# Patient Record
Sex: FEMALE | Race: BLACK OR AFRICAN AMERICAN | NOT HISPANIC OR LATINO | Employment: FULL TIME | URBAN - METROPOLITAN AREA
[De-identification: names, ages, dates, MRNs, and addresses within clinical notes are randomized per-mention and may not be internally consistent; named-entity substitution may affect disease eponyms.]

---

## 2018-04-19 ENCOUNTER — OFFICE VISIT (OUTPATIENT)
Dept: OBSTETRICS AND GYNECOLOGY | Facility: CLINIC | Age: 48
End: 2018-04-19
Payer: MEDICAID

## 2018-04-19 ENCOUNTER — HOSPITAL ENCOUNTER (OUTPATIENT)
Dept: RADIOLOGY | Facility: HOSPITAL | Age: 48
Discharge: HOME | End: 2018-04-19
Attending: NURSE PRACTITIONER
Payer: MEDICAID

## 2018-04-19 VITALS
DIASTOLIC BLOOD PRESSURE: 72 MMHG | WEIGHT: 177.25 LBS | SYSTOLIC BLOOD PRESSURE: 120 MMHG | HEIGHT: 65 IN | BODY MASS INDEX: 29.53 KG/M2

## 2018-04-19 DIAGNOSIS — Z01.419 WELL WOMAN EXAM WITH ROUTINE GYNECOLOGICAL EXAM: Primary | ICD-10-CM

## 2018-04-19 DIAGNOSIS — R53.83 FATIGUE, UNSPECIFIED TYPE: ICD-10-CM

## 2018-04-19 DIAGNOSIS — N95.1 PERIMENOPAUSE: ICD-10-CM

## 2018-04-19 DIAGNOSIS — R63.5 WEIGHT GAIN, ABNORMAL: ICD-10-CM

## 2018-04-19 DIAGNOSIS — N94.6 DYSMENORRHEA: ICD-10-CM

## 2018-04-19 DIAGNOSIS — Z86.018 HISTORY OF UTERINE FIBROID: ICD-10-CM

## 2018-04-19 DIAGNOSIS — Z12.39 BREAST CANCER SCREENING: ICD-10-CM

## 2018-04-19 DIAGNOSIS — N92.1 METRORRHAGIA: ICD-10-CM

## 2018-04-19 DIAGNOSIS — Z87.42 STATUS POST CERVICAL POLYP REMOVAL: ICD-10-CM

## 2018-04-19 DIAGNOSIS — F32.A DEPRESSION, UNSPECIFIED DEPRESSION TYPE: ICD-10-CM

## 2018-04-19 DIAGNOSIS — R41.3 MEMORY LOSS: ICD-10-CM

## 2018-04-19 DIAGNOSIS — N89.8 VAGINAL DISCHARGE: ICD-10-CM

## 2018-04-19 DIAGNOSIS — K59.00 CONSTIPATION, UNSPECIFIED CONSTIPATION TYPE: ICD-10-CM

## 2018-04-19 DIAGNOSIS — Z11.3 SCREENING FOR STDS (SEXUALLY TRANSMITTED DISEASES): ICD-10-CM

## 2018-04-19 DIAGNOSIS — Z98.890 STATUS POST CERVICAL POLYP REMOVAL: ICD-10-CM

## 2018-04-19 LAB
CANDIDA RRNA VAG QL PROBE: NEGATIVE
G VAGINALIS RRNA GENITAL QL PROBE: NEGATIVE
T VAGINALIS RRNA GENITAL QL PROBE: NEGATIVE

## 2018-04-19 PROCEDURE — 99213 OFFICE O/P EST LOW 20 MIN: CPT | Mod: PBBFAC | Performed by: NURSE PRACTITIONER

## 2018-04-19 PROCEDURE — 77067 SCR MAMMO BI INCL CAD: CPT | Mod: TC

## 2018-04-19 PROCEDURE — 99396 PREV VISIT EST AGE 40-64: CPT | Mod: 25,S$PBB,, | Performed by: NURSE PRACTITIONER

## 2018-04-19 PROCEDURE — 87480 CANDIDA DNA DIR PROBE: CPT

## 2018-04-19 PROCEDURE — 77067 SCR MAMMO BI INCL CAD: CPT | Mod: 26,,, | Performed by: RADIOLOGY

## 2018-04-19 PROCEDURE — 88305 TISSUE EXAM BY PATHOLOGIST: CPT | Performed by: PATHOLOGY

## 2018-04-19 PROCEDURE — 87491 CHLMYD TRACH DNA AMP PROBE: CPT

## 2018-04-19 PROCEDURE — 99999 PR PBB SHADOW E&M-EST. PATIENT-LVL III: CPT | Mod: PBBFAC,,, | Performed by: NURSE PRACTITIONER

## 2018-04-19 PROCEDURE — 88305 TISSUE EXAM BY PATHOLOGIST: CPT | Mod: 26,,, | Performed by: PATHOLOGY

## 2018-04-19 PROCEDURE — 77063 BREAST TOMOSYNTHESIS BI: CPT | Mod: 26,,, | Performed by: RADIOLOGY

## 2018-04-19 PROCEDURE — 87510 GARDNER VAG DNA DIR PROBE: CPT

## 2018-04-19 PROCEDURE — 88175 CYTOPATH C/V AUTO FLUID REDO: CPT

## 2018-04-19 NOTE — PROGRESS NOTES
"HISTORY OF PRESENT ILLNESS:    Jess Mcleod is a 48 y.o. female, , Patient's last menstrual period was 2018 (exact date).,  presents for a routine exam, STD screening ( from  who lives in [place]) and has no new gyn complaints.  -See Problem note for ROS and Exam.    Past Medical History:   Diagnosis Date    Arthritis     Chronic back pain     Chronic neck pain     Fibrocystic breast     Fibroid, uterus     GERD (gastroesophageal reflux disease)     Herpes     Migraine headache     Scoliosis        Past Surgical History:   Procedure Laterality Date     SECTION      Scoliosis surgery      UMBILICAL HERNIA REPAIR         MEDICATIONS AND ALLERGIES:      Current Outpatient Prescriptions:     diazepam (VALIUM) 5 MG tablet, Take 1 tablet (5 mg total) by mouth every 12 (twelve) hours as needed (muscle spasm)., Disp: 6 tablet, Rfl: 0    Review of patient's allergies indicates:   Allergen Reactions    Ibuprofen Other (See Comments)     It makes my "ears hurt" when I take large doses.       Family History   Problem Relation Age of Onset    Breast cancer Paternal Aunt     Colon cancer Neg Hx     Ovarian cancer Neg Hx        Social History     Social History    Marital status: Legally      Spouse name: N/A    Number of children: N/A    Years of education: N/A     Occupational History    Not on file.     Social History Main Topics    Smoking status: Never Smoker    Smokeless tobacco: Never Used    Alcohol use Yes    Drug use: No    Sexual activity: Yes     Partners: Male     Birth control/ protection: None     Other Topics Concern    Not on file     Social History Narrative    No narrative on file       OB HISTORY: Number of vaginal deliveries:1 Number of C/S:1     COMPREHENSIVE GYN HISTORY:  PAP History: Denies abnormal Paps. LAST PAP -16 NORMAL.  Infection History: Denies STDs. Denies PID.  Benign History: Reports uterine fibroids. Denies ovarian " "cysts. Denies endometriosis. Denies other conditions.  Cancer History: Denies cervical cancer. Denies uterine cancer or hyperplasia. Denies ovarian cancer. Denies vulvar cancer or pre-cancer. Denies vaginal cancer or pre-cancer. Denies breast cancer. Denies colon cancer.  Sexual Activity History: Reports currently being sexually active  Menstrual History: Monthly. Flows 7 - 10 days. Heavy flow.   Dysmenorrhea History: Reports severe dysmenorrhea.   Contraception: Condoms.     ROS: See note below.     /72   Ht 5' 4.5" (1.638 m)   Wt 80.4 kg (177 lb 4 oz)   LMP 2018 (Exact Date)   BMI 29.96 kg/m²     PE:  APPEARANCE: Well nourished, well developed, in no acute distress.  AFFECT: WNL, alert and oriented x 3.  SKIN: No acne or hirsutism.  NECK: Neck symmetric, without masses or thyromegaly.  NODES: No inguinal, cervical, axillary or femoral lymph node enlargement.  CHEST: Good respiratory effort.   ABDOMEN: Soft. No tenderness or masses. No hepatosplenomegaly. No hernias.  BREASTS: Symmetrical, no skin changes, visible lesions, palpable masses or nipple discharge bilaterally.  PELVIC: See Below for pelvic exam.  EXTREMITIES: No edema    DIAGNOSIS:   Well Woman Exam with routine gyn exam  Perimenopause  STD screening  History of Uterine Fibroid    PLAN:    LABS AND TESTS ORDERED:  Pap  Genprobe  Affirm  HIV Ab, RPR, Hepatitis panel  Mammogram    COUNSELING:  The patient was counseled today on:  -perimenopause vs menopause and to report severe vasomotor symptoms and/or skipping periods, heavy, prolonged bleeding, spotting in between periods;  -STDs and prevention;  -A.C.S. Pap and pelvic exam guidelines (pap every 3 years), recomendations for yearly mammogram;  -to follow up with her PCP for other health maintenance.    FOLLOW-UP with me annually.           HISTORY OF PRESENT ILLNESS:    Jess Mcleod is a 48 y.o. female, , Patient's last menstrual period was 2018 (exact date).,  presents for " "a routine exam and c/o:  -heavy painful periods (with use of double protection, clots, severe cramping for 7 days) and reports losing two jobs because of either being too fatigued to work or bleeding too heavy and in too much pain to work for 2 weeks out of each month, resulting in mood swings, memory loss and depression.  -no longer taking Ortho Micronor "because it didn't help".  -has gained weight, is constipated and is miserable.    See above note for PH, SH, FH, SH, MEDS, ALLERGIES, OB GYN HX.    ROS:  GENERAL: + WT GAIN. No swelling. + FATIGUE. No fever. + MEMORY LOSS.  CARDIOVASCULAR: No chest pain. No shortness of breath. No leg cramps.   NEUROLOGICAL: + HAS MIGRAINE HEADACHES.  BREASTS: No pain. No lumps. No discharge.  ABDOMEN: + MENSTRUAL CRAMPS. No nausea. No vomiting. No diarrhea. + CONSTIPATION.  REPRODUCTIVE: + HEAVY PERIODS.   VULVA: No pain. No lesions. No itching.  VAGINA: No relaxation. No itching. + ODOR and D/C. No lesions. + DRYNESS with her period.  URINARY: No incontinence. No nocturia. No frequency. No dysuria.    /72   Ht 5' 4.5" (1.638 m)   Wt 80.4 kg (177 lb 4 oz)   LMP 2018 (Exact Date)   BMI 29.96 kg/m²     PE:  APPEARANCE: Well nourished, well developed, in no acute distress.  AFFECT: WNL, alert and oriented x 3.  PELVIC: External female genitalia without lesions.  Female hair distribution. Adequate perineal body, Normal urethral meatus. Vagina moist and well rugated without lesions or discharge.  No significant cystocele or rectocele present. Cervix pink with a BEEFY RED POLYP ON A STALK AT OS WHICH BLEEDS WITH Q TIP TOUCH.  No discharge or tenderness. Uterus is 12-14 week size, regular, mobile and non tender. Adnexa bilaterally without masses or tenderness.    PROCEDURES:  CERVICAL POLYP REMOVAL:    Jess Mcleod is a 48 y.o. [id number] Patient's last menstrual period was 2018 (exact date). presents today for a cervical polyp removal; polyp was discovered " during a routine gyn exam.    PRE CERVICAL POLYP REMOVAL COUNSELING:  The patient was informed of the minimal risk of bleeding and infection and she agrees to proceed.    PROCEDURE:  A time out was performed  The cervical polyp was grasped at the base with a ring forceps and easily twisted off  from its base with minimal bleeding.  The base of the polyp was cauterized with silver nitrite to stop bleeding.  The specimen was placed in formalyn and sent to Pathology for histology evaluation.    The patient tolerated the procedure well.     PROCEDURE:  ENDOMETRIAL BIOPSY:    PRE ENDOMETRIAL BIOPSY COUNSELING:  The patient was informed of the risk of bleeding, infection, uterine perforation and pain and that the test will rule-out endometrial cancer with accuracy greater than 95%. She was counseled on the alternatives to endometrial biopsy and agrees to proceed.    PROCEDURE:  UPT negative  A time out was performed  The cervix was visualized with a speculum.  A single tooth tenaculum was placed on the anterior lip prior to the biopsy.  A sterile sound was used to gently dilate the cervical os and sound the uterus prior to obtaining the biopsy.  A sterile endometrial pipelle was passed without difficulty to a depth of 8 cm.  Adequate endometrial tissue was obtained.  The specimen was placed in formalyn and sent to Pathology for histology evaluation.    The patient tolerated the procedure well.    DIAGNOSIS:  Metrorrhagia / Dysmenorrhea  S/P cervical polyp removal  Fatigue   Mood swings / Depression  Memory Loss   Wt gain  Constipation    PLAN:    LABS AND TESTS ORDERED:  Pelvic US  CBC, CMP, TSH  Endometrial biopsy  Cervical biopsy      COUNSELING:  Options for treatment of AUB including combination hormonal Rx, Mirena IUD, cylcic progestins, NSAIDs and surgical intervention with D&C/Hysteroscope, Endometrial ablation or Hysterectomy and she is INTERESTED IN A HYSTERECTOMY.    POST CERVICAL POLYP REMOVAL  COUNSELING:  Expect spotting or light bleeding for a few days.  Report bleeding heavier than a period, fever > 101.0 F, pain or a foul smelling vaginal  discharge.    POST ENDOMETRIAL BIOPSY COUNSELING:  Manage post biopsy cramping with NSAIDs or Tylenol.  Expect spotting or light bleeding for a few days.  Report bleeding heavier than a period, fever > 101.0 F, worsening pain or a foul smelling vaginal discharge.    FOLLOW-UP: pending pathology, US and lab results and with Dr FREIDA Ricardo for a surgery consult and with Neurologist for memory loss work up and PCP for management of fatigue, mood swings, wt gain, constipation, depression.

## 2018-04-20 LAB
C TRACH DNA SPEC QL NAA+PROBE: NOT DETECTED
N GONORRHOEA DNA SPEC QL NAA+PROBE: NOT DETECTED

## 2018-04-23 ENCOUNTER — TELEPHONE (OUTPATIENT)
Dept: RADIOLOGY | Facility: HOSPITAL | Age: 48
End: 2018-04-23

## 2018-04-23 NOTE — TELEPHONE ENCOUNTER
Spoke with patient and explained mammogram findings.Patient expressed understanding of results. Patient is scheduled for a abnormal mammogram follow up appointment at [facility] on 4/24/18.

## 2018-04-24 ENCOUNTER — HOSPITAL ENCOUNTER (OUTPATIENT)
Dept: RADIOLOGY | Facility: HOSPITAL | Age: 48
Discharge: HOME | End: 2018-04-24
Attending: NURSE PRACTITIONER
Payer: MEDICAID

## 2018-04-24 ENCOUNTER — TELEPHONE (OUTPATIENT)
Dept: OBSTETRICS AND GYNECOLOGY | Facility: CLINIC | Age: 48
End: 2018-04-24

## 2018-04-24 DIAGNOSIS — R92.8 ABNORMAL MAMMOGRAM: ICD-10-CM

## 2018-04-24 PROCEDURE — 77065 DX MAMMO INCL CAD UNI: CPT | Mod: 26,LT,, | Performed by: RADIOLOGY

## 2018-04-24 PROCEDURE — 77061 BREAST TOMOSYNTHESIS UNI: CPT | Mod: TC,PO,LT

## 2018-04-24 PROCEDURE — 77061 BREAST TOMOSYNTHESIS UNI: CPT | Mod: 26,LT,, | Performed by: RADIOLOGY

## 2018-04-24 PROCEDURE — 76642 ULTRASOUND BREAST LIMITED: CPT | Mod: 26,LT,, | Performed by: RADIOLOGY

## 2018-04-24 PROCEDURE — 76642 ULTRASOUND BREAST LIMITED: CPT | Mod: TC,PO,LT

## 2018-04-24 PROCEDURE — 77065 DX MAMMO INCL CAD UNI: CPT | Mod: TC,PO,LT

## 2018-04-24 NOTE — TELEPHONE ENCOUNTER
PHONE CALL: Unable to LM on cell # or home #.    MY CHART MESSAGE:  I could not reach you by phone. Your endometrial biopsy is negative for cancer or precancer. The pathology report of the polyp was benign. Please either e-mail me or call if you have questions. Beckie Mcgarry NP.

## 2018-04-26 ENCOUNTER — TELEPHONE (OUTPATIENT)
Dept: OBSTETRICS AND GYNECOLOGY | Facility: CLINIC | Age: 48
End: 2018-04-26

## 2018-04-26 ENCOUNTER — HOSPITAL ENCOUNTER (OUTPATIENT)
Dept: RADIOLOGY | Facility: HOSPITAL | Age: 48
Discharge: HOME | End: 2018-04-26
Attending: NURSE PRACTITIONER
Payer: MEDICAID

## 2018-04-26 DIAGNOSIS — Z86.018 HISTORY OF UTERINE FIBROID: ICD-10-CM

## 2018-04-26 DIAGNOSIS — N94.6 DYSMENORRHEA: ICD-10-CM

## 2018-04-26 DIAGNOSIS — N83.202 CYST OF LEFT OVARY: Primary | ICD-10-CM

## 2018-04-26 DIAGNOSIS — N92.1 METRORRHAGIA: ICD-10-CM

## 2018-04-26 PROCEDURE — 76830 TRANSVAGINAL US NON-OB: CPT | Mod: 26,,, | Performed by: RADIOLOGY

## 2018-04-26 PROCEDURE — 76830 TRANSVAGINAL US NON-OB: CPT | Mod: TC

## 2018-04-26 PROCEDURE — 76856 US EXAM PELVIC COMPLETE: CPT | Mod: 26,,, | Performed by: RADIOLOGY

## 2018-04-27 ENCOUNTER — PATIENT MESSAGE (OUTPATIENT)
Dept: OBSTETRICS AND GYNECOLOGY | Facility: CLINIC | Age: 48
End: 2018-04-27

## 2018-05-07 ENCOUNTER — OFFICE VISIT (OUTPATIENT)
Dept: OBSTETRICS AND GYNECOLOGY | Facility: CLINIC | Age: 48
End: 2018-05-07
Attending: OBSTETRICS & GYNECOLOGY
Payer: MEDICAID

## 2018-05-07 VITALS
DIASTOLIC BLOOD PRESSURE: 84 MMHG | SYSTOLIC BLOOD PRESSURE: 122 MMHG | BODY MASS INDEX: 29.61 KG/M2 | WEIGHT: 177.69 LBS | HEIGHT: 65 IN

## 2018-05-07 DIAGNOSIS — D25.9 UTERINE LEIOMYOMA, UNSPECIFIED LOCATION: ICD-10-CM

## 2018-05-07 DIAGNOSIS — N92.0 MENORRHAGIA WITH REGULAR CYCLE: ICD-10-CM

## 2018-05-07 DIAGNOSIS — N94.6 DYSMENORRHEA: Primary | ICD-10-CM

## 2018-05-07 PROCEDURE — 99214 OFFICE O/P EST MOD 30 MIN: CPT | Mod: S$PBB,,, | Performed by: OBSTETRICS & GYNECOLOGY

## 2018-05-07 PROCEDURE — 99999 PR PBB SHADOW E&M-EST. PATIENT-LVL III: CPT | Mod: PBBFAC,,, | Performed by: OBSTETRICS & GYNECOLOGY

## 2018-05-07 PROCEDURE — 99213 OFFICE O/P EST LOW 20 MIN: CPT | Mod: PBBFAC | Performed by: OBSTETRICS & GYNECOLOGY

## 2018-05-07 RX ORDER — LEVONORGESTREL AND ETHINYL ESTRADIOL 0.1-0.02MG
1 KIT ORAL DAILY
Qty: 28 TABLET | Refills: 11 | Status: SHIPPED | OUTPATIENT
Start: 2018-05-07 | End: 2019-05-14

## 2018-05-07 NOTE — PROGRESS NOTES
"HISTORY OF PRESENT ILLNESS:    Jess Mcleod is a 48 y.o. female, , Patient's last menstrual period was 2018.,  presents for a consult.  Patient with long history of dysmenorrhea.  For the last 2 years cycles have gotten significantly worse in terms of pain and bleeding.  Cycles are regular every month, lasting 7 days, heavy with clots and flooding.  Patient with history of fibroids since .    U/S:  Uterus:  Size: 11.9 x 7.9 x 9.1 cm.  Redemonstration of 3 previously identified fibroids the largest of which measures 4.5 x 4.5 x 4.8 cm.  Interval development of a new fibroid measuring 1.8 x 2.8 x 2.9 cm.  Endometrium: Normal in this pre menopausal patient, measuring 9 mm.  Right ovary:  Size: 4.0 x 4.0 x 2.5 cm.  Appearance: Normal.  Vascular flow: Normal.  Resistive index is 0.68.  Left ovary:  Size: 2.9 x 2.2 x 2.2 cm.  Appearance: New echogenic foci within the left ovary measuring 1.1 x 0.8 x 1.3 cm which is nonspecific however may represent an involuted follicle.  Vascular Flow: Normal.  Resistive index is 0.74.  Free Fluid: None.    TSH normal.  HCT 37&  EMB benign    Past Medical History:   Diagnosis Date    Arthritis     Chronic back pain     Chronic neck pain     Fibrocystic breast     Fibroid, uterus     GERD (gastroesophageal reflux disease)     Herpes     Migraine headache     Scoliosis        Past Surgical History:   Procedure Laterality Date     SECTION      Scoliosis surgery      UMBILICAL HERNIA REPAIR         MEDICATIONS AND ALLERGIES:      Current Outpatient Prescriptions:     levonorgestrel-ethinyl estradiol (AVIANE,ALESSE,LESSINA) 0.1-20 mg-mcg per tablet, Take 1 tablet by mouth once daily., Disp: 28 tablet, Rfl: 11    Review of patient's allergies indicates:   Allergen Reactions    Ibuprofen Other (See Comments)     It makes my "ears hurt" when I take large doses.       Family History   Problem Relation Age of Onset    Breast cancer Paternal Aunt     Colon " "cancer Neg Hx     Ovarian cancer Neg Hx        Social History     Social History    Marital status: Legally      Spouse name: N/A    Number of children: N/A    Years of education: N/A     Occupational History    Not on file.     Social History Main Topics    Smoking status: Never Smoker    Smokeless tobacco: Never Used    Alcohol use Yes    Drug use: No    Sexual activity: Yes     Partners: Male     Birth control/ protection: None     Other Topics Concern    Not on file     Social History Narrative    No narrative on file       ROS:  GENERAL: No weight changes. No swelling. . No fever.  Reports fatigue  CARDIOVASCULAR: No chest pain. No shortness of breath. No leg cramps.   NEUROLOGICAL: No headaches. No vision changes.  BREASTS: No pain. No lumps. No discharge.  ABDOMEN: No pain. No nausea. No vomiting. No diarrhea. No constipation.  REPRODUCTIVE: see above  VULVA: No pain. No lesions. No itching.  VAGINA: No relaxation. No itching. No odor. No discharge. No lesions.  URINARY: No incontinence. No nocturia. No frequency. No dysuria.    /84   Ht 5' 4.5" (1.638 m)   Wt 80.6 kg (177 lb 11.1 oz)   LMP 05/04/2018   BMI 30.03 kg/m²     PE:  APPEARANCE: Well nourished, well developed, in no acute distress.  ABDOMEN: Soft. No tenderness or masses. No hepatosplenomegaly. No hernias.  BREASTS, FUNDOSCOPIC, RECTAL DEFERRED  PELVIC: External female genitalia without lesions.  Female hair distribution. Adequate perineal body, Normal urethral meatus. Vagina moist and well rugated without lesions or discharge.  No significant cystocele or rectocele present. Cervix pink without lesions, discharge or tenderness. Uterus is 14-16 week size, regular, immobile and nontender. Adnexa without masses or tenderness.  EXTREMITIES: No edema      DIAGNOSIS & PLAN  1. Dysmenorrhea     2. Menorrhagia with regular cycle     3. Uterine leiomyoma, unspecified location         COUNSELING:  Discussed options for " treatment including medical management with OCPs vs. Hysterectomy.  Patient desires trial of medical management.

## 2018-05-19 ENCOUNTER — HOSPITAL ENCOUNTER (OUTPATIENT)
Facility: HOSPITAL | Age: 48
Discharge: HOME | End: 2018-05-20
Attending: EMERGENCY MEDICINE | Admitting: EMERGENCY MEDICINE
Payer: MEDICAID

## 2018-05-19 DIAGNOSIS — K76.0 HEPATIC STEATOSIS: ICD-10-CM

## 2018-05-19 DIAGNOSIS — M54.41 ACUTE RIGHT-SIDED LOW BACK PAIN WITH RIGHT-SIDED SCIATICA: ICD-10-CM

## 2018-05-19 DIAGNOSIS — N13.30 HYDROURETERONEPHROSIS: ICD-10-CM

## 2018-05-19 DIAGNOSIS — N20.0 NEPHROLITHIASIS: ICD-10-CM

## 2018-05-19 DIAGNOSIS — N20.1 LEFT URETERAL STONE: ICD-10-CM

## 2018-05-19 DIAGNOSIS — M54.41 CHRONIC RIGHT-SIDED LOW BACK PAIN WITH RIGHT-SIDED SCIATICA: ICD-10-CM

## 2018-05-19 DIAGNOSIS — D25.9 UTERINE LEIOMYOMA, UNSPECIFIED LOCATION: ICD-10-CM

## 2018-05-19 DIAGNOSIS — K59.04 CHRONIC IDIOPATHIC CONSTIPATION: ICD-10-CM

## 2018-05-19 DIAGNOSIS — N20.0 KIDNEY STONE ON LEFT SIDE: Primary | ICD-10-CM

## 2018-05-19 DIAGNOSIS — R11.2 NAUSEA AND VOMITING, INTRACTABILITY OF VOMITING NOT SPECIFIED, UNSPECIFIED VOMITING TYPE: ICD-10-CM

## 2018-05-19 DIAGNOSIS — R10.9 ABDOMINAL PAIN: ICD-10-CM

## 2018-05-19 DIAGNOSIS — G89.29 CHRONIC RIGHT-SIDED LOW BACK PAIN WITH RIGHT-SIDED SCIATICA: ICD-10-CM

## 2018-05-19 DIAGNOSIS — M54.31 SCIATICA OF RIGHT SIDE: ICD-10-CM

## 2018-05-19 DIAGNOSIS — K21.9 GASTROESOPHAGEAL REFLUX DISEASE, ESOPHAGITIS PRESENCE NOT SPECIFIED: ICD-10-CM

## 2018-05-19 PROBLEM — M54.50 ACUTE RIGHT-SIDED LOW BACK PAIN: Status: ACTIVE | Noted: 2018-05-19

## 2018-05-19 LAB
ALBUMIN SERPL BCP-MCNC: 3.6 G/DL
ALP SERPL-CCNC: 77 U/L
ALT SERPL W/O P-5'-P-CCNC: 16 U/L
ANION GAP SERPL CALC-SCNC: 11 MMOL/L
AST SERPL-CCNC: 21 U/L
B-HCG UR QL: NEGATIVE
BACTERIA #/AREA URNS AUTO: ABNORMAL /HPF
BASOPHILS # BLD AUTO: 0.04 K/UL
BASOPHILS NFR BLD: 0.3 %
BILIRUB SERPL-MCNC: 0.3 MG/DL
BILIRUB UR QL STRIP: NEGATIVE
BUN SERPL-MCNC: 9 MG/DL
CALCIUM SERPL-MCNC: 10.8 MG/DL
CHLORIDE SERPL-SCNC: 105 MMOL/L
CLARITY UR REFRACT.AUTO: CLEAR
CO2 SERPL-SCNC: 24 MMOL/L
COLOR UR AUTO: ABNORMAL
CREAT SERPL-MCNC: 0.8 MG/DL
DIFFERENTIAL METHOD: ABNORMAL
EOSINOPHIL # BLD AUTO: 0 K/UL
EOSINOPHIL NFR BLD: 0.2 %
ERYTHROCYTE [DISTWIDTH] IN BLOOD BY AUTOMATED COUNT: 13.5 %
EST. GFR  (AFRICAN AMERICAN): >60 ML/MIN/1.73 M^2
EST. GFR  (NON AFRICAN AMERICAN): >60 ML/MIN/1.73 M^2
GLUCOSE SERPL-MCNC: 97 MG/DL
GLUCOSE UR QL STRIP: NEGATIVE
HCT VFR BLD AUTO: 39.4 %
HGB BLD-MCNC: 13 G/DL
HGB UR QL STRIP: ABNORMAL
IMM GRANULOCYTES # BLD AUTO: 0.04 K/UL
IMM GRANULOCYTES NFR BLD AUTO: 0.3 %
KETONES UR QL STRIP: ABNORMAL
LEUKOCYTE ESTERASE UR QL STRIP: NEGATIVE
LIPASE SERPL-CCNC: 8 U/L
LYMPHOCYTES # BLD AUTO: 1.8 K/UL
LYMPHOCYTES NFR BLD: 14.8 %
MCH RBC QN AUTO: 30.3 PG
MCHC RBC AUTO-ENTMCNC: 33 G/DL
MCV RBC AUTO: 92 FL
MICROSCOPIC COMMENT: ABNORMAL
MONOCYTES # BLD AUTO: 0.3 K/UL
MONOCYTES NFR BLD: 2.5 %
NEUTROPHILS # BLD AUTO: 10 K/UL
NEUTROPHILS NFR BLD: 81.9 %
NITRITE UR QL STRIP: NEGATIVE
NRBC BLD-RTO: 0 /100 WBC
PH UR STRIP: >8 [PH] (ref 5–8)
PLATELET # BLD AUTO: 459 K/UL
PMV BLD AUTO: 10 FL
POTASSIUM SERPL-SCNC: 4.2 MMOL/L
PROT SERPL-MCNC: 8.3 G/DL
PROT UR QL STRIP: NEGATIVE
RBC # BLD AUTO: 4.29 M/UL
RBC #/AREA URNS AUTO: >100 /HPF (ref 0–4)
SODIUM SERPL-SCNC: 140 MMOL/L
SP GR UR STRIP: 1.01 (ref 1–1.03)
SQUAMOUS #/AREA URNS AUTO: 0 /HPF
URN SPEC COLLECT METH UR: ABNORMAL
UROBILINOGEN UR STRIP-ACNC: NEGATIVE EU/DL
WBC # BLD AUTO: 12.19 K/UL

## 2018-05-19 PROCEDURE — 96361 HYDRATE IV INFUSION ADD-ON: CPT

## 2018-05-19 PROCEDURE — 81025 URINE PREGNANCY TEST: CPT

## 2018-05-19 PROCEDURE — 99285 EMERGENCY DEPT VISIT HI MDM: CPT | Mod: ,,, | Performed by: EMERGENCY MEDICINE

## 2018-05-19 PROCEDURE — 96375 TX/PRO/DX INJ NEW DRUG ADDON: CPT

## 2018-05-19 PROCEDURE — 25500020 PHARM REV CODE 255: Performed by: EMERGENCY MEDICINE

## 2018-05-19 PROCEDURE — 80053 COMPREHEN METABOLIC PANEL: CPT

## 2018-05-19 PROCEDURE — 25000003 PHARM REV CODE 250: Performed by: STUDENT IN AN ORGANIZED HEALTH CARE EDUCATION/TRAINING PROGRAM

## 2018-05-19 PROCEDURE — C9113 INJ PANTOPRAZOLE SODIUM, VIA: HCPCS | Performed by: EMERGENCY MEDICINE

## 2018-05-19 PROCEDURE — 99285 EMERGENCY DEPT VISIT HI MDM: CPT | Mod: 25

## 2018-05-19 PROCEDURE — 25000003 PHARM REV CODE 250: Performed by: NURSE PRACTITIONER

## 2018-05-19 PROCEDURE — 96366 THER/PROPH/DIAG IV INF ADDON: CPT

## 2018-05-19 PROCEDURE — 83690 ASSAY OF LIPASE: CPT

## 2018-05-19 PROCEDURE — 85025 COMPLETE CBC W/AUTO DIFF WBC: CPT

## 2018-05-19 PROCEDURE — 99220 PR INITIAL OBSERVATION CARE,LEVL III: CPT | Mod: ,,, | Performed by: PHYSICIAN ASSISTANT

## 2018-05-19 PROCEDURE — 25000003 PHARM REV CODE 250: Performed by: EMERGENCY MEDICINE

## 2018-05-19 PROCEDURE — 96365 THER/PROPH/DIAG IV INF INIT: CPT

## 2018-05-19 PROCEDURE — 81001 URINALYSIS AUTO W/SCOPE: CPT

## 2018-05-19 PROCEDURE — 96376 TX/PRO/DX INJ SAME DRUG ADON: CPT

## 2018-05-19 PROCEDURE — G0378 HOSPITAL OBSERVATION PER HR: HCPCS

## 2018-05-19 PROCEDURE — 93005 ELECTROCARDIOGRAM TRACING: CPT

## 2018-05-19 PROCEDURE — 93010 ELECTROCARDIOGRAM REPORT: CPT | Mod: ,,, | Performed by: INTERNAL MEDICINE

## 2018-05-19 PROCEDURE — 63600175 PHARM REV CODE 636 W HCPCS: Performed by: EMERGENCY MEDICINE

## 2018-05-19 RX ORDER — DOCUSATE SODIUM 100 MG/1
100 CAPSULE, LIQUID FILLED ORAL 2 TIMES DAILY
Status: DISCONTINUED | OUTPATIENT
Start: 2018-05-20 | End: 2018-05-20 | Stop reason: HOSPADM

## 2018-05-19 RX ORDER — ONDANSETRON 4 MG/1
4 TABLET, ORALLY DISINTEGRATING ORAL EVERY 8 HOURS PRN
Status: DISCONTINUED | OUTPATIENT
Start: 2018-05-20 | End: 2018-05-20 | Stop reason: HOSPADM

## 2018-05-19 RX ORDER — TAMSULOSIN HYDROCHLORIDE 0.4 MG/1
0.4 CAPSULE ORAL NIGHTLY
Status: DISCONTINUED | OUTPATIENT
Start: 2018-05-19 | End: 2018-05-20 | Stop reason: HOSPADM

## 2018-05-19 RX ORDER — ACETAMINOPHEN 500 MG
1000 TABLET ORAL
Status: DISCONTINUED | OUTPATIENT
Start: 2018-05-19 | End: 2018-05-20

## 2018-05-19 RX ORDER — RAMELTEON 8 MG/1
8 TABLET ORAL NIGHTLY PRN
Status: DISCONTINUED | OUTPATIENT
Start: 2018-05-20 | End: 2018-05-20 | Stop reason: HOSPADM

## 2018-05-19 RX ORDER — IBUPROFEN 200 MG
24 TABLET ORAL
Status: DISCONTINUED | OUTPATIENT
Start: 2018-05-20 | End: 2018-05-20 | Stop reason: HOSPADM

## 2018-05-19 RX ORDER — SODIUM CHLORIDE 9 MG/ML
INJECTION, SOLUTION INTRAVENOUS CONTINUOUS
Status: DISCONTINUED | OUTPATIENT
Start: 2018-05-20 | End: 2018-05-19

## 2018-05-19 RX ORDER — ONDANSETRON 4 MG/1
4 TABLET, ORALLY DISINTEGRATING ORAL
Status: DISCONTINUED | OUTPATIENT
Start: 2018-05-19 | End: 2018-05-20

## 2018-05-19 RX ORDER — IPRATROPIUM BROMIDE AND ALBUTEROL SULFATE 2.5; .5 MG/3ML; MG/3ML
3 SOLUTION RESPIRATORY (INHALATION) EVERY 4 HOURS PRN
Status: DISCONTINUED | OUTPATIENT
Start: 2018-05-20 | End: 2018-05-20 | Stop reason: HOSPADM

## 2018-05-19 RX ORDER — PANTOPRAZOLE SODIUM 20 MG/1
20 TABLET, DELAYED RELEASE ORAL DAILY
COMMUNITY
End: 2021-06-22

## 2018-05-19 RX ORDER — IBUPROFEN 200 MG
16 TABLET ORAL
Status: DISCONTINUED | OUTPATIENT
Start: 2018-05-20 | End: 2018-05-20 | Stop reason: HOSPADM

## 2018-05-19 RX ORDER — OXYCODONE HYDROCHLORIDE 5 MG/1
5 TABLET ORAL EVERY 4 HOURS PRN
Status: DISCONTINUED | OUTPATIENT
Start: 2018-05-20 | End: 2018-05-20 | Stop reason: HOSPADM

## 2018-05-19 RX ORDER — LIDOCAINE 50 MG/G
1 PATCH TOPICAL DAILY
Status: DISCONTINUED | OUTPATIENT
Start: 2018-05-20 | End: 2018-05-20 | Stop reason: HOSPADM

## 2018-05-19 RX ORDER — ACETAMINOPHEN 325 MG/1
650 TABLET ORAL EVERY 4 HOURS PRN
Status: DISCONTINUED | OUTPATIENT
Start: 2018-05-20 | End: 2018-05-20 | Stop reason: HOSPADM

## 2018-05-19 RX ORDER — SODIUM CHLORIDE 9 MG/ML
INJECTION, SOLUTION INTRAVENOUS CONTINUOUS
Status: DISCONTINUED | OUTPATIENT
Start: 2018-05-20 | End: 2018-05-20 | Stop reason: HOSPADM

## 2018-05-19 RX ORDER — MORPHINE SULFATE 4 MG/ML
4 INJECTION, SOLUTION INTRAMUSCULAR; INTRAVENOUS
Status: COMPLETED | OUTPATIENT
Start: 2018-05-19 | End: 2018-05-19

## 2018-05-19 RX ORDER — TAMSULOSIN HYDROCHLORIDE 0.4 MG/1
0.8 CAPSULE ORAL
Status: COMPLETED | OUTPATIENT
Start: 2018-05-19 | End: 2018-05-19

## 2018-05-19 RX ORDER — AMOXICILLIN 250 MG
1 CAPSULE ORAL 2 TIMES DAILY PRN
Status: DISCONTINUED | OUTPATIENT
Start: 2018-05-20 | End: 2018-05-20 | Stop reason: HOSPADM

## 2018-05-19 RX ORDER — SODIUM CHLORIDE 0.9 % (FLUSH) 0.9 %
5 SYRINGE (ML) INJECTION
Status: DISCONTINUED | OUTPATIENT
Start: 2018-05-20 | End: 2018-05-20 | Stop reason: HOSPADM

## 2018-05-19 RX ORDER — GLUCAGON 1 MG
1 KIT INJECTION
Status: DISCONTINUED | OUTPATIENT
Start: 2018-05-20 | End: 2018-05-20 | Stop reason: HOSPADM

## 2018-05-19 RX ORDER — METOCLOPRAMIDE HYDROCHLORIDE 5 MG/ML
10 INJECTION INTRAMUSCULAR; INTRAVENOUS
Status: COMPLETED | OUTPATIENT
Start: 2018-05-19 | End: 2018-05-19

## 2018-05-19 RX ORDER — POLYETHYLENE GLYCOL 3350 17 G/17G
17 POWDER, FOR SOLUTION ORAL ONCE
Status: COMPLETED | OUTPATIENT
Start: 2018-05-19 | End: 2018-05-20

## 2018-05-19 RX ORDER — PANTOPRAZOLE SODIUM 20 MG/1
20 TABLET, DELAYED RELEASE ORAL DAILY
Status: DISCONTINUED | OUTPATIENT
Start: 2018-05-20 | End: 2018-05-20 | Stop reason: HOSPADM

## 2018-05-19 RX ADMIN — PANTOPRAZOLE SODIUM 40 MG: 40 INJECTION, POWDER, FOR SOLUTION INTRAVENOUS at 05:05

## 2018-05-19 RX ADMIN — TAMSULOSIN HYDROCHLORIDE 0.4 MG: 0.4 CAPSULE ORAL at 11:05

## 2018-05-19 RX ADMIN — MORPHINE SULFATE 4 MG: 4 INJECTION INTRAVENOUS at 09:05

## 2018-05-19 RX ADMIN — TAMSULOSIN HYDROCHLORIDE 0.8 MG: 0.4 CAPSULE ORAL at 10:05

## 2018-05-19 RX ADMIN — MORPHINE SULFATE 4 MG: 4 INJECTION INTRAVENOUS at 04:05

## 2018-05-19 RX ADMIN — IOHEXOL 100 ML: 350 INJECTION, SOLUTION INTRAVENOUS at 08:05

## 2018-05-19 RX ADMIN — SODIUM CHLORIDE 1000 ML: 0.9 INJECTION, SOLUTION INTRAVENOUS at 02:05

## 2018-05-19 RX ADMIN — METOCLOPRAMIDE 10 MG: 5 INJECTION, SOLUTION INTRAMUSCULAR; INTRAVENOUS at 04:05

## 2018-05-19 NOTE — ED NOTES
"Pt identifiers checked and accurate with Jess Mcleod     Pt reports to ED via EMS with complaints of abdominal pain to LLQ x 3 days. Pt reports N/V since Thursday evening, pt reports chronic constipation. Pt reports vaginal pain described as "burning". Pt reports hx cyst on ovary, fibroids, spotting and recent prescription of birth control started on 5/13. Pt denies headache, diarrhea, trouble urinating.     LOC: The patient is awake, alert and aware of environment with an appropriate affect, the patient is oriented x 3 and speaking appropriately.  APPEARANCE: Patient resting comfortably and in no acute distress, patient is clean and well groomed  SKIN: The skin is warm and dry, color consistent with ethnicity, skin intact, no breakdown or bruising noted.  RESPIRATORY: Airway is open and patent, breath sounds clear throughout all lung fields; respirations are even and unlabored  ABDOMEN: Soft and non tender to palpation, no distention noted. Bowel sounds present x 4  NEUROLOGIC: PERRL, 3 mm bilaterally, eyes open spontaneously, behavior appropriate to situation, follows commands    "

## 2018-05-19 NOTE — ED PROVIDER NOTES
"Encounter Date: 2018       History     Chief Complaint   Patient presents with    Abdominal Pain     Left lower abdominal pain x 3 days.  LBM three days ago.  Patients denies trauma.     HPI   This is a 48 year old female presenting to the emergency department today for evaluation of abdominal pain. She states that her pain began on Thursday after eating chicken salad.  She states that she vomited after eating it which improved her pain. She states that again today she ate the same chicken salad and subsequently experienced the same abdominal pain as well as an episode of vomiting. She states that she continues to have a left lower quadrant abdominal pain. The pain is intermittent and burning.  She denies diarrhea.  States her bowel movements have been normal for her, last BM was 2-3 days ago.  She suffers with chronic constipation.  She denies fever or chills. She does complain of a burning pain to the vaginal area.  She denies any dysuria or frequency.  Denies flank pain. She reports that she suffers from chronic back pain which typically radiates to the lower extremities secondary to scoliosis and flat back syndrome.  Denies changes in her bladder or bowel function.  Denies weakness or numbness of the lower extremities.  Denies saddle anesthesia.  Denies recent trauma.  She denies any other problems or concerns this time.    Review of patient's allergies indicates:   Allergen Reactions    Ibuprofen Other (See Comments)     It makes my "ears hurt" when I take large doses.     Past Medical History:   Diagnosis Date    Arthritis     Chronic back pain     Chronic neck pain     Fibrocystic breast     Fibroid, uterus     GERD (gastroesophageal reflux disease)     Herpes     Migraine headache     Scoliosis      Past Surgical History:   Procedure Laterality Date     SECTION      Scoliosis surgery      UMBILICAL HERNIA REPAIR       Family History   Problem Relation Age of Onset    Breast " cancer Paternal Aunt     Colon cancer Neg Hx     Ovarian cancer Neg Hx      Social History   Substance Use Topics    Smoking status: Never Smoker    Smokeless tobacco: Never Used    Alcohol use Yes     Review of Systems   Constitutional: Negative for activity change, chills and fever.   HENT: Negative for congestion and sore throat.    Eyes: Negative for visual disturbance.   Respiratory: Negative for chest tightness and shortness of breath.    Cardiovascular: Negative for chest pain.   Gastrointestinal: Positive for abdominal pain, nausea and vomiting.   Genitourinary: Positive for vaginal bleeding. Negative for difficulty urinating, dysuria, frequency and vaginal discharge.   Neurological: Negative for dizziness, weakness and light-headedness.   Hematological: Negative.    Psychiatric/Behavioral: Negative.    All other systems reviewed and are negative.     Constitutional: Negative for chills and fever.   HENT: Negative for congestion and sinus pressure.    Eyes: Negative for visual disturbance.   Respiratory: Negative for cough, chest tightness and shortness of breath.    Cardiovascular: Negative for chest pain.   Gastrointestinal:  Positive for abdominal pain. Positive for nausea vomiting.  Negative diarrhea.  Negative for rectal bleeding.  Genitourinary: Negative for flank pain. Negative for dysuria. negative for vaginal discharge. Positive for vaginal pain.  Musculoskeletal: Negative for arthralgias and myalgias.   Skin: Negative for rash and wound.   Neurological: Negative for dizziness. Negative for weakness and numbness.   Psychiatric/Behavioral: Negative for confusion.         Physical Exam     Initial Vitals [05/19/18 1353]   BP Pulse Resp Temp SpO2   (!) 144/93 88 16 97.9 °F (36.6 °C) 98 %      MAP       110         Physical Exam    Nursing note and vitals reviewed.  Constitutional: She appears well-developed and well-nourished.   HENT:   Head: Normocephalic and atraumatic.   Nose: Nose normal.    Eyes: Conjunctivae and EOM are normal. Pupils are equal, round, and reactive to light.   Neck: Normal range of motion. Neck supple.   Cardiovascular: Normal rate, regular rhythm and normal heart sounds.   Pulmonary/Chest: Breath sounds normal. No respiratory distress. She has no wheezes. She has no rhonchi. She exhibits no tenderness.   Abdominal: Soft. Bowel sounds are normal. There is tenderness. There is guarding.   LLQ and epigastric tenderness to palpation.   Genitourinary:   Genitourinary Comments: Deferred.   Musculoskeletal: Normal range of motion.   Neurological: She is alert and oriented to person, place, and time. She has normal strength.   Skin: Skin is warm and dry. Capillary refill takes less than 2 seconds.   Psychiatric: She has a normal mood and affect. Thought content normal.        Nursing note and vitals reviewed.  Constitutional: Patient appears well-developed and well-nourished. Not diaphoretic. No distress.   HENT:   Head: Normocephalic and atraumatic.   Eyes: Conjunctivae are normal. No scleral icterus.   Neck: Normal range of motion. Neck supple.   Cardiovascular: Normal rate, regular rhythm and normal heart sounds.   Pulmonary/Chest: Breath sounds normal. No respiratory distress.  Abdominal:  The abdomen is soft and nondistended. Normal bowel sounds in all 4 quadrants.  Patient has slight tenderness to palpation in the left lower quadrant of the abdomen.  There is no rebound or guarding.  Genitourinary:  Normal external female genitalia.  No swelling, erythema or lesions noted. No CVA tenderness.  Musculoskeletal: Normal range of motion. No edema. No bony spinal tenderness, crepitus, deformity or step-off noted. Normal extremity exam.  Neurological: Alert and oriented to person, place, and time. Normal strength. No sensory deficit.   Skin: Skin is warm and dry. No rash noted. No erythema. No pallor.   Psychiatric: Normal mood and affect. Thought content normal.     ED Course    Procedures  Labs Reviewed   CBC W/ AUTO DIFFERENTIAL - Abnormal; Notable for the following:        Result Value    Platelets 459 (*)     Gran # (ANC) 10.0 (*)     Gran% 81.9 (*)     Lymph% 14.8 (*)     Mono% 2.5 (*)     All other components within normal limits   COMPREHENSIVE METABOLIC PANEL - Abnormal; Notable for the following:     Calcium 10.8 (*)     All other components within normal limits   URINALYSIS, REFLEX TO URINE CULTURE - Abnormal; Notable for the following:     pH, UA >8.0 (*)     Ketones, UA 1+ (*)     Occult Blood UA 3+ (*)     All other components within normal limits    Narrative:     Preferred Collection Type->Urine, Clean Catch   URINALYSIS MICROSCOPIC - Abnormal; Notable for the following:     RBC, UA >100 (*)     All other components within normal limits    Narrative:     Preferred Collection Type->Urine, Clean Catch   LIPASE   PREGNANCY TEST, URINE RAPID   PREGNANCY TEST, URINE RAPID    Narrative:     Preferred Collection Type->Urine, Clean Catch  PREGU ADD ON PER Dr. Shelli MD  05/19/2018  16:51    POCT URINE PREGNANCY         Imaging Results          CT Abdomen Pelvis With Contrast (Final result)  Result time 05/19/18 21:11:11    Final result by Ganesh Basurto MD (05/19/18 21:11:11)                 Impression:      Findings of left obstructive uropathy, likely secondary to a 0.4 cm calculus either within the distal left ureter or at the left UVJ.  There is perinephric stranding and urothelial enhancement, as well as urinary bladder wall thickening, correlation with urinalysis recommended to exclude superimposed infection.  There is additional left nonobstructive nephrolithiasis.    Hepatic steatosis, correlation with LFTs recommended.    Enlargement of the uterus, correlation with phase of menstrual cycle recommended.    Several additional findings above.      Electronically signed by: Ganesh Basurto MD  Date:    05/19/2018  Time:    21:11             Narrative:    EXAMINATION:  CT  ABDOMEN PELVIS WITH CONTRAST    CLINICAL HISTORY:  Abdominal pain, unspecified;    TECHNIQUE:  Low dose axial images, sagittal and coronal reformations were obtained from the lung bases to the pubic symphysis following the IV administration of 100 mL of Omnipaque 350 and the oral administration of 30 mL of Omnipaque 350.    COMPARISON:  None.    FINDINGS:  Images of the lower thorax are remarkable for bilateral dependent atelectasis/scarring.    The liver is hypoattenuating, suggesting steatosis, correlation with LFTs recommended.  The spleen, pancreas, gallbladder and adrenal are grossly unremarkable.  There is retained contrast within the gastric lumen, could reflect delayed gastric emptying or outlet obstruction, correlation recommended there is small hiatal hernia.  No significant abdominal lymphadenopathy.  Portal vein, splenic vein, celiac axis and SMA all are patent.    The kidneys enhance asymmetrically, noting delayed enhancement of the left kidney, and no significant excretion of contrast by the left kidney on delayed imaging.  The right kidney enhances excretes contrast appropriately.  There is no right nephrolithiasis or hydronephrosis, and the right ureter is grossly unremarkable without definite calculi seen.  There is diffuse left perinephric inflammation and moderate left hydroureteronephrosis.  There is nonobstructing left nephrolithiasis.  There is a calculus either within the distal left ureter, or just at the left UVJ, measuring 0.4 cm.  This could reflect 2 adjacent calculi as there is a somewhat beaded appearance.  There is left ureteral urothelial enhancement.  The urinary bladder is decompressed although the wall is mildly thickened, correlation with urinalysis to exclude cystitis.  The uterus and adnexa is grossly unremarkable noting the uterus is prominent, correlation with phase of menstrual cycle recommended.  No significant free fluid in the pelvis.    The large bowel is grossly  unremarkable.  The appendix and terminal ileum are grossly unremarkable.  The small bowel is grossly unremarkable.  Scattered shotty periaortic and paracaval lymph nodes are noted.  No focal organized pelvic fluid collection.    Degenerative changes are noted of the lumbar spine noting stabilization susan and postsurgical changes.    No significant inguinal lymphadenopathy.                               X-Ray Abdomen AP 1 View (KUB) (Final result)  Result time 05/19/18 20:40:22    Final result by Dax Harding MD (05/19/18 20:40:22)                 Impression:      Nonobstructive bowel gas pattern.      Electronically signed by: Dax Harding MD  Date:    05/19/2018  Time:    20:40             Narrative:    EXAMINATION:  XR ABDOMEN AP 1 VIEW    CLINICAL HISTORY:  Abdominal pain.    TECHNIQUE:  Single AP View of the abdomen was performed.    COMPARISON:  None.    FINDINGS:  Cardiac wires overlie the abdomen.  Mild fecal loading in the right hemicolon.  Bowel gas pattern is nonobstructive.  Left convex scoliotic curvature of the lumbar spine post surgical correction with thoracolumbar susan.                               US Pelvis Comp with Transvag NON-OB (xpd (Final result)  Result time 05/19/18 19:13:18    Final result by Deng Jimenez MD (05/19/18 19:13:18)                 Impression:      Stable appearance of multiple uterine fibroid uterus.    Previously described echogenic focus in the left ovary on 04/26/2018 is not seen on today's examination.    2.8 cm follicle or cyst in the right ovary.    Electronically signed by resident: Checo Garcia  Date:    05/19/2018  Time:    18:20    Electronically signed by: Deng Jimenez MD  Date:    05/19/2018  Time:    19:13             Narrative:    EXAMINATION:  US PELVIS COMP WITH TRANSVAG NON-OB (XPD)    CLINICAL HISTORY:  LLQ abdominal Pain; Last menstrual period: 05/04/2018.    TECHNIQUE:  Transabdominal sonography of the pelvis was performed, followed by transvaginal  sonography to better evaluate the uterus and ovaries.    COMPARISON:  Ultrasound pelvis 04/26/2018.    FINDINGS:  Uterus:    Size: Measures 12.3 x 5.8 x 6.7 cm    Masses: There are multiple intramural uterine fibroids, the largest 3 measure 5.9 cm, 4.5 cm and 1.9 cm respectively.    Endometrium: Normal in this pre menopausal patient, measuring 11 mm.    Right ovary:    Size: Measures 4.3 x 3.6 x 2.4 cm    Appearance: There is a small cyst or follicle measuring 2.8 cm    Vascular flow: Normal.    Left ovary:    Size: Measures 3.1 x 2.6 x 2.6 cm    Appearance: Normal    Vascular Flow: Normal.    Free Fluid:    Minimal fluid in the pelvic cul-de-sac, likely physiologic.                                     Medical Decision Making:   Differential Diagnosis:   Abdominal pain.  Nausea vomiting.  Other:   I have discussed this case with another health care provider.       <> Summary of the Discussion: I consulted urology service.  Dr. Gamez who is the urology resident on-call came down to the emergency room and evaluated patient.  After discussing the patient with his attending, the urology service recommended admission to internal medicine for further evaluation and management.  Patient will be admitted under Dr. Boswell who is hospitalist on-call.              Attending Attestation:     Physician Attestation Statement for NP/PA:   I have conducted a face to face encounter with this patient in addition to the NP/PA, due to Medical Complexity                     Clinical Impression:   The primary encounter diagnosis was Nausea and vomiting, intractability of vomiting not specified, unspecified vomiting type. Diagnoses of Abdominal pain, Uterine leiomyoma, unspecified location, and Kidney stone on left side were also pertinent to this visit.    Disposition:   Disposition: Admitted  Condition: Stable                        Francisca Otero MD  05/20/18 0038

## 2018-05-20 VITALS
DIASTOLIC BLOOD PRESSURE: 64 MMHG | WEIGHT: 177.5 LBS | HEIGHT: 66 IN | TEMPERATURE: 99 F | SYSTOLIC BLOOD PRESSURE: 112 MMHG | HEART RATE: 92 BPM | OXYGEN SATURATION: 99 % | RESPIRATION RATE: 16 BRPM | BODY MASS INDEX: 28.53 KG/M2

## 2018-05-20 PROBLEM — M54.31 SCIATICA OF RIGHT SIDE: Status: ACTIVE | Noted: 2018-05-20

## 2018-05-20 PROBLEM — N13.30 HYDROURETERONEPHROSIS: Status: ACTIVE | Noted: 2018-05-20

## 2018-05-20 PROBLEM — N20.0 NEPHROLITHIASIS: Status: ACTIVE | Noted: 2018-05-20

## 2018-05-20 PROBLEM — M54.41 CHRONIC RIGHT-SIDED LOW BACK PAIN WITH RIGHT-SIDED SCIATICA: Status: ACTIVE | Noted: 2018-05-20

## 2018-05-20 PROBLEM — K76.0 HEPATIC STEATOSIS: Status: ACTIVE | Noted: 2018-05-20

## 2018-05-20 PROBLEM — K21.9 GERD (GASTROESOPHAGEAL REFLUX DISEASE): Status: ACTIVE | Noted: 2018-05-20

## 2018-05-20 PROBLEM — G89.29 CHRONIC RIGHT-SIDED LOW BACK PAIN WITH RIGHT-SIDED SCIATICA: Status: ACTIVE | Noted: 2018-05-20

## 2018-05-20 PROBLEM — N39.41 URGE INCONTINENCE: Status: ACTIVE | Noted: 2018-05-20

## 2018-05-20 PROBLEM — K59.04 CHRONIC IDIOPATHIC CONSTIPATION: Status: ACTIVE | Noted: 2018-05-20

## 2018-05-20 PROBLEM — M41.9 SCOLIOSIS: Status: ACTIVE | Noted: 2018-05-20

## 2018-05-20 LAB
ALBUMIN SERPL BCP-MCNC: 3.1 G/DL
ALP SERPL-CCNC: 65 U/L
ALT SERPL W/O P-5'-P-CCNC: 15 U/L
ANION GAP SERPL CALC-SCNC: 9 MMOL/L
AST SERPL-CCNC: 21 U/L
BASOPHILS # BLD AUTO: 0.04 K/UL
BASOPHILS NFR BLD: 0.3 %
BILIRUB DIRECT SERPL-MCNC: 0.2 MG/DL
BILIRUB SERPL-MCNC: 0.3 MG/DL
BUN SERPL-MCNC: 7 MG/DL
CALCIUM SERPL-MCNC: 9.4 MG/DL
CHLORIDE SERPL-SCNC: 104 MMOL/L
CHOLEST SERPL-MCNC: 212 MG/DL
CHOLEST/HDLC SERPL: 4.7 {RATIO}
CO2 SERPL-SCNC: 24 MMOL/L
CREAT SERPL-MCNC: 0.6 MG/DL
DIFFERENTIAL METHOD: ABNORMAL
EOSINOPHIL # BLD AUTO: 0 K/UL
EOSINOPHIL NFR BLD: 0.3 %
ERYTHROCYTE [DISTWIDTH] IN BLOOD BY AUTOMATED COUNT: 13.3 %
EST. GFR  (AFRICAN AMERICAN): >60 ML/MIN/1.73 M^2
EST. GFR  (NON AFRICAN AMERICAN): >60 ML/MIN/1.73 M^2
GLUCOSE SERPL-MCNC: 85 MG/DL
HCT VFR BLD AUTO: 35.2 %
HDLC SERPL-MCNC: 45 MG/DL
HDLC SERPL: 21.2 %
HGB BLD-MCNC: 11.7 G/DL
IMM GRANULOCYTES # BLD AUTO: 0.03 K/UL
IMM GRANULOCYTES NFR BLD AUTO: 0.2 %
LDLC SERPL CALC-MCNC: 131.2 MG/DL
LYMPHOCYTES # BLD AUTO: 2.4 K/UL
LYMPHOCYTES NFR BLD: 19 %
MAGNESIUM SERPL-MCNC: 1.9 MG/DL
MCH RBC QN AUTO: 30.5 PG
MCHC RBC AUTO-ENTMCNC: 33.2 G/DL
MCV RBC AUTO: 92 FL
MONOCYTES # BLD AUTO: 0.7 K/UL
MONOCYTES NFR BLD: 5.3 %
NEUTROPHILS # BLD AUTO: 9.2 K/UL
NEUTROPHILS NFR BLD: 74.9 %
NONHDLC SERPL-MCNC: 167 MG/DL
NRBC BLD-RTO: 0 /100 WBC
PHOSPHATE SERPL-MCNC: 3 MG/DL
PLATELET # BLD AUTO: 437 K/UL
PMV BLD AUTO: 10 FL
POTASSIUM SERPL-SCNC: 3.7 MMOL/L
PROT SERPL-MCNC: 7 G/DL
RBC # BLD AUTO: 3.83 M/UL
SODIUM SERPL-SCNC: 137 MMOL/L
TRIGL SERPL-MCNC: 179 MG/DL
WBC # BLD AUTO: 12.36 K/UL

## 2018-05-20 PROCEDURE — 25000003 PHARM REV CODE 250: Performed by: PHYSICIAN ASSISTANT

## 2018-05-20 PROCEDURE — 36415 COLL VENOUS BLD VENIPUNCTURE: CPT

## 2018-05-20 PROCEDURE — 80061 LIPID PANEL: CPT

## 2018-05-20 PROCEDURE — 80048 BASIC METABOLIC PNL TOTAL CA: CPT

## 2018-05-20 PROCEDURE — G0378 HOSPITAL OBSERVATION PER HR: HCPCS

## 2018-05-20 PROCEDURE — 99225 PR SUBSEQUENT OBSERVATION CARE,LEVEL II: CPT | Mod: ,,, | Performed by: INTERNAL MEDICINE

## 2018-05-20 PROCEDURE — 83735 ASSAY OF MAGNESIUM: CPT

## 2018-05-20 PROCEDURE — 25000003 PHARM REV CODE 250: Performed by: STUDENT IN AN ORGANIZED HEALTH CARE EDUCATION/TRAINING PROGRAM

## 2018-05-20 PROCEDURE — 25000003 PHARM REV CODE 250: Performed by: INTERNAL MEDICINE

## 2018-05-20 PROCEDURE — 80076 HEPATIC FUNCTION PANEL: CPT

## 2018-05-20 PROCEDURE — 84100 ASSAY OF PHOSPHORUS: CPT

## 2018-05-20 PROCEDURE — 85025 COMPLETE CBC W/AUTO DIFF WBC: CPT

## 2018-05-20 RX ORDER — CYCLOBENZAPRINE HCL 5 MG
5 TABLET ORAL 3 TIMES DAILY
Status: DISCONTINUED | OUTPATIENT
Start: 2018-05-20 | End: 2018-05-20 | Stop reason: HOSPADM

## 2018-05-20 RX ORDER — OXYCODONE AND ACETAMINOPHEN 10; 325 MG/1; MG/1
1 TABLET ORAL EVERY 6 HOURS PRN
Qty: 40 TABLET | Refills: 0 | Status: SHIPPED | OUTPATIENT
Start: 2018-05-20 | End: 2021-06-22

## 2018-05-20 RX ORDER — DOCUSATE SODIUM 100 MG/1
100 CAPSULE, LIQUID FILLED ORAL 2 TIMES DAILY
Refills: 0 | COMMUNITY
Start: 2018-05-20 | End: 2019-12-05

## 2018-05-20 RX ORDER — CYCLOBENZAPRINE HCL 5 MG
5 TABLET ORAL 3 TIMES DAILY
Qty: 30 TABLET | Refills: 0 | Status: SHIPPED | OUTPATIENT
Start: 2018-05-20 | End: 2021-06-22

## 2018-05-20 RX ORDER — TAMSULOSIN HYDROCHLORIDE 0.4 MG/1
0.4 CAPSULE ORAL NIGHTLY
Qty: 30 CAPSULE | Refills: 0 | Status: SHIPPED | OUTPATIENT
Start: 2018-05-20 | End: 2019-12-05

## 2018-05-20 RX ORDER — AMOXICILLIN 250 MG
1 CAPSULE ORAL 2 TIMES DAILY PRN
COMMUNITY
Start: 2018-05-20 | End: 2019-12-05

## 2018-05-20 RX ORDER — PROMETHAZINE HYDROCHLORIDE 12.5 MG/1
12.5 SUPPOSITORY RECTAL EVERY 6 HOURS PRN
Status: DISCONTINUED | OUTPATIENT
Start: 2018-05-20 | End: 2018-05-20 | Stop reason: HOSPADM

## 2018-05-20 RX ORDER — METOCLOPRAMIDE HYDROCHLORIDE 5 MG/ML
5 INJECTION INTRAMUSCULAR; INTRAVENOUS EVERY 8 HOURS PRN
Status: DISCONTINUED | OUTPATIENT
Start: 2018-05-20 | End: 2018-05-20 | Stop reason: HOSPADM

## 2018-05-20 RX ORDER — CALCIUM CARBONATE 200(500)MG
500 TABLET,CHEWABLE ORAL DAILY PRN
Status: DISCONTINUED | OUTPATIENT
Start: 2018-05-20 | End: 2018-05-20 | Stop reason: HOSPADM

## 2018-05-20 RX ADMIN — DOCUSATE SODIUM 100 MG: 100 CAPSULE, LIQUID FILLED ORAL at 09:05

## 2018-05-20 RX ADMIN — SODIUM CHLORIDE: 0.9 INJECTION, SOLUTION INTRAVENOUS at 12:05

## 2018-05-20 RX ADMIN — LIDOCAINE 1 PATCH: 50 PATCH TOPICAL at 09:05

## 2018-05-20 RX ADMIN — CYCLOBENZAPRINE HYDROCHLORIDE 5 MG: 5 TABLET, FILM COATED ORAL at 06:05

## 2018-05-20 RX ADMIN — OXYCODONE HYDROCHLORIDE 5 MG: 5 TABLET ORAL at 04:05

## 2018-05-20 RX ADMIN — ACETAMINOPHEN 650 MG: 325 TABLET ORAL at 04:05

## 2018-05-20 RX ADMIN — CYCLOBENZAPRINE HYDROCHLORIDE 5 MG: 5 TABLET, FILM COATED ORAL at 11:05

## 2018-05-20 RX ADMIN — OXYCODONE HYDROCHLORIDE 5 MG: 5 TABLET ORAL at 10:05

## 2018-05-20 RX ADMIN — OXYCODONE HYDROCHLORIDE 5 MG: 5 TABLET ORAL at 02:05

## 2018-05-20 RX ADMIN — POLYETHYLENE GLYCOL 3350 17 G: 17 POWDER, FOR SOLUTION ORAL at 12:05

## 2018-05-20 RX ADMIN — PANTOPRAZOLE SODIUM 20 MG: 20 TABLET, DELAYED RELEASE ORAL at 09:05

## 2018-05-20 NOTE — HPI
"This is a 48 year old female with pmhx of uterine fibroids, scoliosis (Ramos rods in 1988), GERD, chronic constipation presents to the ED for acute onset LLQ abdominal pain.  Abd pain is an intermittent mild-moderate pressure ("Like a balloon"). It is non-radiating and worsened with leaning forward.  She states that her abd pain began on Thursday after eating chicken salad.  She states that she vomited after eating it which improved her pain. She states that again today she ate the same chicken salad and subsequently experienced the same abdominal pain as well as an episode of vomiting. Pt denies fevers, dizziness, SOB, CP, nausea, diarrhea but reports chronic constipation since childhood.  Last BM x3 days ago, which is not unusual for her.     Also: c/o acute on chronic radicular back pain secondary to scoliosis and flat back syndrome.  Pt states that she bent down x2 weeks ago from standing position and experienced pain.  When she stood up, she felt a pop in her R lower back. She localizes the pain to her R lower buttock and is c/o constant shooting, sharp pains down her R leg that radiates through her buttock.  The pain is exacerbated by moving her leg and ttwistting her torso, but alleviated but lying on her L side withoutt any pressure on her R lower back.  This pain is not new and tends to occur yearly, per patient.  Patient has used ibuprofen in the past for her pain but is now "allergic" to it since she developed R ear fullness, sore throat and epigastric pain after taking ibuprofen for several weeks.  Denies changes in her bladder or bowel function, saddle paresthesia, extremities, trauma.      Also: c/o burning pain to the vaginal area.  Pt denies vaginal discharge, dysuria, flank pain or frequency.  Pt also reports chronic urinary urge incontinence which she takes no medication for.      In ED, intake labs unremarkable.  Cr 0.8, Ca 10.8.  UA shows >100 RBC, pH >8, 3+ occult blood and 1+ ketones. " Nitrite negative.  WBC 12.  KUB with non-obstructive bowel gas pattern.  CT And pelvis with contrast shows right kidney with no acute abnormality.  Left kidney with no mass or stone.  There is left hydroureteronephrosis to the level of a 4 mm left UVJ stone. Also, L non-obstructive nephrolithiasis.The left kidney takes up but does not excrete contrast.  Urinary bladder is decompressed.  US pelvis complete with transvag shows multiple stable uterine fibroids and R ovary follicle 2.8cm.

## 2018-05-20 NOTE — SUBJECTIVE & OBJECTIVE
Interval History:     СЕРГЕЙ  Patient only complaining of right leg pain, otherwise feels well   She denies left flank, left lower quadrant pain, nausea and vomiting   No PO intake since admission     Review of Systems  Objective:     Temp:  [97.9 °F (36.6 °C)-98.6 °F (37 °C)] 98 °F (36.7 °C)  Pulse:  [] 97  Resp:  [16-18] 16  SpO2:  [95 %-98 %] 97 %  BP: ()/(49-93) 95/49     Body mass index is 28.64 kg/m².            Drains          No matching active lines, drains, or airways          Physical Exam   Constitutional: She is oriented to person, place, and time. She appears well-developed and well-nourished. No distress.   HENT:   Head: Normocephalic and atraumatic.   Cardiovascular: Normal rate.    Pulmonary/Chest: Effort normal.   Abdominal: Soft. She exhibits no distension. There is no tenderness. There is no CVA tenderness.   Neurological: She is alert and oriented to person, place, and time.   Skin: Skin is warm and dry. She is not diaphoretic.     Psychiatric: She has a normal mood and affect.       Significant Labs:    BMP:    Recent Labs  Lab 05/19/18  1434 05/20/18  0350    137   K 4.2 3.7    104   CO2 24 24   BUN 9 7   CREATININE 0.8 0.6   CALCIUM 10.8* 9.4       CBC:     Recent Labs  Lab 05/19/18  1434 05/20/18  0350   WBC 12.19 12.36   HGB 13.0 11.7*   HCT 39.4 35.2*   * 437*       All pertinent labs results from the past 24 hours have been reviewed.    Significant Imaging:  All pertinent imaging results/findings from the past 24 hours have been reviewed.

## 2018-05-20 NOTE — ASSESSMENT & PLAN NOTE
Last BM x3 days ago  - KUB shows non-obstructive bowel gas pattern  - will give miralax now and start on colace 100 mg PO BID.  - has been taking metamucil at home and drinking more water and recently started using fiber shakes.  - currently asymptomatic

## 2018-05-20 NOTE — CONSULTS
"[facility]  Urology  Consult Note    Patient Name: Jess Mcleod  MRN: [id number]  Admission Date: 2018  Hospital Length of Stay: 0   Code Status: No Order   Attending Provider: Lakeisha Martinez MD  Consulting Provider: Haroldo Gamez MD  Primary Care Physician: Primary Doctor No  Principal Problem:<principal problem not specified>    Inpatient consult to Urology  Consult performed by: HAROLDO GAMEZ  Consult ordered by: DOLORES HOOKS          Subjective:     HPI:  48 YOF presents to ED with left lower quadrant abdominal pain, nausea, and vomiting and found to have 4mm left UVJ stone with a normal contralateral kidney on CT, WBC of 12, normal serum Cr, UA nitrite negative. She is afebrile with intermittent low grade tachycardia and normotensive.      Pt states left lower quadrant pain started one week ago.  She began to experience nausea and emesis 48 hours ago, at first only with chicken salad.  On  the nausea and pain worsened and she came to ED.  She denied F/C, dysuria.  She did note mild gross hematuria.      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  She also complains of burning retrosternal pain.      No history of previous acute stone episodes.  She has never seen a urologist.            Past Medical History:   Diagnosis Date    Arthritis     Chronic back pain     Chronic neck pain     Fibrocystic breast     Fibroid, uterus     GERD (gastroesophageal reflux disease)     Herpes     Migraine headache     Scoliosis        Past Surgical History:   Procedure Laterality Date     SECTION      Scoliosis surgery      UMBILICAL HERNIA REPAIR         Review of patient's allergies indicates:   Allergen Reactions    Ibuprofen Other (See Comments)     It makes my "ears hurt" when I take large doses.       Family History     Problem Relation (Age of Onset)    Breast cancer Paternal Aunt          Social History Main Topics    " Smoking status: Never Smoker    Smokeless tobacco: Never Used    Alcohol use Yes    Drug use: No    Sexual activity: Yes     Partners: Male     Birth control/ protection: None       Review of Systems   Constitutional: Negative for chills and fever.   HENT: Negative for ear pain and hearing loss.    Eyes: Negative for pain and visual disturbance.   Respiratory: Negative for cough and shortness of breath.    Cardiovascular: Positive for chest pain. Negative for palpitations.   Genitourinary: Positive for flank pain and hematuria. Negative for dysuria.   Neurological: Negative for dizziness and facial asymmetry.   Psychiatric/Behavioral: Negative for agitation and behavioral problems.       Objective:     Temp:  [97.9 °F (36.6 °C)-98.6 °F (37 °C)] 98.6 °F (37 °C)  Pulse:  [] 102  Resp:  [16-18] 18  SpO2:  [95 %-98 %] 95 %  BP: (132-161)/(67-93) 161/90     Body mass index is 29.05 kg/m².      Date 05/19/18 0700 - 05/20/18 0659   Shift 5011-1782 7581-2642 9770-4506 24 Hour Total   I  N  T  A  K  E   I.V.  (mL/kg)  100  (1.2)  100  (1.2)    IV Piggyback  1000  1000    Shift Total  (mL/kg)  1100  (13.5)  1100  (13.5)   O  U  T  P  U  T   Shift Total  (mL/kg)       Weight (kg) 81.6 81.6 81.6 81.6          Drains          No matching active lines, drains, or airways          Physical Exam   Constitutional: She is oriented to person, place, and time. She appears well-developed and well-nourished. No distress.   HENT:   Head: Normocephalic and atraumatic.   Cardiovascular: Normal rate.    Pulmonary/Chest: Effort normal.   Abdominal: Soft. She exhibits no distension. There is tenderness in the left lower quadrant. There is CVA tenderness (left).   Neurological: She is alert and oriented to person, place, and time.   Skin: Skin is warm and dry. She is not diaphoretic.     Psychiatric: She has a normal mood and affect.       Significant Labs:    BMP:    Recent Labs  Lab 05/19/18  1434      K 4.2      CO2 24    BUN 9   CREATININE 0.8   CALCIUM 10.8*       CBC:    Recent Labs  Lab 05/19/18  1434   WBC 12.19   HGB 13.0   HCT 39.4   *       All pertinent labs results from the past 24 hours have been reviewed.    Significant Imaging:  CT A/P with contrast with delayed images:  Adrenals unremarkable.  Right kidney normal with no stone, mass, or hydronephrosis, and takes up and excretes contrast normally with good filling of the ureter almost all the way to the bladder.  No right ureteral stones.  Left kidney with no mass or stone.  There is left hydroureteronephrosis to the level of a 4 mm left UVJ stone.  The left kidney takes up but does not excrete contrast.  The urinary bladder is decompressed.     Assessment and Plan:     Left ureteral stone    48 YOF presents to ED with left lower quadrant abdominal pain, nausea, and vomiting and found to have 4mm left UVJ stone with a normal contralateral kidney on CT, WBC of 12, normal serum Cr, UA nitrite negative. She is afebrile with intermittent low grade tachycardia and normotensive.      Recommendations:   - observation on medicine service  - workup and treatment of right back pain    - aggressive IV hydration   - ODT zofran, IV or VA phenergan for nausea  - oxycodone 5-10 mg PO q 4 hours PRN pain, IV tylenol   - flomax 0.4mg QHS until stone passage   - strain all urine   - NPO p MN in case of need for procedure   - will reassess in AM             VTE Risk Mitigation     None          Thank you for your consult. I will follow-up with patient. Please contact us if you have any additional questions.    Haroldo Gamez MD  Urology  [facility]    Patient seen on rounds 5/20/2018.  She states that she is a vegetarian but had eaten chicken 3 x.  Possible that this compounded her nausea.  She is stable for discharge.

## 2018-05-20 NOTE — PROGRESS NOTES
[facility]  Urology  Progress Note    Patient Name: Jess Mcleod  MRN: [id number]  Admission Date: 5/19/2018  Hospital Length of Stay: 0 days  Code Status: Full Code   Attending Provider: Lakeisha Martinez MD   Primary Care Physician: Primary Doctor No    Subjective:     HPI:  48 YOF presents to ED with left lower quadrant abdominal pain, nausea, and vomiting and found to have 4mm left UVJ stone with a normal contralateral kidney on CT, WBC of 12, normal serum Cr, UA nitrite negative. She is afebrile with intermittent low grade tachycardia and normotensive.      Pt states left lower quadrant pain started one week ago.  She began to experience nausea and emesis 48 hours ago, at first only with chicken salad.  On 5/19 the nausea and pain worsened and she came to ED.  She denied F/C, dysuria.  She did note mild gross hematuria.      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  She also complains of burning retrosternal pain.      No history of previous acute stone episodes.  She has never seen a urologist.            Interval History:     СЕРГЕЙ  Patient only complaining of right leg pain, otherwise feels well   She denies left flank, left lower quadrant pain, nausea and vomiting   No PO intake since admission     Review of Systems  Objective:     Temp:  [97.9 °F (36.6 °C)-98.6 °F (37 °C)] 98 °F (36.7 °C)  Pulse:  [] 97  Resp:  [16-18] 16  SpO2:  [95 %-98 %] 97 %  BP: ()/(49-93) 95/49     Body mass index is 28.64 kg/m².            Drains          No matching active lines, drains, or airways          Physical Exam   Constitutional: She is oriented to person, place, and time. She appears well-developed and well-nourished. No distress.   HENT:   Head: Normocephalic and atraumatic.   Cardiovascular: Normal rate.    Pulmonary/Chest: Effort normal.   Abdominal: Soft. She exhibits no distension. There is no tenderness. There is no CVA tenderness.   Neurological:  She is alert and oriented to person, place, and time.   Skin: Skin is warm and dry. She is not diaphoretic.     Psychiatric: She has a normal mood and affect.       Significant Labs:    BMP:    Recent Labs  Lab 05/19/18  1434 05/20/18  0350    137   K 4.2 3.7    104   CO2 24 24   BUN 9 7   CREATININE 0.8 0.6   CALCIUM 10.8* 9.4       CBC:     Recent Labs  Lab 05/19/18  1434 05/20/18  0350   WBC 12.19 12.36   HGB 13.0 11.7*   HCT 39.4 35.2*   * 437*       All pertinent labs results from the past 24 hours have been reviewed.    Significant Imaging:  All pertinent imaging results/findings from the past 24 hours have been reviewed.                  Assessment/Plan:     * Left ureteral stone    48 YOF presents to ED with left lower quadrant abdominal pain, nausea, and vomiting and found to have 4mm left UVJ stone with a normal contralateral kidney on CT, WBC of 12, normal serum Cr, UA nitrite negative. She is afebrile with intermittent low grade tachycardia and normotensive.      Recommendations:   - evaluation of right leg/ side pain   - okay for diet  - if patient tolerates diet, okay for discharge from urology perspective after evaluation of right leg pain   - recommended discharge meds   - zofran 8mg ODT PO q6 hours prn nausea   - phenergan CA 25 mg q6 hours nausea uncontrolled by zofran    - oxycodone 5-10 mg PO q 4 hours PRN pain   - flomax 0.4mg QHS until stone passage   - continue to strain all urine until stone obtained   - will need to follow up with urology within the month, sooner if uncontrolled pain, nausea, or fevers  - please call with questions             VTE Risk Mitigation         Ordered     IP VTE LOW RISK PATIENT  Once      05/19/18 2322     Place sequential compression device  Until discontinued      05/19/18 2321     Place TOSHA hose  Until discontinued      05/19/18 2321          Haroldo Gamez MD  Urology  [facility]

## 2018-05-20 NOTE — HPI
48 YOF presents to ED with left lower quadrant abdominal pain, nausea, and vomiting and found to have 4mm left UVJ stone with a normal contralateral kidney on CT, WBC of 12, normal serum Cr, UA nitrite negative. She is afebrile with intermittent low grade tachycardia and normotensive.      Pt states left lower quadrant pain started one week ago.  She began to experience nausea and emesis 48 hours ago, at first only with chicken salad.  On 5/19 the nausea and pain worsened and she came to ED.  She denied F/C, dysuria.  She did note mild gross hematuria.      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  She also complains of burning retrosternal pain.      No history of previous acute stone episodes.  She has never seen a urologist.

## 2018-05-20 NOTE — DISCHARGE SUMMARY
"Discharge Summary  Hospital Medicine    Patient Name: Jess Mcleod  MRN: [id number]  Attending Provider on Discharge: Pratibha Vu MD  Hospital Medicine Team: [facility]OSP MED D  Date of Admission:  5/19/2018     Date of Discharge:  5/20/2018  7:07 PM  Code status: Full Code    Active Hospital Problems    Diagnosis  POA    *Left ureteral stone [N20.1]  Yes    Chronic right-sided low back pain with right-sided sciatica [M54.41, G89.29]  Yes    Scoliosis [M41.9]  Yes    GERD (gastroesophageal reflux disease) [K21.9]  Yes    Urge incontinence [N39.41]  Yes    Chronic idiopathic constipation [K59.04]  Yes    Nephrolithiasis [N20.0]  Yes    Hepatic steatosis [K76.0]  Yes    Hydroureteronephrosis [N13.30]  Yes    Sciatica of right side [M54.31]  Yes      Resolved Hospital Problems    Diagnosis Date Resolved POA   No resolved problems to display.        HPI: "48 year old female with PMH of uterine fibroids, scoliosis (Ramos rods in 1988), GERD, chronic constipation presents to the ED for acute onset LLQ abdominal pain.  Abd pain is an intermittent mild-moderate pressure ("Like a balloon"). It is non-radiating and worsened with leaning forward.  She reports that her abd pain began on Thursday after eating chicken salad.  She vomited after eating it which improved her pain. She states that again today, she ate the same chicken salad and subsequently experienced the same abdominal pain as well as an episode of vomiting. Pt denies fevers, dizziness, SOB, CP, nausea, diarrhea but reports chronic constipation since childhood.  Last BM x3 days ago, which is not unusual for her.   Other significant complaints included:   1) acute on chronic radicular back pain secondary to scoliosis and flat back syndrome.  Pt reports that she bent down x2 weeks ago from standing position and experienced pain.  When she stood up, she felt a pop in her R lower back. She localizes the pain to her R lower buttock with constant " "shooting, sharp pains down her R leg that radiates through her buttock.  The pain is exacerbated by moving her leg and twistting her torso, but alleviated but lying on her L side without any pressure on her R lower back.  This pain is not new and tends to occur yearly, per patient.  Patient has used ibuprofen in the past for her pain but is now "allergic" to it since she developed R ear fullness, sore throat and epigastric pain after taking ibuprofen for several weeks. Denies changes in her bladder or bowel function, saddle paresthesia, extremities, trauma.    2) burning pain to the vaginal area.   Pt denies vaginal discharge, dysuria, flank pain or frequency.  Pt also reports chronic urinary urge incontinence for which she takes no medication.   In ED, CT Abdomen/pelvis with contrast shows right kidney with no acute abnormality.  Left kidney with no mass or stone.  There is left hydroureteronephrosis to the level of a 4 mm left UVJ stone. Also, L non-obstructive nephrolithiasis.The left kidney takes up but does not excrete contrast.  Urinary bladder is decompressed.  TVUS pelvis shows multiple stable uterine fibroids and R ovary follicle 2.8cm."      Hospital Course:   Patient admitted with left lower quadrant abdominal pain, nausea, and vomiting and found to have 4mm left UVJ stone with a normal contralateral kidney on CT, WBC of 12, normal serum Cr, UA nitrite negative. She is afebrile with intermittent low grade tachycardia and normotensive. Urology was consulted. She received aggressive IV hydration, ODT Zofran for nausea, oxycodone 5-10 mg PO q 4 hours PRN pain, IV Tylenol. Plan for Flomax 0.4mg QHS until stone passage and strain all urine.   Recommended discharge plan per Urology:              - Zofran 8mg ODT PO q6 hours prn nausea              - Phenergan [place] 25 mg q6 hours nausea uncontrolled by zofran               - Oxycodone 5-10 mg PO q 4 hours PRN pain              - Flomax 0.4mg QHS until stone " passage    - Continue to strain all urine until stone obtained    - Follow up with Urology within the month, sooner if uncontrolled pain, nausea, or fever    Workup of right back and leg pain revealed nothing acute. Left sided pain, nausea and vomiting resolved. Diet was advanced and tolerated well. Trial of Flexeril and Percocet was initiated for acute on chronic back pain. Patient should follow up with her PCP, OP PT, and/or Pain Management; OP referrals provided.      Recent Labs  Lab 05/19/18  1434 05/20/18  0350   WBC 12.19 12.36   HGB 13.0 11.7*   HCT 39.4 35.2*   * 437*       Recent Labs  Lab 05/19/18  1434 05/20/18  0350    137   K 4.2 3.7    104   CO2 24 24   BUN 9 7   CREATININE 0.8 0.6   GLU 97 85   CALCIUM 10.8* 9.4   MG  --  1.9   PHOS  --  3.0   LIPASE 8  --        Recent Labs  Lab 05/19/18  1434 05/20/18  0350   ALKPHOS 77 65   ALT 16 15   AST 21 21   ALBUMIN 3.6 3.1*   PROT 8.3 7.0   BILITOT 0.3 0.3      Significant Diagnostic Studies:    Imaging Results          X-Ray Lumbar Spine Ap And Lateral (Final result)  Result time 05/20/18 01:33:26    Final result by Dax Harding MD (05/20/18 01:33:26)                 Impression:      No acute bony abnormality in the lumbar spine.    Levoscoliosis with surgical correction.    Additional findings in the body of the report.      Electronically signed by: Dax Harding MD  Date:    05/20/2018  Time:    01:33             Narrative:    EXAMINATION:  XR LUMBAR SPINE AP AND LATERAL    CLINICAL HISTORY:  low back pain x1 week; hx of scoliosis and recurrent back pain;    TECHNIQUE:  AP, lateral and spot images were performed of the lumbar spine.    COMPARISON:  CT abdomen and pelvis, 05/19/2018..    FINDINGS:  Grossly normal alignment.  Significant left scoliotic curvature of the lumbar spine with thoracolumbar surgical susan in place.  Vertebral body heights are relatively well maintained.  No displaced fracture identified.  Enteric  contrast is noted in the distal small bowel and colon.  Persistent contrast is seen in the left collecting system, which is mildly distended.                               CT Abdomen Pelvis With Contrast (Final result)  Result time 05/19/18 21:11:11    Final result by Ganesh Basurto MD (05/19/18 21:11:11)                 Impression:      Findings of left obstructive uropathy, likely secondary to a 0.4 cm calculus either within the distal left ureter or at the left UVJ.  There is perinephric stranding and urothelial enhancement, as well as urinary bladder wall thickening, correlation with urinalysis recommended to exclude superimposed infection.  There is additional left nonobstructive nephrolithiasis.    Hepatic steatosis, correlation with LFTs recommended.    Enlargement of the uterus, correlation with phase of menstrual cycle recommended.    Several additional findings above.      Electronically signed by: Ganesh Basurto MD  Date:    05/19/2018  Time:    21:11             Narrative:    EXAMINATION:  CT ABDOMEN PELVIS WITH CONTRAST    CLINICAL HISTORY:  Abdominal pain, unspecified;    TECHNIQUE:  Low dose axial images, sagittal and coronal reformations were obtained from the lung bases to the pubic symphysis following the IV administration of 100 mL of Omnipaque 350 and the oral administration of 30 mL of Omnipaque 350.    COMPARISON:  None.    FINDINGS:  Images of the lower thorax are remarkable for bilateral dependent atelectasis/scarring.    The liver is hypoattenuating, suggesting steatosis, correlation with LFTs recommended.  The spleen, pancreas, gallbladder and adrenal are grossly unremarkable.  There is retained contrast within the gastric lumen, could reflect delayed gastric emptying or outlet obstruction, correlation recommended there is small hiatal hernia.  No significant abdominal lymphadenopathy.  Portal vein, splenic vein, celiac axis and SMA all are patent.    The kidneys enhance  asymmetrically, noting delayed enhancement of the left kidney, and no significant excretion of contrast by the left kidney on delayed imaging.  The right kidney enhances excretes contrast appropriately.  There is no right nephrolithiasis or hydronephrosis, and the right ureter is grossly unremarkable without definite calculi seen.  There is diffuse left perinephric inflammation and moderate left hydroureteronephrosis.  There is nonobstructing left nephrolithiasis.  There is a calculus either within the distal left ureter, or just at the left UVJ, measuring 0.4 cm.  This could reflect 2 adjacent calculi as there is a somewhat beaded appearance.  There is left ureteral urothelial enhancement.  The urinary bladder is decompressed although the wall is mildly thickened, correlation with urinalysis to exclude cystitis.  The uterus and adnexa is grossly unremarkable noting the uterus is prominent, correlation with phase of menstrual cycle recommended.  No significant free fluid in the pelvis.    The large bowel is grossly unremarkable.  The appendix and terminal ileum are grossly unremarkable.  The small bowel is grossly unremarkable.  Scattered shotty periaortic and paracaval lymph nodes are noted.  No focal organized pelvic fluid collection.    Degenerative changes are noted of the lumbar spine noting stabilization susan and postsurgical changes.    No significant inguinal lymphadenopathy.                               X-Ray Abdomen AP 1 View (KUB) (Final result)  Result time 05/19/18 20:40:22    Final result by Dax Harding MD (05/19/18 20:40:22)                 Impression:      Nonobstructive bowel gas pattern.      Electronically signed by: Dax Harding MD  Date:    05/19/2018  Time:    20:40             Narrative:    EXAMINATION:  XR ABDOMEN AP 1 VIEW    CLINICAL HISTORY:  Abdominal pain.    TECHNIQUE:  Single AP View of the abdomen was performed.    COMPARISON:  None.    FINDINGS:  Cardiac wires overlie the  abdomen.  Mild fecal loading in the right hemicolon.  Bowel gas pattern is nonobstructive.  Left convex scoliotic curvature of the lumbar spine post surgical correction with thoracolumbar susan.                               US Pelvis Comp with Transvag NON-OB (xpd (Final result)  Result time 05/19/18 19:13:18    Final result by Deng Jimenez MD (05/19/18 19:13:18)                 Impression:      Stable appearance of multiple uterine fibroid uterus.    Previously described echogenic focus in the left ovary on 04/26/2018 is not seen on today's examination.    2.8 cm follicle or cyst in the right ovary.    Electronically signed by resident: Checo Garcia  Date:    05/19/2018  Time:    18:20    Electronically signed by: Deng Jimenez MD  Date:    05/19/2018  Time:    19:13             Narrative:    EXAMINATION:  US PELVIS COMP WITH TRANSVAG NON-OB (XPD)    CLINICAL HISTORY:  LLQ abdominal Pain; Last menstrual period: 05/04/2018.    TECHNIQUE:  Transabdominal sonography of the pelvis was performed, followed by transvaginal sonography to better evaluate the uterus and ovaries.    COMPARISON:  Ultrasound pelvis 04/26/2018.    FINDINGS:  Uterus:    Size: Measures 12.3 x 5.8 x 6.7 cm    Masses: There are multiple intramural uterine fibroids, the largest 3 measure 5.9 cm, 4.5 cm and 1.9 cm respectively.    Endometrium: Normal in this pre menopausal patient, measuring 11 mm.    Right ovary:    Size: Measures 4.3 x 3.6 x 2.4 cm    Appearance: There is a small cyst or follicle measuring 2.8 cm    Vascular flow: Normal.    Left ovary:    Size: Measures 3.1 x 2.6 x 2.6 cm    Appearance: Normal    Vascular Flow: Normal.    Free Fluid:    Minimal fluid in the pelvic cul-de-sac, likely physiologic.                                Procedures: none    Consultants:   Consults         Status Ordering Provider     Inpatient consult to Urology  Once     Provider:  (Not yet assigned)    DOLORES Dixon           Medications:  Reconciled Home Medications:      Medication List      START taking these medications    cyclobenzaprine 5 MG tablet  Commonly known as:  FLEXERIL  Take 1 tablet (5 mg total) by mouth 3 (three) times daily.     docusate sodium 100 MG capsule  Commonly known as:  COLACE  Take 1 capsule (100 mg total) by mouth 2 (two) times daily.     oxyCODONE-acetaminophen  mg per tablet  Commonly known as:  PERCOCET  Take 1 tablet by mouth every 6 (six) hours as needed for Pain.     senna-docusate 8.6-50 mg 8.6-50 mg per tablet  Commonly known as:  PERICOLACE  Take 1 tablet by mouth 2 (two) times daily as needed for Constipation.     tamsulosin 0.4 mg Cp24  Commonly known as:  FLOMAX  Take 1 capsule (0.4 mg total) by mouth every evening.        CONTINUE taking these medications    levonorgestrel-ethinyl estradiol 0.1-20 mg-mcg per tablet  Commonly known as:  AVIANE,ALESSE,LESSINA  Take 1 tablet by mouth once daily.     pantoprazole 20 MG tablet  Commonly known as:  PROTONIX  Take 20 mg by mouth once daily.            Discharge Instructions:    Discharge Procedure Orders  Referral to OutPatient  Physical therapy   Referral Priority: Routine Referral Type: Physical Medicine   Referral Reason: Specialty Services Required    Requested Specialty: Physical Therapy    Number of Visits Requested: 1      Ambulatory referral to Pain Clinic   Referral Priority: Routine Referral Type: Consultation   Referral Reason: Specialty Services Required    Requested Specialty: Pain Medicine    Number of Visits Requested: 1      Diet Adult Regular     Activity as tolerated     Notify your health care provider if you experience any of the following:  temperature >100.4     Notify your health care provider if you experience any of the following:  persistent nausea and vomiting or diarrhea     Notify your health care provider if you experience any of the following:  difficulty breathing or increased cough     Notify your health  care provider if you experience any of the following:  persistent dizziness, light-headedness, or visual disturbances         Discharge Condition: good    Disposition: Home or Self Care    Indwelling Lines/Drains at time of discharge: none    Tests pending at the time of discharge: none      Time spent on the discharge of the patient including review of hospital course with the patient, reviewing discharge medications and arranging follow-up care: 30 minutes.    Discharge examination Patient was seen and examined on 5/20/2018 and determined to be suitable for discharge.    Discharge plan and follow up:  Follow-up Information     [facility]. Schedule an appointment as soon as possible for a visit in 3 weeks.    Why:  Follow up trial of passage for left UVJ stone , For discharge from hospital follow up           Physical Therapy. Schedule an appointment as soon as possible for a visit in 2 days.    Why:  To establish care               Future Appointments  Date Time Provider Department Center   6/7/2018 8:45 AM [facility] MASTER [facility] ULTR  [place]       Provider  Pratibha Vu MD  Department of Hospital Medicine  [facility]

## 2018-05-20 NOTE — PLAN OF CARE
Problem: Patient Care Overview  Goal: Plan of Care Review  Outcome: Ongoing (interventions implemented as appropriate)  Pt with no s/s of infection, afebrile. Pt  with no falls or injuries this shift. Pt report pain level 5/10 post pain med. Cardiac monitoring NSR- ST.

## 2018-05-20 NOTE — ASSESSMENT & PLAN NOTE
48 YOF presents to ED with left lower quadrant abdominal pain, nausea, and vomiting and found to have 4mm left UVJ stone with a normal contralateral kidney on CT, WBC of 12, normal serum Cr, UA nitrite negative. She is afebrile with intermittent low grade tachycardia and normotensive.      Recommendations:   - evaluation of right leg/ side pain   - okay for diet  - if patient tolerates diet, okay for discharge from urology perspective after evaluation of right leg pain   - recommended discharge meds   - zofran 8mg ODT PO q6 hours prn nausea   - phenergan [place] 25 mg q6 hours nausea uncontrolled by zofran    - oxycodone 5-10 mg PO q 4 hours PRN pain   - flomax 0.4mg QHS until stone passage   - continue to strain all urine until stone obtained   - will need to follow up with urology within the month, sooner if uncontrolled pain, nausea, or fevers  - please call with questions

## 2018-05-20 NOTE — SUBJECTIVE & OBJECTIVE
"Past Medical History:   Diagnosis Date    Arthritis     Chronic back pain     Chronic neck pain     Fibrocystic breast     Fibroid, uterus     GERD (gastroesophageal reflux disease)     Herpes     Migraine headache     Scoliosis        Past Surgical History:   Procedure Laterality Date     SECTION      Scoliosis surgery      UMBILICAL HERNIA REPAIR         Review of patient's allergies indicates:   Allergen Reactions    Ibuprofen Other (See Comments)     It makes my "ears hurt" when I take large doses.       Family History     Problem Relation (Age of Onset)    Breast cancer Paternal Aunt          Social History Main Topics    Smoking status: Never Smoker    Smokeless tobacco: Never Used    Alcohol use Yes    Drug use: No    Sexual activity: Yes     Partners: Male     Birth control/ protection: None       Review of Systems   Constitutional: Negative for chills and fever.   HENT: Negative for ear pain and hearing loss.    Eyes: Negative for pain and visual disturbance.   Respiratory: Negative for cough and shortness of breath.    Cardiovascular: Positive for chest pain. Negative for palpitations.   Genitourinary: Positive for flank pain and hematuria. Negative for dysuria.   Neurological: Negative for dizziness and facial asymmetry.   Psychiatric/Behavioral: Negative for agitation and behavioral problems.       Objective:     Temp:  [97.9 °F (36.6 °C)-98.6 °F (37 °C)] 98.6 °F (37 °C)  Pulse:  [] 102  Resp:  [16-18] 18  SpO2:  [95 %-98 %] 95 %  BP: (132-161)/(67-93) 161/90     Body mass index is 29.05 kg/m².      Date 18 0700 - 18 0659   Shift 5231-6779 2765-3744 5100-6139 24 Hour Total   I  N  T  A  K  E   I.V.  (mL/kg)  100  (1.2)  100  (1.2)    IV Piggyback  1000  1000    Shift Total  (mL/kg)  1100  (13.5)  1100  (13.5)   O  U  T  P  U  T   Shift Total  (mL/kg)       Weight (kg) 81.6 81.6 81.6 81.6          Drains          No matching active lines, drains, or airways    "       Physical Exam   Constitutional: She is oriented to person, place, and time. She appears well-developed and well-nourished. No distress.   HENT:   Head: Normocephalic and atraumatic.   Cardiovascular: Normal rate.    Pulmonary/Chest: Effort normal.   Abdominal: Soft. She exhibits no distension. There is tenderness in the left lower quadrant. There is CVA tenderness (left).   Neurological: She is alert and oriented to person, place, and time.   Skin: Skin is warm and dry. She is not diaphoretic.     Psychiatric: She has a normal mood and affect.       Significant Labs:    BMP:    Recent Labs  Lab 05/19/18  1434      K 4.2      CO2 24   BUN 9   CREATININE 0.8   CALCIUM 10.8*       CBC:    Recent Labs  Lab 05/19/18  1434   WBC 12.19   HGB 13.0   HCT 39.4   *       All pertinent labs results from the past 24 hours have been reviewed.    Significant Imaging:  CT A/P with contrast with delayed images:  Adrenals unremarkable.  Right kidney normal with no stone, mass, or hydronephrosis, and takes up and excretes contrast normally with good filling of the ureter almost all the way to the bladder.  No right ureteral stones.  Left kidney with no mass or stone.  There is left hydroureteronephrosis to the level of a 4 mm left UVJ stone.  The left kidney takes up but does not excrete contrast.  The urinary bladder is decompressed.

## 2018-05-20 NOTE — ASSESSMENT & PLAN NOTE
Stable  Not currently on any medications for unclear reason  - UA without signs of infection  - will need close PCP f/u (Dr. Doris Sexton- [facility])
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
## 2018-05-20 NOTE — ASSESSMENT & PLAN NOTE
Hepatic steatosis visualized on CT  Hx of high cholesterol but takes no medications  - check Hepatic function panel  - check lipid panel
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
## 2018-05-20 NOTE — ASSESSMENT & PLAN NOTE
Hydroureteronephrosis  Nephrolithiasis  Jess Mcleod is a 47 y/o F with pmhx of scoliosis, chronic constipation, GERD and uterine fibroids.      - Evaluated by Urology in ED and recs given  - CT A/P shows hydrouretonephrosis 2/2 0.4 cm calculus at L UVJ. Non-obstructive L nephrollithiasis noted.  - Cr function stable; +CVA tenderness  - NPO MN for possible procedure in AM  - aggressive IV hydration--> started on 150 cc/hr mIVF   - PRN anti-emetics  - Pain control: oxycodone 5-10 mg PO q 4 hours PRN pain, IV tylenol   - flomax 0.4mg QHS until stone passage   - strain all urine   - N/V in setting of severe pain.  Currently resolved.   - No hx of kidney stones
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
## 2018-05-20 NOTE — PLAN OF CARE
Problem: Patient Care Overview  Goal: Plan of Care Review  Outcome: Ongoing (interventions implemented as appropriate)  Patient has remained free of falls this shift.  She is AAOx4 and VS's have been stable. She has been NPO since arriving on the unit.  She is waiting for the doctors to come this AM. Her major complaint right now is that her right leg hurts. Nurse administered pain medication at 0200 and then tylenol at 0437.

## 2018-05-20 NOTE — ASSESSMENT & PLAN NOTE
Chronic, stable  - Will start tums PRN for indigestion.  States GERD is worse with protonix?  - d/c with alternative medication for GERD

## 2018-05-20 NOTE — ASSESSMENT & PLAN NOTE
Stable  Not currently on any medications for unclear reason  - UA without signs of infection  - will need close PCP f/u (Dr. Doris SIMMS [facility])

## 2018-05-20 NOTE — SUBJECTIVE & OBJECTIVE
"Past Medical History:   Diagnosis Date    Arthritis     Chronic back pain     Chronic neck pain     Fibrocystic breast     Fibroid, uterus     GERD (gastroesophageal reflux disease)     Herpes     Migraine headache     Scoliosis        Past Surgical History:   Procedure Laterality Date     SECTION      Scoliosis surgery      UMBILICAL HERNIA REPAIR         Review of patient's allergies indicates:   Allergen Reactions    Ibuprofen Other (See Comments)     It makes my "ears hurt" when I take large doses.       No current facility-administered medications on file prior to encounter.      Current Outpatient Prescriptions on File Prior to Encounter   Medication Sig    levonorgestrel-ethinyl estradiol (AVIANE,ALESSE,LESSINA) 0.1-20 mg-mcg per tablet Take 1 tablet by mouth once daily.     Family History     Problem Relation (Age of Onset)    Breast cancer Paternal Aunt        Social History Main Topics    Smoking status: Never Smoker    Smokeless tobacco: Never Used    Alcohol use Yes    Drug use: No    Sexual activity: Yes     Partners: Male     Birth control/ protection: None     Review of Systems   Constitutional: Positive for appetite change and chills (chronic). Negative for activity change, diaphoresis, fatigue, fever and unexpected weight change.   HENT: Negative for congestion and rhinorrhea.    Eyes: Negative for photophobia and visual disturbance (chronic).   Respiratory: Negative for cough, shortness of breath and wheezing.    Cardiovascular: Negative for chest pain and palpitations.   Gastrointestinal: Positive for abdominal pain (LLQ), constipation (chronic), nausea and vomiting (resolved). Negative for diarrhea.   Genitourinary: Positive for dysuria (x2), hematuria and urgency (chronic). Negative for decreased urine volume, difficulty urinating, vaginal bleeding and vaginal discharge.   Musculoskeletal: Positive for back pain. Negative for arthralgias (R lower), gait problem, joint " swelling, myalgias and neck pain.        R leg pain   Skin: Negative for wound.   Neurological: Positive for numbness (to RLE). Negative for dizziness and weakness.   Psychiatric/Behavioral: Negative for agitation and confusion. The patient is not nervous/anxious.      Objective:     Vital Signs (Most Recent):  Temp: 98.6 °F (37 °C) (05/19/18 2017)  Pulse: 107 (05/19/18 2357)  Resp: 18 (05/19/18 2017)  BP: (!) 145/78 (05/19/18 2302)  SpO2: 95 % (05/19/18 2017) Vital Signs (24h Range):  Temp:  [97.9 °F (36.6 °C)-98.6 °F (37 °C)] 98.6 °F (37 °C)  Pulse:  [] 107  Resp:  [16-18] 18  SpO2:  [95 %-98 %] 95 %  BP: (132-161)/(67-93) 145/78     Weight: 81.6 kg (180 lb)  Body mass index is 29.05 kg/m².    Physical Exam   Constitutional: She is oriented to person, place, and time. She appears well-developed and well-nourished.   HENT:   Head: Normocephalic and atraumatic.   Eyes: EOM are normal. Pupils are equal, round, and reactive to light.   Neck: Normal range of motion. Neck supple.   Cardiovascular: Normal rate, regular rhythm, normal heart sounds and intact distal pulses.    No murmur heard.  Pulmonary/Chest: Effort normal and breath sounds normal. No respiratory distress. She has no wheezes.   Diminished lung sounds to BL lungs   Abdominal: Soft. She exhibits no distension and no mass. Bowel sounds are increased. There is tenderness (LLQ). There is no rebound and no guarding. No hernia.   Musculoskeletal: Normal range of motion.   TTP to R upper buttock  No spinal tenderness  5/5 STR to RLE  Straight leg raise negative  Grossly neurovascularly intact to RLE  L CVA tenderness   Neurological: She is alert and oriented to person, place, and time.   Skin: Skin is warm and dry.   midline thoracic-lumbar well-healed scar   Psychiatric: She has a normal mood and affect. Her behavior is normal. Judgment and thought content normal.   Nursing note and vitals reviewed.        CRANIAL NERVES     CN III, IV, VI   Pupils are  equal, round, and reactive to light.  Extraocular motions are normal.        Significant Labs:   CBC:   Recent Labs  Lab 05/19/18  1434   WBC 12.19   HGB 13.0   HCT 39.4   *     CMP:   Recent Labs  Lab 05/19/18  1434      K 4.2      CO2 24   GLU 97   BUN 9   CREATININE 0.8   CALCIUM 10.8*   PROT 8.3   ALBUMIN 3.6   BILITOT 0.3   ALKPHOS 77   AST 21   ALT 16   ANIONGAP 11   EGFRNONAA >60.0     Urine Studies:   Recent Labs  Lab 05/19/18  1526   COLORU Straw   APPEARANCEUA Clear   PHUR >8.0*   SPECGRAV 1.010   PROTEINUA Negative   GLUCUA Negative   KETONESU 1+*   BILIRUBINUA Negative   OCCULTUA 3+*   NITRITE Negative   UROBILINOGEN Negative   LEUKOCYTESUR Negative   RBCUA >100*   BACTERIA Rare   SQUAMEPITHEL 0       Significant Imaging: I have reviewed all pertinent imaging results/findings within the past 24 hours.     X-ray Abdomen Ap 1 View (kub)    Result Date: 5/19/2018  EXAMINATION: XR ABDOMEN AP 1 VIEW CLINICAL HISTORY: Abdominal pain. TECHNIQUE: Single AP View of the abdomen was performed. COMPARISON: None. FINDINGS: Cardiac wires overlie the abdomen.  Mild fecal loading in the right hemicolon.  Bowel gas pattern is nonobstructive.  Left convex scoliotic curvature of the lumbar spine post surgical correction with thoracolumbar susan.     Nonobstructive bowel gas pattern. Electronically signed by: Dax Harding MD Date:    05/19/2018 Time:    20:40    Ct Abdomen Pelvis With Contrast    Result Date: 5/19/2018  EXAMINATION: CT ABDOMEN PELVIS WITH CONTRAST CLINICAL HISTORY: Abdominal pain, unspecified; TECHNIQUE: Low dose axial images, sagittal and coronal reformations were obtained from the lung bases to the pubic symphysis following the IV administration of 100 mL of Omnipaque 350 and the oral administration of 30 mL of Omnipaque 350. COMPARISON: None. FINDINGS: Images of the lower thorax are remarkable for bilateral dependent atelectasis/scarring. The liver is hypoattenuating, suggesting  steatosis, correlation with LFTs recommended.  The spleen, pancreas, gallbladder and adrenal are grossly unremarkable.  There is retained contrast within the gastric lumen, could reflect delayed gastric emptying or outlet obstruction, correlation recommended there is small hiatal hernia.  No significant abdominal lymphadenopathy.  Portal vein, splenic vein, celiac axis and SMA all are patent. The kidneys enhance asymmetrically, noting delayed enhancement of the left kidney, and no significant excretion of contrast by the left kidney on delayed imaging.  The right kidney enhances excretes contrast appropriately.  There is no right nephrolithiasis or hydronephrosis, and the right ureter is grossly unremarkable without definite calculi seen.  There is diffuse left perinephric inflammation and moderate left hydroureteronephrosis.  There is nonobstructing left nephrolithiasis.  There is a calculus either within the distal left ureter, or just at the left UVJ, measuring 0.4 cm.  This could reflect 2 adjacent calculi as there is a somewhat beaded appearance.  There is left ureteral urothelial enhancement.  The urinary bladder is decompressed although the wall is mildly thickened, correlation with urinalysis to exclude cystitis.  The uterus and adnexa is grossly unremarkable noting the uterus is prominent, correlation with phase of menstrual cycle recommended.  No significant free fluid in the pelvis. The large bowel is grossly unremarkable.  The appendix and terminal ileum are grossly unremarkable.  The small bowel is grossly unremarkable.  Scattered shotty periaortic and paracaval lymph nodes are noted.  No focal organized pelvic fluid collection. Degenerative changes are noted of the lumbar spine noting stabilization susan and postsurgical changes. No significant inguinal lymphadenopathy.     Findings of left obstructive uropathy, likely secondary to a 0.4 cm calculus either within the distal left ureter or at the left  UVJ.  There is perinephric stranding and urothelial enhancement, as well as urinary bladder wall thickening, correlation with urinalysis recommended to exclude superimposed infection.  There is additional left nonobstructive nephrolithiasis. Hepatic steatosis, correlation with LFTs recommended. Enlargement of the uterus, correlation with phase of menstrual cycle recommended. Several additional findings above.    Us Pelvis Comp With Transvag Non-ob (xpd    Result Date: 5/19/2018  EXAMINATION: US PELVIS COMP WITH TRANSVAG NON-OB (XPD) CLINICAL HISTORY: LLQ abdominal Pain; Last menstrual period: 05/04/2018. TECHNIQUE: Transabdominal sonography of the pelvis was performed, followed by transvaginal sonography to better evaluate the uterus and ovaries. COMPARISON: Ultrasound pelvis 04/26/2018. FINDINGS: Uterus: Size: Measures 12.3 x 5.8 x 6.7 cm Masses: There are multiple intramural uterine fibroids, the largest 3 measure 5.9 cm, 4.5 cm and 1.9 cm respectively. Endometrium: Normal in this pre menopausal patient, measuring 11 mm. Right ovary: Size: Measures 4.3 x 3.6 x 2.4 cm Appearance: There is a small cyst or follicle measuring 2.8 cm Vascular flow: Normal. Left ovary: Size: Measures 3.1 x 2.6 x 2.6 cm Appearance: Normal Vascular Flow: Normal. Free Fluid: Minimal fluid in the pelvic cul-de-sac, likely physiologic.     Stable appearance of multiple uterine fibroid uterus. Previously described echogenic focus in the left ovary on 04/26/2018 is not seen on today's examination. 2.8 cm follicle or cyst in the right ovary.

## 2018-05-20 NOTE — ASSESSMENT & PLAN NOTE
48 YOF presents to ED with left lower quadrant abdominal pain, nausea, and vomiting and found to have 4mm left UVJ stone with a normal contralateral kidney on CT, WBC of 12, normal serum Cr, UA nitrite negative. She is afebrile with intermittent low grade tachycardia and normotensive.      Recommendations:   - observation on medicine service  - workup and treatment of right back pain   - aggressive IV hydration   - ODT zofran, IV or MA phenergan for nausea  - oxycodone 5-10 mg PO q 4 hours PRN pain, IV tylenol   - flomax 0.4mg QHS until stone passage   - strain all urine   - NPO p MN in case of need for procedure   - will reassess in AM

## 2018-05-21 NOTE — NURSING
Order to d/c home today. Saline lock removed. VSS. Discharge instructions given to pt and daughter. Pt verbalized understanding. Pt discharged from OBS unit via w/c. Pt accompanied by daughter. Prescriptions for flexeril, flomax, and percocet given to pt. PT orders given to pt. Supplies for urine collection and strainer given to pt.

## 2018-05-23 ENCOUNTER — TELEPHONE (OUTPATIENT)
Dept: HEPATOLOGY | Facility: CLINIC | Age: 48
End: 2018-05-23

## 2018-05-23 NOTE — TELEPHONE ENCOUNTER
Patient reached out to Dr. Vu who asked me to contact MsGlennHarsha in regards to her Percocet script.  The pharmacy was unable to fill script.  I have tried to reach the patient several times on her mobile and home numbers.  I have left her messages to return my call with no luck.  I need to find out what pharmacy she took the script to find out what the problem is.  I will wait for patient to reach out to us.

## 2018-06-07 ENCOUNTER — HOSPITAL ENCOUNTER (OUTPATIENT)
Dept: RADIOLOGY | Facility: HOSPITAL | Age: 48
Discharge: HOME | End: 2018-06-07
Attending: NURSE PRACTITIONER
Payer: MEDICAID

## 2018-06-07 DIAGNOSIS — N83.202 CYST OF LEFT OVARY: ICD-10-CM

## 2018-06-07 PROCEDURE — 76856 US EXAM PELVIC COMPLETE: CPT | Mod: 26,,, | Performed by: RADIOLOGY

## 2018-06-07 PROCEDURE — 76830 TRANSVAGINAL US NON-OB: CPT | Mod: TC

## 2018-06-07 PROCEDURE — 76830 TRANSVAGINAL US NON-OB: CPT | Mod: 26,,, | Performed by: RADIOLOGY

## 2018-09-10 ENCOUNTER — PATIENT MESSAGE (OUTPATIENT)
Dept: OBSTETRICS AND GYNECOLOGY | Facility: CLINIC | Age: 48
End: 2018-09-10

## 2018-09-13 ENCOUNTER — PATIENT MESSAGE (OUTPATIENT)
Dept: OBSTETRICS AND GYNECOLOGY | Facility: CLINIC | Age: 48
End: 2018-09-13

## 2018-09-13 DIAGNOSIS — F32.A DEPRESSION, UNSPECIFIED DEPRESSION TYPE: Primary | ICD-10-CM

## 2018-09-13 RX ORDER — FLUOXETINE 10 MG/1
10 TABLET ORAL DAILY
Qty: 90 TABLET | Refills: 3 | Status: SHIPPED | OUTPATIENT
Start: 2018-09-13 | End: 2019-12-05

## 2019-02-05 ENCOUNTER — OFFICE VISIT (OUTPATIENT)
Dept: ENDOCRINOLOGY | Facility: CLINIC | Age: 49
End: 2019-02-05
Payer: MEDICAID

## 2019-02-05 VITALS
TEMPERATURE: 98 F | HEIGHT: 66 IN | SYSTOLIC BLOOD PRESSURE: 118 MMHG | HEART RATE: 104 BPM | DIASTOLIC BLOOD PRESSURE: 68 MMHG | WEIGHT: 181 LBS | BODY MASS INDEX: 29.09 KG/M2

## 2019-02-05 DIAGNOSIS — R53.83 FATIGUE, UNSPECIFIED TYPE: Primary | ICD-10-CM

## 2019-02-05 DIAGNOSIS — L68.0 HIRSUTISM: ICD-10-CM

## 2019-02-05 DIAGNOSIS — N94.3 PMS (PREMENSTRUAL SYNDROME): ICD-10-CM

## 2019-02-05 PROCEDURE — 99213 OFFICE O/P EST LOW 20 MIN: CPT | Mod: PBBFAC,PN | Performed by: INTERNAL MEDICINE

## 2019-02-05 PROCEDURE — 99204 OFFICE O/P NEW MOD 45 MIN: CPT | Mod: S$PBB,,, | Performed by: INTERNAL MEDICINE

## 2019-02-05 PROCEDURE — 99204 PR OFFICE/OUTPT VISIT, NEW, LEVL IV, 45-59 MIN: ICD-10-PCS | Mod: S$PBB,,, | Performed by: INTERNAL MEDICINE

## 2019-02-05 PROCEDURE — 99999 PR PBB SHADOW E&M-EST. PATIENT-LVL III: ICD-10-PCS | Mod: PBBFAC,,, | Performed by: INTERNAL MEDICINE

## 2019-02-05 PROCEDURE — 99999 PR PBB SHADOW E&M-EST. PATIENT-LVL III: CPT | Mod: PBBFAC,,, | Performed by: INTERNAL MEDICINE

## 2019-02-05 NOTE — PROGRESS NOTES
Referring Provider:   No referral found.    PCP:  Doris Sexton MD    CC:  Fatigue    HPI:    Pt has been feeling tired. No thyroid problem.  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     from .  2 children.  Dad was exposed to Agent Orange. No FH of DM.    Past Medical History:   Diagnosis Date    Arthritis     Chronic back pain     Chronic neck pain     Fibrocystic breast     Fibroid, uterus     GERD (gastroesophageal reflux disease)     Herpes     Migraine headache     Scoliosis        Past Surgical History:   Procedure Laterality Date     SECTION      Scoliosis surgery      UMBILICAL HERNIA REPAIR         Social History     Socioeconomic History    Marital status: Legally      Spouse name: Not on file    Number of children: Not on file    Years of education: Not on file    Highest education level: Not on file   Social Needs    Financial resource strain: Not on file    Food insecurity - worry: Not on file    Food insecurity - inability: Not on file    Transportation needs - medical: Not on file    Transportation needs - non-medical: Not on file   Occupational History    Not on file   Tobacco Use    Smoking status: Never Smoker    Smokeless tobacco: Never Used   Substance and Sexual Activity    Alcohol use: Yes    Drug use: No    Sexual activity: Yes     Partners: Male     Birth control/protection: None   Other Topics Concern    Not on file   Social History Narrative    Not on file         ROS:   Fatigue  No thyroid disorder  No Diabetes  + swallowing problem when eating chicken or bread  Bad teeth always  Sinus issues  No chest pain or shortness of breath  No abdominal pain  No kidney problem  No rash  ROS otherwise neg except for what is mentioned in the PMH,  PSH and HPI    PE:  Vitals:    02/05/19 1049   BP: 118/68   Pulse: 104   Temp: 97.6 °F (36.4 °C)     Alert and oriented  No acute distress  No acne  +Acanthosis  No Proptosis or conjunctivitis  No rash on tongue, +teeth, some not adjacent to each other  No goitre by inspection  Thyroid gland is not palpable  No cervical lymphadenopathy  Heart reg, no gallop  Lungs cta, no wheezing  Abd soft, no tnd  No edema in lower legs  No rash  No bruises  Speech normal  Behavior normal  No tremor  Overweight  Body mass index is 29.21 kg/m².      Lab:    Lab Results   Component Value Date    TSH 0.618 04/19/2018       No components found for: AGBA1C  Lab Results   Component Value Date    CHOL 212 (H) 05/20/2018    TRIG 179 (H) 05/20/2018    HDL 45 05/20/2018    CHOLHDL 21.2 05/20/2018    TOTALCHOLEST 4.7 05/20/2018    NONHDLCHOL 167 05/20/2018     BMP  Lab Results   Component Value Date     05/20/2018    K 3.7 05/20/2018     05/20/2018    CO2 24 05/20/2018    BUN 7 05/20/2018    CREATININE 0.6 05/20/2018    CALCIUM 9.4 05/20/2018    ANIONGAP 9 05/20/2018    ESTGFRAFRICA >60.0 05/20/2018    EGFRNONAA >60.0 05/20/2018       A/P:  Fatigue, unspecified type  -     Testosterone; Future; Expected date: 02/06/2019  -     T4, free; Future; Expected date: 02/05/2019  -     TSH; Future; Expected date: 02/05/2019  -     Prolactin; Future; Expected date: 02/05/2019  -     Cortisol, 8AM; Future; Expected date: 02/05/2019    PMS (premenstrual syndrome)  -     Testosterone; Future; Expected date: 02/06/2019  -     T4, free; Future; Expected date: 02/05/2019  -     TSH; Future; Expected date: 02/05/2019  -     Prolactin; Future; Expected date: 02/05/2019  -     Cortisol, 8AM; Future; Expected date: 02/05/2019    Hirsutism  -     Testosterone; Future; Expected date: 02/06/2019  -     T4, free; Future; Expected date: 02/05/2019  -     TSH; Future; Expected date: 02/05/2019  -     Prolactin; Future; Expected date: 02/05/2019  -      Cortisol, 8AM; Future; Expected date: 02/05/2019    Memory deficit    Appt in 3 weeks.    Pt understands the plan and instructions.

## 2019-02-07 ENCOUNTER — LAB VISIT (OUTPATIENT)
Dept: LAB | Facility: HOSPITAL | Age: 49
End: 2019-02-07
Attending: INTERNAL MEDICINE
Payer: MEDICAID

## 2019-02-07 DIAGNOSIS — N94.3 PMS (PREMENSTRUAL SYNDROME): ICD-10-CM

## 2019-02-07 DIAGNOSIS — L68.0 HIRSUTISM: ICD-10-CM

## 2019-02-07 DIAGNOSIS — R53.83 FATIGUE, UNSPECIFIED TYPE: ICD-10-CM

## 2019-02-07 LAB
CORTIS SERPL-MCNC: 12.8 UG/DL
PROLACTIN SERPL IA-MCNC: 17.7 NG/ML
T4 FREE SERPL-MCNC: 0.88 NG/DL
TESTOST SERPL-MCNC: 20 NG/DL
TSH SERPL DL<=0.005 MIU/L-ACNC: 1.06 UIU/ML

## 2019-02-07 PROCEDURE — 84443 ASSAY THYROID STIM HORMONE: CPT

## 2019-02-07 PROCEDURE — 36415 COLL VENOUS BLD VENIPUNCTURE: CPT

## 2019-02-07 PROCEDURE — 82533 TOTAL CORTISOL: CPT

## 2019-02-07 PROCEDURE — 84439 ASSAY OF FREE THYROXINE: CPT

## 2019-02-07 PROCEDURE — 84403 ASSAY OF TOTAL TESTOSTERONE: CPT

## 2019-02-07 PROCEDURE — 84146 ASSAY OF PROLACTIN: CPT

## 2019-02-26 ENCOUNTER — OFFICE VISIT (OUTPATIENT)
Dept: ENDOCRINOLOGY | Facility: CLINIC | Age: 49
End: 2019-02-26
Payer: MEDICAID

## 2019-02-26 VITALS
HEART RATE: 81 BPM | HEIGHT: 64 IN | TEMPERATURE: 98 F | SYSTOLIC BLOOD PRESSURE: 116 MMHG | WEIGHT: 176.38 LBS | DIASTOLIC BLOOD PRESSURE: 70 MMHG | BODY MASS INDEX: 30.11 KG/M2

## 2019-02-26 DIAGNOSIS — R53.83 FATIGUE, UNSPECIFIED TYPE: Primary | ICD-10-CM

## 2019-02-26 DIAGNOSIS — R41.3 MEMORY DEFICIT: ICD-10-CM

## 2019-02-26 PROCEDURE — 99214 OFFICE O/P EST MOD 30 MIN: CPT | Mod: S$PBB,,, | Performed by: INTERNAL MEDICINE

## 2019-02-26 PROCEDURE — 99213 OFFICE O/P EST LOW 20 MIN: CPT | Mod: PBBFAC,PN | Performed by: INTERNAL MEDICINE

## 2019-02-26 PROCEDURE — 99999 PR PBB SHADOW E&M-EST. PATIENT-LVL III: CPT | Mod: PBBFAC,,, | Performed by: INTERNAL MEDICINE

## 2019-02-26 PROCEDURE — 99999 PR PBB SHADOW E&M-EST. PATIENT-LVL III: ICD-10-PCS | Mod: PBBFAC,,, | Performed by: INTERNAL MEDICINE

## 2019-02-26 PROCEDURE — 99214 PR OFFICE/OUTPT VISIT, EST, LEVL IV, 30-39 MIN: ICD-10-PCS | Mod: S$PBB,,, | Performed by: INTERNAL MEDICINE

## 2019-02-26 NOTE — PROGRESS NOTES
"  PCP:  Doris Sexton MD     CC:  Follow-up visit on multiple problems, fatigue and cold sensitivity  Main symptoms are fatigue, hair fall, cold sensitivity,   and hoarseness on and off x 10 y  FEELING "Having symptoms of thyroid problems"  issues since many years ago  Fatigue, constipation, and other symptoms since childhood.  Visited GI and Gyn.  Off BCP SEC TO WORSENING THE CYCLE AFTER TAKING ONE DOSE  Did not take Prozac  Reg heavy menstrual cycle  On B12 inj sometimes  On SL B12  On CPAP machine but it is not working  Just aggravated but not depressed  Still taking iodine. Was advised by me last visit not to take supplement.  Advised again today to stop the iodine.  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  2 children.  Dad was exposed to Agent Orange. No FH of DM.    Past Medical History:   Diagnosis Date    Arthritis     Chronic back pain     Chronic neck pain     Fibrocystic breast     Fibroid, uterus     GERD (gastroesophageal reflux disease)     Herpes     Migraine headache     Scoliosis        Past Surgical History:   Procedure Laterality Date     SECTION      Scoliosis surgery      UMBILICAL HERNIA REPAIR         Social History     Socioeconomic History    Marital status: Legally      Spouse name: Not on file    Number of children: Not on file    Years of education: Not on file    Highest education level: Not on file   Social Needs    Financial resource strain: Not on file    Food insecurity - worry: Not on file    Food insecurity - inability: Not on file    Transportation needs - medical: Not on file    Transportation needs - non-medical: Not on file   Occupational History    Not on file   Tobacco Use    Smoking status: Never Smoker    Smokeless tobacco: Never Used   Substance and Sexual Activity    Alcohol use: Yes    Drug use: No    Sexual activity: Yes     Partners: Male     Birth control/protection: None   Other " Topics Concern    Not on file   Social History Narrative    Not on file         ROS:   Fatigue  No thyroid disorder ( patient is feeling she has thyroid symptoms)  No Diabetes  + swallowing problem to chicken and bread  No chest pain or shortness of breath  No abdominal pain  No kidney problem  No rash  No depression    PE:  Vitals:    02/26/19 1102   BP: 116/70   Pulse: 81   Temp: 97.8 °F (36.6 °C)     Alert and oriented  No acute distress  Some anger feeling   acne  +Acanthosis  No Proptosis or conjunctivitis  No goitre by inspection  Thyroid gland is not palpable  No cervical lymphadenopathy  Heart reg, no gallop  Lungs cta, no wheezing  No edema in lower legs  No rash  No bruises  Speech normal  No tremor  Overweight  Body mass index is 30.27 kg/m².      Lab:     Ref. Range 2/7/2019 08:00   Cortisol -8 AM Latest Ref Range: 4.30 - 22.40 ug/dL 12.80   TSH Latest Ref Range: 0.400 - 4.000 uIU/mL 1.058   Free T4 Latest Ref Range: 0.71 - 1.51 ng/dL 0.88   Prolactin Latest Ref Range: 5.2 - 26.5 ng/mL 17.7   Testosterone, Total Latest Ref Range: 5 - 73 ng/dL 20         Lab Results   Component Value Date    TSH 1.058 02/07/2019    FREET4 0.88 02/07/2019       No components found for: AGBA1C  Lab Results   Component Value Date    CHOL 212 (H) 05/20/2018    TRIG 179 (H) 05/20/2018    HDL 45 05/20/2018    CHOLHDL 21.2 05/20/2018    TOTALCHOLEST 4.7 05/20/2018    NONHDLCHOL 167 05/20/2018     BMP  Lab Results   Component Value Date     05/20/2018    K 3.7 05/20/2018     05/20/2018    CO2 24 05/20/2018    BUN 7 05/20/2018    CREATININE 0.6 05/20/2018    CALCIUM 9.4 05/20/2018    ANIONGAP 9 05/20/2018    ESTGFRAFRICA >60.0 05/20/2018    EGFRNONAA >60.0 05/20/2018       A/P:  Fatigue, unspecified type  PMS (premenstrual syndrome)  Hirsutism  Memory deficit  ?  Need for referral to mental health ( discussed).  Patient said she is depressed  and she is just aggravated she wants to know what is wrong with  her.    PATIENT IS ADVISED TO  discuss with PCP.     Follow-up in 3 months    Pt understands the plan and instructions.

## 2019-03-24 PROBLEM — N94.3 PMS (PREMENSTRUAL SYNDROME): Status: ACTIVE | Noted: 2019-03-24

## 2019-05-07 ENCOUNTER — TELEPHONE (OUTPATIENT)
Dept: OBSTETRICS AND GYNECOLOGY | Facility: CLINIC | Age: 49
End: 2019-05-07

## 2019-05-07 DIAGNOSIS — Z12.39 BREAST CANCER SCREENING: Primary | ICD-10-CM

## 2019-05-07 NOTE — TELEPHONE ENCOUNTER
----- Message from Genet Bryant LPN sent at 5/7/2019  4:17 PM CDT -----  Contact: pt      ----- Message -----  From: Lilia Bosch  Sent: 5/7/2019   3:32 PM  To: [facility] Staff    Pt would like to be called back regarding getting mammo orders    Pt can be reached at [phone]      Done GH

## 2019-05-14 ENCOUNTER — TELEPHONE (OUTPATIENT)
Dept: OBSTETRICS AND GYNECOLOGY | Facility: CLINIC | Age: 49
End: 2019-05-14

## 2019-05-14 ENCOUNTER — OFFICE VISIT (OUTPATIENT)
Dept: OBSTETRICS AND GYNECOLOGY | Facility: CLINIC | Age: 49
End: 2019-05-14
Payer: MEDICAID

## 2019-05-14 ENCOUNTER — HOSPITAL ENCOUNTER (OUTPATIENT)
Dept: RADIOLOGY | Facility: HOSPITAL | Age: 49
Discharge: HOME | End: 2019-05-14
Attending: NURSE PRACTITIONER
Payer: MEDICAID

## 2019-05-14 VITALS
BODY MASS INDEX: 29.88 KG/M2 | SYSTOLIC BLOOD PRESSURE: 123 MMHG | HEIGHT: 64 IN | DIASTOLIC BLOOD PRESSURE: 79 MMHG | WEIGHT: 175 LBS

## 2019-05-14 DIAGNOSIS — D25.9 UTERINE LEIOMYOMA, UNSPECIFIED LOCATION: ICD-10-CM

## 2019-05-14 DIAGNOSIS — N39.41 URGE INCONTINENCE OF URINE: ICD-10-CM

## 2019-05-14 DIAGNOSIS — N92.0 MENORRHAGIA WITH REGULAR CYCLE: ICD-10-CM

## 2019-05-14 DIAGNOSIS — N94.6 DYSMENORRHEA: ICD-10-CM

## 2019-05-14 DIAGNOSIS — Z12.39 BREAST CANCER SCREENING: ICD-10-CM

## 2019-05-14 DIAGNOSIS — Z01.419 WELL WOMAN EXAM WITH ROUTINE GYNECOLOGICAL EXAM: Primary | ICD-10-CM

## 2019-05-14 PROCEDURE — 77067 MAMMO DIGITAL SCREENING BILAT WITH TOMOSYNTHESIS_CAD: ICD-10-PCS | Mod: 26,,, | Performed by: RADIOLOGY

## 2019-05-14 PROCEDURE — 77063 MAMMO DIGITAL SCREENING BILAT WITH TOMOSYNTHESIS_CAD: ICD-10-PCS | Mod: 26,,, | Performed by: RADIOLOGY

## 2019-05-14 PROCEDURE — 77067 SCR MAMMO BI INCL CAD: CPT | Mod: 26,,, | Performed by: RADIOLOGY

## 2019-05-14 PROCEDURE — 99213 OFFICE O/P EST LOW 20 MIN: CPT | Mod: PBBFAC,25 | Performed by: NURSE PRACTITIONER

## 2019-05-14 PROCEDURE — 99999 PR PBB SHADOW E&M-EST. PATIENT-LVL III: ICD-10-PCS | Mod: PBBFAC,,, | Performed by: NURSE PRACTITIONER

## 2019-05-14 PROCEDURE — 99999 PR PBB SHADOW E&M-EST. PATIENT-LVL III: CPT | Mod: PBBFAC,,, | Performed by: NURSE PRACTITIONER

## 2019-05-14 PROCEDURE — G0101 CA SCREEN;PELVIC/BREAST EXAM: HCPCS | Mod: PBBFAC | Performed by: NURSE PRACTITIONER

## 2019-05-14 PROCEDURE — 99396 PREV VISIT EST AGE 40-64: CPT | Mod: S$PBB,,, | Performed by: NURSE PRACTITIONER

## 2019-05-14 PROCEDURE — 77067 SCR MAMMO BI INCL CAD: CPT | Mod: TC

## 2019-05-14 PROCEDURE — 77063 BREAST TOMOSYNTHESIS BI: CPT | Mod: 26,,, | Performed by: RADIOLOGY

## 2019-05-14 PROCEDURE — 99396 PR PREVENTIVE VISIT,EST,40-64: ICD-10-PCS | Mod: S$PBB,,, | Performed by: NURSE PRACTITIONER

## 2019-05-14 RX ORDER — MEDROXYPROGESTERONE ACETATE 150 MG/ML
150 INJECTION, SUSPENSION INTRAMUSCULAR
Qty: 1 ML | Refills: 0 | Status: SHIPPED | OUTPATIENT
Start: 2019-05-14 | End: 2019-07-09

## 2019-05-14 RX ORDER — MEDROXYPROGESTERONE ACETATE 150 MG/ML
150 INJECTION, SUSPENSION INTRAMUSCULAR
Qty: 1 SYRINGE | Refills: 3 | Status: SHIPPED | OUTPATIENT
Start: 2019-05-14 | End: 2019-05-18

## 2019-05-14 NOTE — PROGRESS NOTES
"HISTORY OF PRESENT ILLNESS:    Jess Mcleod is a 49 y.o. female, , Patient's last menstrual period was 2019.,  presents for a routine exam with continued heavy painful periods.   -Saw Dr FREIDA Ricardo 18 for a surgery consult and decided to continue medical management.  -Stopped OCPs after one pack "because it made her periods heavier and more painful".   -Periods are heavy, requiring use of double protection with clots and severe cramping.   -Sometimes has premenstrual vomiting and c/o fatigue, weight gain, mood swings.   -Reports U.I and pad use "from sitting and not emptying often".  -States her PCP thought she was having "PMS", but she thinks she "did it to herself after taking energy drinks and weight loss pills".  -Denies SI, HI, but is depressed about her taxes.   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     DATA REVIEWED:  PELVIC ULTRASOUND 18:  Uterus:  Size: Measures 12.3 x 5.8 x 6.7 cm  Masses: There are multiple intramural uterine fibroids, the largest 3 measure 5.9 cm, 4.5 cm and 1.9 cm respectively.  Endometrium: Normal in this pre menopausal patient, measuring 11 mm.  Right ovary: Size: Measures 4.3 x 3.6 x 2.4 cm Appearance: There is a small cyst or follicle measuring 2.8 cm Vascular flow: Normal.  Left ovary: Size: Measures 3.1 x 2.6 x 2.6 cm Appearance: Normal Vascular Flow: Normal.  Free Fluid: minimal fluid in the pelvic cul-de-sac, likely physiologic.   IMPRESSION:  Stable appearance of multiple uterine fibroid uterus.  Previously described echogenic focus in the left ovary on 2018 is not seen on today's examination.  2.8 cm follicle or cyst in the right ovary.     LABS:   18  H/H 11.7/35.2  19:  TSH 1.058     Past Medical History:   Diagnosis Date    Arthritis     Chronic back pain     Chronic neck pain     Fibrocystic breast     Fibroid, uterus     GERD (gastroesophageal reflux " "disease)     Herpes     Migraine headache     Nephrolithiasis 2018    Sciatica of right side 2018    Scoliosis     Urge incontinence 2018       Past Surgical History:   Procedure Laterality Date     SECTION      Scoliosis surgery      UMBILICAL HERNIA REPAIR         MEDICATIONS AND ALLERGIES:      Current Outpatient Medications:     docusate sodium (COLACE) 100 MG capsule, Take 1 capsule (100 mg total) by mouth 2 (two) times daily., Disp: , Rfl: 0    FLUoxetine 10 MG Tab, Take 1 tablet (10 mg total) by mouth once daily., Disp: 90 tablet, Rfl: 3    pantoprazole (PROTONIX) 20 MG tablet, Take 20 mg by mouth once daily., Disp: , Rfl:     senna-docusate 8.6-50 mg (PERICOLACE) 8.6-50 mg per tablet, Take 1 tablet by mouth 2 (two) times daily as needed for Constipation., Disp: , Rfl:     tamsulosin (FLOMAX) 0.4 mg Cp24, Take 1 capsule (0.4 mg total) by mouth every evening., Disp: 30 capsule, Rfl: 0    medroxyPROGESTERone (DEPO-PROVERA) 150 mg/mL Syrg, Inject 1 mL (150 mg total) into the muscle every 3 (three) months. for 4 days, Disp: 1 Syringe, Rfl: 0    medroxyPROGESTERone (DEPO-PROVERA) 150 mg/mL Syrg, Inject 1 mL (150 mg total) into the muscle every 3 (three) months. for 4 days, Disp: 1 Syringe, Rfl: 3    Review of patient's allergies indicates:   Allergen Reactions    Ibuprofen Other (See Comments)     It makes my "ears hurt" when I take large doses.       Family History   Problem Relation Age of Onset    Breast cancer Paternal Aunt     Colon cancer Neg Hx     Ovarian cancer Neg Hx        Social History     Socioeconomic History    Marital status: Legally      Spouse name: Not on file    Number of children: Not on file    Years of education: Not on file    Highest education level: Not on file   Occupational History    Not on file   Social Needs    Financial resource strain: Not on file    Food insecurity:     Worry: Not on file     Inability: Not on file    " Transportation needs:     Medical: Not on file     Non-medical: Not on file   Tobacco Use    Smoking status: Never Smoker    Smokeless tobacco: Never Used   Substance and Sexual Activity    Alcohol use: Not Currently    Drug use: No    Sexual activity: Yes     Partners: Male     Birth control/protection: None   Lifestyle    Physical activity:     Days per week: Not on file     Minutes per session: Not on file    Stress: Not on file   Relationships    Social connections:     Talks on phone: Not on file     Gets together: Not on file     Attends Jewish service: Not on file     Active member of club or organization: Not on file     Attends meetings of clubs or organizations: Not on file     Relationship status: Not on file   Other Topics Concern    Not on file   Social History Narrative    Not on file       OB HISTORY: Number of vaginal deliveries:1 Number of C/S:1     COMPREHENSIVE GYN HISTORY:  PAP History: Denies abnormal Paps. LAST PAP 4-19-18 NORMAL.  Infection History: Denies STDs. Denies PID.  Benign History: Reports uterine fibroids. Denies ovarian cysts. Denies endometriosis. Denies other conditions.  Cancer History: Denies cervical cancer. Denies uterine cancer or hyperplasia. Denies ovarian cancer. Denies vulvar cancer or pre-cancer. Denies vaginal cancer or pre-cancer. Denies breast cancer. Denies colon cancer.  Sexual Activity History: Reports currently being sexually active  Menstrual History: Monthly. Flows 7 - 10 days. Heavy flow.   Dysmenorrhea History: Reports severe dysmenorrhea.   Contraception: Condoms.       ROS:  GENERAL: No weight changes. No swelling. + FATIGUE. No fever.  CARDIOVASCULAR: No chest pain. No shortness of breath. No leg cramps.   NEUROLOGICAL: + MIGRAINE HEADACHES.  BREASTS: No pain. No lumps. No discharge.  ABDOMEN: + MENSTRUAL CRAMPS.  No nausea. No vomiting. No diarrhea. No constipation.  REPRODUCTIVE: + HEAVY PERIODS.   VULVA: No pain. No lesions. No  "itching.  VAGINA: No relaxation. No itching. No odor. No discharge. No lesions.  URINARY: + CHRONIC U.I. And PAD use. No nocturia. No frequency. No dysuria.    /79   Ht 5' 4" (1.626 m)   Wt 79.4 kg (175 lb)   LMP 05/06/2019   BMI 30.04 kg/m²   4-19-18 Wt 80.4 kg (177 lb 4 oz)     PE:  APPEARANCE: Well nourished, well developed, in no acute distress.  AFFECT: WNL, alert and oriented x 3.  SKIN: No acne or hirsutism.  NECK: Neck symmetric, without masses or thyromegaly.  NODES: No inguinal, cervical, axillary or femoral lymph node enlargement.  CHEST: Good respiratory effort.   ABDOMEN: Soft. No tenderness or masses.   BREASTS: Symmetrical, no skin changes, visible lesions, palpable masses or nipple discharge bilaterally.  PELVIC: External female genitalia without lesions.  Female hair distribution. Adequate perineal body, Normal urethral meatus. Vagina moist and well rugated without lesions or discharge.  No significant cystocele or rectocele present. Cervix pink without lesions, discharge or tenderness. Uterus is 10-12 WEEK SIZE, regular, mobile and nontender. Adnexa without masses or tenderness.  EXTREMITIES: No edema    DIAGNOSIS:  1. Well woman exam with routine gynecological exam    2. Menorrhagia with regular cycle    3. Dysmenorrhea    4. Uterine leiomyoma, unspecified location    5. Urge incontinence of urine        PLAN:    Orders Placed This Encounter    medroxyPROGESTERone (DEPO-PROVERA) 150 mg/mL Syrg    medroxyPROGESTERone (DEPO-PROVERA) 150 mg/mL Syrg   Mammogram    COUNSELING:  The patient was counseled today on:  -all contraceptive options and options for surgical intervention and pt states "cannot take off work for the surgery until next year so would like to try Depo Provera";  -Depo Provera use and potential side effects including altered menstrual bleeding pattern, weight gain, increased fracture risk due to reversible osteoporosis;  -osteoporosis prevention and regular weight bearing " exercise;  -types of incontinence and management options including bladder training, timed voiding, Kegel exercises, and the avoidance of bladder irritants in managing mild symptoms conservatively;  -A.C.S. Pap and pelvic exam guidelines (pap every 3 years), recomendations for yearly mammogram;  -to follow up with her PCP for other health maintenance.    FOLLOW-UP with Dr BHANU Ricardo when she decides she wants surgery and in two years.     ADDENDUM:  Depo Provera 150mg IM given left hip UOQ 12:28 pm  LOT # [id number]  Exp date:   Observed 15 min w/o reaction.   Beckie Mcgarry NP

## 2019-06-05 ENCOUNTER — HOSPITAL ENCOUNTER (EMERGENCY)
Facility: HOSPITAL | Age: 49
Discharge: HOME | End: 2019-06-05
Attending: EMERGENCY MEDICINE
Payer: MEDICAID

## 2019-06-05 ENCOUNTER — PATIENT MESSAGE (OUTPATIENT)
Dept: OBSTETRICS AND GYNECOLOGY | Facility: CLINIC | Age: 49
End: 2019-06-05

## 2019-06-05 VITALS
SYSTOLIC BLOOD PRESSURE: 138 MMHG | BODY MASS INDEX: 28.72 KG/M2 | RESPIRATION RATE: 16 BRPM | HEART RATE: 97 BPM | DIASTOLIC BLOOD PRESSURE: 77 MMHG | TEMPERATURE: 99 F | WEIGHT: 172.38 LBS | HEIGHT: 65 IN | OXYGEN SATURATION: 100 %

## 2019-06-05 DIAGNOSIS — M79.662 PAIN OF LEFT CALF: ICD-10-CM

## 2019-06-05 LAB
ALBUMIN SERPL BCP-MCNC: 3.6 G/DL (ref 3.5–5.2)
ALP SERPL-CCNC: 79 U/L (ref 55–135)
ALT SERPL W/O P-5'-P-CCNC: 18 U/L (ref 10–44)
ANION GAP SERPL CALC-SCNC: 8 MMOL/L (ref 8–16)
AST SERPL-CCNC: 21 U/L (ref 10–40)
BASOPHILS # BLD AUTO: 0.04 K/UL (ref 0–0.2)
BASOPHILS NFR BLD: 0.5 % (ref 0–1.9)
BILIRUB SERPL-MCNC: 0.2 MG/DL (ref 0.1–1)
BUN SERPL-MCNC: 9 MG/DL (ref 6–20)
CALCIUM SERPL-MCNC: 10.4 MG/DL (ref 8.7–10.5)
CHLORIDE SERPL-SCNC: 110 MMOL/L (ref 95–110)
CK SERPL-CCNC: 107 U/L (ref 20–180)
CO2 SERPL-SCNC: 20 MMOL/L (ref 23–29)
CREAT SERPL-MCNC: 0.7 MG/DL (ref 0.5–1.4)
D DIMER PPP IA.FEU-MCNC: 0.67 MG/L FEU
DIFFERENTIAL METHOD: ABNORMAL
EOSINOPHIL # BLD AUTO: 0.1 K/UL (ref 0–0.5)
EOSINOPHIL NFR BLD: 1.8 % (ref 0–8)
ERYTHROCYTE [DISTWIDTH] IN BLOOD BY AUTOMATED COUNT: 13.4 % (ref 11.5–14.5)
EST. GFR  (AFRICAN AMERICAN): >60 ML/MIN/1.73 M^2
EST. GFR  (NON AFRICAN AMERICAN): >60 ML/MIN/1.73 M^2
GLUCOSE SERPL-MCNC: 76 MG/DL (ref 70–110)
HCT VFR BLD AUTO: 40.9 % (ref 37–48.5)
HGB BLD-MCNC: 12.9 G/DL (ref 12–16)
IMM GRANULOCYTES # BLD AUTO: 0.01 K/UL (ref 0–0.04)
IMM GRANULOCYTES NFR BLD AUTO: 0.1 % (ref 0–0.5)
LYMPHOCYTES # BLD AUTO: 2.8 K/UL (ref 1–4.8)
LYMPHOCYTES NFR BLD: 38.1 % (ref 18–48)
MCH RBC QN AUTO: 29.5 PG (ref 27–31)
MCHC RBC AUTO-ENTMCNC: 31.5 G/DL (ref 32–36)
MCV RBC AUTO: 93 FL (ref 82–98)
MONOCYTES # BLD AUTO: 0.5 K/UL (ref 0.3–1)
MONOCYTES NFR BLD: 6.9 % (ref 4–15)
NEUTROPHILS # BLD AUTO: 3.9 K/UL (ref 1.8–7.7)
NEUTROPHILS NFR BLD: 52.6 % (ref 38–73)
NRBC BLD-RTO: 0 /100 WBC
PLATELET # BLD AUTO: 441 K/UL (ref 150–350)
PMV BLD AUTO: 10.3 FL (ref 9.2–12.9)
POTASSIUM SERPL-SCNC: 4.1 MMOL/L (ref 3.5–5.1)
PROT SERPL-MCNC: 8.4 G/DL (ref 6–8.4)
RBC # BLD AUTO: 4.38 M/UL (ref 4–5.4)
SODIUM SERPL-SCNC: 138 MMOL/L (ref 136–145)
WBC # BLD AUTO: 7.42 K/UL (ref 3.9–12.7)

## 2019-06-05 PROCEDURE — 80053 COMPREHEN METABOLIC PANEL: CPT

## 2019-06-05 PROCEDURE — 85025 COMPLETE CBC W/AUTO DIFF WBC: CPT

## 2019-06-05 PROCEDURE — 99284 EMERGENCY DEPT VISIT MOD MDM: CPT | Mod: ,,, | Performed by: EMERGENCY MEDICINE

## 2019-06-05 PROCEDURE — 82550 ASSAY OF CK (CPK): CPT

## 2019-06-05 PROCEDURE — 99284 EMERGENCY DEPT VISIT MOD MDM: CPT | Mod: 25

## 2019-06-05 PROCEDURE — 85379 FIBRIN DEGRADATION QUANT: CPT

## 2019-06-05 PROCEDURE — 99284 PR EMERGENCY DEPT VISIT,LEVEL IV: ICD-10-PCS | Mod: ,,, | Performed by: EMERGENCY MEDICINE

## 2019-06-05 NOTE — ED TRIAGE NOTES
Patient reports to the ED today with reports of L leg pain. Patient states that on Sunday she started to experience pain in her left foot that is now radiating up leg

## 2019-06-05 NOTE — ED NOTES
Patient identifiers verified and correct for Jess Mcleod  LOC: The patient is awake, alert and aware of environment with an appropriate affect, the patient is oriented x 3 and speaking appropriately.   APPEARANCE: Patient appears comfortable and in no acute distress, patient is clean and well groomed.  SKIN: The skin is warm and dry, color consistent with ethnicity, patient has normal skin turgor and moist mucus membranes, skin intact, no breakdown or bruising noted.   MUSCULOSKELETAL: Patient moving all extremities spontaneously, no swelling noted. Reports of L leg pain starting at foot and radiating up  RESPIRATORY: Airway is open and patent, respirations are spontaneous, patient has a normal effort and rate, no accessory muscle use noted, O2 sats noted at 100% on room air.  CARDIAC:Denies chest pain capillary refill < 3 seconds.   GASTRO: Soft and non tender to palpation, no distention noted, normoactive bowel sounds present in all four quadrants. Pt states bowel movements have been regular.  : Pt denies any pain or frequency with urination.  NEURO: Pt opens eyes spontaneously, behavior appropriate to situation, follows commands, facial expression symmetrical, bilateral hand grasp equal and even, purposeful motor response noted, normal sensation in all extremities when touched with a finger.

## 2019-06-05 NOTE — ED PROVIDER NOTES
"Encounter Date: 2019       History     Chief Complaint   Patient presents with    Leg Pain     Started in left foot and traveled up L calf, reports pain to area, able to bear weight but painful      48 y/o female which presents with left calf pain that began on . She states she received a depo-provera shot on  and is concerned that she has a blood clot.  Patient states that this has happened before when she takes contraceptives and she always gets muscular pain after contraceptives.  Patient denies any history of DVT.    The history is provided by the patient.     Review of patient's allergies indicates:   Allergen Reactions    Ibuprofen Other (See Comments)     It makes my "ears hurt" when I take large doses.     Past Medical History:   Diagnosis Date    Arthritis     Chronic back pain     Chronic neck pain     Fibrocystic breast     Fibroid, uterus     GERD (gastroesophageal reflux disease)     Herpes     Migraine headache     Nephrolithiasis 2018    Sciatica of right side 2018    Scoliosis     Urge incontinence 2018     Past Surgical History:   Procedure Laterality Date     SECTION      Scoliosis surgery      UMBILICAL HERNIA REPAIR       Family History   Problem Relation Age of Onset    Breast cancer Paternal Aunt     Colon cancer Neg Hx     Ovarian cancer Neg Hx      Social History     Tobacco Use    Smoking status: Never Smoker    Smokeless tobacco: Never Used   Substance Use Topics    Alcohol use: Not Currently    Drug use: No     Review of Systems   Constitutional: Negative for fever.   HENT: Negative for sore throat.    Respiratory: Negative for shortness of breath.    Cardiovascular: Negative for chest pain.   Gastrointestinal: Negative for nausea.   Genitourinary: Negative for dysuria.   Musculoskeletal: Positive for myalgias (Left calf). Negative for back pain.   Skin: Negative for rash.   Neurological: Negative for weakness.   Hematological: " Does not bruise/bleed easily.   All other systems reviewed and are negative.      Physical Exam     Initial Vitals [06/05/19 1613]   BP Pulse Resp Temp SpO2   138/77 97 16 99 °F (37.2 °C) 100 %      MAP       --         Physical Exam    Nursing note and vitals reviewed.  Constitutional: She appears well-developed and well-nourished.   HENT:   Head: Normocephalic and atraumatic.   Eyes: Conjunctivae and EOM are normal. Pupils are equal, round, and reactive to light.   Cardiovascular: Normal rate, regular rhythm, normal heart sounds and intact distal pulses. Exam reveals no gallop and no friction rub.    No murmur heard.  Pulmonary/Chest: Breath sounds normal. No respiratory distress. She has no wheezes. She has no rhonchi. She has no rales. She exhibits no tenderness.   Abdominal: Soft. Bowel sounds are normal. She exhibits no distension and no mass. There is no tenderness. There is no rebound and no guarding.   Musculoskeletal: Normal range of motion. She exhibits tenderness. She exhibits no edema.   Neurological: She is oriented to person, place, and time. She has normal strength. GCS score is 15. GCS eye subscore is 4. GCS verbal subscore is 5. GCS motor subscore is 6.       ED Course   Procedures  Labs Reviewed   CBC W/ AUTO DIFFERENTIAL - Abnormal; Notable for the following components:       Result Value    Mean Corpuscular Hemoglobin Conc 31.5 (*)     Platelets 441 (*)     All other components within normal limits   COMPREHENSIVE METABOLIC PANEL - Abnormal; Notable for the following components:    CO2 20 (*)     All other components within normal limits   D DIMER, QUANTITATIVE - Abnormal; Notable for the following components:    D-Dimer 0.67 (*)     All other components within normal limits   CK   POCT URINE PREGNANCY          Imaging Results          US Lower Extremity Veins Left (Final result)  Result time 06/05/19 18:10:33    Final result by Mar Pastor MD (06/05/19 18:10:33)                  Impression:      No evidence of deep venous thrombosis in the left lower extremity.    Electronically signed by resident: Sincere Catalan  Date:    06/05/2019  Time:    18:08    Electronically signed by: Mar Pastor MD  Date:    06/05/2019  Time:    18:10             Narrative:    EXAMINATION:  US LOWER EXTREMITY VEINS LEFT    CLINICAL HISTORY:  Pain in left lower leg    TECHNIQUE:  Duplex and color flow Doppler evaluation and graded compression of the left lower extremity veins was performed.    COMPARISON:  No relevant prior imaging for comparison.    FINDINGS:  Left thigh veins: The common femoral, femoral, popliteal, upper greater saphenous, and deep femoral veins are patent and free of thrombus. The veins are normally compressible and have normal phasic flow and augmentation response.    Left calf veins: The visualized calf veins are patent.    Contralateral CFV: The contralateral (right) common femoral vein is patent and free of thrombus.    Miscellaneous: None                                 Medical Decision Making:   Initial Assessment:   50 y/o female which presents with left calf pain. She states she received a depo-provera shot on 5/14 and is concerned that she has a blood clot.  Patient states that this has happened before when she takes contraceptives and she always gets muscular pain after contraceptives.  Patient denies any history of DVT.    Differential Diagnosis:   Myositis, DVT, muscle strain  Clinical Tests:   Lab Tests: Ordered and Reviewed  Radiological Study: Ordered and Reviewed  ED Management:  Patient examined has minimal pain to the left calf.  Ultrasound is negative for DVT.  D-dimer slightly elevated the patient does not have any symptoms of a PE.  Patient denies any chest pain or shortness of breath. Lab work is otherwise unremarkable. Patient advised of these findings and then states that she was concerned that maybe she had gout.  Patient states that she has had episodes of gout  previously but has never been diagnosed with gout or been placed on medication.  Patient denies any history of any other medical problems.  Patient educated on signs and symptoms of gout and advised that she does not have any other symptoms. Patient given strict return precautions and voiced understanding of all discharge instructions.  Patient stable at discharge.              Attending Attestation:     Physician Attestation Statement for NP/PA:   I have conducted a face to face encounter with this patient in addition to the NP/PA, due to Medical Complexity    Other NP/PA Attestation Additions:      Medical Decision Makin-year-old female presents with had left plantar surface foot pain rating up in the calf that started while walking no direct injury to the leg.  Since then she has not been able to put pressure on the area that hurts    Left lower extremity +2 DP PT, sensation intact  No edema erythema , compartment soft  full range of motion of the left ankle  Tenderness to palpation over the plantar surface of the midfoot  No calf tenderness to palpation  Patient walking in RWR with no antalgic gait    Offered to perform x-ray of the left foot to rule out fracture patient declined, she was concerned about a clot in the left leg  Ultrasound rule out DVT negative for DVT.  Discussed the risk of missing calf DVT based on the DVT study today.  Patient instructed to follow up with her PCP   The patient has been carefully educated about symptoms and conditions that should prompt immediate return to the ED for recheck or further evaluation. Told to return immediately for any new or worsening or progressive symptoms.                  ED Course as of    1739 BP: 138/77 [AT]   1739 Temp: 99 °F (37.2 °C) [AT]   1739 Temp src: Oral [AT]   1739 Pulse: 97 [AT]   1739 Resp: 16 [AT]   1739 SpO2: 100 % [AT]   1740 Wells criteria score of 2    [AT]   1753 WBC: 7.42 [AT]   1824 D-Dimer(!): 0.67  [AT]   1851 CPK: 107 [AT]      ED Course User Index  [AT] LUKE Sidhu     Clinical Impression:       ICD-10-CM ICD-9-CM   1. Pain of left calf M79.662 729.5                                LUKE Sidhu  06/05/19 2129

## 2019-06-05 NOTE — DISCHARGE INSTRUCTIONS
Motrin or tylenol as needed for pain  If you continue to have calf pain please have a repeat ultrasound in 1 week

## 2019-06-17 ENCOUNTER — PATIENT MESSAGE (OUTPATIENT)
Dept: OBSTETRICS AND GYNECOLOGY | Facility: CLINIC | Age: 49
End: 2019-06-17

## 2019-07-09 ENCOUNTER — PATIENT MESSAGE (OUTPATIENT)
Dept: OBSTETRICS AND GYNECOLOGY | Facility: CLINIC | Age: 49
End: 2019-07-09

## 2019-07-09 RX ORDER — DESOGESTREL AND ETHINYL ESTRADIOL 0.15-0.03
1 KIT ORAL DAILY
Qty: 84 TABLET | Refills: 3 | Status: SHIPPED | OUTPATIENT
Start: 2019-07-09 | End: 2019-09-17 | Stop reason: SDUPTHER

## 2019-08-16 ENCOUNTER — CLINICAL SUPPORT (OUTPATIENT)
Dept: OBSTETRICS AND GYNECOLOGY | Facility: CLINIC | Age: 49
End: 2019-08-16
Payer: MEDICAID

## 2019-08-16 DIAGNOSIS — Z30.42 DEPO-PROVERA CONTRACEPTIVE STATUS: ICD-10-CM

## 2019-08-16 DIAGNOSIS — N92.0 MENORRHAGIA WITH REGULAR CYCLE: Primary | ICD-10-CM

## 2019-08-16 LAB
B-HCG UR QL: NEGATIVE
CTP QC/QA: YES

## 2019-08-16 PROCEDURE — 99999 PR PBB SHADOW E&M-EST. PATIENT-LVL II: CPT | Mod: PBBFAC,,,

## 2019-08-16 PROCEDURE — 81025 URINE PREGNANCY TEST: CPT | Mod: PBBFAC

## 2019-08-16 PROCEDURE — 96372 THER/PROPH/DIAG INJ SC/IM: CPT | Mod: PBBFAC

## 2019-08-16 PROCEDURE — 99212 OFFICE O/P EST SF 10 MIN: CPT | Mod: PBBFAC,25

## 2019-08-16 PROCEDURE — 99999 PR PBB SHADOW E&M-EST. PATIENT-LVL II: ICD-10-PCS | Mod: PBBFAC,,,

## 2019-08-16 RX ORDER — MEDROXYPROGESTERONE ACETATE 150 MG/ML
150 INJECTION, SUSPENSION INTRAMUSCULAR ONCE
Status: COMPLETED | OUTPATIENT
Start: 2019-08-16 | End: 2019-08-16

## 2019-08-16 RX ADMIN — MEDROXYPROGESTERONE ACETATE 150 MG: 150 INJECTION, SUSPENSION INTRAMUSCULAR at 03:08

## 2019-08-16 NOTE — PROGRESS NOTES
Pregnancy test needed to be performed, arrived out of timeframe. Here for Depo Provera Injection,  UPT obtained with  negative results . Patient with no current complaints of pain prior to or after injection. Advised to wait 5 minutes. Return on 11/5 /2019 for next injection.  PATIENT SUPPLIED MEDICATION.  See medication Card for RX information  Order verified, inspected package,storage verified,expiration verified      APPOINTMENT MADE FOR NEXT INJECTION    Site - RB

## 2019-08-26 ENCOUNTER — TELEPHONE (OUTPATIENT)
Dept: OBSTETRICS AND GYNECOLOGY | Facility: CLINIC | Age: 49
End: 2019-08-26

## 2019-08-26 NOTE — TELEPHONE ENCOUNTER
----- Message from Lyly Pierre sent at 8/26/2019 11:23 AM CDT -----  Contact: Elvia [phone]    Name of Who is Calling: Elvia Shah       What is the request in detail: Elvia is requesting a call from staff in regards to resubmitting a new prescription for FLUoxetine 10 MG Tab the insurance will not pay for the tablets  .....Please contact to further discuss and advise.     Can the clinic reply by [facility]: no     What Number to Call Back if not in [facility]:  [phone]

## 2019-09-11 ENCOUNTER — PATIENT MESSAGE (OUTPATIENT)
Dept: OBSTETRICS AND GYNECOLOGY | Facility: CLINIC | Age: 49
End: 2019-09-11

## 2019-09-16 ENCOUNTER — PATIENT MESSAGE (OUTPATIENT)
Dept: OBSTETRICS AND GYNECOLOGY | Facility: CLINIC | Age: 49
End: 2019-09-16

## 2019-09-17 RX ORDER — DESOGESTREL AND ETHINYL ESTRADIOL 0.15-0.03
1 KIT ORAL DAILY
Qty: 84 TABLET | Refills: 3 | Status: SHIPPED | OUTPATIENT
Start: 2019-09-17 | End: 2019-12-05 | Stop reason: SDUPTHER

## 2019-11-05 ENCOUNTER — CLINICAL SUPPORT (OUTPATIENT)
Dept: OBSTETRICS AND GYNECOLOGY | Facility: CLINIC | Age: 49
End: 2019-11-05
Payer: MEDICAID

## 2019-11-05 DIAGNOSIS — Z30.42 ON DEPO-PROVERA FOR CONTRACEPTION: Primary | ICD-10-CM

## 2019-11-05 PROCEDURE — 99211 OFF/OP EST MAY X REQ PHY/QHP: CPT | Mod: PBBFAC,25

## 2019-11-05 PROCEDURE — 99999 PR PBB SHADOW E&M-EST. PATIENT-LVL I: CPT | Mod: PBBFAC,,,

## 2019-11-05 PROCEDURE — 96372 THER/PROPH/DIAG INJ SC/IM: CPT | Mod: PBBFAC

## 2019-11-05 PROCEDURE — 99999 PR PBB SHADOW E&M-EST. PATIENT-LVL I: ICD-10-PCS | Mod: PBBFAC,,,

## 2019-11-05 RX ORDER — MEDROXYPROGESTERONE ACETATE 150 MG/ML
150 INJECTION, SUSPENSION INTRAMUSCULAR
Status: SHIPPED | OUTPATIENT
Start: 2019-11-05 | End: 2020-10-30

## 2019-11-05 RX ADMIN — MEDROXYPROGESTERONE ACETATE 150 MG: 150 INJECTION, SUSPENSION INTRAMUSCULAR at 10:11

## 2019-11-05 NOTE — PROGRESS NOTES
Here for Depo Provera Injection, date due 11/05/2019 - 11/19/2019. Last injection given 08/16/2019. Patient with no current complaints of pain prior to or after injection. Advised to wait 5 minutes. Return between 01/21/2020 - 02/04/2020 for next injection.    PATIENT SUPPLIED MEDICATION.    See medication Card for RX information    Order verified, inspected package,storage verified,expiration verified      Site -

## 2019-12-05 ENCOUNTER — HOSPITAL ENCOUNTER (OUTPATIENT)
Dept: RADIOLOGY | Facility: HOSPITAL | Age: 49
Discharge: HOME | End: 2019-12-05
Attending: NURSE PRACTITIONER
Payer: MEDICAID

## 2019-12-05 ENCOUNTER — OFFICE VISIT (OUTPATIENT)
Dept: OBSTETRICS AND GYNECOLOGY | Facility: CLINIC | Age: 49
End: 2019-12-05
Payer: MEDICAID

## 2019-12-05 VITALS
WEIGHT: 156.5 LBS | BODY MASS INDEX: 26.08 KG/M2 | DIASTOLIC BLOOD PRESSURE: 85 MMHG | SYSTOLIC BLOOD PRESSURE: 124 MMHG | HEIGHT: 65 IN

## 2019-12-05 DIAGNOSIS — N94.6 DYSMENORRHEA: ICD-10-CM

## 2019-12-05 DIAGNOSIS — N92.4 ABNORMAL PERIMENOPAUSAL BLEEDING: Primary | ICD-10-CM

## 2019-12-05 DIAGNOSIS — D25.9 UTERINE LEIOMYOMA, UNSPECIFIED LOCATION: ICD-10-CM

## 2019-12-05 DIAGNOSIS — N92.4 ABNORMAL PERIMENOPAUSAL BLEEDING: ICD-10-CM

## 2019-12-05 PROCEDURE — 99213 PR OFFICE/OUTPT VISIT, EST, LEVL III, 20-29 MIN: ICD-10-PCS | Mod: S$PBB,,, | Performed by: NURSE PRACTITIONER

## 2019-12-05 PROCEDURE — 76830 TRANSVAGINAL US NON-OB: CPT | Mod: 26,,, | Performed by: RADIOLOGY

## 2019-12-05 PROCEDURE — 76830 TRANSVAGINAL US NON-OB: CPT | Mod: TC

## 2019-12-05 PROCEDURE — 76856 US EXAM PELVIC COMPLETE: CPT | Mod: 26,,, | Performed by: RADIOLOGY

## 2019-12-05 PROCEDURE — 99999 PR PBB SHADOW E&M-EST. PATIENT-LVL III: CPT | Mod: PBBFAC,,, | Performed by: NURSE PRACTITIONER

## 2019-12-05 PROCEDURE — 99213 OFFICE O/P EST LOW 20 MIN: CPT | Mod: S$PBB,,, | Performed by: NURSE PRACTITIONER

## 2019-12-05 PROCEDURE — 76830 US PELVIS COMP WITH TRANSVAG NON-OB (XPD): ICD-10-PCS | Mod: 26,,, | Performed by: RADIOLOGY

## 2019-12-05 PROCEDURE — 76856 US PELVIS COMP WITH TRANSVAG NON-OB (XPD): ICD-10-PCS | Mod: 26,,, | Performed by: RADIOLOGY

## 2019-12-05 PROCEDURE — 99999 PR PBB SHADOW E&M-EST. PATIENT-LVL III: ICD-10-PCS | Mod: PBBFAC,,, | Performed by: NURSE PRACTITIONER

## 2019-12-05 PROCEDURE — 99213 OFFICE O/P EST LOW 20 MIN: CPT | Mod: PBBFAC,25 | Performed by: NURSE PRACTITIONER

## 2019-12-05 RX ORDER — DESOGESTREL AND ETHINYL ESTRADIOL 0.15-0.03
1 KIT ORAL DAILY
Qty: 90 TABLET | Refills: 3 | Status: SHIPPED | OUTPATIENT
Start: 2019-12-05 | End: 2020-04-26

## 2019-12-05 RX ORDER — HYDROCODONE BITARTRATE AND ACETAMINOPHEN 10; 325 MG/1; MG/1
TABLET ORAL
COMMUNITY
End: 2020-04-26

## 2019-12-05 RX ORDER — MEDROXYPROGESTERONE ACETATE 150 MG/ML
INJECTION, SUSPENSION INTRAMUSCULAR
COMMUNITY
End: 2020-05-27 | Stop reason: SDUPTHER

## 2019-12-05 RX ORDER — FLUOXETINE HYDROCHLORIDE 40 MG/1
CAPSULE ORAL
Refills: 2 | Status: ON HOLD | COMMUNITY
Start: 2019-12-02 | End: 2020-02-06

## 2019-12-05 RX ORDER — FLUOXETINE HYDROCHLORIDE 40 MG/1
CAPSULE ORAL
Status: ON HOLD | COMMUNITY
Start: 2019-12-02 | End: 2020-02-06

## 2019-12-05 RX ORDER — IBUPROFEN 800 MG/1
800 TABLET ORAL EVERY 8 HOURS PRN
Qty: 60 TABLET | Refills: 2 | Status: SHIPPED | OUTPATIENT
Start: 2019-12-05 | End: 2020-04-26

## 2019-12-05 RX ORDER — QUETIAPINE FUMARATE 25 MG/1
TABLET, FILM COATED ORAL
Refills: 2 | COMMUNITY
Start: 2019-12-02 | End: 2022-04-28

## 2019-12-05 RX ORDER — MEDROXYPROGESTERONE ACETATE 150 MG/ML
INJECTION, SUSPENSION INTRAMUSCULAR
Refills: 3 | Status: ON HOLD | COMMUNITY
Start: 2019-11-03 | End: 2020-02-06

## 2019-12-05 RX ORDER — PANTOPRAZOLE SODIUM 20 MG/1
20 TABLET, DELAYED RELEASE ORAL
Status: ON HOLD | COMMUNITY
Start: 2017-07-07 | End: 2020-02-06

## 2019-12-05 RX ORDER — FLUTICASONE PROPIONATE 50 MCG
SPRAY, SUSPENSION (ML) NASAL
COMMUNITY
End: 2021-05-26

## 2019-12-05 NOTE — PROGRESS NOTES
HISTORY OF PRESENT ILLNESS:    Jess Mcleod is a 49 y.o. female, , No LMP recorded.,  presents for follow up of pain.  -Taking both OCPs and Depo Provera, but when it's time for the sugar pills she gushes for 7 days, passes clots, has headaches, feels fatigued and dizzy with severe pain on the right side (no syncope).  -Saw Dr Ricardo 18 and declined a hysterectomy.   -Wants more conservative management such as a UAE or myomectomy and is here for a second opinon.  -Bleeding today, so does not want to be examined.  -Ibuprofen 400mg used to help, no longer.    DATA REVIEWED:    ENDOMETRIAL BIOPSY: 18:  1. Proliferative phase endometrium, negative for hyperplasia or malignancy.  2. Uterus, cervical polyp, polypectomy:  -Polypoid fragments of benign endocervical and squamous epithelium compatible with polyp.    PELVIC US 18  Uterus:  Size: Measures 12.3 x 5.8 x 6.7 cm  Masses: There are multiple intramural uterine fibroids, the largest 3 measure 5.9 cm, 4.5 cm and 1.9 cm respectively.  Endometrium: Normal in this pre menopausal patient, measuring 11 mm.  Right ovary:  Size: Measures 4.3 x 3.6 x 2.4 cm  Appearance: There is a small cyst or follicle measuring 2.8 cm  Vascular flow: Normal.  Left ovary:  Size: Measures 3.1 x 2.6 x 2.6 cm  Appearance: Normal  Vascular Flow: Normal.  Free Fluid:  Minimal fluid in the pelvic cul-de-sac, likely physiologic.   Impression:  Stable appearance of multiple uterine fibroid uterus.  Previously described echogenic focus in the left ovary on 2018 is not seen on today's examination.  2.8 cm follicle or cyst in the right ovary.         Past Medical History:   Diagnosis Date    Arthritis     Chronic back pain     Chronic neck pain     Fibrocystic breast     Fibroid, uterus     GERD (gastroesophageal reflux disease)     Herpes     Migraine headache     Nephrolithiasis 2018    Sciatica of right side 2018    Scoliosis     Urge incontinence  "2018       Past Surgical History:   Procedure Laterality Date     SECTION      Scoliosis surgery      UMBILICAL HERNIA REPAIR         MEDICATIONS AND ALLERGIES:      Current Outpatient Medications:     FLUoxetine 40 MG capsule, , Disp: , Rfl:     FLUoxetine 40 MG capsule, TK 1 C PO QHS, Disp: , Rfl: 2    fluticasone propionate (FLONASE) 50 mcg/actuation nasal spray, fluticasone propionate 50 mcg/actuation nasal spray,suspension  SHAKE LIQUID AND USE 1 SPRAY IN EACH NOSTRIL EVERY DAY, Disp: , Rfl:     HYDROcodone-acetaminophen (NORCO)  mg per tablet, hydrocodone 10 mg-acetaminophen 325 mg tablet  Take 1 tablet every 8 hours by oral route., Disp: , Rfl:     medroxyPROGESTERone (DEPO-PROVERA) 150 mg/mL Syrg, , Disp: , Rfl:     medroxyPROGESTERone (DEPO-PROVERA) 150 mg/mL Syrg, , Disp: , Rfl: 3    pantoprazole (PROTONIX) 20 MG tablet, Take 20 mg by mouth once daily., Disp: , Rfl:     pantoprazole (PROTONIX) 20 MG tablet, Take 20 mg by mouth., Disp: , Rfl:     QUEtiapine (SEROQUEL) 25 MG Tab, TK 1 T PO QHS, Disp: , Rfl: 2    desogestrel-ethinyl estradiol (APRI) 0.15-0.03 mg per tablet, Take 1 tablet by mouth once daily. Medically advised to skip the placebo pills and take active tablets back to back, Disp: 90 tablet, Rfl: 3    ibuprofen (ADVIL,MOTRIN) 800 MG tablet, Take 1 tablet (800 mg total) by mouth every 8 (eight) hours as needed for Pain., Disp: 60 tablet, Rfl: 2    Current Facility-Administered Medications:     medroxyPROGESTERone (DEPO-PROVERA) injection 150 mg, 150 mg, Intramuscular, Q90 Days, KORINA Mcgarry NP, 150 mg at 19 1017    Review of patient's allergies indicates:   Allergen Reactions    Ibuprofen Other (See Comments)     It makes my "ears hurt" when I take large doses.       Family History   Problem Relation Age of Onset    Breast cancer Paternal Aunt     Colon cancer Neg Hx     Ovarian cancer Neg Hx        Social History     Socioeconomic History    " Marital status: Legally      Spouse name: Not on file    Number of children: Not on file    Years of education: Not on file    Highest education level: Not on file   Occupational History    Not on file   Social Needs    Financial resource strain: Not on file    Food insecurity:     Worry: Not on file     Inability: Not on file    Transportation needs:     Medical: Not on file     Non-medical: Not on file   Tobacco Use    Smoking status: Never Smoker    Smokeless tobacco: Never Used   Substance and Sexual Activity    Alcohol use: Not Currently    Drug use: No    Sexual activity: Yes     Partners: Male     Birth control/protection: None   Lifestyle    Physical activity:     Days per week: Not on file     Minutes per session: Not on file    Stress: Not on file   Relationships    Social connections:     Talks on phone: Not on file     Gets together: Not on file     Attends [employer] service: Not on file     Active member of club or organization: Not on file     Attends meetings of clubs or organizations: Not on file     Relationship status: Not on file   Other Topics Concern    Not on file   Social History Narrative    Not on file       OB HISTORY: Number of vaginal deliveries:1 Number of C/S:1     COMPREHENSIVE GYN HISTORY:  PAP History: Denies abnormal Paps. LAST PAP 4-19-18 NORMAL.  Infection History: Denies STDs. Denies PID.  Benign History: Reports uterine fibroids. Denies ovarian cysts. Denies endometriosis. Denies other conditions.  Cancer History: Denies cervical cancer. Denies uterine cancer or hyperplasia. Denies ovarian cancer. Denies vulvar cancer or pre-cancer. Denies vaginal cancer or pre-cancer. Denies breast cancer. Denies colon cancer.  Sexual Activity History: Reports currently being sexually active  Menstrual History: Monthly. Flows 7 - 10 days. Heavy flow.   Dysmenorrhea History: Reports severe dysmenorrhea.   Contraception: Depo Provera and OCPs.       ROS:  GENERAL: No  "weight changes. No swelling. No fever. + DIZZY,  FATIGUE,   CARDIOVASCULAR: No chest pain. No shortness of breath. No leg cramps.   NEUROLOGICAL: + HEADACHES. No vision changes.  MSK: CHRONIC RIGHT SIDE PAIN, RIGHT BACK PAIN, SCIATICA.  BREASTS: No pain. No lumps. No discharge.  ABDOMEN: + SEVERE RIGHT SIDE PAIN. No nausea. No vomiting. No diarrhea. No constipation.  REPRODUCTIVE: + HEAVY FLOW.   VULVA: No pain. No lesions. No itching.  VAGINA: No relaxation. No itching. No odor. No discharge. No lesions.  URINARY: No incontinence. No nocturia. No frequency. No dysuria.    /85   Ht 5' 4.5" (1.638 m)   Wt 71 kg (156 lb 8.4 oz)   BMI 26.45 kg/m²   5-14-19 Wt 79.4 kg (175 lb)    PE:  APPEARANCE: Well nourished, well developed, in no acute distress.  AFFECT: WNL, alert and oriented x 3.     DIAGNOSIS:  1. Abnormal perimenopausal bleeding    2. Severe right side dysmenorrhea    3. Uterine leiomyoma, unspecified location        PLAN:    Orders Placed This Encounter    CBC auto differential    ibuprofen (ADVIL,MOTRIN) 800 MG tablet   Pelvic US    COUNSELING:  The patient was counseled today on:  -NSAIDs use and potential side effects;  -will arrange for a 2nd opinion with a GYN MD.    FOLLOW-UP with Dr Thomas after the pelvic US for a surgery consult .     "

## 2019-12-06 ENCOUNTER — TELEPHONE (OUTPATIENT)
Dept: OBSTETRICS AND GYNECOLOGY | Facility: CLINIC | Age: 49
End: 2019-12-06

## 2019-12-06 NOTE — TELEPHONE ENCOUNTER
I spoke to the pharmacy they will fax over a form to get a auth for a 3 month supply on the pt's birth control.

## 2019-12-09 ENCOUNTER — TELEPHONE (OUTPATIENT)
Dept: OBSTETRICS AND GYNECOLOGY | Facility: CLINIC | Age: 49
End: 2019-12-09

## 2019-12-09 ENCOUNTER — PATIENT MESSAGE (OUTPATIENT)
Dept: OBSTETRICS AND GYNECOLOGY | Facility: CLINIC | Age: 49
End: 2019-12-09

## 2019-12-09 NOTE — TELEPHONE ENCOUNTER
----- Message from Hernandez Martinez sent at 12/9/2019  2:19 PM CST -----  Contact: RADHA WILKINS [[id number]]  Type:  Patient Returning Call    Who Called:     Who Left Message for Patient: kassie     Does the patient know what this is regarding?: missed call     Best Call Back Number:  [phone]    Additional Information:  n/a

## 2019-12-09 NOTE — TELEPHONE ENCOUNTER
I spoke to the pt and informed her that she has to be see to be referred to interventional radiology for an  appt.

## 2019-12-09 NOTE — TELEPHONE ENCOUNTER
----- Message from Manju Rivera sent at 12/9/2019 10:40 AM CST -----  Contact: RADHA WILKINS [[id number]]  Name of Who is Calling : RADHA WILKINS [[id number]]    Patient is requesting a call from staff in regards to getting sooner appointment patient states would like a call back from dr. ventura   .....Please contact to further discuss and advise.    Can the clinic reply by [facility] : No    What Number to Call Back :  [phone]

## 2020-01-09 ENCOUNTER — SURGICAL CONSULT (OUTPATIENT)
Dept: OBSTETRICS AND GYNECOLOGY | Facility: CLINIC | Age: 50
End: 2020-01-09
Payer: MEDICAID

## 2020-01-09 ENCOUNTER — TELEPHONE (OUTPATIENT)
Dept: GASTROENTEROLOGY | Facility: CLINIC | Age: 50
End: 2020-01-09

## 2020-01-09 VITALS
DIASTOLIC BLOOD PRESSURE: 70 MMHG | BODY MASS INDEX: 27.31 KG/M2 | SYSTOLIC BLOOD PRESSURE: 110 MMHG | WEIGHT: 160 LBS | HEIGHT: 64 IN

## 2020-01-09 DIAGNOSIS — K21.9 GASTROESOPHAGEAL REFLUX DISEASE, ESOPHAGITIS PRESENCE NOT SPECIFIED: Primary | ICD-10-CM

## 2020-01-09 DIAGNOSIS — D25.9 UTERINE LEIOMYOMA, UNSPECIFIED LOCATION: ICD-10-CM

## 2020-01-09 DIAGNOSIS — N92.0 MENORRHAGIA WITH REGULAR CYCLE: ICD-10-CM

## 2020-01-09 DIAGNOSIS — K59.00 CONSTIPATION, UNSPECIFIED CONSTIPATION TYPE: ICD-10-CM

## 2020-01-09 DIAGNOSIS — R10.9 ABDOMINAL PAIN, UNSPECIFIED ABDOMINAL LOCATION: ICD-10-CM

## 2020-01-09 DIAGNOSIS — R10.2 PELVIC PAIN: ICD-10-CM

## 2020-01-09 PROCEDURE — 99212 OFFICE O/P EST SF 10 MIN: CPT | Mod: S$PBB,,, | Performed by: OBSTETRICS & GYNECOLOGY

## 2020-01-09 PROCEDURE — 99212 PR OFFICE/OUTPT VISIT, EST, LEVL II, 10-19 MIN: ICD-10-PCS | Mod: S$PBB,,, | Performed by: OBSTETRICS & GYNECOLOGY

## 2020-01-09 RX ORDER — FLUOXETINE HYDROCHLORIDE 20 MG/1
CAPSULE ORAL
COMMUNITY
Start: 2020-01-06 | End: 2021-05-28

## 2020-01-09 RX ORDER — DEXTROAMPHETAMINE SACCHARATE, AMPHETAMINE ASPARTATE, DEXTROAMPHETAMINE SULFATE AND AMPHETAMINE SULFATE 2.5; 2.5; 2.5; 2.5 MG/1; MG/1; MG/1; MG/1
TABLET ORAL
COMMUNITY
Start: 2020-01-06 | End: 2021-05-28

## 2020-01-09 NOTE — H&P
HISTORY OF PRESENT ILLNESS:    Jess Mcleod is a 50 y.o. female  No LMP recorded. presents today for surgery consult.       2019 - Mcgarry note   Taking both OCPs and Depo Provera, but when it's time for the sugar pills she gushes for 7 days, passes clots, has headaches, feels fatigued and dizzy with severe pain on the right side (no syncope).  -Saw Dr Ricardo 18 and declined a hysterectomy.   -Wants more conservative management such as a UAE or myomectomy and is here for a second opinon.  -Bleeding today, so does not want to be examined.  -Ibuprofen 400mg used to help, no longer.    DATA REVIEWED:    ENDOMETRIAL BIOPSY: 18:  1. Proliferative phase endometrium, negative for hyperplasia or malignancy.  2. Uterus, cervical polyp, polypectomy:  -Polypoid fragments of benign endocervical and squamous epithelium compatible with polyp.    PELVIC US 18  Uterus:  Size: Measures 12.3 x 5.8 x 6.7 cm  Masses: There are multiple intramural uterine fibroids, the largest 3 measure 5.9 cm, 4.5 cm and 1.9 cm respectively.  Endometrium: Normal in this pre menopausal patient, measuring 11 mm.  Right ovary:  Size: Measures 4.3 x 3.6 x 2.4 cm  Appearance: There is a small cyst or follicle measuring 2.8 cm  Vascular flow: Normal.  Left ovary:  Size: Measures 3.1 x 2.6 x 2.6 cm  Appearance: Normal  Vascular Flow: Normal.  Free Fluid:  Minimal fluid in the pelvic cul-de-sac, likely physiologic.   Impression:  Stable appearance of multiple uterine fibroid uterus.  Previously described echogenic focus in the left ovary on 2018 is not seen on today's examination.  2.8 cm follicle or cyst in the right ovary.     Menorrhagia began in 2016. Cycles last 6-7 days using 10-12 pads per day with clotting.   DepoProvera since 2019 & continuous OCPs since 2019.   She also reports upper abdominal pain with menses or if she eats something that aggravates the fibroids.   Needs GI referral     Past Medical  History:   Diagnosis Date    Arthritis     Chronic back pain     Chronic neck pain     Fibrocystic breast     Fibroid, uterus     GERD (gastroesophageal reflux disease)     Herpes     Migraine headache     Nephrolithiasis 2018    Sciatica of right side 2018    Scoliosis     Urge incontinence 2018       Past Surgical History:   Procedure Laterality Date     SECTION      Scoliosis surgery      UMBILICAL HERNIA REPAIR         MEDICATIONS AND ALLERGIES:      Current Outpatient Medications:     desogestrel-ethinyl estradiol (APRI) 0.15-0.03 mg per tablet, Take 1 tablet by mouth once daily. Medically advised to skip the placebo pills and take active tablets back to back, Disp: 90 tablet, Rfl: 3    dextroamphetamine-amphetamine 10 mg Tab, TK 1 T PO BID, Disp: , Rfl:     FLUoxetine 20 MG capsule, TK ONE C PO D, Disp: , Rfl:     fluticasone propionate (FLONASE) 50 mcg/actuation nasal spray, fluticasone propionate 50 mcg/actuation nasal spray,suspension  SHAKE LIQUID AND USE 1 SPRAY IN EACH NOSTRIL EVERY DAY, Disp: , Rfl:     ibuprofen (ADVIL,MOTRIN) 800 MG tablet, Take 1 tablet (800 mg total) by mouth every 8 (eight) hours as needed for Pain., Disp: 60 tablet, Rfl: 2    medroxyPROGESTERone (DEPO-PROVERA) 150 mg/mL Syrg, , Disp: , Rfl:     pantoprazole (PROTONIX) 20 MG tablet, Take 20 mg by mouth., Disp: , Rfl:     QUEtiapine (SEROQUEL) 25 MG Tab, TK 1 T PO QHS, Disp: , Rfl: 2    FLUoxetine 40 MG capsule, , Disp: , Rfl:     FLUoxetine 40 MG capsule, TK 1 C PO QHS, Disp: , Rfl: 2    HYDROcodone-acetaminophen (NORCO)  mg per tablet, hydrocodone 10 mg-acetaminophen 325 mg tablet  Take 1 tablet every 8 hours by oral route., Disp: , Rfl:     medroxyPROGESTERone (DEPO-PROVERA) 150 mg/mL Syrg, , Disp: , Rfl: 3    pantoprazole (PROTONIX) 20 MG tablet, Take 20 mg by mouth once daily., Disp: , Rfl:     Current Facility-Administered Medications:     medroxyPROGESTERone  "(DEPO-PROVERA) injection 150 mg, 150 mg, Intramuscular, Q90 Days, KORINA Whyte Zeferino, SANDRO, 150 mg at 11/05/19 1017    Review of patient's allergies indicates:   Allergen Reactions    Ibuprofen Other (See Comments)     It makes my "ears hurt" when I take large doses.       COMPREHENSIVE GYN HISTORY:  PAP History: Denies abnormal Paps.  Infection History: Denies STDs. Denies PID.  Benign History: Denies uterine fibroids. Denies ovarian cysts. Denies endometriosis. Denies other conditions.  Cancer History: Denies cervical cancer. Denies uterine cancer or hyperplasia. Denies ovarian cancer. Denies vulvar cancer or pre-cancer. Denies vaginal cancer or pre-cancer. Denies breast cancer. Denies colon cancer.  Sexual Activity History: Reports currently being sexually active  Menstrual History: Every 28 days, flows for 4 days. Light flow.  Dysmenorrhea History: Denies dysmenorrhea.    ROS:  GENERAL: No fever or chills.  BREASTS: No pain. No lumps. No discharge.  ABDOMEN: No pain. No nausea. No vomiting. No diarrhea. No constipation.  REPRODUCTIVE: No abnormal bleeding.   VULVA: No pain. No lesions. No itching.  VAGINA: No relaxation. No itching. No odor. No discharge. No lesions.  URINARY: No incontinence. No nocturia. No frequency. No dysuria.    PE:  APPEARANCE: Well nourished, well developed, in no acute distress.  AFFECT: WNL, alert and oriented x 3.  Deferred    Patient counseled in detail on options available for menorrhagia.   Patient with history of anemia, normal pap and normal EMBX.   Medical therapy and surgical options discussed in detail. Patient desires to proceed with minimally invasive surgery with UAE. She does not desire surgery at this time.       1. Gastroesophageal reflux disease, esophagitis presence not specified    2. Menorrhagia with regular cycle    3. Uterine leiomyoma, unspecified location    4. Abdominal pain, unspecified abdominal location    5. Pelvic pain    6. Constipation, unspecified " constipation type        Orders Placed This Encounter    MRI Pelvis W WO Contrast    Ambulatory consult to Interventional Radiology    Ambulatory consult to Gastroenterology       FOLLOW-UP with me in 3 months

## 2020-01-09 NOTE — TELEPHONE ENCOUNTER
----- Message from Yamileth Mitchell sent at 1/9/2020  1:12 PM CST -----  Contact: Pt  Pt need to schedule a appt she has a referral from [facility]    Please advise pt at [phone]

## 2020-01-09 NOTE — TELEPHONE ENCOUNTER
Spoke with patient.  Aware the physicians in our department do not except Medicaid.  Number given to her to call [facility].

## 2020-01-16 ENCOUNTER — HOSPITAL ENCOUNTER (OUTPATIENT)
Dept: RADIOLOGY | Facility: OTHER | Age: 50
Discharge: HOME | End: 2020-01-16
Attending: OBSTETRICS & GYNECOLOGY
Payer: MEDICAID

## 2020-01-16 DIAGNOSIS — D25.1 INTRAMURAL, SUBMUCOUS, AND SUBSEROUS LEIOMYOMA OF UTERUS: Primary | ICD-10-CM

## 2020-01-16 DIAGNOSIS — D25.0 INTRAMURAL, SUBMUCOUS, AND SUBSEROUS LEIOMYOMA OF UTERUS: Primary | ICD-10-CM

## 2020-01-16 DIAGNOSIS — D25.2 INTRAMURAL, SUBMUCOUS, AND SUBSEROUS LEIOMYOMA OF UTERUS: Primary | ICD-10-CM

## 2020-01-16 DIAGNOSIS — D25.9 UTERINE LEIOMYOMA, UNSPECIFIED LOCATION: ICD-10-CM

## 2020-01-16 PROCEDURE — 25500020 PHARM REV CODE 255: Performed by: OBSTETRICS & GYNECOLOGY

## 2020-01-16 PROCEDURE — 72197 MRI FEMALE PELVIS W W/O CONTRAST: ICD-10-PCS | Mod: 26,,, | Performed by: RADIOLOGY

## 2020-01-16 PROCEDURE — A9585 GADOBUTROL INJECTION: HCPCS | Performed by: OBSTETRICS & GYNECOLOGY

## 2020-01-16 PROCEDURE — 72197 MRI PELVIS W/O & W/DYE: CPT | Mod: 26,,, | Performed by: RADIOLOGY

## 2020-01-16 PROCEDURE — 72197 MRI PELVIS W/O & W/DYE: CPT | Mod: TC

## 2020-01-16 RX ORDER — GADOBUTROL 604.72 MG/ML
7 INJECTION INTRAVENOUS
Status: COMPLETED | OUTPATIENT
Start: 2020-01-16 | End: 2020-01-16

## 2020-01-16 RX ADMIN — GADOBUTROL 7 ML: 604.72 INJECTION INTRAVENOUS at 09:01

## 2020-01-24 ENCOUNTER — CLINICAL SUPPORT (OUTPATIENT)
Dept: OBSTETRICS AND GYNECOLOGY | Facility: CLINIC | Age: 50
End: 2020-01-24
Payer: MEDICAID

## 2020-01-24 ENCOUNTER — OFFICE VISIT (OUTPATIENT)
Dept: INTERVENTIONAL RADIOLOGY/VASCULAR | Facility: CLINIC | Age: 50
End: 2020-01-24
Payer: MEDICAID

## 2020-01-24 VITALS
HEIGHT: 64 IN | HEART RATE: 118 BPM | WEIGHT: 160.06 LBS | SYSTOLIC BLOOD PRESSURE: 139 MMHG | DIASTOLIC BLOOD PRESSURE: 84 MMHG | TEMPERATURE: 98 F | BODY MASS INDEX: 27.33 KG/M2

## 2020-01-24 DIAGNOSIS — Z30.42 ON DEPO-PROVERA FOR CONTRACEPTION: Primary | ICD-10-CM

## 2020-01-24 DIAGNOSIS — D21.9 FIBROIDS: ICD-10-CM

## 2020-01-24 PROCEDURE — 96372 THER/PROPH/DIAG INJ SC/IM: CPT | Mod: PBBFAC

## 2020-01-24 PROCEDURE — 99203 OFFICE O/P NEW LOW 30 MIN: CPT | Mod: S$PBB,,, | Performed by: RADIOLOGY

## 2020-01-24 PROCEDURE — 99203 PR OFFICE/OUTPT VISIT, NEW, LEVL III, 30-44 MIN: ICD-10-PCS | Mod: S$PBB,,, | Performed by: RADIOLOGY

## 2020-01-24 PROCEDURE — 99999 PR PBB SHADOW E&M-EST. PATIENT-LVL I: ICD-10-PCS | Mod: PBBFAC,,,

## 2020-01-24 PROCEDURE — 99999 PR PBB SHADOW E&M-EST. PATIENT-LVL I: CPT | Mod: PBBFAC,,,

## 2020-01-24 PROCEDURE — 99213 OFFICE O/P EST LOW 20 MIN: CPT | Mod: PBBFAC,25

## 2020-01-24 PROCEDURE — 99999 PR PBB SHADOW E&M-EST. PATIENT-LVL III: ICD-10-PCS | Mod: PBBFAC,,,

## 2020-01-24 PROCEDURE — 99999 PR PBB SHADOW E&M-EST. PATIENT-LVL III: CPT | Mod: PBBFAC,,,

## 2020-01-24 PROCEDURE — 99211 OFF/OP EST MAY X REQ PHY/QHP: CPT | Mod: PBBFAC,27,25

## 2020-01-24 RX ADMIN — MEDROXYPROGESTERONE ACETATE 150 MG: 150 INJECTION, SUSPENSION INTRAMUSCULAR at 09:01

## 2020-01-24 NOTE — H&P (VIEW-ONLY)
Consult/H&P Note  Interventional Radiology    Consult Requested By: William    Reason for Consult: symptomatic fibroids    SUBJECTIVE:     Chief Complaint: symptomatic fibroids    History of Present Illness: 51 yo F with symptomatic fibroids.   She is on OCP and Depo to attempt to control cycles, but has heavy cycles for roughly 7 days.  She also describes significant pain with cycles which can be debilitating.  She is a [employer] and refuses blood products.    Past Medical History:   Diagnosis Date    Arthritis     Chronic back pain     Chronic neck pain     Fibrocystic breast     Fibroid, uterus     GERD (gastroesophageal reflux disease)     Herpes     Migraine headache     Nephrolithiasis 2018    Sciatica of right side 2018    Scoliosis     Urge incontinence 2018     Past Surgical History:   Procedure Laterality Date     SECTION      Scoliosis surgery      UMBILICAL HERNIA REPAIR       Family History   Problem Relation Age of Onset    Breast cancer Paternal Aunt     Colon cancer Neg Hx     Ovarian cancer Neg Hx      Social History     Tobacco Use    Smoking status: Never Smoker    Smokeless tobacco: Never Used   Substance Use Topics    Alcohol use: Not Currently    Drug use: No       Review of Systems:  Constitutional/General:No fever, chills, change in appetite or weight loss.  Hematological/Immuno: no known coagulopathies  Respiratory: no shortness of breath  Cardiovascular: no chest pain  Gastrointestinal: no abdominal pain  Genito-Urinary: positive for - change in menstrual cycle, dysmenorrhea, irregular/heavy menses and pelvic pain  Musculoskeletal: negative  Skin: Negative for rash, itching, pigmentation changes, nail or hair changes.  Neurological: no TIA or stroke symptoms  Psychiatric: normal mood/affect, good insight/judgement      OBJECTIVE:     Vital Signs Range (Last 24H):  [unfilled]    Physical Exam:  General- Patient alert and oriented x3  "in NAD  ENT- PERRLA,  Neck- No masses  CV- Regular rate and rhythm  Resp-  No increased WOB  GI- Non tender/non-distended  Extrem- No cyanosis, clubbing, edema.   Derm- No rashes, masses, or lesions noted  Neuro-  No focal deficits noted.     Physical Exam  Body mass index is 27.47 kg/m².    Scheduled Meds:    medroxyPROGESTERone  150 mg Intramuscular Q90 Days     Continuous Infusions:   PRN Meds:    Allergies:   Review of patient's allergies indicates:   Allergen Reactions    Ibuprofen Other (See Comments)     It makes my "ears hurt" when I take large doses.       Labs:  @LABRCNTIP(INR, PT, PTT)@  @LABRCNTIP(WBC,HGB,HCT,MCV,PLT)@ @LABRCNTIP(GLU,NA,K,Cl,CO2,BUN,Creatinine,Calcium,MG,ALT,AST,ALBUMIN,bilitot,bilidir)@    Vitals (Most Recent):  Temp: 98.2 °F (36.8 °C) (01/24/20 0914)  Pulse: (!) 118 (01/24/20 0914)  BP: 139/84 (01/24/20 0914)    ASA: 2  Mallampati: 2    Consent obtained    ASSESSMENT/PLAN:     Uterine artery embolization under moderate sedation on 2/6.  Overnight observation for pain control.    Approximately 35 minutes spent in face to face consultation.    There are no hospital problems to display for this patient.          Dean Wheeler MD  "

## 2020-01-24 NOTE — H&P
Consult/H&P Note  Interventional Radiology    Consult Requested By: William    Reason for Consult: symptomatic fibroids    SUBJECTIVE:     Chief Complaint: symptomatic fibroids    History of Present Illness: 51 yo F with symptomatic fibroids.   She is on OCP and Depo to attempt to control cycles, but has heavy cycles for roughly 7 days.  She also describes significant pain with cycles which can be debilitating.  She is a [employer] and refuses blood products.    Past Medical History:   Diagnosis Date    Arthritis     Chronic back pain     Chronic neck pain     Fibrocystic breast     Fibroid, uterus     GERD (gastroesophageal reflux disease)     Herpes     Migraine headache     Nephrolithiasis 2018    Sciatica of right side 2018    Scoliosis     Urge incontinence 2018     Past Surgical History:   Procedure Laterality Date     SECTION      Scoliosis surgery      UMBILICAL HERNIA REPAIR       Family History   Problem Relation Age of Onset    Breast cancer Paternal Aunt     Colon cancer Neg Hx     Ovarian cancer Neg Hx      Social History     Tobacco Use    Smoking status: Never Smoker    Smokeless tobacco: Never Used   Substance Use Topics    Alcohol use: Not Currently    Drug use: No       Review of Systems:  Constitutional/General:No fever, chills, change in appetite or weight loss.  Hematological/Immuno: no known coagulopathies  Respiratory: no shortness of breath  Cardiovascular: no chest pain  Gastrointestinal: no abdominal pain  Genito-Urinary: positive for - change in menstrual cycle, dysmenorrhea, irregular/heavy menses and pelvic pain  Musculoskeletal: negative  Skin: Negative for rash, itching, pigmentation changes, nail or hair changes.  Neurological: no TIA or stroke symptoms  Psychiatric: normal mood/affect, good insight/judgement      OBJECTIVE:     Vital Signs Range (Last 24H):  [unfilled]    Physical Exam:  General- Patient alert and oriented x3  "in NAD  ENT- PERRLA,  Neck- No masses  CV- Regular rate and rhythm  Resp-  No increased WOB  GI- Non tender/non-distended  Extrem- No cyanosis, clubbing, edema.   Derm- No rashes, masses, or lesions noted  Neuro-  No focal deficits noted.     Physical Exam  Body mass index is 27.47 kg/m².    Scheduled Meds:    medroxyPROGESTERone  150 mg Intramuscular Q90 Days     Continuous Infusions:   PRN Meds:    Allergies:   Review of patient's allergies indicates:   Allergen Reactions    Ibuprofen Other (See Comments)     It makes my "ears hurt" when I take large doses.       Labs:  @LABRCNTIP(INR, PT, PTT)@  @LABRCNTIP(WBC,HGB,HCT,MCV,PLT)@ @LABRCNTIP(GLU,NA,K,Cl,CO2,BUN,Creatinine,Calcium,MG,ALT,AST,ALBUMIN,bilitot,bilidir)@    Vitals (Most Recent):  Temp: 98.2 °F (36.8 °C) (01/24/20 0914)  Pulse: (!) 118 (01/24/20 0914)  BP: 139/84 (01/24/20 0914)    ASA: 2  Mallampati: 2    Consent obtained    ASSESSMENT/PLAN:     Uterine artery embolization under moderate sedation on 2/6.  Overnight observation for pain control.    Approximately 35 minutes spent in face to face consultation.    There are no hospital problems to display for this patient.          Dean Wheeler MD  "

## 2020-01-24 NOTE — PROGRESS NOTES
Here for Depo Provera Injection, date due  01/21/2020 - 02/04/2020. Last injection given 11/05/2019. Patient with no current complaints of pain prior to or after injection. Advised to wait 5 minutes. Return between 04/11/2020 - 04/25/2020 for next injection.    PATIENT SUPPLIED MEDICATION.    See medication Card for RX information    Order verified, inspected package,storage verified,expiration verified      Site -   RB

## 2020-02-06 ENCOUNTER — HOSPITAL ENCOUNTER (OUTPATIENT)
Facility: OTHER | Age: 50
Discharge: HOME | End: 2020-02-07
Attending: RADIOLOGY | Admitting: RADIOLOGY
Payer: MEDICAID

## 2020-02-06 DIAGNOSIS — D25.2 INTRAMURAL, SUBMUCOUS, AND SUBSEROUS LEIOMYOMA OF UTERUS: ICD-10-CM

## 2020-02-06 DIAGNOSIS — D25.1 INTRAMURAL AND SUBMUCOUS LEIOMYOMA OF UTERUS: ICD-10-CM

## 2020-02-06 DIAGNOSIS — D21.9 FIBROIDS: Primary | ICD-10-CM

## 2020-02-06 DIAGNOSIS — D25.1 INTRAMURAL, SUBMUCOUS, AND SUBSEROUS LEIOMYOMA OF UTERUS: ICD-10-CM

## 2020-02-06 DIAGNOSIS — D25.0 INTRAMURAL AND SUBMUCOUS LEIOMYOMA OF UTERUS: ICD-10-CM

## 2020-02-06 DIAGNOSIS — D25.0 INTRAMURAL, SUBMUCOUS, AND SUBSEROUS LEIOMYOMA OF UTERUS: ICD-10-CM

## 2020-02-06 LAB
B-HCG UR QL: NEGATIVE
CTP QC/QA: YES

## 2020-02-06 PROCEDURE — 81025 URINE PREGNANCY TEST: CPT | Performed by: RADIOLOGY

## 2020-02-06 PROCEDURE — 63600175 PHARM REV CODE 636 W HCPCS: Performed by: RADIOLOGY

## 2020-02-06 PROCEDURE — 25000003 PHARM REV CODE 250: Performed by: STUDENT IN AN ORGANIZED HEALTH CARE EDUCATION/TRAINING PROGRAM

## 2020-02-06 PROCEDURE — 27201423 OPTIME MED/SURG SUP & DEVICES STERILE SUPPLY: Performed by: RADIOLOGY

## 2020-02-06 PROCEDURE — 25000003 PHARM REV CODE 250: Performed by: RADIOLOGY

## 2020-02-06 PROCEDURE — C1887 CATHETER, GUIDING: HCPCS | Performed by: RADIOLOGY

## 2020-02-06 PROCEDURE — C1769 GUIDE WIRE: HCPCS | Performed by: RADIOLOGY

## 2020-02-06 PROCEDURE — 99152 MOD SED SAME PHYS/QHP 5/>YRS: CPT | Performed by: RADIOLOGY

## 2020-02-06 PROCEDURE — C1894 INTRO/SHEATH, NON-LASER: HCPCS | Performed by: RADIOLOGY

## 2020-02-06 PROCEDURE — 25500020 PHARM REV CODE 255: Performed by: RADIOLOGY

## 2020-02-06 PROCEDURE — 99153 MOD SED SAME PHYS/QHP EA: CPT | Performed by: RADIOLOGY

## 2020-02-06 RX ORDER — SODIUM CHLORIDE 0.9 G/100ML
IRRIGANT IRRIGATION
Status: DISCONTINUED | OUTPATIENT
Start: 2020-02-06 | End: 2020-02-06 | Stop reason: HOSPADM

## 2020-02-06 RX ORDER — NITROGLYCERIN 5 MG/ML
INJECTION, SOLUTION INTRAVENOUS
Status: DISCONTINUED | OUTPATIENT
Start: 2020-02-06 | End: 2020-02-06 | Stop reason: HOSPADM

## 2020-02-06 RX ORDER — HYDROMORPHONE HYDROCHLORIDE 2 MG/ML
1 INJECTION, SOLUTION INTRAMUSCULAR; INTRAVENOUS; SUBCUTANEOUS
Status: DISCONTINUED | OUTPATIENT
Start: 2020-02-06 | End: 2020-02-06 | Stop reason: SDUPTHER

## 2020-02-06 RX ORDER — HEPARIN SOD,PORCINE/0.9 % NACL 1000/500ML
INTRAVENOUS SOLUTION INTRAVENOUS
Status: DISCONTINUED | OUTPATIENT
Start: 2020-02-06 | End: 2020-02-06 | Stop reason: HOSPADM

## 2020-02-06 RX ORDER — OXYCODONE AND ACETAMINOPHEN 5; 325 MG/1; MG/1
2 TABLET ORAL EVERY 6 HOURS
Status: DISCONTINUED | OUTPATIENT
Start: 2020-02-06 | End: 2020-02-07 | Stop reason: HOSPADM

## 2020-02-06 RX ORDER — DIPHENHYDRAMINE HYDROCHLORIDE 50 MG/ML
25 INJECTION INTRAMUSCULAR; INTRAVENOUS EVERY 6 HOURS PRN
Status: DISCONTINUED | OUTPATIENT
Start: 2020-02-06 | End: 2020-02-07 | Stop reason: HOSPADM

## 2020-02-06 RX ORDER — ONDANSETRON 2 MG/ML
4 INJECTION INTRAMUSCULAR; INTRAVENOUS EVERY 6 HOURS PRN
Status: DISCONTINUED | OUTPATIENT
Start: 2020-02-06 | End: 2020-02-07 | Stop reason: HOSPADM

## 2020-02-06 RX ORDER — OXYCODONE AND ACETAMINOPHEN 5; 325 MG/1; MG/1
1 TABLET ORAL EVERY 6 HOURS
Status: DISCONTINUED | OUTPATIENT
Start: 2020-02-06 | End: 2020-02-06

## 2020-02-06 RX ORDER — HYDROMORPHONE HYDROCHLORIDE 1 MG/ML
1 INJECTION, SOLUTION INTRAMUSCULAR; INTRAVENOUS; SUBCUTANEOUS
Status: DISCONTINUED | OUTPATIENT
Start: 2020-02-06 | End: 2020-02-07 | Stop reason: HOSPADM

## 2020-02-06 RX ORDER — ASPIRIN 81 MG/1
81 TABLET ORAL DAILY
COMMUNITY
End: 2020-05-27

## 2020-02-06 RX ORDER — MIDAZOLAM HYDROCHLORIDE 1 MG/ML
INJECTION, SOLUTION INTRAMUSCULAR; INTRAVENOUS
Status: DISCONTINUED | OUTPATIENT
Start: 2020-02-06 | End: 2020-02-06 | Stop reason: HOSPADM

## 2020-02-06 RX ORDER — KETOROLAC TROMETHAMINE 30 MG/ML
30 INJECTION, SOLUTION INTRAMUSCULAR; INTRAVENOUS EVERY 6 HOURS
Status: DISCONTINUED | OUTPATIENT
Start: 2020-02-06 | End: 2020-02-07 | Stop reason: HOSPADM

## 2020-02-06 RX ORDER — HEPARIN SODIUM 1000 [USP'U]/ML
INJECTION, SOLUTION INTRAVENOUS; SUBCUTANEOUS
Status: DISCONTINUED | OUTPATIENT
Start: 2020-02-06 | End: 2020-02-06 | Stop reason: HOSPADM

## 2020-02-06 RX ORDER — FAMOTIDINE 20 MG/1
20 TABLET, FILM COATED ORAL ONCE
Status: COMPLETED | OUTPATIENT
Start: 2020-02-06 | End: 2020-02-06

## 2020-02-06 RX ORDER — DOCUSATE SODIUM 100 MG/1
100 CAPSULE, LIQUID FILLED ORAL 2 TIMES DAILY
Status: DISCONTINUED | OUTPATIENT
Start: 2020-02-06 | End: 2020-02-07 | Stop reason: HOSPADM

## 2020-02-06 RX ORDER — KETOROLAC TROMETHAMINE 30 MG/ML
INJECTION, SOLUTION INTRAMUSCULAR; INTRAVENOUS
Status: DISCONTINUED | OUTPATIENT
Start: 2020-02-06 | End: 2020-02-06 | Stop reason: HOSPADM

## 2020-02-06 RX ORDER — FENTANYL CITRATE 50 UG/ML
INJECTION, SOLUTION INTRAMUSCULAR; INTRAVENOUS
Status: DISCONTINUED | OUTPATIENT
Start: 2020-02-06 | End: 2020-02-06 | Stop reason: HOSPADM

## 2020-02-06 RX ORDER — CIPROFLOXACIN 2 MG/ML
INJECTION, SOLUTION INTRAVENOUS
Status: DISCONTINUED | OUTPATIENT
Start: 2020-02-06 | End: 2020-02-07 | Stop reason: HOSPADM

## 2020-02-06 RX ORDER — LIDOCAINE HYDROCHLORIDE 10 MG/ML
INJECTION, SOLUTION EPIDURAL; INFILTRATION; INTRACAUDAL; PERINEURAL
Status: DISCONTINUED | OUTPATIENT
Start: 2020-02-06 | End: 2020-02-06 | Stop reason: HOSPADM

## 2020-02-06 RX ORDER — ONDANSETRON 2 MG/ML
INJECTION INTRAMUSCULAR; INTRAVENOUS
Status: DISCONTINUED | OUTPATIENT
Start: 2020-02-06 | End: 2020-02-06 | Stop reason: HOSPADM

## 2020-02-06 RX ADMIN — DOCUSATE SODIUM 100 MG: 100 CAPSULE, LIQUID FILLED ORAL at 10:02

## 2020-02-06 RX ADMIN — HYDROMORPHONE HYDROCHLORIDE 1 MG: 2 INJECTION, SOLUTION INTRAMUSCULAR; INTRAVENOUS; SUBCUTANEOUS at 10:02

## 2020-02-06 RX ADMIN — HYDROMORPHONE HYDROCHLORIDE 1 MG: 2 INJECTION, SOLUTION INTRAMUSCULAR; INTRAVENOUS; SUBCUTANEOUS at 02:02

## 2020-02-06 RX ADMIN — KETOROLAC TROMETHAMINE 30 MG: 30 INJECTION, SOLUTION INTRAMUSCULAR; INTRAVENOUS at 06:02

## 2020-02-06 RX ADMIN — HYDROMORPHONE HYDROCHLORIDE 1 MG: 2 INJECTION, SOLUTION INTRAMUSCULAR; INTRAVENOUS; SUBCUTANEOUS at 04:02

## 2020-02-06 RX ADMIN — FAMOTIDINE 20 MG: 20 TABLET ORAL at 09:02

## 2020-02-06 RX ADMIN — OXYCODONE HYDROCHLORIDE AND ACETAMINOPHEN 2 TABLET: 5; 325 TABLET ORAL at 06:02

## 2020-02-06 RX ADMIN — SODIUM CHLORIDE 1000 ML: 0.9 INJECTION, SOLUTION INTRAVENOUS at 04:02

## 2020-02-06 NOTE — PROGRESS NOTES
Patient arrived on unit via stretcher. VSS. Bedside handoff done with SUSAN Verma. Patient alert and oriented. Dressing to left wrist dry and intact. Patient is ordering food. Will continue to monitor.

## 2020-02-06 NOTE — PROCEDURES
Radiology Post-Procedure Note    Pre Op Diagnosis: uterine fibroids  Post Op Diagnosis: Same    Procedure: uterine artery embolization    Procedure performed by: Dean Wheeler MD    Written Informed Consent Obtained: Yes  Specimen Removed: NO  Estimated Blood Loss: Minimal    Findings:   Successful B UAE.    Patient tolerated procedure well.    @SIG@

## 2020-02-07 VITALS
RESPIRATION RATE: 18 BRPM | TEMPERATURE: 98 F | HEIGHT: 64 IN | BODY MASS INDEX: 26.63 KG/M2 | SYSTOLIC BLOOD PRESSURE: 108 MMHG | WEIGHT: 156 LBS | DIASTOLIC BLOOD PRESSURE: 66 MMHG | HEART RATE: 70 BPM | OXYGEN SATURATION: 99 %

## 2020-02-07 PROCEDURE — 63600175 PHARM REV CODE 636 W HCPCS: Performed by: RADIOLOGY

## 2020-02-07 PROCEDURE — 25000003 PHARM REV CODE 250: Performed by: RADIOLOGY

## 2020-02-07 RX ORDER — OXYCODONE AND ACETAMINOPHEN 5; 325 MG/1; MG/1
1 TABLET ORAL EVERY 6 HOURS PRN
Qty: 12 TABLET | Refills: 0 | Status: SHIPPED | OUTPATIENT
Start: 2020-02-07 | End: 2020-05-27

## 2020-02-07 RX ORDER — ONDANSETRON 4 MG/1
4 TABLET, ORALLY DISINTEGRATING ORAL EVERY 6 HOURS PRN
Qty: 20 TABLET | Refills: 0 | Status: SHIPPED | OUTPATIENT
Start: 2020-02-07 | End: 2020-05-27

## 2020-02-07 RX ORDER — CIPROFLOXACIN 500 MG/1
500 TABLET ORAL EVERY 12 HOURS
Qty: 14 TABLET | Refills: 0 | Status: SHIPPED | OUTPATIENT
Start: 2020-02-07 | End: 2021-06-22

## 2020-02-07 RX ORDER — KETOROLAC TROMETHAMINE 10 MG/1
10 TABLET, FILM COATED ORAL EVERY 6 HOURS
Qty: 20 TABLET | Refills: 0 | Status: SHIPPED | OUTPATIENT
Start: 2020-02-07 | End: 2020-05-27

## 2020-02-07 RX ORDER — HYDROCODONE BITARTRATE AND ACETAMINOPHEN 5; 325 MG/1; MG/1
1 TABLET ORAL EVERY 4 HOURS PRN
Status: DISCONTINUED | OUTPATIENT
Start: 2020-02-07 | End: 2020-02-07 | Stop reason: HOSPADM

## 2020-02-07 RX ADMIN — OXYCODONE HYDROCHLORIDE AND ACETAMINOPHEN 2 TABLET: 5; 325 TABLET ORAL at 05:02

## 2020-02-07 RX ADMIN — HYDROCODONE BITARTRATE AND ACETAMINOPHEN 1 TABLET: 5; 325 TABLET ORAL at 10:02

## 2020-02-07 RX ADMIN — KETOROLAC TROMETHAMINE 30 MG: 30 INJECTION, SOLUTION INTRAMUSCULAR; INTRAVENOUS at 05:02

## 2020-02-07 RX ADMIN — KETOROLAC TROMETHAMINE 30 MG: 30 INJECTION, SOLUTION INTRAMUSCULAR; INTRAVENOUS at 12:02

## 2020-02-07 RX ADMIN — OXYCODONE HYDROCHLORIDE AND ACETAMINOPHEN 2 TABLET: 5; 325 TABLET ORAL at 12:02

## 2020-02-07 RX ADMIN — DOCUSATE SODIUM 100 MG: 100 CAPSULE, LIQUID FILLED ORAL at 10:02

## 2020-02-07 NOTE — PLAN OF CARE
8:34 AM    In agreement and eager for DC.  VU of DC instructions, paperwork and prescriptions called - to be delivered to bedside prior to leaving.   IV removed with cath tip intact, WNL.  To be DC home with Daughter, en route from [facility] at this time.    Will be escorted downstairs via  transport team once arrives for transportation home.   Free from falls or skin breakdown this hospital admission.

## 2020-02-07 NOTE — PLAN OF CARE
Pt AAOx4. VSS throughout shift. Poc reviewed with pt and questions answered. Pt repositions self independently. Pt has a good appetite and tolerating diet well. Pt is voiding accurately up to toilet. Drainage is clear and yellow. Dressing to L. Wrist, CDI. Pt c/o stomach burn this. Pepcid is given with full relief. Pain is managed with prn IV med and scheduled pain meds. Pt remains free from falls, injury, and skin breakdown. All needs and concerns met. Purposeful rounding done. Bed is locked and in lowest position, side rails up x2, call light in reach. Will continue to monitor.

## 2020-02-07 NOTE — DISCHARGE INSTRUCTIONS
Discharge Instructions for Uterine Fibroid Embolization  Your doctor performed a uterine fibroid embolization. This is also called a uterine artery embolization. Uterine fibroids are tumors that are not cancerous. This means they are benign. Uterine fibroid embolization stops the blood supply to the tumor without surgery. To do this, a doctor injects small bits of plastic into the blood vessel that brings blood to the fibroid tumor. These pieces of plastic build up in the artery and block the blood supply. During this procedure, your doctor made a cut, or incision, at your groin. A thin tube called a catheter was put through a blood vessel in your leg to your uterus. Here's what to do at home following this procedure.  Activity  · Limit your activity for 2 days after the procedure.  · Ask a friend or family member to stay with you as you rest in bed or on the couch.  · Slowly increase your activities during the week after the procedure.  · Dont drive for 24 hours.  · Slowly climb stairs for 2 days after the procedure.  · Dont lift anything heavier than 10 pounds for 1 week after the procedure.  · Dont bend at the waist for 2 days.  · Ask your doctor when you can go back to work.  Other home care  · Dont be alarmed by vaginal discharge that is grayish or brown in color. This is from the breakdown of the fibroid tumor. It is normal.  · Expect your next 2 or 3 periods to be heavier than normal.  · Take your medicines as directed. Dont skip doses.  · Unless otherwise directed, drink 6 to 8 glasses of water every day. This helps to prevent dehydration. It also helps flush your body of the dye that was used during the procedure.  · Take your temperature and check your incision site every day for a week. Look for signs of infection such as redness, swelling, or warmth.  · Ask your doctor when it is safe to swim or take a bath.  Follow-up care  Make a follow-up appointment as directed by our staff.  When to seek  medical care  Call your health care provider right away if you have any of the following:  · Constant or increasing pain or numbness in your leg  · Fever above 100.5°F (38.0°C) or other signs of infection. This includes redness, swelling, or warmth at the incision site.  · Shortness of breath  · A leg that feels cold or looks blue  · Blood in your urine  · Black or tarry stools   Date Last Reviewed: 6/14/2015 © 2000-2017 [employer]. [address] [place] [address]. All rights reserved. This information is not intended as a substitute for professional medical care. Always follow your healthcare professional's instructions.

## 2020-02-07 NOTE — DISCHARGE SUMMARY
[facility]  Discharge Summary      Admit Date: 2/6/2020    Discharge Date and Time:  02/07/2020 8:16 AM    Attending Physician: Dean Wheeler MD     Reason for Admission: post procedural analgesia    Procedures Performed: Procedure(s) (LRB):  EMBOLIZATION, BLOOD VESSEL (N/A)    Hospital Course (synopsis of major diagnoses, care, treatment, and services provided during the course of the hospital stay): pain adequately controlled currently     Consults: none    Significant Diagnostic Studies: none    Final Diagnoses:    Principal Problem: <principal problem not specified>   Secondary Diagnoses:   Active Hospital Problems    Diagnosis  POA    Intramural and submucous leiomyoma of uterus [D25.1, D25.0]  Yes    Fibroids [D21.9]  Yes      Resolved Hospital Problems   No resolved problems to display.       Discharged Condition: good    Disposition: Home or Self Care    Follow Up/Patient Instructions:     Medications:  Reconciled Home Medications:      Medication List      ASK your doctor about these medications    aspirin 81 MG EC tablet  Commonly known as:  ECOTRIN  Take 81 mg by mouth once daily.     Depo-Provera 150 mg/mL Syrg  Generic drug:  medroxyPROGESTERone  Ask about: Which instructions should I use?     desogestrel-ethinyl estradiol 0.15-0.03 mg per tablet  Commonly known as:  APRI  Take 1 tablet by mouth once daily. Medically advised to skip the placebo pills and take active tablets back to back     dextroamphetamine-amphetamine 10 mg Tab  TK 1 T PO BID     FLUoxetine 20 MG capsule  TK ONE C PO D  Ask about: Which instructions should I use?     fluticasone propionate 50 mcg/actuation nasal spray  Commonly known as:  FLONASE  fluticasone propionate 50 mcg/actuation nasal spray,suspension   SHAKE LIQUID AND USE 1 SPRAY IN EACH NOSTRIL EVERY DAY     HYDROcodone-acetaminophen  mg per tablet  Commonly known as:  NORCO  hydrocodone 10 mg-acetaminophen 325 mg tablet   Take 1 tablet  every 8 hours by oral route.     ibuprofen 800 MG tablet  Commonly known as:  ADVIL,MOTRIN  Take 1 tablet (800 mg total) by mouth every 8 (eight) hours as needed for Pain.     pantoprazole 20 MG tablet  Commonly known as:  PROTONIX  Take 20 mg by mouth once daily.  Ask about: Which instructions should I use?     QUEtiapine 25 MG Tab  Commonly known as:  SEROQUEL  TK 1 T PO QHS          No discharge procedures on file.

## 2020-02-07 NOTE — PROGRESS NOTES
Patient tolerating regular diet. Pain controlled with use of IV and PO pain medication. Wrist incision site clean and dry with no evidence of bleeding.

## 2020-02-28 ENCOUNTER — PATIENT MESSAGE (OUTPATIENT)
Dept: OBSTETRICS AND GYNECOLOGY | Facility: CLINIC | Age: 50
End: 2020-02-28

## 2020-02-28 ENCOUNTER — OFFICE VISIT (OUTPATIENT)
Dept: INTERVENTIONAL RADIOLOGY/VASCULAR | Facility: CLINIC | Age: 50
End: 2020-02-28
Payer: MEDICAID

## 2020-02-28 VITALS
BODY MASS INDEX: 26.75 KG/M2 | DIASTOLIC BLOOD PRESSURE: 69 MMHG | WEIGHT: 155.88 LBS | SYSTOLIC BLOOD PRESSURE: 127 MMHG | TEMPERATURE: 98 F

## 2020-02-28 DIAGNOSIS — D25.0 INTRAMURAL AND SUBMUCOUS LEIOMYOMA OF UTERUS: Primary | ICD-10-CM

## 2020-02-28 DIAGNOSIS — D25.1 INTRAMURAL AND SUBMUCOUS LEIOMYOMA OF UTERUS: Primary | ICD-10-CM

## 2020-02-28 PROCEDURE — 99213 PR OFFICE/OUTPT VISIT, EST, LEVL III, 20-29 MIN: ICD-10-PCS | Mod: S$PBB,,, | Performed by: RADIOLOGY

## 2020-02-28 PROCEDURE — 99212 OFFICE O/P EST SF 10 MIN: CPT | Mod: PBBFAC

## 2020-02-28 PROCEDURE — 99213 OFFICE O/P EST LOW 20 MIN: CPT | Mod: S$PBB,,, | Performed by: RADIOLOGY

## 2020-02-28 PROCEDURE — 99999 PR PBB SHADOW E&M-EST. PATIENT-LVL II: CPT | Mod: PBBFAC,,,

## 2020-02-28 PROCEDURE — 99999 PR PBB SHADOW E&M-EST. PATIENT-LVL II: ICD-10-PCS | Mod: PBBFAC,,,

## 2020-02-28 NOTE — PROGRESS NOTES
Ms. Mcleod is 3 weeks following UAE for symptomatic fibroids.    She has done well following the procedure.  Her pain has resolved and she is happy to this point.  She does report some light vaginal bleeding, initially relatively constant but becoming somewhat intermittent recently.  Her L ulnar puncture site looks great.    Plan:  We will see her for a 3 month follow up on 4/24.    She was questioning her OCP regimen, which I have encouraged her to direct to her gynecologist.

## 2020-03-25 ENCOUNTER — PATIENT MESSAGE (OUTPATIENT)
Dept: OBSTETRICS AND GYNECOLOGY | Facility: CLINIC | Age: 50
End: 2020-03-25

## 2020-04-15 ENCOUNTER — TELEPHONE (OUTPATIENT)
Dept: INTERVENTIONAL RADIOLOGY/VASCULAR | Facility: OTHER | Age: 50
End: 2020-04-15

## 2020-04-15 NOTE — PROGRESS NOTES
Spoke with MsGlenn Harsha by phone due to COVID precautions.  She states that she did pass some clots several weeks ago, but has had relatively persistent vaginal bleeding since then.  She describes has constant flows and requires pads.  She also has had 3 instances of what sounds to be urge incontinence.    She does not have any pain which is improved for her.    We will call her in 2 weeks to see how things are going.  This does seem a bit far out for her to still have persistent bleeding, although she did have a large submucosal fibroid that was treated.  I have also encouraged her to re-establish contact with her OB, as we are approximately 3 months out from procedure.

## 2020-04-17 ENCOUNTER — CLINICAL SUPPORT (OUTPATIENT)
Dept: OBSTETRICS AND GYNECOLOGY | Facility: CLINIC | Age: 50
End: 2020-04-17
Payer: MEDICAID

## 2020-04-17 PROCEDURE — 99999 PR PBB SHADOW E&M-EST. PATIENT-LVL I: ICD-10-PCS | Mod: PBBFAC,,,

## 2020-04-17 PROCEDURE — 99999 PR PBB SHADOW E&M-EST. PATIENT-LVL I: CPT | Mod: PBBFAC,,,

## 2020-04-17 NOTE — PROGRESS NOTES
Here for Depo Provera Injection, date due 4/11- 4/25 . Last injection given 1/24/2020 . Patient with no current complaints of pain prior to or after injection. Advised to wait 5 minutes. Return between   July 3-17 /2020 for next injection.      PATIENT SUPPLIED MEDICATION.    See medication Card for RX information    Order verified, inspected package,storage verified,expiration verified      Site - [facility]

## 2020-04-24 ENCOUNTER — OFFICE VISIT (OUTPATIENT)
Dept: OBSTETRICS AND GYNECOLOGY | Facility: CLINIC | Age: 50
End: 2020-04-24
Payer: MEDICAID

## 2020-04-24 ENCOUNTER — LAB VISIT (OUTPATIENT)
Dept: LAB | Facility: OTHER | Age: 50
End: 2020-04-24
Attending: OBSTETRICS & GYNECOLOGY
Payer: MEDICAID

## 2020-04-24 VITALS
SYSTOLIC BLOOD PRESSURE: 138 MMHG | BODY MASS INDEX: 25.69 KG/M2 | WEIGHT: 149.69 LBS | DIASTOLIC BLOOD PRESSURE: 92 MMHG

## 2020-04-24 DIAGNOSIS — N92.6 IRREGULAR MENSTRUAL BLEEDING: Primary | ICD-10-CM

## 2020-04-24 DIAGNOSIS — N92.6 IRREGULAR MENSTRUAL BLEEDING: ICD-10-CM

## 2020-04-24 LAB
BASOPHILS # BLD AUTO: 0.03 K/UL (ref 0–0.2)
BASOPHILS NFR BLD: 0.4 % (ref 0–1.9)
DIFFERENTIAL METHOD: ABNORMAL
EOSINOPHIL # BLD AUTO: 0.2 K/UL (ref 0–0.5)
EOSINOPHIL NFR BLD: 2.3 % (ref 0–8)
ERYTHROCYTE [DISTWIDTH] IN BLOOD BY AUTOMATED COUNT: 14.1 % (ref 11.5–14.5)
HCT VFR BLD AUTO: 41.9 % (ref 37–48.5)
HGB BLD-MCNC: 13.4 G/DL (ref 12–16)
IMM GRANULOCYTES # BLD AUTO: 0.02 K/UL (ref 0–0.04)
IMM GRANULOCYTES NFR BLD AUTO: 0.3 % (ref 0–0.5)
LYMPHOCYTES # BLD AUTO: 2.4 K/UL (ref 1–4.8)
LYMPHOCYTES NFR BLD: 30.6 % (ref 18–48)
MCH RBC QN AUTO: 29.3 PG (ref 27–31)
MCHC RBC AUTO-ENTMCNC: 32 G/DL (ref 32–36)
MCV RBC AUTO: 92 FL (ref 82–98)
MONOCYTES # BLD AUTO: 0.4 K/UL (ref 0.3–1)
MONOCYTES NFR BLD: 5.5 % (ref 4–15)
NEUTROPHILS # BLD AUTO: 4.8 K/UL (ref 1.8–7.7)
NEUTROPHILS NFR BLD: 60.9 % (ref 38–73)
NRBC BLD-RTO: 0 /100 WBC
PLATELET # BLD AUTO: 556 K/UL (ref 150–350)
PMV BLD AUTO: 9.7 FL (ref 9.2–12.9)
RBC # BLD AUTO: 4.58 M/UL (ref 4–5.4)
WBC # BLD AUTO: 7.8 K/UL (ref 3.9–12.7)

## 2020-04-24 PROCEDURE — 99212 OFFICE O/P EST SF 10 MIN: CPT | Mod: S$PBB,,, | Performed by: OBSTETRICS & GYNECOLOGY

## 2020-04-24 PROCEDURE — 99999 PR PBB SHADOW E&M-EST. PATIENT-LVL II: CPT | Mod: PBBFAC,,, | Performed by: OBSTETRICS & GYNECOLOGY

## 2020-04-24 PROCEDURE — 36415 COLL VENOUS BLD VENIPUNCTURE: CPT

## 2020-04-24 PROCEDURE — 99212 PR OFFICE/OUTPT VISIT, EST, LEVL II, 10-19 MIN: ICD-10-PCS | Mod: S$PBB,,, | Performed by: OBSTETRICS & GYNECOLOGY

## 2020-04-24 PROCEDURE — 85025 COMPLETE CBC W/AUTO DIFF WBC: CPT

## 2020-04-24 PROCEDURE — 99212 OFFICE O/P EST SF 10 MIN: CPT | Mod: PBBFAC | Performed by: OBSTETRICS & GYNECOLOGY

## 2020-04-24 PROCEDURE — 99999 PR PBB SHADOW E&M-EST. PATIENT-LVL II: ICD-10-PCS | Mod: PBBFAC,,, | Performed by: OBSTETRICS & GYNECOLOGY

## 2020-04-25 ENCOUNTER — PATIENT MESSAGE (OUTPATIENT)
Dept: OBSTETRICS AND GYNECOLOGY | Facility: CLINIC | Age: 50
End: 2020-04-25

## 2020-04-26 NOTE — PROGRESS NOTES
HISTORY OF PRESENT ILLNESS:    Jess Mcleod is a 50 y.o. female  No LMP recorded. presents today complaining of continued vaginal bleeding despite uterine artery embolization earlier this year.  Patient describes bleeding as light to moderate and followed up with interventional radiology who suggested for her to follow up with GYN.   Patient recently received Depo-Provera therapy.    Past Medical History:   Diagnosis Date    Arthritis     Chronic back pain     Chronic neck pain     Fibrocystic breast     Fibroid, uterus     GERD (gastroesophageal reflux disease)     Herpes     Migraine headache     Nephrolithiasis 2018    Sciatica of right side 2018    Scoliosis     Urge incontinence 2018       Past Surgical History:   Procedure Laterality Date     SECTION      EMBOLIZATION N/A 2020    Procedure: EMBOLIZATION, BLOOD VESSEL;  Surgeon: Dean Wheeler MD;  Location: [facility] CATH LAB;  Service: Radiology;  Laterality: N/A;    Scoliosis surgery      UMBILICAL HERNIA REPAIR         MEDICATIONS AND ALLERGIES:      Current Outpatient Medications:     aspirin (ECOTRIN) 81 MG EC tablet, Take 81 mg by mouth once daily., Disp: , Rfl:     dextroamphetamine-amphetamine 10 mg Tab, TK 1 T PO BID, Disp: , Rfl:     FLUoxetine 20 MG capsule, TK ONE C PO D, Disp: , Rfl:     fluticasone propionate (FLONASE) 50 mcg/actuation nasal spray, fluticasone propionate 50 mcg/actuation nasal spray,suspension  SHAKE LIQUID AND USE 1 SPRAY IN EACH NOSTRIL EVERY DAY, Disp: , Rfl:     medroxyPROGESTERone (DEPO-PROVERA) 150 mg/mL Syrg, , Disp: , Rfl:     QUEtiapine (SEROQUEL) 25 MG Tab, TK 1 T PO QHS, Disp: , Rfl: 2    desogestrel-ethinyl estradiol (APRI) 0.15-0.03 mg per tablet, Take 1 tablet by mouth once daily. Medically advised to skip the placebo pills and take active tablets back to back (Patient not taking: Reported on 2020), Disp: 90 tablet, Rfl: 3     "HYDROcodone-acetaminophen (NORCO)  mg per tablet, hydrocodone 10 mg-acetaminophen 325 mg tablet  Take 1 tablet every 8 hours by oral route., Disp: , Rfl:     ibuprofen (ADVIL,MOTRIN) 800 MG tablet, Take 1 tablet (800 mg total) by mouth every 8 (eight) hours as needed for Pain., Disp: 60 tablet, Rfl: 2    ketorolac (TORADOL) 10 mg tablet, Take 1 tablet (10 mg total) by mouth every 6 (six) hours., Disp: 20 tablet, Rfl: 0    ondansetron (ZOFRAN-ODT) 4 MG TbDL, Take 1 tablet (4 mg total) by mouth every 6 (six) hours as needed (nausea)., Disp: 20 tablet, Rfl: 0    oxyCODONE-acetaminophen (PERCOCET) 5-325 mg per tablet, Take 1 tablet by mouth every 6 (six) hours as needed for Pain., Disp: 12 tablet, Rfl: 0    pantoprazole (PROTONIX) 20 MG tablet, Take 20 mg by mouth once daily., Disp: , Rfl:     Current Facility-Administered Medications:     medroxyPROGESTERone (DEPO-PROVERA) injection 150 mg, 150 mg, Intramuscular, Q90 Days, KORINA Mcgarry NP, 150 mg at 01/24/20 0902    Review of patient's allergies indicates:   Allergen Reactions    Ibuprofen Other (See Comments)     It makes my "ears hurt" when I take large doses.       COMPREHENSIVE GYN HISTORY:  PAP History: Denies abnormal Paps.  Infection History: Denies STDs. Denies PID.  Benign History: Denies uterine fibroids. Denies ovarian cysts. Denies endometriosis. Denies other conditions.  Cancer History: Denies cervical cancer. Denies uterine cancer or hyperplasia. Denies ovarian cancer. Denies vulvar cancer or pre-cancer. Denies vaginal cancer or pre-cancer. Denies breast cancer. Denies colon cancer.  Sexual Activity History: Reports currently being sexually active  Menstrual History: Every 28 days, flows for 4 days. Light flow.  Dysmenorrhea History: Denies dysmenorrhea.    ROS:  GENERAL: No fever or chills.  BREASTS: No pain. No lumps. No discharge.  ABDOMEN: No pain. No nausea. No vomiting. No diarrhea. No constipation.  REPRODUCTIVE: No abnormal " bleeding.   VULVA: No pain. No lesions. No itching.  VAGINA: No relaxation. No itching. No odor. No discharge. No lesions.  URINARY: No incontinence. No nocturia. No frequency. No dysuria.    PE:  APPEARANCE: Well nourished, well developed, in no acute distress.  AFFECT: WNL, alert and oriented x 3.  Deferred    1. Irregular menstrual bleeding      PLAN:    Orders Placed This Encounter    CBC auto differential     Plan daily low-dose estrogen therapy to stabilize uterine lining.    FOLLOW-UP with me in 4 weeks  Answers for HPI/ROS submitted by the patient on 2020   Vaginal bleeding  Chronicity: recurrent  Onset: more than 1 month ago  Frequency: constantly  Progression since onset: waxing and waning  Pain severity: no pain  Affected side: both  Pregnant now?: No  abdominal pain: No  anorexia: No  back pain: No  chills: Yes  constipation: Yes  diarrhea: No  discolored urine: No  dysuria: No  fever: No  flank pain: No  frequency: Yes  headaches: No  hematuria: No  nausea: No  painful intercourse: No  rash: No  urgency: Yes  vomiting: No  Vaginal bleeding: typical of menses  Passing clots?: No  Passing tissue?: No  Aggravated by: nothing  treatments tried: nothing  Improvement on treatment: no relief  Sexual activity: not sexually active  Partner with STD symptoms: unknown  Birth control: condoms  Menstrual history: irregular  STD: Yes  abdominal surgery: No   section: Yes  Ectopic pregnancy: No  Endometriosis: No  herpes simplex: Yes  gynecological surgery: Yes  menorrhagia: Yes  metrorrhagia: Yes  miscarriage: Yes  ovarian cysts: Yes  perineal abscess: No  PID: No  terminated pregnancy: Yes  vaginosis: No

## 2020-05-01 ENCOUNTER — TELEPHONE (OUTPATIENT)
Dept: OBSTETRICS AND GYNECOLOGY | Facility: CLINIC | Age: 50
End: 2020-05-01

## 2020-05-01 NOTE — TELEPHONE ENCOUNTER
----- Message from Jackie Villar sent at 5/1/2020  2:27 PM CDT -----  Contact: RADHA WILKINS [[id number]]  Name of Who is Calling: RADHA WILKINS [[id number]]    What is the request in detail: Would like to speak with staff in regards to test results and estrogen prescription. Patient states her pharmacy never received the prescription. Please contact to further discuss and advise      Can the clinic reply by [facility]: no    [facility]: [phone]

## 2020-05-05 ENCOUNTER — TELEPHONE (OUTPATIENT)
Dept: OBSTETRICS AND GYNECOLOGY | Facility: CLINIC | Age: 50
End: 2020-05-05

## 2020-05-05 NOTE — TELEPHONE ENCOUNTER
Pt states that  She saw you last week and she was supposed to be placed on a low dose of estrogen and she never got the rx

## 2020-05-05 NOTE — TELEPHONE ENCOUNTER
----- Message from Eder Mckenzie sent at 5/5/2020 10:31 AM CDT -----  PT NEEDS A REFILL ON HER ESTROGEN PHARMACY # [phone]

## 2020-05-06 RX ORDER — ESTRADIOL 1 MG/1
1 TABLET ORAL DAILY
Qty: 30 TABLET | Refills: 11 | OUTPATIENT
Start: 2020-05-06 | End: 2021-05-06

## 2020-05-07 ENCOUNTER — TELEPHONE (OUTPATIENT)
Dept: OBSTETRICS AND GYNECOLOGY | Facility: CLINIC | Age: 50
End: 2020-05-07

## 2020-05-07 NOTE — TELEPHONE ENCOUNTER
----- Message from Manju Rivera sent at 5/6/2020  1:41 PM CDT -----  Contact: RADHA WILKINS [[id number]]  Name of Who is Calling : RADHA WILKINS [[id number]]    Patient is requesting a call from staff in regards to medication states she was waiting to hear back from staff about low dose estrogen medication  .....Please contact to further discuss and advise.    Can the clinic reply by [facility] : No     What Number to Call Back : [phone]        Physical Therapy Daily Treatment       Visit Count: 4   Plan of Care: 10/11/2018 Through: 1/3/2019  Insurance Information: pending  Referred by: Maynor Maynard MD; Next provider visit (if known/scheduled): 11/20/2018  Medical Diagnosis (from order):  M25.511, G89.29 Chronic right shoulder pain   Diagnosis Precautions: none  Chart reviewed at time of initial evaluation (relevant co-morbidities, allergies, tests and medications listed):   1996- Foot/toes surgery       SUBJECTIVE   Patient reports he has been feeling pretty good the last two days.     Pain:  Intensity (0-10 scale):   Current: 3-4/10 (was 1-2 last session).  Pain Range (best-worst): 0-4 (was 8 last session).  Location: right shoulder    OBJECTIVE   Right shoulder passive range of motion and active range of motion within Normal limits.    Strength (out of 5)    Left Right   Date Initial Initial   Shoulder Flexors 5 5   Shoulder Extensors  5 5   Shoulder Abductors 5 5   Shoulder Internal Rotators 5 5   Shoulder External Rotators  5 5 (slight irritation reported)           Treatment                       HEP: Reviewed with patient demonstrating all in therapy.   Patient was instructed/performed right upper extremity strengthening with level #5 non latex band resistance for flexion, extension, internal rotation, external rotation. Patient was also educated on performing activities as tolerated.   Horizontal adduction with level 3 theraband 2-3 sets of 10 repetitions.  Right shoulder adduction with level 3 theraband 2-3 sets of 10 repetitions.   Doorway pectoral muscle stretch 90 degree and 120 degrees bilateral shoulder flexion.     Skilled input: Patient reinforced on appropriate amount of excursion of movement for performance of exercise tasks.     Writer verbally educated the patient and received verbal consent from the patient on hand placement, positioning of patient, and exercises performed to strengthen Right shoulder including proper placement of  arms for shoulder stretch in doorway.    ASSESSMENT   44 year old male patient has signs and symptoms consistent with right shoulder pain that has reported functional limitations listed above. Today: Discussion occurred with patient this date in regards to shoulder irritation. Patient demonstrates passive and active range of motion that is within normal limits, along with right shoulder strength. He has had mostly good days with shoulder pain, but occasional times after sleeping on his shoulder where he experiences irritation. Patient is to continue with home exercise program, and discussed with him avoiding laying on his right side, along with avoiding aggravating activities. He demonstrates and reports independence with this. Based on discussion and demonstration of right upper extremity mobility, he is to be discharged from skilled physical therapy at this time.     PLAN   Suggestions for next session as indicated:  Progression of HEP shoulder and scapular strengthening include postural awareness.       THERAPY DAILY BILLING   Insurance: 1. [employer] 2. [employer]    Evaluation Procedures:  Re-evaluation of Physical Therapy    Timed Procedures:  No timed codes were used on this date of service    Untimed Procedures:  No untimed codes were used on this date of service    Total Treatment Time: 10  Minutes

## 2020-05-07 NOTE — TELEPHONE ENCOUNTER
----- Message from Manju Rivera sent at 5/6/2020  1:41 PM CDT -----  Contact: RADHA WILKINS [[id number]]  Name of Who is Calling : RADHA WILKINS [[id number]]    Patient is requesting a call from staff in regards to medication states she was waiting to hear back from staff about low dose estrogen medication  .....Please contact to further discuss and advise.    Can the clinic reply by [facility] : No     What Number to Call Back : [phone]

## 2020-05-07 NOTE — TELEPHONE ENCOUNTER
Pt states that she was suppose to be placed on a low dosage of estrogen and never got the rx sent to the pharmacy

## 2020-05-27 ENCOUNTER — OFFICE VISIT (OUTPATIENT)
Dept: OBSTETRICS AND GYNECOLOGY | Facility: CLINIC | Age: 50
End: 2020-05-27
Payer: MEDICAID

## 2020-05-27 VITALS — BODY MASS INDEX: 25.6 KG/M2 | WEIGHT: 149.94 LBS | HEIGHT: 64 IN

## 2020-05-27 DIAGNOSIS — N94.6 DYSMENORRHEA: ICD-10-CM

## 2020-05-27 DIAGNOSIS — D25.9 UTERINE LEIOMYOMA, UNSPECIFIED LOCATION: Primary | ICD-10-CM

## 2020-05-27 DIAGNOSIS — N92.0 MENORRHAGIA WITH REGULAR CYCLE: ICD-10-CM

## 2020-05-27 DIAGNOSIS — Z12.31 VISIT FOR SCREENING MAMMOGRAM: ICD-10-CM

## 2020-05-27 PROCEDURE — 99396 PR PREVENTIVE VISIT,EST,40-64: ICD-10-PCS | Mod: S$PBB,,, | Performed by: OBSTETRICS & GYNECOLOGY

## 2020-05-27 PROCEDURE — 99999 PR PBB SHADOW E&M-EST. PATIENT-LVL III: ICD-10-PCS | Mod: PBBFAC,,, | Performed by: OBSTETRICS & GYNECOLOGY

## 2020-05-27 PROCEDURE — 99213 OFFICE O/P EST LOW 20 MIN: CPT | Mod: PBBFAC | Performed by: OBSTETRICS & GYNECOLOGY

## 2020-05-27 PROCEDURE — 99999 PR PBB SHADOW E&M-EST. PATIENT-LVL III: CPT | Mod: PBBFAC,,, | Performed by: OBSTETRICS & GYNECOLOGY

## 2020-05-27 PROCEDURE — 99396 PREV VISIT EST AGE 40-64: CPT | Mod: S$PBB,,, | Performed by: OBSTETRICS & GYNECOLOGY

## 2020-05-27 RX ORDER — MEDROXYPROGESTERONE ACETATE 150 MG/ML
150 INJECTION, SUSPENSION INTRAMUSCULAR
Qty: 1 ML | Refills: 3 | Status: SHIPPED | OUTPATIENT
Start: 2020-05-27 | End: 2021-05-26

## 2020-05-27 NOTE — PROGRESS NOTES
"HISTORY OF PRESENT ILLNESS:    Jess Mcleod is a 50 y.o. female, , No LMP recorded.,  presents for follow up.   Patient with spotting after UAE. Bleeding has improved.     Past Medical History:   Diagnosis Date    Arthritis     Chronic back pain     Chronic neck pain     Fibrocystic breast     Fibroid, uterus     GERD (gastroesophageal reflux disease)     Herpes     Migraine headache     Nephrolithiasis 2018    Sciatica of right side 2018    Scoliosis     Urge incontinence 2018       Past Surgical History:   Procedure Laterality Date     SECTION      EMBOLIZATION N/A 2020    Procedure: EMBOLIZATION, BLOOD VESSEL;  Surgeon: Dean Wheeler MD;  Location: [facility] CATH LAB;  Service: Radiology;  Laterality: N/A;    Scoliosis surgery      UMBILICAL HERNIA REPAIR         MEDICATIONS AND ALLERGIES:      Current Outpatient Medications:     fluticasone propionate (FLONASE) 50 mcg/actuation nasal spray, fluticasone propionate 50 mcg/actuation nasal spray,suspension  SHAKE LIQUID AND USE 1 SPRAY IN EACH NOSTRIL EVERY DAY, Disp: , Rfl:     medroxyPROGESTERone (DEPO-PROVERA) 150 mg/mL Syrg, Inject 1 mL (150 mg total) into the muscle every 3 (three) months., Disp: 1 mL, Rfl: 3    QUEtiapine (SEROQUEL) 25 MG Tab, TK 1 T PO QHS, Disp: , Rfl: 2    dextroamphetamine-amphetamine 10 mg Tab, TK 1 T PO BID, Disp: , Rfl:     FLUoxetine 20 MG capsule, TK ONE C PO D, Disp: , Rfl:     pantoprazole (PROTONIX) 20 MG tablet, Take 20 mg by mouth once daily., Disp: , Rfl:     Current Facility-Administered Medications:     medroxyPROGESTERone (DEPO-PROVERA) injection 150 mg, 150 mg, Intramuscular, Q90 Days, KORINA Mcgarry NP, 150 mg at 20 0902    Review of patient's allergies indicates:   Allergen Reactions    Ibuprofen Other (See Comments)     It makes my "ears hurt" when I take large doses.       Family History   Problem Relation Age of Onset    Breast cancer Paternal " Aunt     Colon cancer Neg Hx     Ovarian cancer Neg Hx        Social History     Socioeconomic History    Marital status: Legally      Spouse name: Not on file    Number of children: Not on file    Years of education: Not on file    Highest education level: Not on file   Occupational History    Not on file   Social Needs    Financial resource strain: Not on file    Food insecurity:     Worry: Not on file     Inability: Not on file    Transportation needs:     Medical: Not on file     Non-medical: Not on file   Tobacco Use    Smoking status: Never Smoker    Smokeless tobacco: Never Used   Substance and Sexual Activity    Alcohol use: Not Currently    Drug use: No    Sexual activity: Yes     Partners: Male     Birth control/protection: None   Lifestyle    Physical activity:     Days per week: Not on file     Minutes per session: Not on file    Stress: Not on file   Relationships    Social connections:     Talks on phone: Not on file     Gets together: Not on file     Attends [employer]ism service: Not on file     Active member of club or organization: Not on file     Attends meetings of clubs or organizations: Not on file     Relationship status: Not on file   Other Topics Concern    Not on file   Social History Narrative    Not on file       COMPREHENSIVE GYN HISTORY:  PAP History: Denies abnormal Paps.  Infection History: Denies STDs. Denies PID.  Benign History: Denies uterine fibroids. Denies ovarian cysts. Denies endometriosis. Denies other conditions.  Cancer History: Denies cervical cancer. Denies uterine cancer or hyperplasia. Denies ovarian cancer. Denies vulvar cancer or pre-cancer. Denies vaginal cancer or pre-cancer. Denies breast cancer. Denies colon cancer.  Sexual Activity History: Reports currently being sexually active  Menstrual History: Monthly. Mod then light flow.   Dysmenorrhea History: Reports mild dysmenorrhea.       ROS:  GENERAL: No weight changes. No swelling. No  "fatigue. No fever.  CARDIOVASCULAR: No chest pain. No shortness of breath. No leg cramps.   NEUROLOGICAL: No headaches. No vision changes.  BREASTS: No pain. No lumps. No discharge.  ABDOMEN: No pain. No nausea. No vomiting. No diarrhea. No constipation.  REPRODUCTIVE: No abnormal bleeding.   VULVA: No pain. No lesions. No itching.  VAGINA: No relaxation. No itching. No odor. No discharge. No lesions.  URINARY: No incontinence. No nocturia. No frequency. No dysuria.    Ht 5' 4" (1.626 m)   Wt 68 kg (149 lb 14.6 oz)   BMI 25.73 kg/m²     PE:  APPEARANCE: Well nourished, well developed, in no acute distress.  AFFECT: WNL, alert and oriented x 3.  Deferred     DIAGNOSIS:  1. Uterine leiomyoma, unspecified location    2. Menorrhagia with regular cycle    3. Dysmenorrhea    4. Visit for screening mammogram        PLAN:    Orders Placed This Encounter    Mammo Digital Screening Bilat w/ Erasto    medroxyPROGESTERone (DEPO-PROVERA) 150 mg/mL Syrg       COUNSELING:  The patient was counseled today on:  -A.C.S. Pap and pelvic exam guidelines (pap every 3 years), recomendations for yearly mammogram;  -to follow up with her PCP for other health maintenance.    FOLLOW-UP with me in 3 months     Answers for HPI/ROS submitted by the patient on 5/27/2020   Gynecologic exam  genital itching: No  genital lesions: No  genital odor: No  genital rash: No  missed menses: No  pelvic pain: No  vaginal bleeding: No  vaginal discharge: No  Chronicity: chronic  Onset: more than 1 month ago  Frequency: daily  Progression since onset: gradually improving  Pain severity: no pain  Affected side: both  Pregnant now?: No  abdominal pain: No  anorexia: No  back pain: Yes  chills: Yes  constipation: Yes  diarrhea: No  discolored urine: Yes  dysuria: No  fever: No  flank pain: Yes  frequency: Yes  headaches: No  hematuria: No  nausea: No  painful intercourse: No  rash: No  urgency: Yes  vomiting: No  Vaginal bleeding: no bleeding  Passing clots?: " No  Passing tissue?: No  Aggravated by: nothing  treatments tried: nothing  Improvement on treatment: moderate  Sexual activity: not sexually active  Partner with STD symptoms: possible  Menstrual history: irregular  STD: Yes  abdominal surgery: No   section: Yes  Ectopic pregnancy: No  Endometriosis: No  herpes simplex: Yes  gynecological surgery: Yes  menorrhagia: Yes  metrorrhagia: Yes  miscarriage: Yes  ovarian cysts: Yes  perineal abscess: No  PID: No  terminated pregnancy: Yes  vaginosis: No

## 2020-05-27 NOTE — PROGRESS NOTES
HISTORY OF PRESENT ILLNESS:    Jess Mcleod is a 50 y.o. female  No LMP recorded. presents today for follow up.   Continued vaginal bleeding despite uterine artery embolization earlier 2020.   Patient describes bleeding as light to moderate and followed up with interventional radiology who suggested for her to follow up with GYN.   Patient recently received Depo-Provera therapy 2020, 2020, On Depo secondary to menorrhagia & dysmenorrhea.   Bleeding stopped 2 weeks ago, started in November.     Past Medical History:   Diagnosis Date    Arthritis     Chronic back pain     Chronic neck pain     Fibrocystic breast     Fibroid, uterus     GERD (gastroesophageal reflux disease)     Herpes     Migraine headache     Nephrolithiasis 2018    Sciatica of right side 2018    Scoliosis     Urge incontinence 2018   Answers for HPI/ROS submitted by the patient on 2020   Gynecologic exam  genital itching: No  genital lesions: No  genital odor: No  genital rash: No  missed menses: No  pelvic pain: No  vaginal bleeding: No  vaginal discharge: No  Chronicity: chronic  Onset: more than 1 month ago  Frequency: daily  Progression since onset: gradually improving  Pain severity: no pain  Affected side: both  Pregnant now?: No  abdominal pain: No  anorexia: No  back pain: Yes  chills: Yes  constipation: Yes  diarrhea: No  discolored urine: Yes  dysuria: No  fever: No  flank pain: Yes  frequency: Yes  headaches: No  hematuria: No  nausea: No  painful intercourse: No  rash: No  urgency: Yes  vomiting: No  Vaginal bleeding: no bleeding  Passing clots?: No  Passing tissue?: No  Aggravated by: nothing  treatments tried: nothing  Improvement on treatment: moderate  Sexual activity: not sexually active  Partner with STD symptoms: possible  Menstrual history: irregular  STD: Yes  abdominal surgery: No   section: Yes  Ectopic pregnancy: No  Endometriosis: No  herpes simplex:  "Yes  gynecological surgery: Yes  menorrhagia: Yes  metrorrhagia: Yes  miscarriage: Yes  ovarian cysts: Yes  perineal abscess: No  PID: No  terminated pregnancy: Yes  vaginosis: No      Past Surgical History:   Procedure Laterality Date     SECTION      EMBOLIZATION N/A 2020    Procedure: EMBOLIZATION, BLOOD VESSEL;  Surgeon: Dean Wheeler MD;  Location: [facility] CATH LAB;  Service: Radiology;  Laterality: N/A;    Scoliosis surgery      UMBILICAL HERNIA REPAIR         MEDICATIONS AND ALLERGIES:      Current Outpatient Medications:     fluticasone propionate (FLONASE) 50 mcg/actuation nasal spray, fluticasone propionate 50 mcg/actuation nasal spray,suspension  SHAKE LIQUID AND USE 1 SPRAY IN EACH NOSTRIL EVERY DAY, Disp: , Rfl:     medroxyPROGESTERone (DEPO-PROVERA) 150 mg/mL Syrg, , Disp: , Rfl:     QUEtiapine (SEROQUEL) 25 MG Tab, TK 1 T PO QHS, Disp: , Rfl: 2    dextroamphetamine-amphetamine 10 mg Tab, TK 1 T PO BID, Disp: , Rfl:     FLUoxetine 20 MG capsule, TK ONE C PO D, Disp: , Rfl:     pantoprazole (PROTONIX) 20 MG tablet, Take 20 mg by mouth once daily., Disp: , Rfl:     Current Facility-Administered Medications:     medroxyPROGESTERone (DEPO-PROVERA) injection 150 mg, 150 mg, Intramuscular, Q90 Days, KORINA Mcgarry NP, 150 mg at 20 0902    Review of patient's allergies indicates:   Allergen Reactions    Ibuprofen Other (See Comments)     It makes my "ears hurt" when I take large doses.       COMPREHENSIVE GYN HISTORY:  PAP History: Denies abnormal Paps.  Infection History: Denies STDs. Denies PID.  Benign History: Denies uterine fibroids. Denies ovarian cysts. Denies endometriosis. Denies other conditions.  Cancer History: Denies cervical cancer. Denies uterine cancer or hyperplasia. Denies ovarian cancer. Denies vulvar cancer or pre-cancer. Denies vaginal cancer or pre-cancer. Denies breast cancer. Denies colon cancer.  Sexual Activity History: Reports currently being " sexually active  Menstrual History: Every 28 days, flows for 4 days. Light flow.  Dysmenorrhea History: Denies dysmenorrhea.  Contraception History:      ROS:  GENERAL: No fever or chills.  BREASTS: No pain. No lumps. No discharge.  ABDOMEN: No pain. No nausea. No vomiting. No diarrhea. No constipation.  REPRODUCTIVE: No abnormal bleeding.   VULVA: No pain. No lesions. No itching.  VAGINA: No relaxation. No itching. No odor. No discharge. No lesions.  URINARY: No incontinence. No nocturia. No frequency. No dysuria.    PE:  APPEARANCE: Well nourished, well developed, in no acute distress.  AFFECT: WNL, alert and oriented x 3.  ABDOMEN: Soft. No tenderness or masses. No hepatosplenomegaly. No hernias.  BREASTS: Symmetrical, no skin changes or visible lesions. No palpable masses, nipple discharge bilaterally.  PELVIC: Normal external female genitalia without lesions. Normal hair distribution. Adequate perineal body, normal urethral meatus. Vagina pink and well rugated without lesions or discharge. Cervix pink without lesions, discharge or tenderness. No significant cystocele or rectocele. Bimanual exam shows uterus to be 4-6 weeks size, regular, mobile and nontender. Adnexa without masses or tenderness.    PROCEDURES:    1. Uterine leiomyoma, unspecified location    2. Menorrhagia with regular cycle    3. Dysmenorrhea    4. Visit for screening mammogram        PLAN:    Orders Placed This Encounter    Mammo Digital Screening Bilat w/ Erasto       COUNSELING:  The patient was counseled today on:    FOLLOW-UP with me pending test results.

## 2020-07-07 ENCOUNTER — OFFICE VISIT (OUTPATIENT)
Dept: OBSTETRICS AND GYNECOLOGY | Facility: CLINIC | Age: 50
End: 2020-07-07
Payer: MEDICAID

## 2020-07-07 ENCOUNTER — HOSPITAL ENCOUNTER (OUTPATIENT)
Dept: RADIOLOGY | Facility: OTHER | Age: 50
Discharge: HOME | End: 2020-07-07
Attending: OBSTETRICS & GYNECOLOGY
Payer: MEDICAID

## 2020-07-07 VITALS
BODY MASS INDEX: 26.27 KG/M2 | HEIGHT: 64 IN | DIASTOLIC BLOOD PRESSURE: 68 MMHG | SYSTOLIC BLOOD PRESSURE: 106 MMHG | WEIGHT: 153.88 LBS

## 2020-07-07 DIAGNOSIS — R41.3 MEMORY DEFICIT: ICD-10-CM

## 2020-07-07 DIAGNOSIS — D21.9 FIBROIDS: ICD-10-CM

## 2020-07-07 DIAGNOSIS — Z12.31 VISIT FOR SCREENING MAMMOGRAM: ICD-10-CM

## 2020-07-07 DIAGNOSIS — N94.3 PMS (PREMENSTRUAL SYNDROME): ICD-10-CM

## 2020-07-07 DIAGNOSIS — N39.41 URGE INCONTINENCE: ICD-10-CM

## 2020-07-07 DIAGNOSIS — K21.9 GASTROESOPHAGEAL REFLUX DISEASE, ESOPHAGITIS PRESENCE NOT SPECIFIED: ICD-10-CM

## 2020-07-07 DIAGNOSIS — Z01.419 ENCOUNTER FOR GYNECOLOGICAL EXAMINATION WITHOUT ABNORMAL FINDING: Primary | ICD-10-CM

## 2020-07-07 PROCEDURE — 77067 MAMMO DIGITAL SCREENING BILAT WITH TOMOSYNTHESIS_CAD: ICD-10-PCS | Mod: 26,,, | Performed by: RADIOLOGY

## 2020-07-07 PROCEDURE — 99999 PR PBB SHADOW E&M-EST. PATIENT-LVL III: CPT | Mod: PBBFAC,,, | Performed by: OBSTETRICS & GYNECOLOGY

## 2020-07-07 PROCEDURE — 99396 PREV VISIT EST AGE 40-64: CPT | Mod: S$PBB,,, | Performed by: OBSTETRICS & GYNECOLOGY

## 2020-07-07 PROCEDURE — 77063 MAMMO DIGITAL SCREENING BILAT WITH TOMOSYNTHESIS_CAD: ICD-10-PCS | Mod: 26,,, | Performed by: RADIOLOGY

## 2020-07-07 PROCEDURE — 77067 SCR MAMMO BI INCL CAD: CPT | Mod: 26,,, | Performed by: RADIOLOGY

## 2020-07-07 PROCEDURE — 99396 PR PREVENTIVE VISIT,EST,40-64: ICD-10-PCS | Mod: S$PBB,,, | Performed by: OBSTETRICS & GYNECOLOGY

## 2020-07-07 PROCEDURE — 99213 OFFICE O/P EST LOW 20 MIN: CPT | Mod: PBBFAC | Performed by: OBSTETRICS & GYNECOLOGY

## 2020-07-07 PROCEDURE — 77067 SCR MAMMO BI INCL CAD: CPT | Mod: TC

## 2020-07-07 PROCEDURE — 99999 PR PBB SHADOW E&M-EST. PATIENT-LVL III: ICD-10-PCS | Mod: PBBFAC,,, | Performed by: OBSTETRICS & GYNECOLOGY

## 2020-07-07 PROCEDURE — 77063 BREAST TOMOSYNTHESIS BI: CPT | Mod: 26,,, | Performed by: RADIOLOGY

## 2020-07-07 NOTE — PROGRESS NOTES
HISTORY OF PRESENT ILLNESS:    Jess Mcleod is a 50 y.o. female, , Patient's last menstrual period was 2020.,  presents for a routine exam.     Patient with history of menorrhagia since 2016. Over that year her periods became heavy. She used double protection or a super sizes pad and changes every 1-2 hours with clots and severe cramping for a full seven days. She tried OCPs but had to discontinue them due to leg pain (did not have a DVT). Also is concerned about needing a more permanent form of birth control.     In 2018 she took OCPs and Depo Provera, but when it's time for the sugar pills she gushes for 7 days, passes clots, has headaches, feels fatigued and dizzy with severe pain on the right side (no syncope).  Saw Dr Ricardo 18 and declined a hysterectomy wanted more conservative management such as a UAE or myomectomy.     Had UAE in 2020 and after patient describes bleeding as light to moderate. Patient continued Depo Provera with last doses 2020, 2020.    On Depo secondary to menorrhagia & dysmenorrhea.   Bleeding has been intermittent. Last cycle started on 2020 with daily bleeding since then using 2-5 pads per day with occasional clots.     2020   EXAMINATION:  MRI FEMALE PELVIS W W/O CONTRAST     CLINICAL HISTORY:  Leiomyoma of uterus, unspecifiedUterine fibroid, suspected;     TECHNIQUE:  T2-weighted images with fat saturation were obtained of the pelvis in sagittal and coronal planes. T2-weighted axial images were also obtained as well as coronal T1-weighted images and Haste multi-slice images. No intravenous contrast was administered.     COMPARISON:  Ultrasound 2019, CT 2018     FINDINGS:  Uterus is anteverted and anteflexed. It measures 11.2 x 9.5 x 8.5 cm.  Endometrium has uniform signal and has a maximal thickness of 6 mm which is within normal limits for a female with menstrual cycles.  Junctional zone is normal.     Multiple fibroids  are present.     There is a 5.1 cm anterior fibroid along the submucosa.   There is a 2.5 cm fibroid at the left inferior uterus which may also have submucosal extension.   There are 2 posterior intramural fibroids measuring 2.3 cm and 1.5 cm.   There is a subserosal fibroid at the left lateral uterus measuring 3.4 cm.  There is an intramural fibroid at the right lateral uterus measuring 2.0 cm.   There is a 2.6 cm intramural fibroid at the left inferior posterior uterus.   The bilateral ovaries are unremarkable..  No adnexal or pelvic masses.   Bladder is relatively decompressed and has normal appearance. No enlarged lymph nodes. There is   free fluid within the pelvis, likely physiologic in a female patient in this age.   Osseous structures have normal marrow signal characteristics.  Prior lumbar fusion with associated artifact.     Impression:  Fibroid uterus as detailed above.  Of note, there is a dominant 5.1 cm subserosal fibroid at the uterine fundus.       Past Medical History:   Diagnosis Date    Arthritis     Chronic back pain     Chronic neck pain     Fibrocystic breast     Fibroid, uterus     GERD (gastroesophageal reflux disease)     Herpes     Migraine headache     Nephrolithiasis 2018    Sciatica of right side 2018    Scoliosis     Urge incontinence 2018       Past Surgical History:   Procedure Laterality Date     SECTION      EMBOLIZATION N/A 2020    Procedure: EMBOLIZATION, BLOOD VESSEL;  Surgeon: Dean Wheeler MD;  Location: [facility] CATH LAB;  Service: Radiology;  Laterality: N/A;    Scoliosis surgery      UMBILICAL HERNIA REPAIR         MEDICATIONS AND ALLERGIES:      Current Outpatient Medications:     dextroamphetamine-amphetamine 10 mg Tab, TK 1 T PO BID, Disp: , Rfl:     FLUoxetine 20 MG capsule, TK ONE C PO D, Disp: , Rfl:     medroxyPROGESTERone (DEPO-PROVERA) 150 mg/mL Syrg, Inject 1 mL (150 mg total) into the muscle every 3 (three) months.,  "Disp: 1 mL, Rfl: 3    pantoprazole (PROTONIX) 20 MG tablet, Take 20 mg by mouth once daily., Disp: , Rfl:     QUEtiapine (SEROQUEL) 25 MG Tab, TK 1 T PO QHS, Disp: , Rfl: 2    fluticasone propionate (FLONASE) 50 mcg/actuation nasal spray, fluticasone propionate 50 mcg/actuation nasal spray,suspension  SHAKE LIQUID AND USE 1 SPRAY IN EACH NOSTRIL EVERY DAY, Disp: , Rfl:     Current Facility-Administered Medications:     medroxyPROGESTERone (DEPO-PROVERA) injection 150 mg, 150 mg, Intramuscular, Q90 Days, KORINA Mcgarry NP, 150 mg at 01/24/20 0902    Review of patient's allergies indicates:   Allergen Reactions    Ibuprofen Other (See Comments)     It makes my "ears hurt" when I take large doses.       Family History   Problem Relation Age of Onset    Breast cancer Paternal Aunt     Colon cancer Neg Hx     Ovarian cancer Neg Hx        Social History     Socioeconomic History    Marital status: Legally      Spouse name: Not on file    Number of children: Not on file    Years of education: Not on file    Highest education level: Not on file   Occupational History    Not on file   Social Needs    Financial resource strain: Not on file    Food insecurity     Worry: Not on file     Inability: Not on file    Transportation needs     Medical: Not on file     Non-medical: Not on file   Tobacco Use    Smoking status: Never Smoker    Smokeless tobacco: Never Used   Substance and Sexual Activity    Alcohol use: Not Currently    Drug use: No    Sexual activity: Yes     Partners: Male     Birth control/protection: None   Lifestyle    Physical activity     Days per week: Not on file     Minutes per session: Not on file    Stress: Not on file   Relationships    Social connections     Talks on phone: Not on file     Gets together: Not on file     Attends Faith service: Not on file     Active member of club or organization: Not on file     Attends meetings of clubs or organizations: Not on file " "    Relationship status: Not on file   Other Topics Concern    Not on file   Social History Narrative    Not on file       COMPREHENSIVE GYN HISTORY:  PAP History: Denies abnormal Paps.  Infection History: Denies STDs. Denies PID.  Benign History: Denies uterine fibroids. Denies ovarian cysts. Denies endometriosis. Denies other conditions.  Cancer History: Denies cervical cancer. Denies uterine cancer or hyperplasia. Denies ovarian cancer. Denies vulvar cancer or pre-cancer. Denies vaginal cancer or pre-cancer. Denies breast cancer. Denies colon cancer.  Sexual Activity History: Reports currently being sexually active  Menstrual History: Monthly. Mod then light flow.   Dysmenorrhea History: Reports mild dysmenorrhea.       ROS:  GENERAL: No weight changes. No swelling. No fatigue. No fever.  CARDIOVASCULAR: No chest pain. No shortness of breath. No leg cramps.   NEUROLOGICAL: No headaches. No vision changes.  BREASTS: No pain. No lumps. No discharge.  ABDOMEN: No pain. No nausea. No vomiting. No diarrhea. No constipation.  REPRODUCTIVE: No abnormal bleeding.   VULVA: No pain. No lesions. No itching.  VAGINA: No relaxation. No itching. No odor. No discharge. No lesions.  URINARY: No incontinence. No nocturia. No frequency. No dysuria.    /68 (BP Location: Right arm, Patient Position: Sitting, BP Method: Medium (Manual))   Ht 5' 4" (1.626 m)   Wt 69.8 kg (153 lb 14.1 oz)   LMP 05/01/2020   BMI 26.41 kg/m²     PE:  APPEARANCE: Well nourished, well developed, in no acute distress.  AFFECT: WNL, alert and oriented x 3.  SKIN: No acne or hirsutism.  NECK: Neck symmetric, without masses or thyromegaly.  NODES: No inguinal, cervical, axillary or femoral lymph node enlargement.  CHEST: Good respiratory effort.   ABDOMEN: Soft. No tenderness or masses. No hepatosplenomegaly. No hernias.  BREASTS: Symmetrical, no skin changes, visible lesions, palpable masses or nipple discharge bilaterally.  PELVIC: External " female genitalia without lesions.  Female hair distribution. Adequate perineal body, Normal urethral meatus. Vagina moist and well rugated without lesions or discharge.  No significant cystocele or rectocele present. Cervix pink without lesions, discharge or tenderness. Uterus is 4-6 week size, regular, mobile and nontender. Adnexa without masses or tenderness.  EXTREMITIES: No edema    DIAGNOSIS:  1. Encounter for gynecological examination without abnormal finding    2. Visit for screening mammogram    3. Memory deficit    4. Urge incontinence    5. PMS (premenstrual syndrome)    6. Hydroureteronephrosis    7. Fibroids    8. Gastroesophageal reflux disease, esophagitis presence not specified      PLAN:    COUNSELING:  The patient was counseled today on:  -A.C.S. Pap and pelvic exam guidelines (pap every 3 years), recomendations for yearly mammogram;  -to follow up with her PCP for other health maintenance.    FOLLOW-UP with me IN 3 MONTHS

## 2020-12-09 ENCOUNTER — PATIENT MESSAGE (OUTPATIENT)
Dept: OBSTETRICS AND GYNECOLOGY | Facility: CLINIC | Age: 50
End: 2020-12-09

## 2020-12-10 ENCOUNTER — TELEPHONE (OUTPATIENT)
Dept: OBSTETRICS AND GYNECOLOGY | Facility: CLINIC | Age: 50
End: 2020-12-10

## 2021-04-12 ENCOUNTER — PATIENT MESSAGE (OUTPATIENT)
Dept: RESEARCH | Facility: HOSPITAL | Age: 51
End: 2021-04-12

## 2021-04-19 ENCOUNTER — OFFICE VISIT (OUTPATIENT)
Dept: SLEEP MEDICINE | Facility: CLINIC | Age: 51
End: 2021-04-19
Payer: COMMERCIAL

## 2021-04-19 VITALS
HEART RATE: 97 BPM | SYSTOLIC BLOOD PRESSURE: 110 MMHG | HEIGHT: 64 IN | WEIGHT: 162.69 LBS | BODY MASS INDEX: 27.77 KG/M2 | DIASTOLIC BLOOD PRESSURE: 74 MMHG

## 2021-04-19 DIAGNOSIS — G47.52 DREAM ENACTMENT BEHAVIOR: ICD-10-CM

## 2021-04-19 DIAGNOSIS — G47.10 HYPERSOMNIA: ICD-10-CM

## 2021-04-19 DIAGNOSIS — G47.33 OSA (OBSTRUCTIVE SLEEP APNEA): Primary | ICD-10-CM

## 2021-04-19 PROCEDURE — 3008F PR BODY MASS INDEX (BMI) DOCUMENTED: ICD-10-PCS | Mod: CPTII,S$GLB,, | Performed by: INTERNAL MEDICINE

## 2021-04-19 PROCEDURE — 99203 PR OFFICE/OUTPT VISIT, NEW, LEVL III, 30-44 MIN: ICD-10-PCS | Mod: S$GLB,,, | Performed by: INTERNAL MEDICINE

## 2021-04-19 PROCEDURE — 3008F BODY MASS INDEX DOCD: CPT | Mod: CPTII,S$GLB,, | Performed by: INTERNAL MEDICINE

## 2021-04-19 PROCEDURE — 1126F AMNT PAIN NOTED NONE PRSNT: CPT | Mod: S$GLB,,, | Performed by: INTERNAL MEDICINE

## 2021-04-19 PROCEDURE — 99999 PR PBB SHADOW E&M-EST. PATIENT-LVL III: CPT | Mod: PBBFAC,,, | Performed by: INTERNAL MEDICINE

## 2021-04-19 PROCEDURE — 1126F PR PAIN SEVERITY QUANTIFIED, NO PAIN PRESENT: ICD-10-PCS | Mod: S$GLB,,, | Performed by: INTERNAL MEDICINE

## 2021-04-19 PROCEDURE — 99203 OFFICE O/P NEW LOW 30 MIN: CPT | Mod: S$GLB,,, | Performed by: INTERNAL MEDICINE

## 2021-04-19 PROCEDURE — 99999 PR PBB SHADOW E&M-EST. PATIENT-LVL III: ICD-10-PCS | Mod: PBBFAC,,, | Performed by: INTERNAL MEDICINE

## 2021-05-25 ENCOUNTER — HOSPITAL ENCOUNTER (OUTPATIENT)
Dept: SLEEP MEDICINE | Facility: OTHER | Age: 51
Discharge: HOME | End: 2021-05-25
Attending: INTERNAL MEDICINE
Payer: COMMERCIAL

## 2021-05-25 DIAGNOSIS — G47.52 DREAM ENACTMENT BEHAVIOR: ICD-10-CM

## 2021-05-25 DIAGNOSIS — G47.10 HYPERSOMNIA: ICD-10-CM

## 2021-05-25 DIAGNOSIS — G47.33 OSA (OBSTRUCTIVE SLEEP APNEA): ICD-10-CM

## 2021-05-25 PROCEDURE — 95810 POLYSOM 6/> YRS 4/> PARAM: CPT | Mod: 26,,, | Performed by: INTERNAL MEDICINE

## 2021-05-25 PROCEDURE — 95810 POLYSOM 6/> YRS 4/> PARAM: CPT

## 2021-05-25 PROCEDURE — 95810 PR POLYSOMNOGRAPHY, 4 OR MORE: ICD-10-PCS | Mod: 26,,, | Performed by: INTERNAL MEDICINE

## 2021-05-26 LAB
AMPHET+METHAMPHET UR QL: NEGATIVE
BARBITURATES UR QL SCN>200 NG/ML: NEGATIVE
BENZODIAZ UR QL SCN>200 NG/ML: NEGATIVE
BZE UR QL SCN: NEGATIVE
CANNABINOIDS UR QL SCN: NEGATIVE
CREAT UR-MCNC: 45.3 MG/DL (ref 15–325)
ETHANOL UR-MCNC: <10 MG/DL
METHADONE UR QL SCN>300 NG/ML: NEGATIVE
OPIATES UR QL SCN: NEGATIVE
PCP UR QL SCN>25 NG/ML: NEGATIVE
TOXICOLOGY INFORMATION: NORMAL

## 2021-05-26 PROCEDURE — 80307 DRUG TEST PRSMV CHEM ANLYZR: CPT | Performed by: INTERNAL MEDICINE

## 2021-05-26 PROCEDURE — 95805 MULTIPLE SLEEP LATENCY TEST: CPT | Mod: 26,,, | Performed by: INTERNAL MEDICINE

## 2021-05-26 PROCEDURE — 95805 PR MULTIPLE SLEEP LATENCY TEST: ICD-10-PCS | Mod: 26,,, | Performed by: INTERNAL MEDICINE

## 2021-05-26 PROCEDURE — 95805 MULTIPLE SLEEP LATENCY TEST: CPT

## 2021-05-28 ENCOUNTER — OFFICE VISIT (OUTPATIENT)
Dept: SLEEP MEDICINE | Facility: CLINIC | Age: 51
End: 2021-05-28
Payer: COMMERCIAL

## 2021-05-28 DIAGNOSIS — G47.10 HYPERSOMNIA: ICD-10-CM

## 2021-05-28 DIAGNOSIS — G47.33 OSA (OBSTRUCTIVE SLEEP APNEA): Primary | ICD-10-CM

## 2021-05-28 PROCEDURE — 99213 PR OFFICE/OUTPT VISIT, EST, LEVL III, 20-29 MIN: ICD-10-PCS | Mod: 95,,, | Performed by: INTERNAL MEDICINE

## 2021-05-28 PROCEDURE — 99213 OFFICE O/P EST LOW 20 MIN: CPT | Mod: 95,,, | Performed by: INTERNAL MEDICINE

## 2021-05-28 RX ORDER — MODAFINIL 200 MG/1
200 TABLET ORAL DAILY
Qty: 30 TABLET | Refills: 0 | Status: SHIPPED | OUTPATIENT
Start: 2021-05-28 | End: 2021-08-04

## 2021-06-22 ENCOUNTER — OFFICE VISIT (OUTPATIENT)
Dept: OBSTETRICS AND GYNECOLOGY | Facility: CLINIC | Age: 51
End: 2021-06-22
Payer: COMMERCIAL

## 2021-06-22 VITALS
DIASTOLIC BLOOD PRESSURE: 60 MMHG | BODY MASS INDEX: 27.89 KG/M2 | HEIGHT: 64 IN | SYSTOLIC BLOOD PRESSURE: 112 MMHG | WEIGHT: 163.38 LBS

## 2021-06-22 DIAGNOSIS — N92.0 MENORRHAGIA WITH REGULAR CYCLE: ICD-10-CM

## 2021-06-22 DIAGNOSIS — R10.2 PELVIC PAIN IN FEMALE: ICD-10-CM

## 2021-06-22 DIAGNOSIS — D25.9 UTERINE LEIOMYOMA, UNSPECIFIED LOCATION: Primary | ICD-10-CM

## 2021-06-22 DIAGNOSIS — N39.41 URGE INCONTINENCE: ICD-10-CM

## 2021-06-22 PROCEDURE — 3008F PR BODY MASS INDEX (BMI) DOCUMENTED: ICD-10-PCS | Mod: CPTII,S$GLB,, | Performed by: OBSTETRICS & GYNECOLOGY

## 2021-06-22 PROCEDURE — 99214 PR OFFICE/OUTPT VISIT, EST, LEVL IV, 30-39 MIN: ICD-10-PCS | Mod: S$GLB,,, | Performed by: OBSTETRICS & GYNECOLOGY

## 2021-06-22 PROCEDURE — 99999 PR PBB SHADOW E&M-EST. PATIENT-LVL III: CPT | Mod: PBBFAC,,, | Performed by: OBSTETRICS & GYNECOLOGY

## 2021-06-22 PROCEDURE — 1126F PR PAIN SEVERITY QUANTIFIED, NO PAIN PRESENT: ICD-10-PCS | Mod: S$GLB,,, | Performed by: OBSTETRICS & GYNECOLOGY

## 2021-06-22 PROCEDURE — 99214 OFFICE O/P EST MOD 30 MIN: CPT | Mod: S$GLB,,, | Performed by: OBSTETRICS & GYNECOLOGY

## 2021-06-22 PROCEDURE — 99999 PR PBB SHADOW E&M-EST. PATIENT-LVL III: ICD-10-PCS | Mod: PBBFAC,,, | Performed by: OBSTETRICS & GYNECOLOGY

## 2021-06-22 PROCEDURE — 3008F BODY MASS INDEX DOCD: CPT | Mod: CPTII,S$GLB,, | Performed by: OBSTETRICS & GYNECOLOGY

## 2021-06-22 PROCEDURE — 1126F AMNT PAIN NOTED NONE PRSNT: CPT | Mod: S$GLB,,, | Performed by: OBSTETRICS & GYNECOLOGY

## 2021-06-22 RX ORDER — DEXTROAMPHETAMINE SACCHARATE, AMPHETAMINE ASPARTATE, DEXTROAMPHETAMINE SULFATE AND AMPHETAMINE SULFATE 3.75; 3.75; 3.75; 3.75 MG/1; MG/1; MG/1; MG/1
15 TABLET ORAL 2 TIMES DAILY
COMMUNITY
Start: 2021-04-25 | End: 2022-11-21

## 2021-06-22 RX ORDER — MODAFINIL 200 MG/1
TABLET ORAL
COMMUNITY
End: 2021-06-22

## 2021-06-22 RX ORDER — TRAMADOL HYDROCHLORIDE 50 MG/1
50 TABLET ORAL EVERY 6 HOURS PRN
Qty: 15 TABLET | Refills: 0 | Status: SHIPPED | OUTPATIENT
Start: 2021-06-22 | End: 2022-04-20 | Stop reason: ALTCHOICE

## 2021-06-30 ENCOUNTER — TELEPHONE (OUTPATIENT)
Dept: OBSTETRICS AND GYNECOLOGY | Facility: CLINIC | Age: 51
End: 2021-06-30

## 2021-06-30 DIAGNOSIS — Z12.31 VISIT FOR SCREENING MAMMOGRAM: Primary | ICD-10-CM

## 2022-02-25 ENCOUNTER — HOSPITAL ENCOUNTER (EMERGENCY)
Facility: HOSPITAL | Age: 52
Discharge: HOME | End: 2022-02-25
Attending: EMERGENCY MEDICINE
Payer: COMMERCIAL

## 2022-02-25 VITALS
HEART RATE: 106 BPM | TEMPERATURE: 98 F | DIASTOLIC BLOOD PRESSURE: 76 MMHG | WEIGHT: 175 LBS | BODY MASS INDEX: 30.04 KG/M2 | RESPIRATION RATE: 16 BRPM | SYSTOLIC BLOOD PRESSURE: 126 MMHG | OXYGEN SATURATION: 100 %

## 2022-02-25 DIAGNOSIS — S83.412A SPRAIN OF MEDIAL COLLATERAL LIGAMENT OF LEFT KNEE, INITIAL ENCOUNTER: Primary | ICD-10-CM

## 2022-02-25 PROCEDURE — 99284 PR EMERGENCY DEPT VISIT,LEVEL IV: ICD-10-PCS | Mod: ,,, | Performed by: EMERGENCY MEDICINE

## 2022-02-25 PROCEDURE — 99284 EMERGENCY DEPT VISIT MOD MDM: CPT | Mod: ,,, | Performed by: EMERGENCY MEDICINE

## 2022-02-25 PROCEDURE — 99282 EMERGENCY DEPT VISIT SF MDM: CPT

## 2022-02-26 NOTE — ED NOTES
Adult Physical Assessment  LOC: Jess Mcleod, 52 y.o. female verified via two identifiers.  The patient is awake, alert, oriented and speaking appropriately at this time.  APPEARANCE: Patient resting comfortably and appears to be in no acute distress at this time. Patient is clean and well groomed, patient's clothing is properly fastened.  SKIN:The skin is warm and dry, color consistent with ethnicity, patient has normal skin turgor and moist mucus membranes, skin intact, no breakdown or brusing noted.  MUSCULOSKELETAL: No obvious swelling or deformities noted. Patient has mild difficulty bearing weight on the left leg r/t knee pain; otherwise, patient is moving all extremities well.   RESPIRATORY: Airway is open and patent, respirations are spontaneous, patient has a normal effort and rate, no accessory muscle use noted.  CARDIAC: Patient has a normal rate and rhythm, no periphreal edema noted in any extremity, capillary refill < 3 seconds in all extremities  ABDOMEN: Soft and non tender to palpation, no abdominal distention noted. Bowel sounds present in all four quadrants.  NEUROLOGIC: Eyes open spontaneously, behavior appropriate to situation, follows commands, facial expression symmetrical, bilateral hand grasp equal and even, purposeful motor response noted, normal sensation in all extremities when touched with a finger.

## 2022-02-26 NOTE — DISCHARGE INSTRUCTIONS
Take acetaminophen (Tylenol), ibuprofen (Motrin, Advil) or naproxen (Naprosyn, Aleve) over-the-counter for pain as needed.  Please strictly follow all instructions on the packaging and do not take more medication per dose and per day than the instructions recommend.

## 2022-02-26 NOTE — ED PROVIDER NOTES
"Source of History   Patient    Chief Complaint   Knee Injury (Pt c/o of L knee pain x2 weeks. Pt states she "rolled her knee" while walking up steps. Pt is ambulatory in triage. )      History Of Present Illness   Jess Mcleod is a 52 y.o. female presenting with left knee pain that has been present for 2 weeks and over the last few days has worsened significantly.  She is limping and more than it she has ever been.  She states she rolled her knee severely 2 weeks ago.  No direct trauma, it was a twisting motion.  She has been wearing any brace, which is not helping over the last couple of days.    Review Of Systems   As per HPI and below:  General: No fever.  No chills.  Extremities: Notes left lower extremity pain.  Integument: No rashes or bruising.  Neurologic: No weakness or numbness.          Review of patient's allergies indicates:   Allergen Reactions    Ibuprofen Other (See Comments)     It makes my "ears hurt" when I take large doses.       No current facility-administered medications on file prior to encounter.     Current Outpatient Medications on File Prior to Encounter   Medication Sig Dispense Refill    dextroamphetamine-amphetamine (ADDERALL) 15 mg tablet Take 15 mg by mouth 2 (two) times daily.      modafiniL (PROVIGIL) 200 MG Tab TAKE 1 TABLET BY MOUTH ONCE DAILY 30 tablet 3    QUEtiapine (SEROQUEL) 25 MG Tab TK 1 T PO QHS  2    traMADoL (ULTRAM) 50 mg tablet Take 1 tablet (50 mg total) by mouth every 6 (six) hours as needed for Pain. 15 tablet 0       Past History   As per HPI and below:  Past Medical History:   Diagnosis Date    Arthritis     Chronic back pain     Chronic neck pain     Fibrocystic breast     Fibroid, uterus     GERD (gastroesophageal reflux disease)     Herpes     Migraine headache     Narcolepsy     Nephrolithiasis 5/20/2018    Sciatica of right side 5/20/2018    Scoliosis     Urge incontinence 5/20/2018     Past Surgical History:   Procedure Laterality " Date     SECTION      EMBOLIZATION N/A 2020    Procedure: EMBOLIZATION, BLOOD VESSEL;  Surgeon: Dean Wheeler MD;  Location: [facility] CATH LAB;  Service: Radiology;  Laterality: N/A;    Scoliosis surgery      UMBILICAL HERNIA REPAIR         Social History     Socioeconomic History    Marital status: Legally    Tobacco Use    Smoking status: Never Smoker    Smokeless tobacco: Never Used   Substance and Sexual Activity    Alcohol use: Not Currently    Drug use: No    Sexual activity: Yes     Partners: Male     Birth control/protection: None       Family History   Problem Relation Age of Onset    Breast cancer Paternal Aunt     Colon cancer Neg Hx     Ovarian cancer Neg Hx        Physical Exam     Vitals:    22   BP: 126/76   Pulse: 106   Resp: 16   Temp: 97.9 °F (36.6 °C)   TempSrc: Oral   SpO2: 100%   Weight: 79.4 kg (175 lb)     Appearance: No acute distress.  Head: Atraumatic.  Integument: No ecchymoses or other signs of trauma.  Musculoskeletal:  No bony tenderness in the extremities.  No deformities.  Soft tissue tenderness over the MCL region of the left knee.  Negative anterior and posterior drawer.  No meniscal tenderness over the medial or lateral meniscus.  No significant effusion.  Neurovascular exam distal to the injury intact.  Neurologic: Motor intact.  Sensation intact.   Mental status: Alert and oriented x 3.  GCS 15.          I decided to obtain the patient's medical records.    Medications - No data to display    Results and Course   Labs Reviewed - No data to display    Imaging Results    None                  MDM, Impression and Plan   52 y.o. female with likely MCL sprain x 2 weeks, worsening over the past few days.  Unclear if she is resting it properly in the brace she is wearing.  Will provide knee immobilizer and crutches for a few days.  Referred her to Sports Medicine.  OTC NSAID of choice.  Good distal pulse and intact sensation; no indication  for emergent imaging.       Final diagnoses:  [T24.412X] Sprain of medial collateral ligament of left knee, initial encounter (Primary)          ED Disposition Condition    Discharge Stable        ED Prescriptions     None        Follow-up Information     Follow up With Specialties Details Why Contact Info Additional Information    Mark Barbosa B - Sports Med [facility] Schedule an appointment as soon as possible for a visit   [address]  [facility] [address]  [phone] Please park in surface lot or garage and check in on the first floor, [address]           Stephan Duran MD  02/25/22 3051

## 2022-02-26 NOTE — ED TRIAGE NOTES
"Pt reports to ED with c/o of pain in her left knee. Pt states that she "rolled" her knee on February 12, 2022, and the pain has not improved. Pt denies any numbness or tingling.   "

## 2022-03-07 ENCOUNTER — HOSPITAL ENCOUNTER (OUTPATIENT)
Dept: RADIOLOGY | Facility: HOSPITAL | Age: 52
Discharge: HOME | End: 2022-03-07
Attending: FAMILY MEDICINE
Payer: COMMERCIAL

## 2022-03-07 ENCOUNTER — OFFICE VISIT (OUTPATIENT)
Dept: SPORTS MEDICINE | Facility: CLINIC | Age: 52
End: 2022-03-07
Payer: COMMERCIAL

## 2022-03-07 VITALS
BODY MASS INDEX: 28.51 KG/M2 | HEART RATE: 98 BPM | DIASTOLIC BLOOD PRESSURE: 79 MMHG | SYSTOLIC BLOOD PRESSURE: 127 MMHG | WEIGHT: 167 LBS | HEIGHT: 64 IN

## 2022-03-07 DIAGNOSIS — Z99.89 CRUTCHES AS AMBULATION AID: ICD-10-CM

## 2022-03-07 DIAGNOSIS — S83.412A SPRAIN OF MEDIAL COLLATERAL LIGAMENT OF LEFT KNEE, INITIAL ENCOUNTER: ICD-10-CM

## 2022-03-07 DIAGNOSIS — M25.562 MECHANICAL KNEE PAIN, LEFT: Primary | ICD-10-CM

## 2022-03-07 DIAGNOSIS — R26.89 ANTALGIC GAIT: ICD-10-CM

## 2022-03-07 PROCEDURE — 1160F RVW MEDS BY RX/DR IN RCRD: CPT | Mod: CPTII,S$GLB,, | Performed by: FAMILY MEDICINE

## 2022-03-07 PROCEDURE — 73564 XR KNEE ORTHO BILAT WITH FLEXION: ICD-10-PCS | Mod: 26,,, | Performed by: RADIOLOGY

## 2022-03-07 PROCEDURE — 3078F DIAST BP <80 MM HG: CPT | Mod: CPTII,S$GLB,, | Performed by: FAMILY MEDICINE

## 2022-03-07 PROCEDURE — 3008F PR BODY MASS INDEX (BMI) DOCUMENTED: ICD-10-PCS | Mod: CPTII,S$GLB,, | Performed by: FAMILY MEDICINE

## 2022-03-07 PROCEDURE — 99999 PR PBB SHADOW E&M-EST. PATIENT-LVL IV: CPT | Mod: PBBFAC,,, | Performed by: FAMILY MEDICINE

## 2022-03-07 PROCEDURE — 3008F BODY MASS INDEX DOCD: CPT | Mod: CPTII,S$GLB,, | Performed by: FAMILY MEDICINE

## 2022-03-07 PROCEDURE — 73564 X-RAY EXAM KNEE 4 OR MORE: CPT | Mod: 26,,, | Performed by: RADIOLOGY

## 2022-03-07 PROCEDURE — 3074F PR MOST RECENT SYSTOLIC BLOOD PRESSURE < 130 MM HG: ICD-10-PCS | Mod: CPTII,S$GLB,, | Performed by: FAMILY MEDICINE

## 2022-03-07 PROCEDURE — 3078F PR MOST RECENT DIASTOLIC BLOOD PRESSURE < 80 MM HG: ICD-10-PCS | Mod: CPTII,S$GLB,, | Performed by: FAMILY MEDICINE

## 2022-03-07 PROCEDURE — 1159F PR MEDICATION LIST DOCUMENTED IN MEDICAL RECORD: ICD-10-PCS | Mod: CPTII,S$GLB,, | Performed by: FAMILY MEDICINE

## 2022-03-07 PROCEDURE — 1159F MED LIST DOCD IN RCRD: CPT | Mod: CPTII,S$GLB,, | Performed by: FAMILY MEDICINE

## 2022-03-07 PROCEDURE — 1160F PR REVIEW ALL MEDS BY PRESCRIBER/CLIN PHARMACIST DOCUMENTED: ICD-10-PCS | Mod: CPTII,S$GLB,, | Performed by: FAMILY MEDICINE

## 2022-03-07 PROCEDURE — 3074F SYST BP LT 130 MM HG: CPT | Mod: CPTII,S$GLB,, | Performed by: FAMILY MEDICINE

## 2022-03-07 PROCEDURE — 73564 X-RAY EXAM KNEE 4 OR MORE: CPT | Mod: TC,50

## 2022-03-07 PROCEDURE — 99204 OFFICE O/P NEW MOD 45 MIN: CPT | Mod: S$GLB,,, | Performed by: FAMILY MEDICINE

## 2022-03-07 PROCEDURE — 99204 PR OFFICE/OUTPT VISIT, NEW, LEVL IV, 45-59 MIN: ICD-10-PCS | Mod: S$GLB,,, | Performed by: FAMILY MEDICINE

## 2022-03-07 PROCEDURE — 99999 PR PBB SHADOW E&M-EST. PATIENT-LVL IV: ICD-10-PCS | Mod: PBBFAC,,, | Performed by: FAMILY MEDICINE

## 2022-03-07 NOTE — PROGRESS NOTES
Jess Mcleod, a 52 y.o. female, presents today for evaluation of her left knee.      History of Present Illness (HPI)  Location: anterior knee   Onset: sudden  Palliative:    relative rest   oral analgesics, OTC   Crutches   Knee brace   Ice and heat first week     Provocative: ambulation  Prior: none  Progression: plateau discomfort  Quality: sharp  Radiation: none  Severity: per nursing documentation  Timing: constant, exacerbated w/ use  Trauma:     Review of Systems (ROS)  A 10+ review of systems was performed with pertinent positives and negatives noted above in the history of present illness. Other systems were negative unless otherwise specified.    Physical Examination (PE)  General:  The patient is alert and oriented x 3. Mood is pleasant. Observation of ears, eyes and nose reveal no gross abnormalities. HEENT: NCAT, sclera anicteric.   Lungs: Respirations are equal and unlabored.  Gait is coordinated. Patient can toe walk and heel walk without difficulty.    KNEE EXAMINATION    Observation/Inspection  Gait:   ANTALGIC  Alignment:  Neutral   Scars:   None   Muscle atrophy: Mild  Effusion:  None   Warmth:  None   Discoloration:   none     Tenderness / Crepitus (T / C):         T / C      T / C  Patella   - / -   Lateral joint line   - / -     Peripatellar medial  -  Medial joint line    + / -  Peripatellar lateral -  Medial plica   - / -  Patellar tendon -   Popliteal fossa   - / -  Quad tendon   -   Gastrocnemius   -  Prepatellar Bursa - / -   Quadricep   -  Tibial tubercle  -  Thigh/hamstring  -  Pes anserine/HS -  Fibula    -  ITB   - / -  Tibia     -  Tib/fib joint  - / -  LCL    -    MFC   - / -   MCL: Proximal  -    LFC   - / -   Distal    -          ROM: (* = pain)  PASSIVE   ACTIVE    Left :   5 / 0 / 145   5 / 0 / 145     Right :    5 / 0 / 145   5 / 0 / 145    Patellofemoral examination:  See above noted areas of tenderness.   Patella position    Subluxation / dislocation: Centered         Sup. / Inf;   Normal   Crepitus (PF):    Absent   Patellar Mobility:       Medial-lateral:   Normal    Superior-inferior:  Normal    Inferior tilt   Normal    Patellar tendon:  Normal   Lateral tilt:    Normal   J-sign:     None   Patellofemoral grind:   No pain     Meniscal Signs:     Pain on terminal extension:  +  Pain on terminal flexion:  +  Krystens maneuver:  +*  Squat     NT  Thesaly    NT    Ligament Examination:  ACL / Lachman:  WNL  PCL-Post.  drawer: normal 0 to 2mm  MCL- Valgus:  normal 0 to 2mm  LCL- Varus:    normal 0 to 2mm  Pivot shift:  guarding   Dial Test:   difference c/w other side   At 30° flexion: normal (< 5°)    At 90° flexion: normal (< 5°)   Reverse Pivot Shift:   normal (Equal)     Strength: (* = with pain) Painful Side  Quadriceps   5/5  Hamstrin/5    Extremity Neuro-vascular Examination:   Sensation:  Grossly intact to light touch all dermatomal regions.   Motor Function:  Fully intact motor function at hip, knee, foot and ankle    DTRs;  quadriceps and  achilles 2+.  No clonus and downgoing Babinski.    Vascular status:  DP and PT pulses 2+, brisk capillary refill, symmetric.     Other Findings:    ASSESSMENT & PLAN  Assessment  #1 mechanical knee pain, s/p trauma, left   W/ med knee pain  #2 chondrocalcinosis seen on plane films may be indicative of CPPD, knee, bilat    No evidence of neurologic pathology  No evidence of vascular pathology    Imaging studies reviewed:   X-ray knee, bilateral 22.03    Plan  Given extreme dysfunction and discomfort, coupled with physical examination suggesting meniscal pathology and non-diagnostic x-ray imaging, we will obtain MRI imaging for further evaluation of the soft tissue structures of the knee.     We discussed options including    Watchful waiting / relative rest x   Physical therapy    Injection therapy    Consultation    The patient chooses As above   x = prescribed  CSI = corticosteroid injection  VSI = viscosupplement  injection  PRPI = platelet rich plasma injection  ia = intra articular  R = right  L = left  B = bilateral   nfSx = surgical consultation was recommended, but patient is not interested in consultation at this time    Physical Therapy        Formal (fPT), @ [facility] facility    Formal (fPT), @ [facility] facility        Homegoing (hgPT), per concurrent fPT recommendations    Homegoing (hgPT), per prior fPT recommendations    Homegoing (hgPT), handout provided        w/  (atPT)    [blank] = not prescribed  x = prescribed  b = prescribed, and begin as indicated  t = continue as indicated  r = prescribed, and restart as indicated  p = completed prior as indicated  hs = prescribed, and with high school   col = prescribed, and with college or university   nfPT = physical therapy was recommended, but patient is not interested in PT at this time    Activity (e.g. sports, work) restrictions    [blank] = as tolerated  pt = per physical therapist  at = per   NWB = non weight bearing on affected lower extremity, with crutches assistance for ambulation    Bracing Only tolerating a single crutch currently   [blank] = not prescribed  r = recommended, but not fit with at todays visit  f = prescribed and fit with at todays visit  t = continue as indicated  d = d/c  p = as needed  rare = use on rare, as-needed basis; advised against chronic use    Pain management acet  melox   [blank] = No prescription necessary. A handout detailing dosing of appropriate   over-the-counter musculoskeletal analgesics was made available to the patient.   m = meloxicam x 14 days  mp = 14 day course of meloxicam prescribed prior    Follow up mri   [blank] = as needed  [number] = in [number] weeks  CSI = for corticosteroid injection  VSI = for viscosupplement injection or injection series  PRP = for platelet rich plasma injection or injection series  MRI = after MRI imaging  ns = should surgical  options be deferred (no surgery)  o = appointment offered, deferred by patient    Should symptoms worsen or fail to resolve, consider    Revisiting the above options and / or      Vocation:

## 2022-03-08 ENCOUNTER — HOSPITAL ENCOUNTER (OUTPATIENT)
Dept: RADIOLOGY | Facility: HOSPITAL | Age: 52
Discharge: HOME | End: 2022-03-08
Attending: FAMILY MEDICINE
Payer: COMMERCIAL

## 2022-03-08 DIAGNOSIS — M25.562 MECHANICAL KNEE PAIN, LEFT: ICD-10-CM

## 2022-03-08 DIAGNOSIS — S83.412A SPRAIN OF MEDIAL COLLATERAL LIGAMENT OF LEFT KNEE, INITIAL ENCOUNTER: ICD-10-CM

## 2022-03-08 PROCEDURE — 73721 MRI JNT OF LWR EXTRE W/O DYE: CPT | Mod: TC,LT

## 2022-03-08 PROCEDURE — 73721 MRI KNEE WITHOUT CONTRAST LEFT: ICD-10-PCS | Mod: 26,LT,, | Performed by: RADIOLOGY

## 2022-03-08 PROCEDURE — 73721 MRI JNT OF LWR EXTRE W/O DYE: CPT | Mod: 26,LT,, | Performed by: RADIOLOGY

## 2022-03-09 ENCOUNTER — PATIENT MESSAGE (OUTPATIENT)
Dept: SPORTS MEDICINE | Facility: CLINIC | Age: 52
End: 2022-03-09

## 2022-03-09 ENCOUNTER — OFFICE VISIT (OUTPATIENT)
Dept: SPORTS MEDICINE | Facility: CLINIC | Age: 52
End: 2022-03-09
Payer: COMMERCIAL

## 2022-03-09 VITALS — BODY MASS INDEX: 28.51 KG/M2 | WEIGHT: 167 LBS | TEMPERATURE: 98 F | HEIGHT: 64 IN

## 2022-03-09 DIAGNOSIS — S83.242S ACUTE MEDIAL MENISCAL TEAR, LEFT, SEQUELA: Primary | ICD-10-CM

## 2022-03-09 DIAGNOSIS — R26.89 ANTALGIC GAIT: ICD-10-CM

## 2022-03-09 DIAGNOSIS — Z99.89 CRUTCHES AS AMBULATION AID: ICD-10-CM

## 2022-03-09 DIAGNOSIS — M25.562 ACUTE PAIN OF LEFT KNEE: ICD-10-CM

## 2022-03-09 PROCEDURE — 1160F RVW MEDS BY RX/DR IN RCRD: CPT | Mod: CPTII,S$GLB,, | Performed by: FAMILY MEDICINE

## 2022-03-09 PROCEDURE — 1159F MED LIST DOCD IN RCRD: CPT | Mod: CPTII,S$GLB,, | Performed by: FAMILY MEDICINE

## 2022-03-09 PROCEDURE — 99999 PR PBB SHADOW E&M-EST. PATIENT-LVL III: ICD-10-PCS | Mod: PBBFAC,,, | Performed by: FAMILY MEDICINE

## 2022-03-09 PROCEDURE — 1159F PR MEDICATION LIST DOCUMENTED IN MEDICAL RECORD: ICD-10-PCS | Mod: CPTII,S$GLB,, | Performed by: FAMILY MEDICINE

## 2022-03-09 PROCEDURE — 3008F PR BODY MASS INDEX (BMI) DOCUMENTED: ICD-10-PCS | Mod: CPTII,S$GLB,, | Performed by: FAMILY MEDICINE

## 2022-03-09 PROCEDURE — 3008F BODY MASS INDEX DOCD: CPT | Mod: CPTII,S$GLB,, | Performed by: FAMILY MEDICINE

## 2022-03-09 PROCEDURE — 99214 OFFICE O/P EST MOD 30 MIN: CPT | Mod: S$GLB,,, | Performed by: FAMILY MEDICINE

## 2022-03-09 PROCEDURE — 1160F PR REVIEW ALL MEDS BY PRESCRIBER/CLIN PHARMACIST DOCUMENTED: ICD-10-PCS | Mod: CPTII,S$GLB,, | Performed by: FAMILY MEDICINE

## 2022-03-09 PROCEDURE — 99214 PR OFFICE/OUTPT VISIT, EST, LEVL IV, 30-39 MIN: ICD-10-PCS | Mod: S$GLB,,, | Performed by: FAMILY MEDICINE

## 2022-03-09 PROCEDURE — 99999 PR PBB SHADOW E&M-EST. PATIENT-LVL III: CPT | Mod: PBBFAC,,, | Performed by: FAMILY MEDICINE

## 2022-03-09 NOTE — PROGRESS NOTES
Jess Mcleod, a 52 y.o. female, presents today for evaluation of her left knee.      History of Present Illness (HPI)  Location: anterior knee   Onset: sudden  Palliative:    relative rest   oral analgesics, OTC   Crutches   Knee brace   Ice and heat first week     Provocative: ambulation  Prior: none  Progression: plateau discomfort  Quality: sharp  Radiation: none  Severity: per nursing documentation  Timing: constant, exacerbated w/ use  Trauma:     Review of Systems (ROS)  A 10+ review of systems was performed with pertinent positives and negatives noted above in the history of present illness. Other systems were negative unless otherwise specified.    Physical Examination (PE)  General:  The patient is alert and oriented x 3. Mood is pleasant. Observation of ears, eyes and nose reveal no gross abnormalities. HEENT: NCAT, sclera anicteric.   Lungs: Respirations are equal and unlabored.  Gait is coordinated. Patient can toe walk and heel walk without difficulty.    KNEE EXAMINATION    Observation/Inspection  Gait:   ANTALGIC  Alignment:  Neutral   Scars:   None   Muscle atrophy: Mild  Effusion:  None   Warmth:  None   Discoloration:   none     Tenderness / Crepitus (T / C):         T / C      T / C  Patella   - / -   Lateral joint line   - / -     Peripatellar medial  -  Medial joint line    + / -  Peripatellar lateral -  Medial plica   - / -  Patellar tendon -   Popliteal fossa   - / -  Quad tendon   -   Gastrocnemius   -  Prepatellar Bursa - / -   Quadricep   -  Tibial tubercle  -  Thigh/hamstring  -  Pes anserine/HS -  Fibula    -  ITB   - / -  Tibia     -  Tib/fib joint  - / -  LCL    -    MFC   - / -   MCL: Proximal  -    LFC   - / -   Distal    -          ROM: (* = pain)  PASSIVE   ACTIVE    Left :   5 / 0 / 145   5 / 0 / 145     Right :    5 / 0 / 145   5 / 0 / 145    Patellofemoral examination:  See above noted areas of tenderness.   Patella position    Subluxation / dislocation: Centered         Sup. / Inf;   Normal   Crepitus (PF):    Absent   Patellar Mobility:       Medial-lateral:   Normal    Superior-inferior:  Normal    Inferior tilt   Normal    Patellar tendon:  Normal   Lateral tilt:    Normal   J-sign:     None   Patellofemoral grind:   No pain     Meniscal Signs:     Pain on terminal extension:  +  Pain on terminal flexion:  +  Krystens maneuver:  +*  Squat     NT  Thesaly    NT    Ligament Examination:  ACL / Lachman:  WNL  PCL-Post.  drawer: normal 0 to 2mm  MCL- Valgus:  normal 0 to 2mm  LCL- Varus:    normal 0 to 2mm  Pivot shift:  guarding   Dial Test:   difference c/w other side   At 30° flexion: normal (< 5°)    At 90° flexion: normal (< 5°)   Reverse Pivot Shift:   normal (Equal)     Strength: (* = with pain) Painful Side  Quadriceps   5/5  Hamstrin/5    Extremity Neuro-vascular Examination:   Sensation:  Grossly intact to light touch all dermatomal regions.   Motor Function:  Fully intact motor function at hip, knee, foot and ankle    DTRs;  quadriceps and  achilles 2+.  No clonus and downgoing Babinski.    Vascular status:  DP and PT pulses 2+, brisk capillary refill, symmetric.     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, sarah med > lat compartments, left  #3 chondrocalcinosis seen on plane films may be indicative of CPPD, knee, bilateral    No evidence of neurologic pathology  No evidence of vascular pathology    Imaging studies reviewed:   X-ray knee, bilateral 22.03  MRI knee left 22.03    Plan    We discussed options including    Watchful waiting / relative rest x   Physical therapy    Injection therapy    Consultation W/ Team [employer] re: arthroscopy   The patient chooses As above   x = prescribed  CSI = corticosteroid injection  VSI = viscosupplement injection  PRPI = platelet rich plasma injection  ia = intra articular  R = right  L = left  B = bilateral   nfSx  = surgical consultation was recommended, but patient is not interested in consultation at this time    Physical Therapy        Formal (fPT), @ [facility] facility    Formal (fPT), @ [facility] facility        Homegoing (hgPT), per concurrent fPT recommendations    Homegoing (hgPT), per prior fPT recommendations    Homegoing (hgPT), handout provided        w/  (atPT)    [blank] = not prescribed  x = prescribed  b = prescribed, and begin as indicated  t = continue as indicated  r = prescribed, and restart as indicated  p = completed prior as indicated  hs = prescribed, and with high school   col = prescribed, and with college or university   nfPT = physical therapy was recommended, but patient is not interested in PT at this time    Activity (e.g. sports, work) restrictions    [blank] = as tolerated  pt = per physical therapist  at = per   NWB = non weight bearing on affected lower extremity, with crutches assistance for ambulation    Bracing Only tolerating a single crutch currently   [blank] = not prescribed  r = recommended, but not fit with at todays visit  f = prescribed and fit with at todays visit  t = continue as indicated  d = d/c  p = as needed  rare = use on rare, as-needed basis; advised against chronic use    Pain management acet    Melox: clifford allergy noted in chart; pt says she tolerates melox just fine, script given for 14 tablets   [blank] = No prescription necessary. A handout detailing dosing of appropriate   over-the-counter musculoskeletal analgesics was made available to the patient.   m = meloxicam x 14 days  mp = 14 day course of meloxicam prescribed prior    Follow up ns   [blank] = as needed  [number] = in [number] weeks  CSI = for corticosteroid injection  VSI = for viscosupplement injection or injection series  PRP = for platelet rich plasma injection or injection series  MRI = after MRI imaging  ns = should surgical options be deferred  (no surgery)  o = appointment offered, deferred by patient    Should symptoms worsen or fail to resolve, consider    Revisiting the above options and / or      Vocation:

## 2022-03-10 ENCOUNTER — TELEPHONE (OUTPATIENT)
Dept: SPORTS MEDICINE | Facility: CLINIC | Age: 52
End: 2022-03-10
Payer: COMMERCIAL

## 2022-03-10 RX ORDER — MELOXICAM 7.5 MG/1
7.5 TABLET ORAL DAILY
Qty: 15 TABLET | Refills: 0 | Status: ON HOLD | OUTPATIENT
Start: 2022-03-10 | End: 2022-04-28 | Stop reason: HOSPADM

## 2022-03-10 NOTE — PROGRESS NOTES
"NEW PATIENT    HISTORY OF PRESENT ILLNESS   52 y.o. Female with a history of left knee pain who is referred today by Dr. Danny Cherry. She is a  who fell in a rubber runner at work. She states she initially felt a "pop" and has felt "popping" ever since. She reports immediate pain and swelling. She was able to walk with discomfort. She wore a brace, but states later that week she had to walk on her tiptoes. She states she presented to the ED with pain and felt something loose. She is wearing a sleeve on her right knee, she believes she is in pain because of favoring the knee.      + mechanical symptoms, - instability    Is affecting ADLs.  Pain is 5/10 at it's worst.        PAST MEDICAL HISTORY    Past Medical History:   Diagnosis Date    Arthritis     Chronic back pain     Chronic neck pain     Fibrocystic breast     Fibroid, uterus     GERD (gastroesophageal reflux disease)     Herpes     Migraine headache     Narcolepsy     Nephrolithiasis 2018    Sciatica of right side 2018    Scoliosis     Urge incontinence 2018       PAST SURGICAL HISTORY     Past Surgical History:   Procedure Laterality Date     SECTION      EMBOLIZATION N/A 2020    Procedure: EMBOLIZATION, BLOOD VESSEL;  Surgeon: Dean Wheeler MD;  Location: [facility] CATH LAB;  Service: Radiology;  Laterality: N/A;    Scoliosis surgery      UMBILICAL HERNIA REPAIR         FAMILY HISTORY    Family History   Problem Relation Age of Onset    Breast cancer Paternal Aunt     Colon cancer Neg Hx     Ovarian cancer Neg Hx        SOCIAL HISTORY    Social History     Socioeconomic History    Marital status: Legally    Tobacco Use    Smoking status: Never Smoker    Smokeless tobacco: Never Used   Substance and Sexual Activity    Alcohol use: Not Currently    Drug use: No    Sexual activity: Yes     Partners: Male     Birth control/protection: None       MEDICATIONS      Current Outpatient " "Medications:     dextroamphetamine-amphetamine (ADDERALL) 15 mg tablet, Take 15 mg by mouth 2 (two) times daily., Disp: , Rfl:     meloxicam (MOBIC) 7.5 MG tablet, Take 1 tablet (7.5 mg total) by mouth once daily., Disp: 15 tablet, Rfl: 0    modafiniL (PROVIGIL) 200 MG Tab, TAKE 1 TABLET BY MOUTH ONCE DAILY, Disp: 30 tablet, Rfl: 3    QUEtiapine (SEROQUEL) 25 MG Tab, TK 1 T PO QHS, Disp: , Rfl: 2    traMADoL (ULTRAM) 50 mg tablet, Take 1 tablet (50 mg total) by mouth every 6 (six) hours as needed for Pain., Disp: 15 tablet, Rfl: 0    ALLERGIES     Review of patient's allergies indicates:   Allergen Reactions    Ibuprofen Other (See Comments)     It makes my "ears hurt" when I take large doses.         REVIEW OF SYSTEMS   Constitution: Negative. Negative for chills, fever and night sweats.   HENT: Negative for congestion and headaches.    Eyes: Negative for blurred vision, left vision loss and right vision loss.   Cardiovascular: Negative for chest pain and syncope.   Respiratory: Negative for cough and shortness of breath.    Endocrine: Negative for polydipsia, polyphagia and polyuria.   Hematologic/Lymphatic: Negative for bleeding problem. Does not bruise/bleed easily.   Skin: Negative for dry skin, itching and rash.   Musculoskeletal: Negative for falls. Positive for left knee pain  Gastrointestinal: Negative for abdominal pain and bowel incontinence.   Genitourinary: Negative for bladder incontinence and nocturia.   Neurological: Negative for disturbances in coordination, loss of balance and seizures.   Psychiatric/Behavioral: Negative for depression. The patient does not have insomnia.    Allergic/Immunologic: Negative for hives and persistent infections.     PHYSICAL EXAMINATION    Vitals: BP (!) 153/97   Pulse 96   Ht 5' 4" (1.626 m)   Wt 74.8 kg (165 lb)   BMI 28.32 kg/m²     General: The patient appears active and healthy with no apparent physical problems.  No disturbance of mood or affect is " demonstrated. Alert and Oriented.    HEENT: Eyes normal, pupils equally round, nose normal.    Resp: Equal and symmetrical chest rises. No wheezing    CV: Regular rate    Neck: Supple; nonpainful range of motion.    Extremities: no cyanosis, clubbing, edema, or diffuse swelling.  Palpable pulses, good capillary refill of the digits.  No coolness, discoloration, edema or obvious varicosities.    Skin: no lesions noted.    Lymphatic: no detected adenopathy in the upper or lower extremities.    Neurologic: normal mental status, normal reflexes, normal gait and balance.  Patient is alert and oriented to person, place and time.  No flaccidity or spasticity is noted.  No motor or sensory deficits are noted.  Light touch is intact    Orthopaedic:     KNEE EXAM - LEFT    Inspection:   Normal skin color and appearance with no scars, no ecchymosis and no effusion.      ROM:   0° - 145°.      Palpation:   There is no tenderness along medial joint line, medial plica, medial collateral ligament, pes bursa, lateral joint line, iliotibial band, lateral collateral ligament, popliteal fossa, patellar tendon, or quadriceps tendon. Tender posterior medial joint line    Stability: - anterior drawer, - Lachman, - pivot shift and - posterior drawer.      No instability with varus or valgus stress at 0° or 30°. Negative dial  test at 30° and 90°.    Tests:   + Krystens test. + Thessaly's  - patellar compression - grind test, - crepitus.  There is no patellar apprehension.     - J Sign. - Henry's. - patellar tilt. - Andreia. Lateral patella translation  2 quadrants. Medial patella translation 2 quadrants    Motor:   Quadriceps strength is 5/5 and hamstrings strength is 5/5. Hamstrings show no tightness.      Neuro:   Distal neurovascular status is normal    Vascular: Negative Homans and no palpable popliteal cords. 2+ pedal pulse with brisk cap refill    Gait Antalgic with contralateral crutch      IMAGING    MRI Knee Without Contrast  Left  Narrative: EXAMINATION:  MRI KNEE WITHOUT CONTRAST LEFT    CLINICAL HISTORY:  mechanical pain;Sprain of medial collateral ligament of left knee, initial encounter    TECHNIQUE:  Multiplanar, multisequence MRI of the left knee performed per routine protocol without contrast.    COMPARISON:  Left knee radiograph 03/07/2022    FINDINGS:  Menisci: There is a complex tear of the body segment and posterior horn medial meniscus, noting a prominent vertical longitudinal component at the posterior horn.  Lateral meniscus is intact.    Ligaments:  ACL, PCL, MCL, and LCL complex are intact.    Tendons:  Extensor mechanism is maintained.    Cartilage:    Patellofemoral: Articular cartilage is maintained.    Medial tibiofemoral: Full-thickness fissuring of the weight bearing medial femoral condyle associated with mild subchondral marrow edema.  Medial tibial plateau cartilage is maintained.    Lateral tibiofemoral: Full-thickness fissuring of the weight-bearing lateral femoral condyle.  Lateral tibial plateau cartilage is maintained.    Bone: No fracture or diffuse marrow replacing process.    Miscellaneous: Small joint effusion.  Edema within the superolateral aspect Hoffa's fat pad.  Impression: 1. Complex tear of the medial meniscus.  2. Medial and lateral tibiofemoral chondral fissuring.  3. Edema within the superolateral aspect of Hoffa's fat pad, findings which can be seen with patellar tendon lateral femoral condyle friction syndrome.    Electronically signed by resident: Bhatri Pratt  Date:    03/08/2022  Time:    15:59    Electronically signed by: Yohannes Escalona MD  Date:    03/08/2022  Time:    17:09        IMPRESSION       ICD-10-CM ICD-9-CM   1. Acute medial meniscal tear, left, sequela  S83.242S 905.7   2. 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 None    RECOMMENDATIONS     1. Surgical vs non-surgical options discussed  today  2. Patient would like to continue with conservative treatment at this time  3. RTC in 1 month  4. We discussed a possible left knee arthroscopic medial meniscectomy and chondroplasty if needed.  We also discussed possible corticosteroid injection for her left knee if she remains symptomatic and wishes to not have surgery      All questions were answered, pt will contact us for questions or concerns in the interim.

## 2022-03-10 NOTE — TELEPHONE ENCOUNTER
Spoke with pt. Pt requested meloxicam. I informed the pt is has been called in but she has an ibuprofen allergy in her chart. Pt stated she can take ibuprofen in small doses and that she would be ok that she was only going to take as needed.     María Ramirez  Clinical Assistant to Dr. Danny Cherry

## 2022-03-11 ENCOUNTER — OFFICE VISIT (OUTPATIENT)
Dept: SPORTS MEDICINE | Facility: CLINIC | Age: 52
End: 2022-03-11
Payer: COMMERCIAL

## 2022-03-11 VITALS
BODY MASS INDEX: 28.17 KG/M2 | HEART RATE: 96 BPM | DIASTOLIC BLOOD PRESSURE: 97 MMHG | WEIGHT: 165 LBS | SYSTOLIC BLOOD PRESSURE: 153 MMHG | HEIGHT: 64 IN

## 2022-03-11 DIAGNOSIS — M11.262 CHONDROCALCINOSIS DUE TO PYROPHOSPHATE CRYSTALS, OF KNEE, LEFT: ICD-10-CM

## 2022-03-11 DIAGNOSIS — S83.242S ACUTE MEDIAL MENISCAL TEAR, LEFT, SEQUELA: Primary | ICD-10-CM

## 2022-03-11 PROCEDURE — 1159F PR MEDICATION LIST DOCUMENTED IN MEDICAL RECORD: ICD-10-PCS | Mod: CPTII,S$GLB,, | Performed by: ORTHOPAEDIC SURGERY

## 2022-03-11 PROCEDURE — 99204 PR OFFICE/OUTPT VISIT, NEW, LEVL IV, 45-59 MIN: ICD-10-PCS | Mod: S$GLB,,, | Performed by: ORTHOPAEDIC SURGERY

## 2022-03-11 PROCEDURE — 99204 OFFICE O/P NEW MOD 45 MIN: CPT | Mod: S$GLB,,, | Performed by: ORTHOPAEDIC SURGERY

## 2022-03-11 PROCEDURE — 3008F PR BODY MASS INDEX (BMI) DOCUMENTED: ICD-10-PCS | Mod: CPTII,S$GLB,, | Performed by: ORTHOPAEDIC SURGERY

## 2022-03-11 PROCEDURE — 3080F DIAST BP >= 90 MM HG: CPT | Mod: CPTII,S$GLB,, | Performed by: ORTHOPAEDIC SURGERY

## 2022-03-11 PROCEDURE — 99999 PR PBB SHADOW E&M-EST. PATIENT-LVL III: CPT | Mod: PBBFAC,,, | Performed by: ORTHOPAEDIC SURGERY

## 2022-03-11 PROCEDURE — 3080F PR MOST RECENT DIASTOLIC BLOOD PRESSURE >= 90 MM HG: ICD-10-PCS | Mod: CPTII,S$GLB,, | Performed by: ORTHOPAEDIC SURGERY

## 2022-03-11 PROCEDURE — 3077F SYST BP >= 140 MM HG: CPT | Mod: CPTII,S$GLB,, | Performed by: ORTHOPAEDIC SURGERY

## 2022-03-11 PROCEDURE — 1159F MED LIST DOCD IN RCRD: CPT | Mod: CPTII,S$GLB,, | Performed by: ORTHOPAEDIC SURGERY

## 2022-03-11 PROCEDURE — 99999 PR PBB SHADOW E&M-EST. PATIENT-LVL III: ICD-10-PCS | Mod: PBBFAC,,, | Performed by: ORTHOPAEDIC SURGERY

## 2022-03-11 PROCEDURE — 3008F BODY MASS INDEX DOCD: CPT | Mod: CPTII,S$GLB,, | Performed by: ORTHOPAEDIC SURGERY

## 2022-03-11 PROCEDURE — 3077F PR MOST RECENT SYSTOLIC BLOOD PRESSURE >= 140 MM HG: ICD-10-PCS | Mod: CPTII,S$GLB,, | Performed by: ORTHOPAEDIC SURGERY

## 2022-04-01 ENCOUNTER — PATIENT MESSAGE (OUTPATIENT)
Dept: SPORTS MEDICINE | Facility: CLINIC | Age: 52
End: 2022-04-01
Payer: COMMERCIAL

## 2022-04-01 NOTE — PROGRESS NOTES
"ESTABLISHED PATIENT    History 4/4/2022:  Jess returns to clinic today for left knee medial meniscus tear. She presents to clinic with one crutch. She states she is still having trouble moving around.  She states her left knee is catching more often.    History 3/11/2022:   52 y.o. Female with a history of left knee pain who is referred today by Dr. Danny Cherry. She is a  who fell in a rubber runner at work. She states she initially felt a "pop" and has felt "popping" ever since. She reports immediate pain and swelling. She was able to walk with discomfort. She wore a brace, but states later that week she had to walk on her tiptoes. She states she presented to the ED with pain and felt something loose. She is wearing a sleeve on her right knee, she believes she is in pain because of favoring the knee.      + mechanical symptoms, - instability    Is affecting ADLs.  Pain is 4/10 at it's worst.      REVIEW OF SYSTEMS   Constitution: Negative. Negative for chills, fever and night sweats.   HENT: Negative for congestion and headaches.    Eyes: Negative for blurred vision, left vision loss and right vision loss.   Cardiovascular: Negative for chest pain and syncope.   Respiratory: Negative for cough and shortness of breath.    Endocrine: Negative for polydipsia, polyphagia and polyuria.   Hematologic/Lymphatic: Negative for bleeding problem. Does not bruise/bleed easily.   Skin: Negative for dry skin, itching and rash.   Musculoskeletal: Negative for falls. Positive for left knee pain  Gastrointestinal: Negative for abdominal pain and bowel incontinence.   Genitourinary: Negative for bladder incontinence and nocturia.   Neurological: Negative for disturbances in coordination, loss of balance and seizures.   Psychiatric/Behavioral: Negative for depression. The patient does not have insomnia.    Allergic/Immunologic: Negative for hives and persistent infections.     PHYSICAL EXAMINATION    Vitals: /80 " "  Pulse 103   Ht 5' 4" (1.626 m)   Wt 74.8 kg (165 lb)   BMI 28.32 kg/m²     General: The patient appears active and healthy with no apparent physical problems.  No disturbance of mood or affect is demonstrated. Alert and Oriented.    HEENT: Eyes normal, pupils equally round, nose normal.    Resp: Equal and symmetrical chest rises. No wheezing    CV: Regular rate    Neck: Supple; nonpainful range of motion.    Extremities: no cyanosis, clubbing, edema, or diffuse swelling.  Palpable pulses, good capillary refill of the digits.  No coolness, discoloration, edema or obvious varicosities.    Skin: no lesions noted.    Lymphatic: no detected adenopathy in the upper or lower extremities.    Neurologic: normal mental status, normal reflexes, normal gait and balance.  Patient is alert and oriented to person, place and time.  No flaccidity or spasticity is noted.  No motor or sensory deficits are noted.  Light touch is intact    Orthopaedic:     KNEE EXAM - LEFT    Inspection:   Normal skin color and appearance with no scars, no ecchymosis and no effusion.      ROM:   0° - 145°.      Palpation:   There is no tenderness along medial joint line, medial plica, medial collateral ligament, pes bursa, lateral joint line, iliotibial band, lateral collateral ligament, popliteal fossa, patellar tendon, or quadriceps tendon. Tender posterior medial joint line    Stability: - anterior drawer, - Lachman, - pivot shift and - posterior drawer.      No instability with varus or valgus stress at 0° or 30°. Negative dial  test at 30° and 90°.    Tests:   + Krystens test. + Thessaly's  - patellar compression - grind test, - crepitus.  There is no patellar apprehension.     - J Sign. - Henry's. - patellar tilt. - Andreia. Lateral patella translation  2 quadrants. Medial patella translation 2 quadrants    Motor:   Quadriceps strength is 5/5 and hamstrings strength is 5/5. Hamstrings show no tightness.      Neuro:   Distal neurovascular " status is normal    Vascular: Negative Homans and no palpable popliteal cords. 2+ pedal pulse with brisk cap refill    Gait Antalgic with contralateral crutch      IMAGING    MRI Knee Without Contrast Left  Narrative: EXAMINATION:  MRI KNEE WITHOUT CONTRAST LEFT    CLINICAL HISTORY:  mechanical pain;Sprain of medial collateral ligament of left knee, initial encounter    TECHNIQUE:  Multiplanar, multisequence MRI of the left knee performed per routine protocol without contrast.    COMPARISON:  Left knee radiograph 03/07/2022    FINDINGS:  Menisci: There is a complex tear of the body segment and posterior horn medial meniscus, noting a prominent vertical longitudinal component at the posterior horn.  Lateral meniscus is intact.    Ligaments:  ACL, PCL, MCL, and LCL complex are intact.    Tendons:  Extensor mechanism is maintained.    Cartilage:    Patellofemoral: Articular cartilage is maintained.    Medial tibiofemoral: Full-thickness fissuring of the weight bearing medial femoral condyle associated with mild subchondral marrow edema.  Medial tibial plateau cartilage is maintained.    Lateral tibiofemoral: Full-thickness fissuring of the weight-bearing lateral femoral condyle.  Lateral tibial plateau cartilage is maintained.    Bone: No fracture or diffuse marrow replacing process.    Miscellaneous: Small joint effusion.  Edema within the superolateral aspect Hoffa's fat pad.  Impression: 1. Complex tear of the medial meniscus.  2. Medial and lateral tibiofemoral chondral fissuring.  3. Edema within the superolateral aspect of Hoffa's fat pad, findings which can be seen with patellar tendon lateral femoral condyle friction syndrome.    Electronically signed by resident: Bharti Pratt  Date:    03/08/2022  Time:    15:59    Electronically signed by: Yohannes Escalona MD  Date:    03/08/2022  Time:    17:09        IMPRESSION       ICD-10-CM ICD-9-CM   1. Acute medial meniscal tear, left, sequela  S83.242S 905.7   2.  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 None    RECOMMENDATIONS     1. Surgical vs non-surgical options discussed today; left knee arthroscopy with medial meniscus repair versus menisectomy, synovectomy and chondroplasty  2. RTC for pre-op      All questions were answered, pt will contact us for questions or concerns in the interim.

## 2022-04-04 ENCOUNTER — OFFICE VISIT (OUTPATIENT)
Dept: SPORTS MEDICINE | Facility: CLINIC | Age: 52
End: 2022-04-04
Payer: COMMERCIAL

## 2022-04-04 VITALS
HEART RATE: 103 BPM | DIASTOLIC BLOOD PRESSURE: 80 MMHG | HEIGHT: 64 IN | WEIGHT: 165 LBS | SYSTOLIC BLOOD PRESSURE: 128 MMHG | BODY MASS INDEX: 28.17 KG/M2

## 2022-04-04 DIAGNOSIS — M11.262 CHONDROCALCINOSIS DUE TO PYROPHOSPHATE CRYSTALS, OF KNEE, LEFT: ICD-10-CM

## 2022-04-04 DIAGNOSIS — S83.242S ACUTE MEDIAL MENISCAL TEAR, LEFT, SEQUELA: Primary | ICD-10-CM

## 2022-04-04 PROCEDURE — 99214 OFFICE O/P EST MOD 30 MIN: CPT | Mod: S$GLB,,, | Performed by: ORTHOPAEDIC SURGERY

## 2022-04-04 PROCEDURE — 3074F SYST BP LT 130 MM HG: CPT | Mod: CPTII,S$GLB,, | Performed by: ORTHOPAEDIC SURGERY

## 2022-04-04 PROCEDURE — 99999 PR PBB SHADOW E&M-EST. PATIENT-LVL III: CPT | Mod: PBBFAC,,, | Performed by: ORTHOPAEDIC SURGERY

## 2022-04-04 PROCEDURE — 3008F BODY MASS INDEX DOCD: CPT | Mod: CPTII,S$GLB,, | Performed by: ORTHOPAEDIC SURGERY

## 2022-04-04 PROCEDURE — 99999 PR PBB SHADOW E&M-EST. PATIENT-LVL III: ICD-10-PCS | Mod: PBBFAC,,, | Performed by: ORTHOPAEDIC SURGERY

## 2022-04-04 PROCEDURE — 99214 PR OFFICE/OUTPT VISIT, EST, LEVL IV, 30-39 MIN: ICD-10-PCS | Mod: S$GLB,,, | Performed by: ORTHOPAEDIC SURGERY

## 2022-04-04 PROCEDURE — 3079F DIAST BP 80-89 MM HG: CPT | Mod: CPTII,S$GLB,, | Performed by: ORTHOPAEDIC SURGERY

## 2022-04-04 PROCEDURE — 3079F PR MOST RECENT DIASTOLIC BLOOD PRESSURE 80-89 MM HG: ICD-10-PCS | Mod: CPTII,S$GLB,, | Performed by: ORTHOPAEDIC SURGERY

## 2022-04-04 PROCEDURE — 3074F PR MOST RECENT SYSTOLIC BLOOD PRESSURE < 130 MM HG: ICD-10-PCS | Mod: CPTII,S$GLB,, | Performed by: ORTHOPAEDIC SURGERY

## 2022-04-04 PROCEDURE — 3008F PR BODY MASS INDEX (BMI) DOCUMENTED: ICD-10-PCS | Mod: CPTII,S$GLB,, | Performed by: ORTHOPAEDIC SURGERY

## 2022-04-06 ENCOUNTER — PATIENT MESSAGE (OUTPATIENT)
Dept: PREADMISSION TESTING | Facility: HOSPITAL | Age: 52
End: 2022-04-06
Payer: COMMERCIAL

## 2022-04-06 NOTE — ANESTHESIA PAT ROS NOTE
04/06/2022  Jess Mcleod is a 52 y.o., female.    All information is gathered per Chart review via Epic system only.  Pre-op Assessment          Review of Systems  Anesthesia Hx:  No problems with previous Anesthesia    Social:  Non-Smoker, No Alcohol Use    Pulmonary:   Sleep Apnea    Renal/:   Chronic Renal Disease nephrolithiasis    Hepatic/GI:   GERD Liver Disease, Hepatic steatosis   OB/GYN/PEDS:  uterine artery embolization 2020    Neurological:   Headaches       Planned Procedure: Procedure(s) (LRB):  ARTHROSCOPY, KNEE, WITH MENISCECTOMY (Left)  ARTHROSCOPY, KNEE, WITH CHONDROPLASTY (Left)  Requested Anesthesia Type:General  Surgeon: Janette Odonnell MD  Service: Orthopedics  Known or anticipated Date of Surgery:4/28/2022    Previous anesthesia records:MAC and No problems     Subspecialty notes: OB/GYN    5'4  165#  No clearance requested

## 2022-04-18 ENCOUNTER — ANESTHESIA EVENT (OUTPATIENT)
Dept: SURGERY | Facility: HOSPITAL | Age: 52
End: 2022-04-18
Payer: COMMERCIAL

## 2022-04-18 NOTE — ANESTHESIA PREPROCEDURE EVALUATION
Chart review complete. Patient's medical history reviewed.  OK to proceed at [facility].    Dean Reno MD   4/18/2022 04/18/2022  Jess Mcleod is a 52 y.o., female.      Pre-op Assessment    I have reviewed the Patient Summary Reports.     I have reviewed the Nursing Notes. I have reviewed the NPO Status.   I have reviewed the Medications.     Review of Systems  Anesthesia Hx:  No problems with previous Anesthesia  Denies Family Hx of Anesthesia complications.   Denies Personal Hx of Anesthesia complications.   Social:  Non-Smoker, No Alcohol Use    Cardiovascular:   Denies MI.  Denies CAD.     Functional Capacity good / => 4 METS    Pulmonary:   Sleep Apnea    Renal/:   Chronic Renal Disease nephrolithiasis    Hepatic/GI:   GERD Liver Disease, Hepatic steatosis   OB/GYN/PEDS:  uterine artery embolization 2020    Neurological:   Headaches    Endocrine:  Denies Diabetes  Denies Thyroid Disease        Physical Exam  General: Well nourished and Cooperative    Airway:  Mallampati: II   Mouth Opening: Normal  Tongue: Normal  Neck ROM: Normal ROM    Chest/Lungs:  Clear to auscultation, Normal Respiratory Rate    Heart:  Rate: Normal  Rhythm: Regular Rhythm    Abdomen:  Normal, Soft        Anesthesia Plan  Type of Anesthesia, risks & benefits discussed:    Anesthesia Type: Gen Supraglottic Airway  Intra-op Monitoring Plan: Standard ASA Monitors  Post Op Pain Control Plan: multimodal analgesia and IV/PO Opioids PRN  Induction:  IV  Informed Consent: Informed consent signed with the Patient and all parties understand the risks and agree with anesthesia plan.  All questions answered.   ASA Score: 2    Ready For Surgery From Anesthesia Perspective.     .

## 2022-04-19 NOTE — H&P (VIEW-ONLY)
"ESTABLISHED PATIENT    History 2022:  Jess returns to clinic today to complete perioperative paperwork. She does not report any changes to her condition since last visit.  She obtained her medical clearance.    History 2022:  Jess returns to clinic today for left knee medial meniscus tear. She presents to clinic with one crutch. She states she is still having trouble moving around.  She states her left knee is catching more often.    History 3/11/2022:   52 y.o. Female with a history of left knee pain who is referred today by Dr. Danny Cherry. She is a  who fell in a rubber runner at work. She states she initially felt a "pop" and has felt "popping" ever since. She reports immediate pain and swelling. She was able to walk with discomfort. She wore a brace, but states later that week she had to walk on her tiptoes. She states she presented to the ED with pain and felt something loose. She is wearing a sleeve on her right knee, she believes she is in pain because of favoring the knee.      + mechanical symptoms, - instability    Is affecting ADLs.  Pain is 2/10 at it's worst.        PAST MEDICAL HISTORY    Past Medical History:   Diagnosis Date    Arthritis     Chronic back pain     Chronic neck pain     Fibrocystic breast     Fibroid, uterus     GERD (gastroesophageal reflux disease)     Herpes     Migraine headache     Narcolepsy     Nephrolithiasis 2018    Sciatica of right side 2018    Scoliosis     Urge incontinence 2018       PAST SURGICAL HISTORY     Past Surgical History:   Procedure Laterality Date     SECTION      EMBOLIZATION N/A 2020    Procedure: EMBOLIZATION, BLOOD VESSEL;  Surgeon: Dean Wheeler MD;  Location: [facility] CATH LAB;  Service: Radiology;  Laterality: N/A;    Scoliosis surgery      UMBILICAL HERNIA REPAIR         FAMILY HISTORY    Family History   Problem Relation Age of Onset    Breast cancer Paternal Aunt     Colon " "cancer Neg Hx     Ovarian cancer Neg Hx        SOCIAL HISTORY    Social History     Socioeconomic History    Marital status: Legally    Tobacco Use    Smoking status: Never Smoker    Smokeless tobacco: Never Used   Substance and Sexual Activity    Alcohol use: Not Currently    Drug use: No    Sexual activity: Yes     Partners: Male     Birth control/protection: None       MEDICATIONS      Current Outpatient Medications:     dextroamphetamine-amphetamine (ADDERALL) 15 mg tablet, Take 15 mg by mouth 2 (two) times daily., Disp: , Rfl:     meloxicam (MOBIC) 7.5 MG tablet, Take 1 tablet (7.5 mg total) by mouth once daily., Disp: 15 tablet, Rfl: 0    modafiniL (PROVIGIL) 200 MG Tab, TAKE 1 TABLET BY MOUTH ONCE DAILY, Disp: 30 tablet, Rfl: 3    QUEtiapine (SEROQUEL) 25 MG Tab, TK 1 T PO QHS, Disp: , Rfl: 2    traMADoL (ULTRAM) 50 mg tablet, Take 1 tablet (50 mg total) by mouth every 6 (six) hours as needed for Pain., Disp: 15 tablet, Rfl: 0    ALLERGIES     Review of patient's allergies indicates:   Allergen Reactions    Ibuprofen Other (See Comments)     It makes my "ears hurt" when I take large doses.         REVIEW OF SYSTEMS   Constitution: Negative. Negative for chills, fever and night sweats.   HENT: Negative for congestion and headaches.    Eyes: Negative for blurred vision, left vision loss and right vision loss.   Cardiovascular: Negative for chest pain and syncope.   Respiratory: Negative for cough and shortness of breath.    Endocrine: Negative for polydipsia, polyphagia and polyuria.   Hematologic/Lymphatic: Negative for bleeding problem. Does not bruise/bleed easily.   Skin: Negative for dry skin, itching and rash.   Musculoskeletal: Negative for falls. Positive for left knee pain  Gastrointestinal: Negative for abdominal pain and bowel incontinence.   Genitourinary: Negative for bladder incontinence and nocturia.   Neurological: Negative for disturbances in coordination, loss of balance " "and seizures.   Psychiatric/Behavioral: Negative for depression. The patient does not have insomnia.    Allergic/Immunologic: Negative for hives and persistent infections.     PHYSICAL EXAMINATION    Vitals: /77   Pulse 96   Ht 5' 4" (1.626 m)   Wt 74.8 kg (165 lb)   BMI 28.32 kg/m²     General: The patient appears active and healthy with no apparent physical problems.  No disturbance of mood or affect is demonstrated. Alert and Oriented.    HEENT: Eyes normal, pupils equally round, nose normal.    Resp: Equal and symmetrical chest rises. No wheezing    CV: Regular rate    Neck: Supple; nonpainful range of motion.    Extremities: no cyanosis, clubbing, edema, or diffuse swelling.  Palpable pulses, good capillary refill of the digits.  No coolness, discoloration, edema or obvious varicosities.    Skin: no lesions noted.    Lymphatic: no detected adenopathy in the upper or lower extremities.    Neurologic: normal mental status, normal reflexes, normal gait and balance.  Patient is alert and oriented to person, place and time.  No flaccidity or spasticity is noted.  No motor or sensory deficits are noted.  Light touch is intact    Orthopaedic:     KNEE EXAM - LEFT    Inspection:   Normal skin color and appearance with no scars, no ecchymosis and no effusion.      ROM:   0° - 145°.      Palpation:   There is no tenderness along medial joint line, medial plica, medial collateral ligament, pes bursa, lateral joint line, iliotibial band, lateral collateral ligament, popliteal fossa, patellar tendon, or quadriceps tendon. Tender posterior medial joint line    Stability: - anterior drawer, - Lachman, - pivot shift and - posterior drawer.      No instability with varus or valgus stress at 0° or 30°. Negative dial  test at 30° and 90°.    Tests:   + Krystens test. + Thessaly's  - patellar compression - grind test, - crepitus.  There is no patellar apprehension.     - J Sign. - Henry's. - patellar tilt. - Andreia. " Lateral patella translation  2 quadrants. Medial patella translation 2 quadrants    Motor:   Quadriceps strength is 5/5 and hamstrings strength is 5/5. Hamstrings show no tightness.      Neuro:   Distal neurovascular status is normal    Vascular: Negative Homans and no palpable popliteal cords. 2+ pedal pulse with brisk cap refill    Gait Antalgic      IMAGING    MRI Knee Without Contrast Left  Narrative: EXAMINATION:  MRI KNEE WITHOUT CONTRAST LEFT    CLINICAL HISTORY:  mechanical pain;Sprain of medial collateral ligament of left knee, initial encounter    TECHNIQUE:  Multiplanar, multisequence MRI of the left knee performed per routine protocol without contrast.    COMPARISON:  Left knee radiograph 03/07/2022    FINDINGS:  Menisci: There is a complex tear of the body segment and posterior horn medial meniscus, noting a prominent vertical longitudinal component at the posterior horn.  Lateral meniscus is intact.    Ligaments:  ACL, PCL, MCL, and LCL complex are intact.    Tendons:  Extensor mechanism is maintained.    Cartilage:    Patellofemoral: Articular cartilage is maintained.    Medial tibiofemoral: Full-thickness fissuring of the weight bearing medial femoral condyle associated with mild subchondral marrow edema.  Medial tibial plateau cartilage is maintained.    Lateral tibiofemoral: Full-thickness fissuring of the weight-bearing lateral femoral condyle.  Lateral tibial plateau cartilage is maintained.    Bone: No fracture or diffuse marrow replacing process.    Miscellaneous: Small joint effusion.  Edema within the superolateral aspect Hoffa's fat pad.  Impression: 1. Complex tear of the medial meniscus.  2. Medial and lateral tibiofemoral chondral fissuring.  3. Edema within the superolateral aspect of Hoffa's fat pad, findings which can be seen with patellar tendon lateral femoral condyle friction syndrome.    Electronically signed by resident: Bharti  Santa  Date:    03/08/2022  Time:    15:59    Electronically signed by: Yohannes Escalona MD  Date:    03/08/2022  Time:    17:09        IMPRESSION       ICD-10-CM ICD-9-CM   1. Acute medial meniscal tear, left, sequela  S83.242S 905.7       MEDICATIONS PRESCRIBED      1. None    RECOMMENDATIONS     1. The patient elected to proceed with operative intervention after all risks, benefits, and alternatives were discussed with the patient.  The risks of surgery include bleeding, scarring, injuries to nerves, arteries and veins, need for additional surgeries, blood clots, infections, and the risk of anesthesia.  I personally obtained informed consent from the patient and documented full history and physical, which has been placed on the chart.  2. Left knee arthroscopy with medial menisectomy versus repair and chondroplasty  3. Referral to physical therapy placed today      All questions were answered, pt will contact us for questions or concerns in the interim.

## 2022-04-19 NOTE — PROGRESS NOTES
"ESTABLISHED PATIENT    History 2022:  Jess returns to clinic today to complete perioperative paperwork. She does not report any changes to her condition since last visit.  She obtained her medical clearance.    History 2022:  Jess returns to clinic today for left knee medial meniscus tear. She presents to clinic with one crutch. She states she is still having trouble moving around.  She states her left knee is catching more often.    History 3/11/2022:   52 y.o. Female with a history of left knee pain who is referred today by Dr. Danny Cherry. She is a  who fell in a rubber runner at work. She states she initially felt a "pop" and has felt "popping" ever since. She reports immediate pain and swelling. She was able to walk with discomfort. She wore a brace, but states later that week she had to walk on her tiptoes. She states she presented to the ED with pain and felt something loose. She is wearing a sleeve on her right knee, she believes she is in pain because of favoring the knee.      + mechanical symptoms, - instability    Is affecting ADLs.  Pain is 2/10 at it's worst.        PAST MEDICAL HISTORY    Past Medical History:   Diagnosis Date    Arthritis     Chronic back pain     Chronic neck pain     Fibrocystic breast     Fibroid, uterus     GERD (gastroesophageal reflux disease)     Herpes     Migraine headache     Narcolepsy     Nephrolithiasis 2018    Sciatica of right side 2018    Scoliosis     Urge incontinence 2018       PAST SURGICAL HISTORY     Past Surgical History:   Procedure Laterality Date     SECTION      EMBOLIZATION N/A 2020    Procedure: EMBOLIZATION, BLOOD VESSEL;  Surgeon: Dean Wheeler MD;  Location: [facility] CATH LAB;  Service: Radiology;  Laterality: N/A;    Scoliosis surgery      UMBILICAL HERNIA REPAIR         FAMILY HISTORY    Family History   Problem Relation Age of Onset    Breast cancer Paternal Aunt     Colon " "cancer Neg Hx     Ovarian cancer Neg Hx        SOCIAL HISTORY    Social History     Socioeconomic History    Marital status: Legally    Tobacco Use    Smoking status: Never Smoker    Smokeless tobacco: Never Used   Substance and Sexual Activity    Alcohol use: Not Currently    Drug use: No    Sexual activity: Yes     Partners: Male     Birth control/protection: None       MEDICATIONS      Current Outpatient Medications:     dextroamphetamine-amphetamine (ADDERALL) 15 mg tablet, Take 15 mg by mouth 2 (two) times daily., Disp: , Rfl:     meloxicam (MOBIC) 7.5 MG tablet, Take 1 tablet (7.5 mg total) by mouth once daily., Disp: 15 tablet, Rfl: 0    modafiniL (PROVIGIL) 200 MG Tab, TAKE 1 TABLET BY MOUTH ONCE DAILY, Disp: 30 tablet, Rfl: 3    QUEtiapine (SEROQUEL) 25 MG Tab, TK 1 T PO QHS, Disp: , Rfl: 2    traMADoL (ULTRAM) 50 mg tablet, Take 1 tablet (50 mg total) by mouth every 6 (six) hours as needed for Pain., Disp: 15 tablet, Rfl: 0    ALLERGIES     Review of patient's allergies indicates:   Allergen Reactions    Ibuprofen Other (See Comments)     It makes my "ears hurt" when I take large doses.         REVIEW OF SYSTEMS   Constitution: Negative. Negative for chills, fever and night sweats.   HENT: Negative for congestion and headaches.    Eyes: Negative for blurred vision, left vision loss and right vision loss.   Cardiovascular: Negative for chest pain and syncope.   Respiratory: Negative for cough and shortness of breath.    Endocrine: Negative for polydipsia, polyphagia and polyuria.   Hematologic/Lymphatic: Negative for bleeding problem. Does not bruise/bleed easily.   Skin: Negative for dry skin, itching and rash.   Musculoskeletal: Negative for falls. Positive for left knee pain  Gastrointestinal: Negative for abdominal pain and bowel incontinence.   Genitourinary: Negative for bladder incontinence and nocturia.   Neurological: Negative for disturbances in coordination, loss of balance " "and seizures.   Psychiatric/Behavioral: Negative for depression. The patient does not have insomnia.    Allergic/Immunologic: Negative for hives and persistent infections.     PHYSICAL EXAMINATION    Vitals: /77   Pulse 96   Ht 5' 4" (1.626 m)   Wt 74.8 kg (165 lb)   BMI 28.32 kg/m²     General: The patient appears active and healthy with no apparent physical problems.  No disturbance of mood or affect is demonstrated. Alert and Oriented.    HEENT: Eyes normal, pupils equally round, nose normal.    Resp: Equal and symmetrical chest rises. No wheezing    CV: Regular rate    Neck: Supple; nonpainful range of motion.    Extremities: no cyanosis, clubbing, edema, or diffuse swelling.  Palpable pulses, good capillary refill of the digits.  No coolness, discoloration, edema or obvious varicosities.    Skin: no lesions noted.    Lymphatic: no detected adenopathy in the upper or lower extremities.    Neurologic: normal mental status, normal reflexes, normal gait and balance.  Patient is alert and oriented to person, place and time.  No flaccidity or spasticity is noted.  No motor or sensory deficits are noted.  Light touch is intact    Orthopaedic:     KNEE EXAM - LEFT    Inspection:   Normal skin color and appearance with no scars, no ecchymosis and no effusion.      ROM:   0° - 145°.      Palpation:   There is no tenderness along medial joint line, medial plica, medial collateral ligament, pes bursa, lateral joint line, iliotibial band, lateral collateral ligament, popliteal fossa, patellar tendon, or quadriceps tendon. Tender posterior medial joint line    Stability: - anterior drawer, - Lachman, - pivot shift and - posterior drawer.      No instability with varus or valgus stress at 0° or 30°. Negative dial  test at 30° and 90°.    Tests:   + Krystens test. + Thessaly's  - patellar compression - grind test, - crepitus.  There is no patellar apprehension.     - J Sign. - Henry's. - patellar tilt. - Andreia. " Lateral patella translation  2 quadrants. Medial patella translation 2 quadrants    Motor:   Quadriceps strength is 5/5 and hamstrings strength is 5/5. Hamstrings show no tightness.      Neuro:   Distal neurovascular status is normal    Vascular: Negative Homans and no palpable popliteal cords. 2+ pedal pulse with brisk cap refill    Gait Antalgic      IMAGING    MRI Knee Without Contrast Left  Narrative: EXAMINATION:  MRI KNEE WITHOUT CONTRAST LEFT    CLINICAL HISTORY:  mechanical pain;Sprain of medial collateral ligament of left knee, initial encounter    TECHNIQUE:  Multiplanar, multisequence MRI of the left knee performed per routine protocol without contrast.    COMPARISON:  Left knee radiograph 03/07/2022    FINDINGS:  Menisci: There is a complex tear of the body segment and posterior horn medial meniscus, noting a prominent vertical longitudinal component at the posterior horn.  Lateral meniscus is intact.    Ligaments:  ACL, PCL, MCL, and LCL complex are intact.    Tendons:  Extensor mechanism is maintained.    Cartilage:    Patellofemoral: Articular cartilage is maintained.    Medial tibiofemoral: Full-thickness fissuring of the weight bearing medial femoral condyle associated with mild subchondral marrow edema.  Medial tibial plateau cartilage is maintained.    Lateral tibiofemoral: Full-thickness fissuring of the weight-bearing lateral femoral condyle.  Lateral tibial plateau cartilage is maintained.    Bone: No fracture or diffuse marrow replacing process.    Miscellaneous: Small joint effusion.  Edema within the superolateral aspect Hoffa's fat pad.  Impression: 1. Complex tear of the medial meniscus.  2. Medial and lateral tibiofemoral chondral fissuring.  3. Edema within the superolateral aspect of Hoffa's fat pad, findings which can be seen with patellar tendon lateral femoral condyle friction syndrome.    Electronically signed by resident: Bharti  Santa  Date:    03/08/2022  Time:    15:59    Electronically signed by: Yohannes Escalona MD  Date:    03/08/2022  Time:    17:09        IMPRESSION       ICD-10-CM ICD-9-CM   1. Acute medial meniscal tear, left, sequela  S83.242S 905.7       MEDICATIONS PRESCRIBED      1. None    RECOMMENDATIONS     1. The patient elected to proceed with operative intervention after all risks, benefits, and alternatives were discussed with the patient.  The risks of surgery include bleeding, scarring, injuries to nerves, arteries and veins, need for additional surgeries, blood clots, infections, and the risk of anesthesia.  I personally obtained informed consent from the patient and documented full history and physical, which has been placed on the chart.  2. Left knee arthroscopy with medial menisectomy versus repair and chondroplasty  3. Referral to physical therapy placed today      All questions were answered, pt will contact us for questions or concerns in the interim.

## 2022-04-20 ENCOUNTER — OFFICE VISIT (OUTPATIENT)
Dept: SPORTS MEDICINE | Facility: CLINIC | Age: 52
End: 2022-04-20
Payer: COMMERCIAL

## 2022-04-20 VITALS
SYSTOLIC BLOOD PRESSURE: 114 MMHG | BODY MASS INDEX: 28.17 KG/M2 | HEART RATE: 96 BPM | WEIGHT: 165 LBS | HEIGHT: 64 IN | DIASTOLIC BLOOD PRESSURE: 77 MMHG

## 2022-04-20 DIAGNOSIS — S83.242S ACUTE MEDIAL MENISCAL TEAR, LEFT, SEQUELA: Primary | ICD-10-CM

## 2022-04-20 PROCEDURE — 3074F SYST BP LT 130 MM HG: CPT | Mod: CPTII,S$GLB,, | Performed by: ORTHOPAEDIC SURGERY

## 2022-04-20 PROCEDURE — 99215 PR OFFICE/OUTPT VISIT, EST, LEVL V, 40-54 MIN: ICD-10-PCS | Mod: S$GLB,,, | Performed by: ORTHOPAEDIC SURGERY

## 2022-04-20 PROCEDURE — 99999 PR PBB SHADOW E&M-EST. PATIENT-LVL III: CPT | Mod: PBBFAC,,, | Performed by: ORTHOPAEDIC SURGERY

## 2022-04-20 PROCEDURE — 99999 PR PBB SHADOW E&M-EST. PATIENT-LVL III: ICD-10-PCS | Mod: PBBFAC,,, | Performed by: ORTHOPAEDIC SURGERY

## 2022-04-20 PROCEDURE — 3078F PR MOST RECENT DIASTOLIC BLOOD PRESSURE < 80 MM HG: ICD-10-PCS | Mod: CPTII,S$GLB,, | Performed by: ORTHOPAEDIC SURGERY

## 2022-04-20 PROCEDURE — 3078F DIAST BP <80 MM HG: CPT | Mod: CPTII,S$GLB,, | Performed by: ORTHOPAEDIC SURGERY

## 2022-04-20 PROCEDURE — 3008F PR BODY MASS INDEX (BMI) DOCUMENTED: ICD-10-PCS | Mod: CPTII,S$GLB,, | Performed by: ORTHOPAEDIC SURGERY

## 2022-04-20 PROCEDURE — 3008F BODY MASS INDEX DOCD: CPT | Mod: CPTII,S$GLB,, | Performed by: ORTHOPAEDIC SURGERY

## 2022-04-20 PROCEDURE — 99215 OFFICE O/P EST HI 40 MIN: CPT | Mod: S$GLB,,, | Performed by: ORTHOPAEDIC SURGERY

## 2022-04-20 PROCEDURE — 3074F PR MOST RECENT SYSTOLIC BLOOD PRESSURE < 130 MM HG: ICD-10-PCS | Mod: CPTII,S$GLB,, | Performed by: ORTHOPAEDIC SURGERY

## 2022-04-20 RX ORDER — ASPIRIN 81 MG/1
81 TABLET ORAL DAILY
Qty: 28 TABLET | Refills: 0 | Status: SHIPPED | OUTPATIENT
Start: 2022-04-20 | End: 2022-11-21

## 2022-04-20 RX ORDER — HYDROCODONE BITARTRATE AND ACETAMINOPHEN 7.5; 325 MG/1; MG/1
1 TABLET ORAL EVERY 4 HOURS PRN
Qty: 20 TABLET | Refills: 0 | Status: SHIPPED | OUTPATIENT
Start: 2022-04-20 | End: 2022-11-21

## 2022-04-20 RX ORDER — MUPIROCIN 20 MG/G
OINTMENT TOPICAL
Status: CANCELLED | OUTPATIENT
Start: 2022-04-20

## 2022-04-20 NOTE — H&P
"History 2022:  Jess returns to clinic today to complete perioperative paperwork. She does not report any changes to her condition since last visit.  She obtained her medical clearance.    History 2022:  Jess returns to clinic today for left knee medial meniscus tear. She presents to clinic with one crutch. She states she is still having trouble moving around.  She states her left knee is catching more often.    History 3/11/2022:   52 y.o. Female with a history of left knee pain who is referred today by Dr. Danny Cherry. She is a  who fell in a rubber runner at work. She states she initially felt a "pop" and has felt "popping" ever since. She reports immediate pain and swelling. She was able to walk with discomfort. She wore a brace, but states later that week she had to walk on her tiptoes. She states she presented to the ED with pain and felt something loose. She is wearing a sleeve on her right knee, she believes she is in pain because of favoring the knee.      + mechanical symptoms, - instability    Is affecting ADLs.  Pain is 2/10 at it's worst.        PAST MEDICAL HISTORY    Past Medical History:   Diagnosis Date    Arthritis     Chronic back pain     Chronic neck pain     Fibrocystic breast     Fibroid, uterus     GERD (gastroesophageal reflux disease)     Herpes     Migraine headache     Narcolepsy     Nephrolithiasis 2018    Sciatica of right side 2018    Scoliosis     Urge incontinence 2018       PAST SURGICAL HISTORY     Past Surgical History:   Procedure Laterality Date     SECTION      EMBOLIZATION N/A 2020    Procedure: EMBOLIZATION, BLOOD VESSEL;  Surgeon: Dean Wheeler MD;  Location: [facility], [place] CATH LAB;  Service: Radiology;  Laterality: N/A;    Scoliosis surgery      UMBILICAL HERNIA REPAIR         FAMILY HISTORY    Family History   Problem Relation Age of Onset    Breast cancer Paternal Aunt     Colon cancer Neg Hx     Ovarian " "cancer Neg Hx        SOCIAL HISTORY    Social History     Socioeconomic History    Marital status: Legally    Tobacco Use    Smoking status: Never Smoker    Smokeless tobacco: Never Used   Substance and Sexual Activity    Alcohol use: Not Currently    Drug use: No    Sexual activity: Yes     Partners: Male     Birth control/protection: None       MEDICATIONS      Current Outpatient Medications:     dextroamphetamine-amphetamine (ADDERALL) 15 mg tablet, Take 15 mg by mouth 2 (two) times daily., Disp: , Rfl:     meloxicam (MOBIC) 7.5 MG tablet, Take 1 tablet (7.5 mg total) by mouth once daily., Disp: 15 tablet, Rfl: 0    modafiniL (PROVIGIL) 200 MG Tab, TAKE 1 TABLET BY MOUTH ONCE DAILY, Disp: 30 tablet, Rfl: 3    QUEtiapine (SEROQUEL) 25 MG Tab, TK 1 T PO QHS, Disp: , Rfl: 2    traMADoL (ULTRAM) 50 mg tablet, Take 1 tablet (50 mg total) by mouth every 6 (six) hours as needed for Pain., Disp: 15 tablet, Rfl: 0    ALLERGIES     Review of patient's allergies indicates:   Allergen Reactions    Ibuprofen Other (See Comments)     It makes my "ears hurt" when I take large doses.         REVIEW OF SYSTEMS   Constitution: Negative. Negative for chills, fever and night sweats.   HENT: Negative for congestion and headaches.    Eyes: Negative for blurred vision, left vision loss and right vision loss.   Cardiovascular: Negative for chest pain and syncope.   Respiratory: Negative for cough and shortness of breath.    Endocrine: Negative for polydipsia, polyphagia and polyuria.   Hematologic/Lymphatic: Negative for bleeding problem. Does not bruise/bleed easily.   Skin: Negative for dry skin, itching and rash.   Musculoskeletal: Negative for falls. Positive for left knee pain  Gastrointestinal: Negative for abdominal pain and bowel incontinence.   Genitourinary: Negative for bladder incontinence and nocturia.   Neurological: Negative for disturbances in coordination, loss of balance and seizures. " "  Psychiatric/Behavioral: Negative for depression. The patient does not have insomnia.    Allergic/Immunologic: Negative for hives and persistent infections.     PHYSICAL EXAMINATION    Vitals: /77   Pulse 96   Ht 5' 4" (1.626 m)   Wt 74.8 kg (165 lb)   BMI 28.32 kg/m²     General: The patient appears active and healthy with no apparent physical problems.  No disturbance of mood or affect is demonstrated. Alert and Oriented.    HEENT: Eyes normal, pupils equally round, nose normal.    Resp: Equal and symmetrical chest rises. No wheezing    CV: Regular rate    Neck: Supple; nonpainful range of motion.    Extremities: no cyanosis, clubbing, edema, or diffuse swelling.  Palpable pulses, good capillary refill of the digits.  No coolness, discoloration, edema or obvious varicosities.    Skin: no lesions noted.    Lymphatic: no detected adenopathy in the upper or lower extremities.    Neurologic: normal mental status, normal reflexes, normal gait and balance.  Patient is alert and oriented to person, place and time.  No flaccidity or spasticity is noted.  No motor or sensory deficits are noted.  Light touch is intact    Orthopaedic:     KNEE EXAM - LEFT    Inspection:   Normal skin color and appearance with no scars, no ecchymosis and no effusion.      ROM:   0° - 145°.      Palpation:   There is no tenderness along medial joint line, medial plica, medial collateral ligament, pes bursa, lateral joint line, iliotibial band, lateral collateral ligament, popliteal fossa, patellar tendon, or quadriceps tendon. Tender posterior medial joint line    Stability: - anterior drawer, - Lachman, - pivot shift and - posterior drawer.      No instability with varus or valgus stress at 0° or 30°. Negative dial  test at 30° and 90°.    Tests:   + Krystens test. + Thessaly's  - patellar compression - grind test, - crepitus.  There is no patellar apprehension.     - J Sign. - Henry's. - patellar tilt. - Andreia. Lateral patella " translation  2 quadrants. Medial patella translation 2 quadrants    Motor:   Quadriceps strength is 5/5 and hamstrings strength is 5/5. Hamstrings show no tightness.      Neuro:   Distal neurovascular status is normal    Vascular: Negative Homans and no palpable popliteal cords. 2+ pedal pulse with brisk cap refill    Gait Antalgic      IMAGING    MRI Knee Without Contrast Left  Narrative: EXAMINATION:  MRI KNEE WITHOUT CONTRAST LEFT    CLINICAL HISTORY:  mechanical pain;Sprain of medial collateral ligament of left knee, initial encounter    TECHNIQUE:  Multiplanar, multisequence MRI of the left knee performed per routine protocol without contrast.    COMPARISON:  Left knee radiograph 03/07/2022    FINDINGS:  Menisci: There is a complex tear of the body segment and posterior horn medial meniscus, noting a prominent vertical longitudinal component at the posterior horn.  Lateral meniscus is intact.    Ligaments:  ACL, PCL, MCL, and LCL complex are intact.    Tendons:  Extensor mechanism is maintained.    Cartilage:    Patellofemoral: Articular cartilage is maintained.    Medial tibiofemoral: Full-thickness fissuring of the weight bearing medial femoral condyle associated with mild subchondral marrow edema.  Medial tibial plateau cartilage is maintained.    Lateral tibiofemoral: Full-thickness fissuring of the weight-bearing lateral femoral condyle.  Lateral tibial plateau cartilage is maintained.    Bone: No fracture or diffuse marrow replacing process.    Miscellaneous: Small joint effusion.  Edema within the superolateral aspect Hoffa's fat pad.  Impression: 1. Complex tear of the medial meniscus.  2. Medial and lateral tibiofemoral chondral fissuring.  3. Edema within the superolateral aspect of Hoffa's fat pad, findings which can be seen with patellar tendon lateral femoral condyle friction syndrome.    Electronically signed by resident: Bharti Pratt  Date:    03/08/2022  Time:    15:59    Electronically  signed by: Yohannes Escalona MD  Date:    03/08/2022  Time:    17:09        IMPRESSION       ICD-10-CM ICD-9-CM   1. Acute medial meniscal tear, left, sequela  S83.242S 905.7       MEDICATIONS PRESCRIBED      1. None    RECOMMENDATIONS     1. The patient elected to proceed with operative intervention after all risks, benefits, and alternatives were discussed with the patient.  The risks of surgery include bleeding, scarring, injuries to nerves, arteries and veins, need for additional surgeries, blood clots, infections, and the risk of anesthesia.  I personally obtained informed consent from the patient and documented full history and physical, which has been placed on the chart.  2. Left knee arthroscopy with medial menisectomy versus repair and chondroplasty  3. Referral to physical therapy placed today      All questions were answered, pt will contact us for questions or concerns in the interim.

## 2022-04-27 ENCOUNTER — TELEPHONE (OUTPATIENT)
Dept: SPORTS MEDICINE | Facility: CLINIC | Age: 52
End: 2022-04-27
Payer: COMMERCIAL

## 2022-04-27 NOTE — TELEPHONE ENCOUNTER
Spoke with the patient. Notified of 5 AM arrival time to [facility]. Informed of current visitor policy.  Reminded of NPO. Patient verbalized understanding to all.    Montserrat Cobos MS, OTC  Clinical/OR Assistant to Janette Odonnell MD  [facility]    ----- Message from Giuseppe Oreilly sent at 4/27/2022  3:12 PM CDT -----  Regarding: Arrival Time needed for tomorrow    The Pt states that she hasn't heard from you about procedure instructions and arrival time     # [phone]

## 2022-04-28 ENCOUNTER — HOSPITAL ENCOUNTER (OUTPATIENT)
Facility: HOSPITAL | Age: 52
Discharge: HOME | End: 2022-04-28
Attending: ORTHOPAEDIC SURGERY | Admitting: ORTHOPAEDIC SURGERY
Payer: COMMERCIAL

## 2022-04-28 ENCOUNTER — ANESTHESIA (OUTPATIENT)
Dept: SURGERY | Facility: HOSPITAL | Age: 52
End: 2022-04-28
Payer: COMMERCIAL

## 2022-04-28 VITALS
BODY MASS INDEX: 27.49 KG/M2 | TEMPERATURE: 98 F | OXYGEN SATURATION: 100 % | SYSTOLIC BLOOD PRESSURE: 147 MMHG | HEART RATE: 81 BPM | RESPIRATION RATE: 17 BRPM | WEIGHT: 165 LBS | HEIGHT: 65 IN | DIASTOLIC BLOOD PRESSURE: 88 MMHG

## 2022-04-28 DIAGNOSIS — S83.242S ACUTE MEDIAL MENISCAL TEAR, LEFT, SEQUELA: ICD-10-CM

## 2022-04-28 LAB
B-HCG UR QL: NEGATIVE
CTP QC/QA: YES
CTP QC/QA: YES
SARS-COV-2 AG RESP QL IA.RAPID: NEGATIVE

## 2022-04-28 PROCEDURE — 29877 PR KNEE SCOPE,SHAVE ARTICULAR CART: ICD-10-PCS | Mod: LT,,, | Performed by: ORTHOPAEDIC SURGERY

## 2022-04-28 PROCEDURE — D9220A PRA ANESTHESIA: ICD-10-PCS | Mod: ANES,,, | Performed by: ANESTHESIOLOGY

## 2022-04-28 PROCEDURE — D9220A PRA ANESTHESIA: ICD-10-PCS | Mod: CRNA,,, | Performed by: NURSE ANESTHETIST, CERTIFIED REGISTERED

## 2022-04-28 PROCEDURE — D9220A PRA ANESTHESIA: Mod: CRNA,,, | Performed by: NURSE ANESTHETIST, CERTIFIED REGISTERED

## 2022-04-28 PROCEDURE — 99900035 HC TECH TIME PER 15 MIN (STAT)

## 2022-04-28 PROCEDURE — 63600175 PHARM REV CODE 636 W HCPCS: Performed by: ORTHOPAEDIC SURGERY

## 2022-04-28 PROCEDURE — 29877 ARTHRS KNEE SURG DBRDMT/SHVG: CPT | Mod: LT,,, | Performed by: ORTHOPAEDIC SURGERY

## 2022-04-28 PROCEDURE — 81025 URINE PREGNANCY TEST: CPT | Performed by: ORTHOPAEDIC SURGERY

## 2022-04-28 PROCEDURE — 25000003 PHARM REV CODE 250: Performed by: NURSE ANESTHETIST, CERTIFIED REGISTERED

## 2022-04-28 PROCEDURE — 37000008 HC ANESTHESIA 1ST 15 MINUTES: Performed by: ORTHOPAEDIC SURGERY

## 2022-04-28 PROCEDURE — 36000711: Performed by: ORTHOPAEDIC SURGERY

## 2022-04-28 PROCEDURE — 36000710: Performed by: ORTHOPAEDIC SURGERY

## 2022-04-28 PROCEDURE — 25000003 PHARM REV CODE 250: Performed by: SURGERY

## 2022-04-28 PROCEDURE — 27200651 HC AIRWAY, LMA: Performed by: ANESTHESIOLOGY

## 2022-04-28 PROCEDURE — 63600175 PHARM REV CODE 636 W HCPCS: Performed by: NURSE ANESTHETIST, CERTIFIED REGISTERED

## 2022-04-28 PROCEDURE — 37000009 HC ANESTHESIA EA ADD 15 MINS: Performed by: ORTHOPAEDIC SURGERY

## 2022-04-28 PROCEDURE — D9220A PRA ANESTHESIA: Mod: ANES,,, | Performed by: ANESTHESIOLOGY

## 2022-04-28 PROCEDURE — 63600175 PHARM REV CODE 636 W HCPCS: Performed by: ANESTHESIOLOGY

## 2022-04-28 PROCEDURE — 94761 N-INVAS EAR/PLS OXIMETRY MLT: CPT

## 2022-04-28 PROCEDURE — 71000033 HC RECOVERY, INTIAL HOUR: Performed by: ORTHOPAEDIC SURGERY

## 2022-04-28 PROCEDURE — 25000003 PHARM REV CODE 250: Performed by: ORTHOPAEDIC SURGERY

## 2022-04-28 PROCEDURE — 27201423 OPTIME MED/SURG SUP & DEVICES STERILE SUPPLY: Performed by: ORTHOPAEDIC SURGERY

## 2022-04-28 PROCEDURE — 25000003 PHARM REV CODE 250: Performed by: ANESTHESIOLOGY

## 2022-04-28 PROCEDURE — 71000015 HC POSTOP RECOV 1ST HR: Performed by: ORTHOPAEDIC SURGERY

## 2022-04-28 RX ORDER — KETAMINE HCL IN 0.9 % NACL 50 MG/5 ML
SYRINGE (ML) INTRAVENOUS
Status: DISCONTINUED | OUTPATIENT
Start: 2022-04-28 | End: 2022-04-28

## 2022-04-28 RX ORDER — TRAMADOL HYDROCHLORIDE 50 MG/1
100 TABLET ORAL EVERY 6 HOURS PRN
Status: DISCONTINUED | OUTPATIENT
Start: 2022-04-28 | End: 2022-04-28 | Stop reason: HOSPADM

## 2022-04-28 RX ORDER — BUPIVACAINE HYDROCHLORIDE AND EPINEPHRINE 2.5; 5 MG/ML; UG/ML
INJECTION, SOLUTION EPIDURAL; INFILTRATION; INTRACAUDAL; PERINEURAL
Status: DISCONTINUED | OUTPATIENT
Start: 2022-04-28 | End: 2022-04-28 | Stop reason: HOSPADM

## 2022-04-28 RX ORDER — IBUPROFEN 200 MG
200 TABLET ORAL EVERY 6 HOURS PRN
COMMUNITY

## 2022-04-28 RX ORDER — CEFAZOLIN SODIUM/WATER 2 G/20 ML
2 SYRINGE (ML) INTRAVENOUS
Status: COMPLETED | OUTPATIENT
Start: 2022-04-28 | End: 2022-04-28

## 2022-04-28 RX ORDER — SODIUM CHLORIDE 0.9 % (FLUSH) 0.9 %
10 SYRINGE (ML) INJECTION
Status: DISCONTINUED | OUTPATIENT
Start: 2022-04-28 | End: 2022-04-28 | Stop reason: HOSPADM

## 2022-04-28 RX ORDER — EPINEPHRINE 1 MG/ML
INJECTION, SOLUTION INTRACARDIAC; INTRAMUSCULAR; INTRAVENOUS; SUBCUTANEOUS
Status: DISCONTINUED | OUTPATIENT
Start: 2022-04-28 | End: 2022-04-28 | Stop reason: HOSPADM

## 2022-04-28 RX ORDER — HALOPERIDOL 5 MG/ML
0.5 INJECTION INTRAMUSCULAR EVERY 10 MIN PRN
Status: DISCONTINUED | OUTPATIENT
Start: 2022-04-28 | End: 2022-04-28 | Stop reason: HOSPADM

## 2022-04-28 RX ORDER — FENTANYL CITRATE 50 UG/ML
25 INJECTION, SOLUTION INTRAMUSCULAR; INTRAVENOUS EVERY 5 MIN PRN
Status: DISCONTINUED | OUTPATIENT
Start: 2022-04-28 | End: 2022-04-28 | Stop reason: HOSPADM

## 2022-04-28 RX ORDER — LANOLIN ALCOHOL/MO/W.PET/CERES
400 CREAM (GRAM) TOPICAL DAILY
COMMUNITY

## 2022-04-28 RX ORDER — MORPHINE SULFATE 2 MG/ML
2 INJECTION, SOLUTION INTRAMUSCULAR; INTRAVENOUS EVERY 10 MIN PRN
Status: DISCONTINUED | OUTPATIENT
Start: 2022-04-28 | End: 2022-04-28 | Stop reason: HOSPADM

## 2022-04-28 RX ORDER — ONDANSETRON 2 MG/ML
4 INJECTION INTRAMUSCULAR; INTRAVENOUS EVERY 12 HOURS PRN
Status: DISCONTINUED | OUTPATIENT
Start: 2022-04-28 | End: 2022-04-28 | Stop reason: HOSPADM

## 2022-04-28 RX ORDER — BACITRACIN ZINC 500 UNIT/G
OINTMENT (GRAM) TOPICAL
Status: DISCONTINUED | OUTPATIENT
Start: 2022-04-28 | End: 2022-04-28 | Stop reason: HOSPADM

## 2022-04-28 RX ORDER — CARBOXYMETHYLCELLULOSE SODIUM 10 MG/ML
GEL OPHTHALMIC
Status: DISCONTINUED | OUTPATIENT
Start: 2022-04-28 | End: 2022-04-28

## 2022-04-28 RX ORDER — FENTANYL CITRATE 50 UG/ML
INJECTION, SOLUTION INTRAMUSCULAR; INTRAVENOUS
Status: DISCONTINUED | OUTPATIENT
Start: 2022-04-28 | End: 2022-04-28

## 2022-04-28 RX ORDER — LIDOCAINE HYDROCHLORIDE 10 MG/ML
1 INJECTION, SOLUTION EPIDURAL; INFILTRATION; INTRACAUDAL; PERINEURAL ONCE
Status: ACTIVE | OUTPATIENT
Start: 2022-04-28

## 2022-04-28 RX ORDER — OXYCODONE HYDROCHLORIDE 5 MG/1
10 TABLET ORAL EVERY 4 HOURS PRN
Status: DISCONTINUED | OUTPATIENT
Start: 2022-04-28 | End: 2022-04-28 | Stop reason: HOSPADM

## 2022-04-28 RX ORDER — ACETAMINOPHEN 500 MG
1000 TABLET ORAL
Status: COMPLETED | OUTPATIENT
Start: 2022-04-28 | End: 2022-04-28

## 2022-04-28 RX ORDER — PHENYLEPHRINE HYDROCHLORIDE 10 MG/ML
INJECTION INTRAVENOUS
Status: DISCONTINUED | OUTPATIENT
Start: 2022-04-28 | End: 2022-04-28

## 2022-04-28 RX ORDER — FAMOTIDINE 10 MG/ML
INJECTION INTRAVENOUS
Status: DISCONTINUED | OUTPATIENT
Start: 2022-04-28 | End: 2022-04-28

## 2022-04-28 RX ORDER — CALCIUM CARBONATE 500(1250)
1 TABLET ORAL DAILY
COMMUNITY

## 2022-04-28 RX ORDER — CELECOXIB 200 MG/1
400 CAPSULE ORAL ONCE
Status: COMPLETED | OUTPATIENT
Start: 2022-04-28 | End: 2022-04-28

## 2022-04-28 RX ORDER — MIDAZOLAM HYDROCHLORIDE 1 MG/ML
INJECTION INTRAMUSCULAR; INTRAVENOUS
Status: DISCONTINUED | OUTPATIENT
Start: 2022-04-28 | End: 2022-04-28

## 2022-04-28 RX ORDER — DEXAMETHASONE SODIUM PHOSPHATE 4 MG/ML
INJECTION, SOLUTION INTRA-ARTICULAR; INTRALESIONAL; INTRAMUSCULAR; INTRAVENOUS; SOFT TISSUE
Status: DISCONTINUED | OUTPATIENT
Start: 2022-04-28 | End: 2022-04-28

## 2022-04-28 RX ORDER — PROMETHAZINE HYDROCHLORIDE 25 MG/1
25 TABLET ORAL EVERY 6 HOURS PRN
Status: DISCONTINUED | OUTPATIENT
Start: 2022-04-28 | End: 2022-04-28 | Stop reason: HOSPADM

## 2022-04-28 RX ORDER — ONDANSETRON HYDROCHLORIDE 2 MG/ML
INJECTION, SOLUTION INTRAMUSCULAR; INTRAVENOUS
Status: DISCONTINUED | OUTPATIENT
Start: 2022-04-28 | End: 2022-04-28

## 2022-04-28 RX ORDER — MUPIROCIN 20 MG/G
OINTMENT TOPICAL
Status: DISCONTINUED | OUTPATIENT
Start: 2022-04-28 | End: 2022-04-28 | Stop reason: HOSPADM

## 2022-04-28 RX ORDER — LIDOCAINE HCL/PF 100 MG/5ML
SYRINGE (ML) INTRAVENOUS
Status: DISCONTINUED | OUTPATIENT
Start: 2022-04-28 | End: 2022-04-28

## 2022-04-28 RX ORDER — OXYCODONE HYDROCHLORIDE 5 MG/1
5 TABLET ORAL
Status: DISCONTINUED | OUTPATIENT
Start: 2022-04-28 | End: 2022-04-28 | Stop reason: HOSPADM

## 2022-04-28 RX ORDER — PROPOFOL 10 MG/ML
VIAL (ML) INTRAVENOUS
Status: DISCONTINUED | OUTPATIENT
Start: 2022-04-28 | End: 2022-04-28

## 2022-04-28 RX ADMIN — MUPIROCIN: 20 OINTMENT TOPICAL at 06:04

## 2022-04-28 RX ADMIN — FENTANYL CITRATE 25 MCG: 50 INJECTION INTRAMUSCULAR; INTRAVENOUS at 08:04

## 2022-04-28 RX ADMIN — LIDOCAINE HYDROCHLORIDE 100 MG: 20 INJECTION, SOLUTION INTRAVENOUS at 07:04

## 2022-04-28 RX ADMIN — DEXAMETHASONE SODIUM PHOSPHATE 8 MG: 4 INJECTION, SOLUTION INTRAMUSCULAR; INTRAVENOUS at 07:04

## 2022-04-28 RX ADMIN — FENTANYL CITRATE 50 MCG: 50 INJECTION, SOLUTION INTRAMUSCULAR; INTRAVENOUS at 07:04

## 2022-04-28 RX ADMIN — PHENYLEPHRINE HYDROCHLORIDE 100 MCG: 10 INJECTION INTRAVENOUS at 07:04

## 2022-04-28 RX ADMIN — PROPOFOL 150 MG: 10 INJECTION, EMULSION INTRAVENOUS at 07:04

## 2022-04-28 RX ADMIN — MIDAZOLAM HYDROCHLORIDE 2 MG: 1 INJECTION, SOLUTION INTRAMUSCULAR; INTRAVENOUS at 06:04

## 2022-04-28 RX ADMIN — SODIUM CHLORIDE, SODIUM GLUCONATE, SODIUM ACETATE, POTASSIUM CHLORIDE, MAGNESIUM CHLORIDE, SODIUM PHOSPHATE, DIBASIC, AND POTASSIUM PHOSPHATE: .53; .5; .37; .037; .03; .012; .00082 INJECTION, SOLUTION INTRAVENOUS at 07:04

## 2022-04-28 RX ADMIN — OXYCODONE 5 MG: 5 TABLET ORAL at 08:04

## 2022-04-28 RX ADMIN — Medication 10 MG: at 07:04

## 2022-04-28 RX ADMIN — Medication 2 G: at 07:04

## 2022-04-28 RX ADMIN — CELECOXIB 400 MG: 200 CAPSULE ORAL at 06:04

## 2022-04-28 RX ADMIN — FAMOTIDINE 20 MG: 10 INJECTION, SOLUTION INTRAVENOUS at 07:04

## 2022-04-28 RX ADMIN — ACETAMINOPHEN 1000 MG: 500 TABLET ORAL at 06:04

## 2022-04-28 RX ADMIN — CARBOXYMETHYLCELLULOSE SODIUM 2 DROP: 10 GEL OPHTHALMIC at 07:04

## 2022-04-28 RX ADMIN — ONDANSETRON 4 MG: 2 INJECTION, SOLUTION INTRAMUSCULAR; INTRAVENOUS at 08:04

## 2022-04-28 RX ADMIN — SODIUM CHLORIDE: 0.9 INJECTION, SOLUTION INTRAVENOUS at 06:04

## 2022-04-28 NOTE — DISCHARGE INSTRUCTIONS
[employer] CARE CUBE COLD THERAPY SYSTEM    The Polar Care Cube Cold Therapy System is simple and reliable. It is easy to use, compact design makes it great for home use. With the addition of ice and water, you will enjoy 6-8 hours of effortless cold therapy. Proper use requires an insulation barrier between the pad and the patient's skin.  Instructions on how to use the Polar Care Cube Cold Therapy System Below:

## 2022-04-28 NOTE — PATIENT INSTRUCTIONS
POST-OPERATIVE DISCHARGE INSTRUCTIONS    Diet: Ice chips, clear liquids, and then diet as tolerated.  Drink plenty of liquids after surgery.  Ice the area at least three times a day (20 minutes per session) if possible.    Elevate the extremity above the level of the heart to help reduce swelling.  Pain medication can be taken every four to six hours as needed.  It is helpful to take pain medication prior to physical therapy. If pain is not controlled, please take 800 mg ibuprofen every 8 hours as needed unless contraindicated (NSAID allergy, kidney disease, heart disease, currently taking blood thinners, etc.).  Any activity that requires precise thinking or accuracy should be avoided for a minimum of 72 hours after surgery and while on narcotic pain medication.  This includes operating machinery and/or driving a vehicle.  All sutures will be removed approximately 10-14 days from the time of surgery. Leave steri-strips (skin tapes) in place until sutures are removed.  If skin glue is used instead of stitches, do not apply any ointments or solutions to the incision.  Keep the incision dry.  The skin glue will peel off in 3-4 weeks.  Dressing change directions, unless otherwise instructed:     X  Knee scope Change dressing on the first post-op day.  Use gauze for the first 3 days, then start using waterproof Band-Aids over the incision sites.   ?Other ____________________________________________________________________________    All casts, splints, braces, slings, crutches, abduction pillows, etc. are to be worn as instructed.  Yg Hose or Sequential Compression Devices are often used following surgery to help with swelling and prevent blood clots.  They can be removed for 2-3 hours daily as needed.  If the hose becomes uncomfortable after a few days, switch to an Ace Wrap if desired.  Keep the incision dry for 10-14 days.  A waterproof dressing ([facility] or [facility]) can be used to shower.  No bath, pool, or hot tub  until instructed.  You should notify our office if you notice any of the following:  A temperature greater than 101 F  Severe or increasing pain  Excessive drainage or redness of the incision  Calf swelling and pain  Chest pain or shortness of breath

## 2022-04-28 NOTE — TRANSFER OF CARE
"Anesthesia Transfer of Care Note    Patient: Jess Mcleod    Procedure(s) Performed: Procedure(s) (LRB):  ARTHROSCOPY, KNEE, WITH CHONDROPLASTY (Left)  SYNOVECTOMY, KNEE (Left)    Patient location: PACU    Anesthesia Type: general    Transport from OR: Transported from OR on 6-10 L/min O2 by face mask with adequate spontaneous ventilation    Post pain: adequate analgesia    Post assessment: no apparent anesthetic complications    Post vital signs: stable    Level of consciousness: sedated    Nausea/Vomiting: no nausea/vomiting    Complications: none    Transfer of care protocol was followed      Last vitals:   Visit Vitals  BP (!) 129/90 (BP Location: Right arm, Patient Position: Lying)   Pulse 88   Temp 36.4 °C (97.5 °F) (Oral)   Resp 16   Ht 5' 4.5" (1.638 m)   Wt 74.8 kg (165 lb)   LMP 04/27/2022 (Exact Date)   SpO2 100%   Breastfeeding No   BMI 27.88 kg/m²     "

## 2022-04-28 NOTE — PLAN OF CARE
Pre-op complete. Waiting on site red and H&P update. Family at bedside and will take patient's purse and glasses. Clothing and crutches placed in a locker.

## 2022-04-28 NOTE — BRIEF OP NOTE
[facility] - Surgery (Hospital)  Brief Operative Note    Surgery Date: 4/28/2022     Surgeon(s) and Role:     * Janette Odonnell MD - Primary    Assisting Surgeon:   David Gresham MD    Pre-op Diagnosis:  Acute medial meniscal tear, left, sequela [S83.242S]    Post-op Diagnosis:  Post-Op Diagnosis Codes:  Left knee medial femoral condyle chondral lesion  2. Left knee medial plica    Procedure  1. Left knee arthroscopic chondroplasty  2. Left knee arthroscopic synovectomy    Anesthesia: General    Operative Findings: tear    Estimated Blood Loss: * No values recorded between 4/28/2022 12:00 AM and 4/28/2022  6:54 AM *         Specimens:   Specimen (24h ago, onward)                None              Discharge Note    OUTCOME: Patient tolerated treatment/procedure well without complication and is now ready for discharge.    DISPOSITION: Home or Self Care    FINAL DIAGNOSIS:  <principal problem not specified>    FOLLOWUP: In clinic    DISCHARGE INSTRUCTIONS:    Discharge Procedure Orders   Diet general     Call MD for:  temperature >100.4     Call MD for:  persistent nausea and vomiting     Call MD for:  severe uncontrolled pain     Call MD for:  difficulty breathing, headache or visual disturbances     Call MD for:  redness, tenderness, or signs of infection (pain, swelling, redness, odor or green/yellow discharge around incision site)     Call MD for:  hives     Call MD for:  persistent dizziness or light-headedness

## 2022-04-28 NOTE — OP NOTE
[facility])  Surgery Department  Operative Note    SUMMARY     Date of Procedure: 4/28/2022     Pre-Operative Diagnosis:   Left Knee Tear, Medial meniscus, acute S83.249A    Post-Operative Diagnosis:   Left Knee Chondromalacia, (excludes patella) M94.29  Left Knee Synovitis M65.9    Procedure:  1. Left Knee Arthroscopy, debridement/shaving of articular cartilage (chondroplasty) 04739  2.  Left Knee Arthroscopy, knee, synovectomy, limited 54561    Surgeon(s) and Role:     * Janette Odonnell MD - Primary    Assisting Surgeon:  Jj Gresham MD    Anesthesia: General    Complications: None    Tourniquet: None    Implants: * No implants in log *    Arthroscopic Findings:   Patellofemoral Compartment  Medial Patella: Grade 1  Central Patella:Grade 1  Lateral Patella: Grade 1  Trochlea:Grade 0  Plica: Small medial   Medial Gutter: Clear of loose bodies or debris  Lateral Gutter:Clear of loose bodies or debris    Ligaments  ACL:Intact  PCL:Intact    Medial Compartment:  Femoral Cartilage: 1.5 x 1.5 MFC Grade2/3 chondral defect   Tibial Cartilage: Normal  Meniscus:Small inferior tear far posterior medial horn without displacement    Lateral Compartment:  Femoral Cartilage: Normal  Tibial Cartilage:Normal  Meniscus:Intact       Indication for Procedure:  The patient is a 52-year-old female who states she injured her knee while getting it caught on a runner at work.  She felt a pop in her knee.  She was seen and evaluated by 1 of our primary care physicians and referred to me for further management after history, physical and imaging were concerning for medial meniscal tear.  Risks, benefits and alternatives were discussed with the patient prior to proceeding with surgery.  She elected to proceed with surgical intervention.    Surgery in Detail:  The patient was marked identified in the holding room.  She was brought back to the operating room and placed in the supine position.  After general endotracheal  anesthesia was administered the left lower extremity was prepped and draped in usual sterile fashion for a knee arthroscopy. The contralateral leg was secured and well padded. Preoperative antibiotics were given within 1 hour the procedure.  A time-out was taken prior starting. We used 0.25% Marcaine and epinephrine for local periportal anesthetic.  We created an anterior lateral portal and under direct visualization an anterior medial portal was created.    The probe was brought into the medial compartment and we identified a small tear exiting the far peripheral posterior medial aspect of the medial meniscus on the inferior surface.  There was no significant tearing noted at the white-white zone.  The tear did not extend significantly from the inferior exiting posterior medial tear site.  We did not feel there was any good indication to debride out the white-white zone to look for any type of horizontal cleavage tear.  We did encounter a grade 2 and grade 3 chondral defect located along the medial aspect of the weight-bearing zone of the medial femoral condyle with some loose chondral flaps.  Rosario were brought in to debride the cartilage as well as a Wand stabilize the edges.  We then moved to the patellofemoral compartment and debrided a small medial plica which looked to be abrading the medial femoral condyle.    The arthroscopes were removed from the joint. The patient was dressed with Adaptic, bacitracin, 4x4s, Webril and an Ace wrap from the toes up to the proximal thigh.  The patient was extubated and taken recovery in satisfactory condition.      Post-Op Plan:  Standard knee arthroscopy protocol    Attestation: I was present and scrubbed for the entire procedure.

## 2022-04-28 NOTE — ANESTHESIA POSTPROCEDURE EVALUATION
Anesthesia Post Evaluation    Patient: Jess Mcleod    Procedure(s) Performed: Procedure(s) (LRB):  ARTHROSCOPY, KNEE, WITH CHONDROPLASTY (Left)  SYNOVECTOMY, KNEE (Left)    Final Anesthesia Type: general      Patient location during evaluation: PACU  Patient participation: Yes- Able to Participate  Level of consciousness: awake and alert  Post-procedure vital signs: reviewed and stable  Pain management: adequate  Airway patency: patent    PONV status at discharge: No PONV  Anesthetic complications: no      Cardiovascular status: blood pressure returned to baseline  Respiratory status: unassisted  Hydration status: euvolemic  Follow-up not needed.          Vitals Value Taken Time   /88 04/28/22 0847   Temp 36.5 °C (97.7 °F) 04/28/22 0845   Pulse 80 04/28/22 0855   Resp 18 04/28/22 0855   SpO2 99 % 04/28/22 0855   Vitals shown include unvalidated device data.      Event Time   Out of Recovery 08:50:00         Pain/Tahir Score: Pain Rating Prior to Med Admin: 6 (4/28/2022  8:39 AM)  Pain Rating Post Med Admin: 3 (4/28/2022  8:44 AM)

## 2022-04-28 NOTE — PLAN OF CARE
VSS. Pt able to tolerate oral liquids. Pt reports tolerable pain level for D/C. Polar care and dressing intact. Thigh TEDs intact. Pt received home meds per bedside delivery. Polar care power adapter placed with pts personal belongings. Crutches at bedside for home use. No distress noted. Pt states she is ready for D/C. D/C instructions reviewed with pt and brother, verbalized understanding.

## 2022-04-28 NOTE — ANESTHESIA PROCEDURE NOTES
Intubation    Date/Time: 4/28/2022 7:08 AM  Performed by: Maddie Jacob CRNA  Authorized by: Vince Pino MD     Intubation:     Induction:  Intravenous    Intubated:  Postinduction    Mask Ventilation:  Easy mask    Attempts:  1    Attempted By:  CRNA    Method of Intubation:  Fast track LMA    Difficult Airway Encountered?: No      Complications:  None    Airway Device Size:  3.5    Style/Cuff Inflation:  Cuffed    Secured at:  The lips    Placement Verified By:  Capnometry    Complicating Factors:  None    Findings Post-Intubation:  BS equal bilateral

## 2022-04-29 ENCOUNTER — PATIENT MESSAGE (OUTPATIENT)
Dept: PREADMISSION TESTING | Facility: HOSPITAL | Age: 52
End: 2022-04-29
Payer: COMMERCIAL

## 2022-05-02 ENCOUNTER — CLINICAL SUPPORT (OUTPATIENT)
Dept: REHABILITATION | Facility: HOSPITAL | Age: 52
End: 2022-05-02
Attending: ORTHOPAEDIC SURGERY
Payer: COMMERCIAL

## 2022-05-02 DIAGNOSIS — S83.242S ACUTE MEDIAL MENISCAL TEAR, LEFT, SEQUELA: ICD-10-CM

## 2022-05-02 DIAGNOSIS — M25.562 ACUTE PAIN OF LEFT KNEE: ICD-10-CM

## 2022-05-02 PROCEDURE — 97140 MANUAL THERAPY 1/> REGIONS: CPT | Performed by: PHYSICAL THERAPIST

## 2022-05-02 PROCEDURE — 97110 THERAPEUTIC EXERCISES: CPT | Performed by: PHYSICAL THERAPIST

## 2022-05-02 PROCEDURE — 97162 PT EVAL MOD COMPLEX 30 MIN: CPT | Performed by: PHYSICAL THERAPIST

## 2022-05-02 NOTE — PLAN OF CARE
[facility]  Physical Therapy Initial Evaluation    Date: 2022   Name: Jess Mcleod  Clinic Number: [id number]    Therapy Diagnosis:   Encounter Diagnoses   Name Primary?    Acute medial meniscal tear, left, sequela     Acute pain of left knee      Physician: Janette Odonnell MD    Physician Orders: PT Eval and Treat   Medical Diagnosis from Referral: S83.242S (ICD-10-CM) - Acute medial meniscal tear, left, sequela  Evaluation Date: 2022  Authorization Period Expiration: 2022  Plan of Care Expiration: 2022  Visit # / Visits authorized:     Time In: 0702  Time Out: 0758  Total Appointment Time (timed & untimed codes): 56 minutes    Precautions: Fall and Weightbearing    Post-Op Plan:  Standard knee arthroscopy protocol  Subjective   Date of onset: 2022  History of current condition - Jess reports: In February I was climbing some stairs and twisted my knee. I had an MRI and they found my mensicus was injured. During surgery the doctor said my mensicus looked good but he took out some fragments. 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 She has stairs at home which give her difficulty. Patient notes she got her bandaging wet and changed it this weekend.      Medical History:   Past Medical History:   Diagnosis Date    Arthritis     Chronic back pain     Chronic neck pain     Fibrocystic breast     Fibroid, uterus     GERD (gastroesophageal reflux disease)     Herpes     Migraine headache     Narcolepsy     Nephrolithiasis 2018    Sciatica of right side 2018    Scoliosis     Urge incontinence 2018       Surgical History:   Jess Mcleod  has a past surgical history that includes Umbilical hernia repair;  section; Scoliosis surgery; Embolization (N/A, 2020); Arthroscopic chondroplasty of knee joint (Left, 2022); and Synovectomy of knee (Left,  "4/28/2022).    Medications:   Jess has a current medication list which includes the following prescription(s): aspirin, calcium carbonate, dextroamphetamine-amphetamine, hydrocodone-acetaminophen, ibuprofen, lactobacillus rhamnosus gg, magnesium oxide, and [DISCONTINUED] quetiapine, and the following Facility-Administered Medications: lidocaine (pf) 10 mg/ml (1%).    Allergies:   Review of patient's allergies indicates:   Allergen Reactions    Ibuprofen Other (See Comments)     It makes my "ears hurt" when I take large doses.        Imaging, MRI studies:     Impression:     1. Complex tear of the medial meniscus.  2. Medial and lateral tibiofemoral chondral fissuring.  3. Edema within the superolateral aspect of Hoffa's fat pad, findings which can be seen with patellar tendon lateral femoral condyle friction syndrome.    Prior Therapy: None  Social History: She lives with their family  Occupation: [employer] - Personal injury   Prior Level of Function: Independent  Current Level of Function: Mod Ind with crutches    Pain:  Current 6/10, worst 8/10, best 3/10   Location: { left knee(s)   Description: Aching  Aggravating Factors: Standing, Walking, Extension and Flexing  Easing Factors: ice and rest    Pts goals: return to work pain free      Objective     Observation: Patient is a 52 y.o. female alert and oriented    Functional Tests:  Gait: Ambulates wit bilateral axillary crutches, slowly and notes not liking to use them. WBAT    Knee Passive Range of Motion:   Right  Left    Flexion 128 89   Extension +3 0       Quad Set: fair, improved with activation    Joint Mobility: Limited patellar mobility due to guarding with pain     Palpation: Tender over incision and joint line    Sensation: Intact    Edema: mod present, given ankle pump and educated on elevation      Limitation/Restriction for FOTO Knee Survey    Therapist reviewed FOTO scores for Jess Mcleod on 5/2/2022.   FOTO documents entered into EPIC - " "see Media section.             TREATMENT   Treatment Time In: 0730  Treatment Time Out: 0750  Total Treatment time (time-based codes) separate from Evaluation: 20 minutes    Jess received therapeutic exercises to develop strength, endurance, ROM and flexibility for 10 minutes including:    Quad sets 3" 20x  SLR supine 10x  Heel slides 2'    Jess received the following manual therapy techniques: Joint mobilizations and Soft tissue Mobilization were applied to the: L knee for 10 minutes, including:    Patellar mobility Gr III   Gr IV extension  Hinge mob knee       Home Exercises and Patient Education Provided    Education provided:   - Quad set every hour  -Elevation for swelling  - LLLD stretch  - Weightbearing precaution  - s/s infection     Written Home Exercises Provided: yes.  Exercises were reviewed and patient was able to demonstrate them prior to the end of the session.  Patient demonstrated good  understanding of the education provided.     See EMR under Patient Instructions for exercisesprovided 5/2/2022.    Assessment   Jess is a 52 y.o. female referred to outpatient Physical Therapy with a medical diagnosis of left medial meniscus tear. Pt presents with limited gait tolerance with crutches, edema in the knee, pain with ADL and work, and strength and motion deficits consisted with post op meniscectomy.     Pt prognosis is Excellent.   Pt will benefit from skilled outpatient Physical Therapy to address the deficits stated above and in the chart below, provide pt/family education, and to maximize pt's level of independence.     Plan of care discussed with patient: Yes  Pt's spiritual, cultural and educational needs considered and patient is agreeable to the plan of care and goals as stated below:     Anticipated Barriers for therapy: scheduling     Medical Necessity is demonstrated by the following  History  Co-morbidities and personal factors that may impact the plan of care Co-morbidities:   difficulty " sleeping, high BMI and level of undertstanding of current condition    Personal Factors:   no deficits     moderate   Examination  Body Structures and Functions, activity limitations and participation restrictions that may impact the plan of care Body Regions:   back  lower extremities    Body Systems:    ROM  strength  balance  gait  transfers  transitions  motor control  motor learning  edema  scar formation    Participation Restrictions:   covid-19    Activity limitations:   Learning and applying knowledge  no deficits    General Tasks and Commands  no deficits    Communication  no deficits    Mobility  walking    Self care  no deficits    Domestic Life  no deficits    Interactions/Relationships  no deficits    Life Areas  no deficits    Community and Social Life  no deficits         moderate   Clinical Presentation stable and uncomplicated moderate   Decision Making/ Complexity Score: moderate     GOALS: Short Term Goals:  4 weeks  1.Report decreased knee pain  < / =  2/10  to increase tolerance for return to work painf ree  2. Increase knee ROM to 120 flexion in order to be able to perform ADLs without difficulty.  3. Increase strength by 1/3 MMT grade in quad  to increase tolerance for ADL and work activities.  4. Pt to tolerate HEP to improve ROM and independence with ADL's    Long Term Goals: 8 weeks  1.Report decreased knee pain < / = 0/10  to increase tolerance for return to exercise   2.Patient goal: return to work and ADL pain free  3.Increase strength to >/= 4+/5 in quad and glut  to increase tolerance for ADL and work activities.  4. Pt will report at CJ level (20-40% impaired) on FOTO knee to demonstrate increase in LE function with every day tasks.     Plan   Plan of care Certification: 5/2/2022 to 8/2/2022.    Outpatient Physical Therapy 2 times weekly for 10 weeks to include the following interventions: Gait Training, Manual Therapy, Moist Heat/ Ice, Neuromuscular Re-ed, Patient Education, Self  Care, Therapeutic Activities and Therapeutic Exercise.     Anuj Melgar, PT

## 2022-05-09 ENCOUNTER — CLINICAL SUPPORT (OUTPATIENT)
Dept: REHABILITATION | Facility: HOSPITAL | Age: 52
End: 2022-05-09
Payer: COMMERCIAL

## 2022-05-09 DIAGNOSIS — M25.562 ACUTE PAIN OF LEFT KNEE: Primary | ICD-10-CM

## 2022-05-09 PROCEDURE — 97112 NEUROMUSCULAR REEDUCATION: CPT | Mod: CQ

## 2022-05-09 PROCEDURE — 97110 THERAPEUTIC EXERCISES: CPT | Mod: CQ

## 2022-05-09 NOTE — PROGRESS NOTES
"  Physical Therapy Daily Treatment Note     Name: Jess Denney [place]  Clinic Number: [id number]    Therapy Diagnosis:   Encounter Diagnosis   Name Primary?    Acute pain of left knee Yes     Physician: Janette Odonnell MD    Visit Date: 5/9/2022    Physician Orders: PT Eval and Treat   Medical Diagnosis from Referral: S83.242S (ICD-10-CM) - Acute medial meniscal tear, left, sequela  Evaluation Date: 5/2/2022  Authorization Period Expiration: 12/31/2022  Plan of Care Expiration: 8/2/2022  Visit # / Visits authorized: 1/20    Time In: 0707  Time Out: 0810  Total Billable Time: 48 minutes    Precautions: Fall and Weightbearing    Procedures: 4/28/22  1. Left knee arthroscopic chondroplasty  2. Left knee arthroscopic synovectomy    Subjective     Pt reports: Pt returns to therapy this am with c/o continued L knee pain at rest and with movement. Reports noncompliance with HEP because of work.      She was compliant with home exercise program.  Response to previous treatment: No adverse effects  Functional change: N/A    Pain: not verbalized/10  Location: left knee      Objective     DOS: 4/28/22  POD: 11 days    Knee Passive Range of Motion:    Right  Left    Flexion 128 108   Extension +3 +3     Jess received therapeutic exercises to develop strength, endurance, ROM and flexibility for 38 minutes including:    Heel slides x 3'  Bridges YTB 3 x 10 x 5"  S/L clams YTB 10 x 10" sudha  Upright bike x 6' for joint mobility/flexion ROM  Gait training with single crutch  Pt education: HEP compliance, TKE during heel strike, amb s crutches    Jess received the following manual therapy techniques: Joint mobilizations were applied for 03 minutes including:    Assessment of knee ROM  Patellar mobs    Jess participated in neuromuscular re-education activities to improve Balance, Coordination, Proprioception and Neuromuscular Control for 10 minutes. The following activities were included:    Quad sets 5" hold x 3'  1/2 foam SAQ 5" " hold x 3'  Supine SLR 3 x 10    Jess participated in dynamic functional therapeutic activities to improve functional performance for 00 minutes including:      Jess participated in gait training to improve functional mobility and safety for 00 minutes including:      Jess received ice/compression to L knee x 10'.      Home Exercises Provided and Patient Education Provided     Education provided:   - Quad set every hour  -Elevation for swelling  - LLLD stretch  - Weightbearing precaution  - s/s infection     Written Home Exercises Provided: yes.  Exercises were reviewed and Jess was able to demonstrate them prior to the end of the session.  Jess demonstrated good  understanding of the education provided.     See EMR under Patient Instructions for exercises provided 5/2/22.    Assessment     Pt was able to complete all exercises including progressions with no c/o increased knee pain. Both flx and ext ROM improved since IE. End range quad strength is poor at this time with a lag noted during unweighted supine SLR. Per supervising DPT, dropped to one crutch for community. Encouraged improved compliance with HEP; pt voiced understanding. No adverse effects reported p tx.     Jess Is progressing well towards her goals.   Pt prognosis is Excellent.     Pt will continue to benefit from skilled outpatient physical therapy to address the deficits listed in the problem list box on initial evaluation, provide pt/family education and to maximize pt's level of independence in the home and community environment.     Pt's spiritual, cultural and educational needs considered and pt agreeable to plan of care and goals.     Anticipated barriers to physical therapy: scheduling    Goals:   Short Term Goals:  4 weeks  1.Report decreased knee pain  < / =  2/10  to increase tolerance for return to work painf ree  2. Increase knee ROM to 120 flexion in order to be able to perform ADLs without difficulty.  3. Increase strength by 1/3  MMT grade in quad  to increase tolerance for ADL and work activities.  4. Pt to tolerate HEP to improve ROM and independence with ADL's     Long Term Goals: 8 weeks  1.Report decreased knee pain < / = 0/10  to increase tolerance for return to exercise   2.Patient goal: return to work and ADL pain free  3.Increase strength to >/= 4+/5 in quad and glut  to increase tolerance for ADL and work activities.  4. Pt will report at CJ level (20-40% impaired) on FOTO knee to demonstrate increase in LE function with every day tasks.     Plan   Plan of care Certification: 5/2/2022 to 8/2/2022.     Outpatient Physical Therapy 2 times weekly for 10 weeks to include the following interventions: Gait Training, Manual Therapy, Moist Heat/ Ice, Neuromuscular Re-ed, Patient Education, Self Care, Therapeutic Activities and Therapeutic Exercise.     Yolis Serrano, PTA

## 2022-05-10 NOTE — PROGRESS NOTES
"POST-OPERATIVE EXAMINATION    52 y.o. Female who returns for follow after surgery. She is 2 weeks s/p    Procedure  1. Left knee arthroscopic chondroplasty  2. Left knee arthroscopic synovectomy     She is doing well without any issues. She has been going to physical therapy and progressing towards her goals.      PHYSICAL EXAMINATION:  /74   Pulse (!) 115   Ht 5' 4.5" (1.638 m)   Wt 74.8 kg (165 lb)   LMP 04/27/2022 (Exact Date)   BMI 27.88 kg/m²   General: Well-developed well-nourished 52 y.o. femalein no acute distress   Cardiovascular: Regular rhythm   Lungs: No labored breathing or wheezing appreciated   Neuro: Alert and oriented ×3   Psychiatric: well oriented to person, place and time, demonstrates normal mood and affect   Skin: No rashes, lesions or ulcers, normal temperature, turgor, and texture on involved extremity    ORTHOPEDIC EXAM:  Normal post-operative swelling  Normal post-operative scarring  Strength: WNL  ROM: WNL  Tests: None  Small effusion    ASSESSMENT:      ICD-10-CM ICD-9-CM   1. S/P left knee arthroscopy  Z98.890 V45.89       PLAN:       1. All sutures removed today    2.  Continue physical therapy    3. RTC in 1 month            "

## 2022-05-11 ENCOUNTER — OFFICE VISIT (OUTPATIENT)
Dept: SPORTS MEDICINE | Facility: CLINIC | Age: 52
End: 2022-05-11
Payer: COMMERCIAL

## 2022-05-11 VITALS
HEIGHT: 65 IN | HEART RATE: 115 BPM | DIASTOLIC BLOOD PRESSURE: 74 MMHG | SYSTOLIC BLOOD PRESSURE: 127 MMHG | BODY MASS INDEX: 27.49 KG/M2 | WEIGHT: 165 LBS

## 2022-05-11 DIAGNOSIS — Z98.890 S/P LEFT KNEE ARTHROSCOPY: Primary | ICD-10-CM

## 2022-05-11 PROCEDURE — 1159F PR MEDICATION LIST DOCUMENTED IN MEDICAL RECORD: ICD-10-PCS | Mod: CPTII,S$GLB,, | Performed by: ORTHOPAEDIC SURGERY

## 2022-05-11 PROCEDURE — 99999 PR PBB SHADOW E&M-EST. PATIENT-LVL III: CPT | Mod: PBBFAC,,, | Performed by: ORTHOPAEDIC SURGERY

## 2022-05-11 PROCEDURE — 99024 POSTOP FOLLOW-UP VISIT: CPT | Mod: S$GLB,,, | Performed by: ORTHOPAEDIC SURGERY

## 2022-05-11 PROCEDURE — 99024 PR POST-OP FOLLOW-UP VISIT: ICD-10-PCS | Mod: S$GLB,,, | Performed by: ORTHOPAEDIC SURGERY

## 2022-05-11 PROCEDURE — 3078F DIAST BP <80 MM HG: CPT | Mod: CPTII,S$GLB,, | Performed by: ORTHOPAEDIC SURGERY

## 2022-05-11 PROCEDURE — 3074F SYST BP LT 130 MM HG: CPT | Mod: CPTII,S$GLB,, | Performed by: ORTHOPAEDIC SURGERY

## 2022-05-11 PROCEDURE — 3074F PR MOST RECENT SYSTOLIC BLOOD PRESSURE < 130 MM HG: ICD-10-PCS | Mod: CPTII,S$GLB,, | Performed by: ORTHOPAEDIC SURGERY

## 2022-05-11 PROCEDURE — 99999 PR PBB SHADOW E&M-EST. PATIENT-LVL III: ICD-10-PCS | Mod: PBBFAC,,, | Performed by: ORTHOPAEDIC SURGERY

## 2022-05-11 PROCEDURE — 3078F PR MOST RECENT DIASTOLIC BLOOD PRESSURE < 80 MM HG: ICD-10-PCS | Mod: CPTII,S$GLB,, | Performed by: ORTHOPAEDIC SURGERY

## 2022-05-11 PROCEDURE — 3008F PR BODY MASS INDEX (BMI) DOCUMENTED: ICD-10-PCS | Mod: CPTII,S$GLB,, | Performed by: ORTHOPAEDIC SURGERY

## 2022-05-11 PROCEDURE — 3008F BODY MASS INDEX DOCD: CPT | Mod: CPTII,S$GLB,, | Performed by: ORTHOPAEDIC SURGERY

## 2022-05-11 PROCEDURE — 1159F MED LIST DOCD IN RCRD: CPT | Mod: CPTII,S$GLB,, | Performed by: ORTHOPAEDIC SURGERY

## 2022-05-16 ENCOUNTER — CLINICAL SUPPORT (OUTPATIENT)
Dept: REHABILITATION | Facility: HOSPITAL | Age: 52
End: 2022-05-16
Payer: COMMERCIAL

## 2022-05-16 ENCOUNTER — PATIENT MESSAGE (OUTPATIENT)
Dept: SPORTS MEDICINE | Facility: CLINIC | Age: 52
End: 2022-05-16
Payer: COMMERCIAL

## 2022-05-16 DIAGNOSIS — M25.562 ACUTE PAIN OF LEFT KNEE: Primary | ICD-10-CM

## 2022-05-16 PROCEDURE — 97110 THERAPEUTIC EXERCISES: CPT | Performed by: PHYSICAL THERAPIST

## 2022-05-16 PROCEDURE — 97112 NEUROMUSCULAR REEDUCATION: CPT | Performed by: PHYSICAL THERAPIST

## 2022-05-16 NOTE — PROGRESS NOTES
"  Physical Therapy Daily Treatment Note     Name: Jess Mcleod  Clinic Number: [id number]    Therapy Diagnosis:   No diagnosis found.  Physician: No ref. provider found    Visit Date: 5/16/2022    Physician Orders: PT Eval and Treat   Medical Diagnosis from Referral: S83.242S (ICD-10-CM) - Acute medial meniscal tear, left, sequela  Evaluation Date: 5/2/2022  Authorization Period Expiration: 12/31/2022  Plan of Care Expiration: 8/2/2022  Visit # / Visits authorized: 2/20    Time In: 0702  Time Out: 0800  Total Billable Time: 30 minutes    Precautions: Fall and Weightbearing    Procedures: 4/28/22  1. Left knee arthroscopic chondroplasty  2. Left knee arthroscopic synovectomy    Subjective     Pt reports: The knee feels like it's infected, bothers me to touch over the incision.     She was compliant with home exercise program.  Response to previous treatment: No adverse effects  Functional change: N/A    Pain: not verbalized/10  Location: left knee      Objective     DOS: 4/28/22  POD: 17 days    Knee Passive Range of Motion:    Right  Left    Flexion 128 108   Extension +3 +3     Jess received therapeutic exercises to develop strength, endurance, ROM and flexibility for 27 minutes including:    Upright bike x 5' for joint mobility/flexion ROM  Standing TKE BTB 30x 3"  S/L clams YTB 10 x 10" sudha  Sidelying hip abd 2 x 10  Pt education: HEP compliance, TKE during heel strike, amb s crutches    NT  Heel slides x 3'  Bridges YTB 3 x 10 x 5"  Gait training with single crutch    Jess received the following manual therapy techniques: Joint mobilizations were applied for 08 minutes including:    Assessment of knee ROM  Patellar mobs all planes  Gr IV extension   Gr III flexion     Jess participated in neuromuscular re-education activities to improve Balance, Coordination, Proprioception and Neuromuscular Control for 13 minutes. The following activities were included:    Quad sets 5" hold x 3'  1/2 foam SAQ 2# 2 x 10 " "3"  Supine SLR 2 x 10 3"    Jess participated in dynamic functional therapeutic activities to improve functional performance for 00 minutes including:      Jess participated in gait training to improve functional mobility and safety for 00 minutes including:      Jess received ice/compression to L knee x 10'.      Home Exercises Provided and Patient Education Provided     Education provided:   - Quad set every hour  -Elevation for swelling  - LLLD stretch  - Weightbearing precaution  - s/s infection     Written Home Exercises Provided: yes.  Exercises were reviewed and Jess was able to demonstrate them prior to the end of the session.  Jess demonstrated good  understanding of the education provided.     See EMR under Patient Instructions for exercises provided 5/2/22.    Assessment     Patient lacked motivation and limited in the clinic. Reports no sleeping over the weekend because she doesn't take her medicine. Good quad activation, able to perform a SLR without lag but lag returned with repetition. Will progress to closed chain strengthening     Jess Is progressing well towards her goals.   Pt prognosis is Excellent.     Pt will continue to benefit from skilled outpatient physical therapy to address the deficits listed in the problem list box on initial evaluation, provide pt/family education and to maximize pt's level of independence in the home and community environment.     Pt's spiritual, cultural and educational needs considered and pt agreeable to plan of care and goals.     Anticipated barriers to physical therapy: scheduling    Goals:   Short Term Goals:  4 weeks  1.Report decreased knee pain  < / =  2/10  to increase tolerance for return to work painf ree  2. Increase knee ROM to 120 flexion in order to be able to perform ADLs without difficulty.  3. Increase strength by 1/3 MMT grade in quad  to increase tolerance for ADL and work activities.  4. Pt to tolerate HEP to improve ROM and independence " with ADL's     Long Term Goals: 8 weeks  1.Report decreased knee pain < / = 0/10  to increase tolerance for return to exercise   2.Patient goal: return to work and ADL pain free  3.Increase strength to >/= 4+/5 in quad and glut  to increase tolerance for ADL and work activities.  4. Pt will report at CJ level (20-40% impaired) on FOTO knee to demonstrate increase in LE function with every day tasks.     Plan   Plan of care Certification: 5/2/2022 to 8/2/2022.     Outpatient Physical Therapy 2 times weekly for 10 weeks to include the following interventions: Gait Training, Manual Therapy, Moist Heat/ Ice, Neuromuscular Re-ed, Patient Education, Self Care, Therapeutic Activities and Therapeutic Exercise.     Anuj Melgar, PT

## 2022-05-23 ENCOUNTER — CLINICAL SUPPORT (OUTPATIENT)
Dept: REHABILITATION | Facility: HOSPITAL | Age: 52
End: 2022-05-23
Payer: COMMERCIAL

## 2022-05-23 DIAGNOSIS — M25.562 ACUTE PAIN OF LEFT KNEE: Primary | ICD-10-CM

## 2022-05-23 PROCEDURE — 97110 THERAPEUTIC EXERCISES: CPT | Mod: CQ

## 2022-05-23 PROCEDURE — 97112 NEUROMUSCULAR REEDUCATION: CPT | Mod: CQ

## 2022-05-23 PROCEDURE — 97140 MANUAL THERAPY 1/> REGIONS: CPT | Mod: CQ

## 2022-05-23 NOTE — PROGRESS NOTES
"  Physical Therapy Daily Treatment Note     Name: Jess Denney [place]  Clinic Number: [id number]    Therapy Diagnosis:   Encounter Diagnosis   Name Primary?    Acute pain of left knee Yes     Physician: Janette Odonnell MD    Visit Date: 5/23/2022    Physician Orders: PT Eval and Treat   Medical Diagnosis from Referral: S83.242S (ICD-10-CM) - Acute medial meniscal tear, left, sequela  Evaluation Date: 5/2/2022  Authorization Period Expiration: 12/31/2022  Plan of Care Expiration: 8/2/2022  Visit # / Visits authorized: 3/20    Time In: 0704  Time Out: 0808  Total Billable Time: 48 minutes    Precautions: Fall and Weightbearing    Procedures: 4/28/22  1. Left knee arthroscopic chondroplasty  2. Left knee arthroscopic synovectomy    Subjective     Pt reports: No c/o knee pain this morning.     She was compliant with home exercise program.  Response to previous treatment: No adverse effects  Functional change: N/A    Pain: 0/10  Location: left knee      Objective     DOS: 4/28/22  POD: 3 weeks, 4 days    Knee Passive Range of Motion:    Right  Left    Flexion 128 108   Extension +3 +3     Jess received therapeutic exercises to develop strength, endurance, ROM and flexibility for 27 minutes including:    Upright bike lvl 3 x 8' for joint mobility/flexion ROM  Bridges YTB 3 x 10 x 5"  S/L clams YTB 10 x 10" sudha  Mini squats behind mat 3 x 10  Calf raises 3 x 10    NT  Sidelying hip abd 2 x 10  Heel slides x 3'  Standing TKE BTB 30x 3"    Jess received the following manual therapy techniques: Joint mobilizations were applied for 08 minutes including:    Assessment of knee ROM  Patellar mobs all planes  Gr IV extension   Gr III flexion     Jess participated in neuromuscular re-education activities to improve Balance, Coordination, Proprioception and Neuromuscular Control for 13 minutes. The following activities were included:    Quad sets 5" hold x 3'  1/2 foam SAQ 2# 5" hold x 3'  Supine SLR 3 x 10 x 3"    Jess " participated in dynamic functional therapeutic activities to improve functional performance for 00 minutes including:      Jess participated in gait training to improve functional mobility and safety for 00 minutes including:      Jess received ice/compression to L knee x 10'.      Home Exercises Provided and Patient Education Provided     Education provided:   - Quad set every hour  -Elevation for swelling  - LLLD stretch  - Weightbearing precaution  - s/s infection     Written Home Exercises Provided: yes.  Exercises were reviewed and Jess was able to demonstrate them prior to the end of the session.  Jess demonstrated good  understanding of the education provided.     See EMR under Patient Instructions for exercises provided 5/2/22.    Assessment     Pt was able to complete all exercises including progressions with no c/o increased knee pain. Continued lag during SLR, so weight was removed with cueing for quad set prior to lift. Encouraged compliance with HEP; pt voiced understanding. No adverse effects reported p tx.     Jess Is progressing well towards her goals.   Pt prognosis is Excellent.     Pt will continue to benefit from skilled outpatient physical therapy to address the deficits listed in the problem list box on initial evaluation, provide pt/family education and to maximize pt's level of independence in the home and community environment.     Pt's spiritual, cultural and educational needs considered and pt agreeable to plan of care and goals.     Anticipated barriers to physical therapy: scheduling    Goals:   Short Term Goals:  4 weeks  1.Report decreased knee pain  < / =  2/10  to increase tolerance for return to work painf ree  2. Increase knee ROM to 120 flexion in order to be able to perform ADLs without difficulty.  3. Increase strength by 1/3 MMT grade in quad  to increase tolerance for ADL and work activities.  4. Pt to tolerate HEP to improve ROM and independence with ADL's     Long Term  Goals: 8 weeks  1.Report decreased knee pain < / = 0/10  to increase tolerance for return to exercise   2.Patient goal: return to work and ADL pain free  3.Increase strength to >/= 4+/5 in quad and glut  to increase tolerance for ADL and work activities.  4. Pt will report at CJ level (20-40% impaired) on FOTO knee to demonstrate increase in LE function with every day tasks.     Plan   Plan of care Certification: 5/2/2022 to 8/2/2022.     Outpatient Physical Therapy 2 times weekly for 10 weeks to include the following interventions: Gait Training, Manual Therapy, Moist Heat/ Ice, Neuromuscular Re-ed, Patient Education, Self Care, Therapeutic Activities and Therapeutic Exercise.     Yolis Serrano, PTA

## 2022-05-30 ENCOUNTER — CLINICAL SUPPORT (OUTPATIENT)
Dept: REHABILITATION | Facility: HOSPITAL | Age: 52
End: 2022-05-30
Payer: COMMERCIAL

## 2022-05-30 DIAGNOSIS — M25.562 ACUTE PAIN OF LEFT KNEE: Primary | ICD-10-CM

## 2022-05-30 PROCEDURE — 97110 THERAPEUTIC EXERCISES: CPT | Mod: CQ

## 2022-05-30 PROCEDURE — 97112 NEUROMUSCULAR REEDUCATION: CPT | Mod: CQ

## 2022-05-30 NOTE — PROGRESS NOTES
"  Physical Therapy Daily Treatment Note     Name: Jess Mcleod  Clinic Number: [id number]    Therapy Diagnosis:   Encounter Diagnosis   Name Primary?    Acute pain of left knee Yes     Physician: Janette Odonnell MD    Visit Date: 5/30/2022    Physician Orders: PT Eval and Treat   Medical Diagnosis from Referral: S83.242S (ICD-10-CM) - Acute medial meniscal tear, left, sequela  Evaluation Date: 5/2/2022  Authorization Period Expiration: 12/31/2022  Plan of Care Expiration: 8/2/2022  Visit # / Visits authorized: 4/20    Time In: 0709  Time Out: 0809  Total Billable Time: 49 minutes    Precautions: Fall and Weightbearing    Procedures: 4/28/22  1. Left knee arthroscopic chondroplasty  2. Left knee arthroscopic synovectomy    Subjective     Pt reports: Her knee was hurting last week, but it is feeling better today. Presents with ACE bandage on L knee.     She was compliant with home exercise program.  Response to previous treatment: No adverse effects  Functional change: N/A    Pain: 0/10  Location: left knee      Objective     DOS: 4/28/22  POD: 4 weeks, 4 days    Knee Passive Range of Motion:    Right  Left    Flexion 128 108   Extension +3 +3     Jess received therapeutic exercises to develop strength, endurance, ROM and flexibility for 26 minutes including:    Upright bike lvl 3 x 10' for joint mobility/cardiovacular endurance  S/L hip ABD 2# 3 x 8  DL shuttle 2.0 bands 3 x 15  6in lateral step ups 3 x 10    NT  Bridges YTB 3 x 10 x 5"  S/L clams YTB 10 x 10" sudha  Mini squats behind mat 3 x 10  Calf raises 3 x 10  Heel slides x 3'    Jess received the following manual therapy techniques: Joint mobilizations were applied for 03 minutes including:    Assessment of knee ROM  Patellar mobs all planes  Gr IV extension   Gr III flexion     Jess participated in neuromuscular re-education activities to improve Balance, Coordination, Proprioception and Neuromuscular Control for 23 minutes. The following activities " "were included:    Quad sets into hyperext 5" hold x 3'  1/2 foam SAQ 2# 5" hold x 3' - np  Supine SLR 2# 3 x 10 x 3"  [facility] TKE GTB 5" hold x 4'    Jess participated in dynamic functional therapeutic activities to improve functional performance for 00 minutes including:      Jess received ice/compression to L knee x 10'.      Home Exercises Provided and Patient Education Provided     Education provided:   - Quad set every hour  -Elevation for swelling  - LLLD stretch  - Weightbearing precaution  - s/s infection     Written Home Exercises Provided: yes.  Exercises were reviewed and Jess was able to demonstrate them prior to the end of the session.  Jess demonstrated good  understanding of the education provided.     See EMR under Patient Instructions for exercises provided 5/2/22.    Assessment     Pt was able to complete all exercises including progressions with no c/o increased knee pain. Continued lag during SLR, but she responds well to cueing to extend knee and is able to actively hyperextend. Encouraged compliance with HEP; pt voiced understanding. No adverse effects reported p tx.     Jess Is progressing well towards her goals.   Pt prognosis is Excellent.     Pt will continue to benefit from skilled outpatient physical therapy to address the deficits listed in the problem list box on initial evaluation, provide pt/family education and to maximize pt's level of independence in the home and community environment.     Pt's spiritual, cultural and educational needs considered and pt agreeable to plan of care and goals.     Anticipated barriers to physical therapy: scheduling    Goals:   Short Term Goals:  4 weeks  1.Report decreased knee pain  < / =  2/10  to increase tolerance for return to work painf ree  2. Increase knee ROM to 120 flexion in order to be able to perform ADLs without difficulty.  3. Increase strength by 1/3 MMT grade in quad  to increase tolerance for ADL and work activities.  4. Pt to " tolerate HEP to improve ROM and independence with ADL's     Long Term Goals: 8 weeks  1.Report decreased knee pain < / = 0/10  to increase tolerance for return to exercise   2.Patient goal: return to work and ADL pain free  3.Increase strength to >/= 4+/5 in quad and glut  to increase tolerance for ADL and work activities.  4. Pt will report at CJ level (20-40% impaired) on FOTO knee to demonstrate increase in LE function with every day tasks.     Plan   Plan of care Certification: 5/2/2022 to 8/2/2022.     Outpatient Physical Therapy 2 times weekly for 10 weeks to include the following interventions: Gait Training, Manual Therapy, Moist Heat/ Ice, Neuromuscular Re-ed, Patient Education, Self Care, Therapeutic Activities and Therapeutic Exercise.     Yolis Serrano, PTA

## 2022-06-06 ENCOUNTER — CLINICAL SUPPORT (OUTPATIENT)
Dept: REHABILITATION | Facility: HOSPITAL | Age: 52
End: 2022-06-06
Payer: COMMERCIAL

## 2022-06-06 DIAGNOSIS — M25.562 ACUTE PAIN OF LEFT KNEE: Primary | ICD-10-CM

## 2022-06-06 PROCEDURE — 97112 NEUROMUSCULAR REEDUCATION: CPT | Mod: CQ

## 2022-06-06 PROCEDURE — 97110 THERAPEUTIC EXERCISES: CPT | Mod: CQ

## 2022-06-06 NOTE — PROGRESS NOTES
"  Physical Therapy Daily Treatment Note     Name: Jess Denney Mcleod  Clinic Number: [id number]    Therapy Diagnosis:   Encounter Diagnosis   Name Primary?    Acute pain of left knee Yes     Physician: Janette Odonnell MD    Visit Date: 6/6/2022    Physician Orders: PT Eval and Treat   Medical Diagnosis from Referral: S83.242S (ICD-10-CM) - Acute medial meniscal tear, left, sequela  Evaluation Date: 5/2/2022  Authorization Period Expiration: 12/31/2022  Plan of Care Expiration: 8/2/2022  Visit # / Visits authorized: 5/20    Time In: 0703  Time Out: 0811  Total Billable Time: 51 minutes    Precautions: Fall and Weightbearing    Procedures: 4/28/22  1. Left knee arthroscopic chondroplasty  2. Left knee arthroscopic synovectomy    Subjective     Pt reports: Her knee is doing alright today.     She was compliant with home exercise program.  Response to previous treatment: No adverse effects  Functional change: N/A    Pain: 0/10  Location: left knee      Objective     DOS: 4/28/22  POD: 5 weeks, 4 days    Knee Passive Range of Motion:    Right  Left    Flexion 128 108   Extension +3 +3     Jess received therapeutic exercises to develop strength, endurance, ROM and flexibility for 28 minutes including:    Upright bike lvl 3 x 10' for joint mobility/cardiovacular endurance  S/L hip ABD 2# 3 x 10  DL shuttle 2.5 bands 3 x 10  LAQ 5# 3 x 10 x 3"  Mini squats behind mat 3 x 10  6in lateral step ups 3 x 12    NT  Bridges YTB 3 x 10 x 5"  S/L clams YTB 10 x 10" sudha  Mini squats behind mat 3 x 10  Calf raises 3 x 10  Heel slides x 3'    Jess received the following manual therapy techniques: Joint mobilizations were applied for 03 minutes including:    Assessment of knee ROM  Patellar mobs all planes  Gr IV extension   Gr III flexion     Jess participated in neuromuscular re-education activities to improve Balance, Coordination, Proprioception and Neuromuscular Control for 23 minutes. The following activities were " "included:    Quad sets into hyperext 5" hold x 3'  1/2 foam SAQ 2# 5" hold x 3'   Supine SLR 2# 3 x 10 x 3"  [facility] TKE GTB 5" hold x 4'     Jess participated in dynamic functional therapeutic activities to improve functional performance for 00 minutes including:      Jess received ice/compression to L knee x 10'.      Home Exercises Provided and Patient Education Provided     Education provided:   - Quad set every hour  -Elevation for swelling  - LLLD stretch  - Weightbearing precaution  - s/s infection     Written Home Exercises Provided: yes.  Exercises were reviewed and Jess was able to demonstrate them prior to the end of the session.  Jess demonstrated good  understanding of the education provided.     See EMR under Patient Instructions for exercises provided 5/2/22.    Assessment     Pt was able to complete all exercises including progressions with no c/o increased knee pain. She demonstrated poor squat form with cueing required to avoid excessive quad dominance and hip ADD/IR collapse. Encouraged compliance with HEP; pt voiced understanding. No adverse effects reported p tx.     Jess Is progressing well towards her goals.   Pt prognosis is Excellent.     Pt will continue to benefit from skilled outpatient physical therapy to address the deficits listed in the problem list box on initial evaluation, provide pt/family education and to maximize pt's level of independence in the home and community environment.     Pt's spiritual, cultural and educational needs considered and pt agreeable to plan of care and goals.     Anticipated barriers to physical therapy: scheduling    Goals:   Short Term Goals:  4 weeks  1.Report decreased knee pain  < / =  2/10  to increase tolerance for return to work painf ree  2. Increase knee ROM to 120 flexion in order to be able to perform ADLs without difficulty.  3. Increase strength by 1/3 MMT grade in quad  to increase tolerance for ADL and work activities.  4. Pt to tolerate " HEP to improve ROM and independence with ADL's     Long Term Goals: 8 weeks  1.Report decreased knee pain < / = 0/10  to increase tolerance for return to exercise   2.Patient goal: return to work and ADL pain free  3.Increase strength to >/= 4+/5 in quad and glut  to increase tolerance for ADL and work activities.  4. Pt will report at CJ level (20-40% impaired) on FOTO knee to demonstrate increase in LE function with every day tasks.     Plan   Plan of care Certification: 5/2/2022 to 8/2/2022.     Outpatient Physical Therapy 2 times weekly for 10 weeks to include the following interventions: Gait Training, Manual Therapy, Moist Heat/ Ice, Neuromuscular Re-ed, Patient Education, Self Care, Therapeutic Activities and Therapeutic Exercise.     Yolis Serrano, PTA

## 2022-06-08 ENCOUNTER — TELEPHONE (OUTPATIENT)
Dept: SPORTS MEDICINE | Facility: CLINIC | Age: 52
End: 2022-06-08
Payer: COMMERCIAL

## 2022-06-08 NOTE — TELEPHONE ENCOUNTER
Spoke with patient, rescheduled appointment.    Montserrat Cobos MS, OTC [facility]    ----- Message from Arden Nava sent at 6/8/2022  8:50 AM CDT -----  Contact: Patient  Patient called stating that she went to the wrong clinic and requesting call back in regards to rescheduling.       [phone] (home)

## 2022-06-09 ENCOUNTER — OFFICE VISIT (OUTPATIENT)
Dept: OTOLARYNGOLOGY | Facility: CLINIC | Age: 52
End: 2022-06-09
Payer: COMMERCIAL

## 2022-06-09 VITALS — DIASTOLIC BLOOD PRESSURE: 87 MMHG | HEART RATE: 112 BPM | SYSTOLIC BLOOD PRESSURE: 133 MMHG

## 2022-06-09 DIAGNOSIS — J34.89 NASAL OBSTRUCTION: ICD-10-CM

## 2022-06-09 DIAGNOSIS — Z82.0 FAMILY HISTORY OF NARCOLEPSY: ICD-10-CM

## 2022-06-09 DIAGNOSIS — J35.8 TONSIL STONE: ICD-10-CM

## 2022-06-09 DIAGNOSIS — G47.33 OSA (OBSTRUCTIVE SLEEP APNEA): ICD-10-CM

## 2022-06-09 DIAGNOSIS — Z91.09 HISTORY OF ENVIRONMENTAL ALLERGIES: ICD-10-CM

## 2022-06-09 DIAGNOSIS — J35.1 TONSILLAR HYPERTROPHY: Primary | ICD-10-CM

## 2022-06-09 PROCEDURE — 3079F PR MOST RECENT DIASTOLIC BLOOD PRESSURE 80-89 MM HG: ICD-10-PCS | Mod: CPTII,S$GLB,, | Performed by: OTOLARYNGOLOGY

## 2022-06-09 PROCEDURE — 3075F PR MOST RECENT SYSTOLIC BLOOD PRESS GE 130-139MM HG: ICD-10-PCS | Mod: CPTII,S$GLB,, | Performed by: OTOLARYNGOLOGY

## 2022-06-09 PROCEDURE — 99203 PR OFFICE/OUTPT VISIT, NEW, LEVL III, 30-44 MIN: ICD-10-PCS | Mod: 25,S$GLB,, | Performed by: OTOLARYNGOLOGY

## 2022-06-09 PROCEDURE — 1159F MED LIST DOCD IN RCRD: CPT | Mod: CPTII,S$GLB,, | Performed by: OTOLARYNGOLOGY

## 2022-06-09 PROCEDURE — 3075F SYST BP GE 130 - 139MM HG: CPT | Mod: CPTII,S$GLB,, | Performed by: OTOLARYNGOLOGY

## 2022-06-09 PROCEDURE — 96372 PR INJECTION,THERAP/PROPH/DIAG2ST, IM OR SUBCUT: ICD-10-PCS | Mod: S$GLB,,, | Performed by: OTOLARYNGOLOGY

## 2022-06-09 PROCEDURE — 99999 PR PBB SHADOW E&M-EST. PATIENT-LVL III: ICD-10-PCS | Mod: PBBFAC,,, | Performed by: OTOLARYNGOLOGY

## 2022-06-09 PROCEDURE — 99203 OFFICE O/P NEW LOW 30 MIN: CPT | Mod: 25,S$GLB,, | Performed by: OTOLARYNGOLOGY

## 2022-06-09 PROCEDURE — 99999 PR PBB SHADOW E&M-EST. PATIENT-LVL III: CPT | Mod: PBBFAC,,, | Performed by: OTOLARYNGOLOGY

## 2022-06-09 PROCEDURE — 1159F PR MEDICATION LIST DOCUMENTED IN MEDICAL RECORD: ICD-10-PCS | Mod: CPTII,S$GLB,, | Performed by: OTOLARYNGOLOGY

## 2022-06-09 PROCEDURE — 3079F DIAST BP 80-89 MM HG: CPT | Mod: CPTII,S$GLB,, | Performed by: OTOLARYNGOLOGY

## 2022-06-09 PROCEDURE — 96372 THER/PROPH/DIAG INJ SC/IM: CPT | Mod: S$GLB,,, | Performed by: OTOLARYNGOLOGY

## 2022-06-09 RX ORDER — BETAMETHASONE SODIUM PHOSPHATE AND BETAMETHASONE ACETATE 3; 3 MG/ML; MG/ML
9 INJECTION, SUSPENSION INTRA-ARTICULAR; INTRALESIONAL; INTRAMUSCULAR; SOFT TISSUE
Status: COMPLETED | OUTPATIENT
Start: 2022-06-09 | End: 2022-06-09

## 2022-06-09 RX ORDER — AZELASTINE 1 MG/ML
1 SPRAY, METERED NASAL 2 TIMES DAILY
Qty: 30 ML | Refills: 1 | Status: SHIPPED | OUTPATIENT
Start: 2022-06-09 | End: 2022-06-09

## 2022-06-09 RX ADMIN — BETAMETHASONE SODIUM PHOSPHATE AND BETAMETHASONE ACETATE 9 MG: 3; 3 INJECTION, SUSPENSION INTRA-ARTICULAR; INTRALESIONAL; INTRAMUSCULAR; SOFT TISSUE at 10:06

## 2022-06-09 NOTE — PATIENT INSTRUCTIONS
Sinus CT and brain MRI studies to be reviewed  1.5 cc celestone IM today per request  Tonsil stone/debris  removed from left tonsil crypt manually  Pt. is a candidate for turbinate reduction/john bullosa procedures +;   Consultation with Dr. BRIANNE Giles to be scheduled  Trial of both Flonase NSS and Astelin nasal spray; one spray of each h.s x 6 weeks  Retrial of CPAP may help

## 2022-06-09 NOTE — PROGRESS NOTES
S is Subjective:       Patient ID: Jess Mcleod is a 52 y.o. female.    Chief Complaint: Sinus Problem    HPI: Ms. cMleod is a 52 year old AAF who c/o nasal obstruction and congestion sx especially at night.  She admits to a history of sinus problems.  She has a hx of narcolepsy (  or hypersomnia)  and HAIR but she does not use CPAP (due to recurrent sinus infections?)  She has been prescribed stimulant medications which caused side effects and she has reduced the dose of these.  She was recently evaluated by ENT man Dr. BINDU Grant but she is desperate for immediate relief of her nasal obstruction problems ( defined for her as bilateral middle turbinate john bullosa deformities... worse on right and turbinate hypertrophy) and his treatment plan did not meet her expectations.  She was recently evaluated by a neurologist. She takes Adderall for narcolepsy but she has been reducing the dose due to side effects; she had tried sudafed recently.   Scanning was performed.  She presents me with 2 scan studies ( a sinus CT and brain MRI) for my review.  She, apparently,  is looking for procedures which will immediately relieve her nasal/sinus sx.   However, her real chief complaiont is daytime fatigue which she, is afraid, limits her usefulness at work as a  ( her dream job). She believes that her job is in jeopardy due to her lack of energy/job performance.  She carries bottled oxygen with her which she beieves helps her functioning.   She was allergy tested years ago with + responses to house dust, mold and possibly dog dander... she lives with a dog) .  She indicates a problem with sensitivity perfumes and colognes and scented candles which she feels may trigger migraines.  She accepts my offer of celestone injection today for more immediate relief.    Past Medical History:   Diagnosis Date    Arthritis     Chronic back pain     Chronic neck pain     Fibrocystic breast     Fibroid, uterus     GERD  (gastroesophageal reflux disease)     Herpes     Migraine headache     Narcolepsy     Nephrolithiasis 2018    Sciatica of right side 2018    Scoliosis     Urge incontinence 2018     Past Surgical History:   Procedure Laterality Date    ARTHROSCOPIC CHONDROPLASTY OF KNEE JOINT Left 2022    Procedure: ARTHROSCOPY, KNEE, WITH CHONDROPLASTY;  Surgeon: Janette Odonnell MD;  Location: [facility] OR;  Service: Orthopedics;  Laterality: Left;     SECTION      EMBOLIZATION N/A 2020    Procedure: EMBOLIZATION, BLOOD VESSEL;  Surgeon: Dean Wheeler MD;  Location: [facility], [place], operated by [facility] CATH LAB;  Service: Radiology;  Laterality: N/A;    Scoliosis surgery      SYNOVECTOMY OF KNEE Left 2022    Procedure: SYNOVECTOMY, KNEE;  Surgeon: Janette Odonnell MD;  Location: [facility] OR;  Service: Orthopedics;  Laterality: Left;    UMBILICAL HERNIA REPAIR       Current Outpatient Medications on File Prior to Visit   Medication Sig Dispense Refill    aspirin (ECOTRIN) 81 MG EC tablet Take 1 tablet (81 mg total) by mouth once daily. 28 tablet 0    calcium carbonate (OS-TIFFANIE) 500 mg calcium (1,250 mg) tablet Take 1 tablet by mouth once daily.      dextroamphetamine-amphetamine (ADDERALL) 15 mg tablet Take 15 mg by mouth 2 (two) times daily.      HYDROcodone-acetaminophen (NORCO) 7.5-325 mg per tablet Take 1 tablet by mouth every 4 (four) hours as needed for Pain. 20 tablet 0    ibuprofen (ADVIL,MOTRIN) 200 MG tablet Take 200 mg by mouth every 6 (six) hours as needed for Pain.      Lactobacillus rhamnosus GG (CULTURELLE) 10 billion cell capsule Take 1 capsule by mouth once daily.      magnesium oxide (MAG-OX) 400 mg (241.3 mg magnesium) tablet Take 400 mg by mouth once daily.      [DISCONTINUED] QUEtiapine (SEROQUEL) 25 MG Tab TK 1 T PO QHS  2     Current Facility-Administered Medications on File Prior to Visit   Medication Dose Route Frequency Provider Last Rate Last Admin    LIDOcaine (PF) 10 mg/ml (1%)  injection 10 mg  1 mL Intradermal Once Checo Escobar MD         Review of Systems        Objective:    Gen.: alert and oriented AAF in no acute distress  Physical Exam  Constitutional:       Appearance: She is well-developed.   HENT:      Head: Normocephalic.      Jaw: No trismus.      Right Ear: Tympanic membrane and external ear normal. No decreased hearing noted. No drainage. No foreign body. No mastoid tenderness. Tympanic membrane is not perforated.      Left Ear: Tympanic membrane and external ear normal. No decreased hearing noted. No drainage. No foreign body. No mastoid tenderness. Tympanic membrane is not perforated.      Ears:        Nose: Nose normal. No nasal deformity or septal deviation.      Right Sinus: No maxillary sinus tenderness or frontal sinus tenderness.      Left Sinus: No maxillary sinus tenderness or frontal sinus tenderness.        Mouth/Throat:      Mouth: No oral lesions.      Pharynx: Uvula midline. No oropharyngeal exudate or uvula swelling.      Tonsils: No tonsillar abscesses.     Neck:      Thyroid: No thyromegaly.      Trachea: No tracheal deviation.   Pulmonary:      Effort: Pulmonary effort is normal.      Breath sounds: No stridor.   Musculoskeletal:      Cervical back: Neck supple.   Lymphadenopathy:      Cervical: No cervical adenopathy.   Skin:     Findings: No rash.   Neurological:      Mental Status: She is alert and oriented to person, place, and time.         Assessment:       1. Tonsillar hypertrophy    2. Tonsil stone ; left tonsil crypt   3. Nasal obstruction    4. History of environmental allergies ( dust, mold +)    5. HAIR (obstructive sleep apnea)    6.  History of narcolepsy / hypersomnia     7.    Fatigue; affecting work  8.      Hx migraines  Plan:     Sinus CT and brain MRI studies to be reviewed; given to Dr. BRIANNE Giles  1.5 cc celestone IM today per request  Tonsil stone/debris removed from left tonsil crypt manually  Pt. is a candidate for turbinate  reduction/john bullosa procedures +;   Consultation with Dr. BRIANNE Giles to be scheduled  Trial of both otc Flonase NSS and Rx for Astelin nasal spray; one spray of each to each nostril h.s x 6 weeks may help  Retrial of CPAP may help

## 2022-06-23 ENCOUNTER — TELEPHONE (OUTPATIENT)
Dept: OTOLARYNGOLOGY | Facility: CLINIC | Age: 52
End: 2022-06-23

## 2022-06-23 ENCOUNTER — OFFICE VISIT (OUTPATIENT)
Dept: OTOLARYNGOLOGY | Facility: CLINIC | Age: 52
End: 2022-06-23
Payer: COMMERCIAL

## 2022-06-23 VITALS — HEIGHT: 64 IN | WEIGHT: 171.06 LBS | BODY MASS INDEX: 29.2 KG/M2

## 2022-06-23 DIAGNOSIS — J34.89 CONCHA BULLOSA: ICD-10-CM

## 2022-06-23 DIAGNOSIS — G47.30 SLEEP-DISORDERED BREATHING: ICD-10-CM

## 2022-06-23 DIAGNOSIS — J30.2 PERENNIAL ALLERGIC RHINITIS WITH SEASONAL VARIATION: Primary | ICD-10-CM

## 2022-06-23 DIAGNOSIS — J34.3 NASAL TURBINATE HYPERTROPHY: ICD-10-CM

## 2022-06-23 DIAGNOSIS — J30.89 PERENNIAL ALLERGIC RHINITIS WITH SEASONAL VARIATION: Primary | ICD-10-CM

## 2022-06-23 PROCEDURE — 3008F PR BODY MASS INDEX (BMI) DOCUMENTED: ICD-10-PCS | Mod: CPTII,S$GLB,, | Performed by: OTOLARYNGOLOGY

## 2022-06-23 PROCEDURE — 99999 PR PBB SHADOW E&M-EST. PATIENT-LVL IV: CPT | Mod: PBBFAC,,, | Performed by: OTOLARYNGOLOGY

## 2022-06-23 PROCEDURE — 99214 PR OFFICE/OUTPT VISIT, EST, LEVL IV, 30-39 MIN: ICD-10-PCS | Mod: 25,S$GLB,, | Performed by: OTOLARYNGOLOGY

## 2022-06-23 PROCEDURE — 3008F BODY MASS INDEX DOCD: CPT | Mod: CPTII,S$GLB,, | Performed by: OTOLARYNGOLOGY

## 2022-06-23 PROCEDURE — 1160F RVW MEDS BY RX/DR IN RCRD: CPT | Mod: CPTII,S$GLB,, | Performed by: OTOLARYNGOLOGY

## 2022-06-23 PROCEDURE — 1160F PR REVIEW ALL MEDS BY PRESCRIBER/CLIN PHARMACIST DOCUMENTED: ICD-10-PCS | Mod: CPTII,S$GLB,, | Performed by: OTOLARYNGOLOGY

## 2022-06-23 PROCEDURE — 1159F MED LIST DOCD IN RCRD: CPT | Mod: CPTII,S$GLB,, | Performed by: OTOLARYNGOLOGY

## 2022-06-23 PROCEDURE — 99214 OFFICE O/P EST MOD 30 MIN: CPT | Mod: 25,S$GLB,, | Performed by: OTOLARYNGOLOGY

## 2022-06-23 PROCEDURE — 1159F PR MEDICATION LIST DOCUMENTED IN MEDICAL RECORD: ICD-10-PCS | Mod: CPTII,S$GLB,, | Performed by: OTOLARYNGOLOGY

## 2022-06-23 PROCEDURE — 99999 PR PBB SHADOW E&M-EST. PATIENT-LVL IV: ICD-10-PCS | Mod: PBBFAC,,, | Performed by: OTOLARYNGOLOGY

## 2022-06-23 NOTE — H&P (VIEW-ONLY)
Subjective:      Jess Mcleod is a 52 y.o. female who was referred to me by Dr. Denver Jones* in consultation for nasal congestion.    She relates a long history for many years of perennial, daily, moderately severe, bilateral nasal congestion that is present throughout the day and associated with poor sleep.  She has tried flonase, astelin and saline without improvement.  She relates sensitivity to certain odors, though denies hyposmia.  She describes migraine headaches periodically and has been under care of a neurologist.    Current sinonasal medications as above.  The last course of antibiotics was a long time ago.  She does not regularly use nasal decongestant sprays.    She recalls previously having allergy testing, which was reportedly positive for dust and molds a long time ago.  She does not recall having SCIT.    She denies a history of asthma.    She denies a history of reflux symptoms.     She denies a diagnosis of obstructive sleep apnea though takes Adderall for narcolepsy.     She has not had sinonasal surgery.  She has not had a tonsillectomy.    She does not recall a prior history of nasal trauma.    SNOT-22 score: : (P) 79  NOSE score:: (P) 85%  ETDQ-7 score:: (P) 4      Past Medical History  She has a past medical history of Arthritis, Chronic back pain, Chronic neck pain, Fibrocystic breast, Fibroid, uterus, GERD (gastroesophageal reflux disease), Herpes, Migraine headache, Narcolepsy, Nephrolithiasis, Sciatica of right side, Scoliosis, and Urge incontinence.    Past Surgical History  She has a past surgical history that includes Umbilical hernia repair;  section; Scoliosis surgery; Embolization (N/A, 2020); Arthroscopic chondroplasty of knee joint (Left, 2022); and Synovectomy of knee (Left, 2022).    Family History  Her family history includes Breast cancer in her paternal aunt.    Social History  She reports that she has never smoked. She has never used  smokeless tobacco. She reports previous alcohol use. She reports that she does not use drugs.    Allergies  She is allergic to ibuprofen.    Medications   She has a current medication list which includes the following prescription(s): aspirin, azelastine, calcium carbonate, dextroamphetamine-amphetamine, dextroamphetamine-amphetamine, dextroamphetamine-amphetamine, hydrocodone-acetaminophen, ibuprofen, lactobacillus rhamnosus gg, magnesium oxide, and [DISCONTINUED] quetiapine, and the following Facility-Administered Medications: lidocaine (pf) 10 mg/ml (1%).    Review of Systems     Constitutional: Positive for appetite change and fatigue.  Negative for chills, fever and unexpected weight loss.      HENT: Positive for ear pain, facial swelling, mouth sores, sinus infection, sinus pressure, dental problems, trouble swallowing and voice change.  Negative for ear discharge, ear infection, hearing loss, nosebleeds, postnasal drip, ringing in the ears, runny nose, sore throat, stuffy nose and tonsil infection.      Eyes:  Positive for eye drainage, eye itching and photophobia. Negative for change in eyesight.     Respiratory:  Positive for shortness of breath, sleep apnea and snoring. Negative for cough and wheezing.      Cardiovascular:  Positive for foot swelling. Negative for chest pain, irregular heartbeat and swollen veins.     Gastrointestinal:  Positive for abdominal pain, acid reflux, constipation and heartburn. Negative for diarrhea and vomiting.     Genitourinary: Positive for frequent urination. Negative for difficulty urinating and sexual problems.     Musc: Positive for aching muscles, back pain and neck pain. Negative for aching joints.     Skin: Negative for rash.     Allergy: Positive for food allergies and seasonal allergies.     Endocrine: Positive for cold intolerance and heat intolerance.     Neurological: Positive for dizziness, headaches, light-headedness and tremors. Negative for seizures.   "    Hematologic: Positive for bruises/bleeds easily. Negative for swollen glands.      Psychiatric: Positive for decreased concentration, nervous/anxious and sleep disturbance. Negative for depression.               Objective:     Ht 5' 4" (1.626 m)   Wt 77.6 kg (171 lb 1.2 oz)   BMI 29.37 kg/m²        Constitutional:   She appears well-developed. She is cooperative. Normal speech.  No hoarse voice.      Head:  Normocephalic. Salivary glands normal.  Facial strength is normal.      Ears:    Right Ear: No drainage or tenderness. Tympanic membrane is not perforated. Tympanic membrane mobility is normal. No middle ear effusion. No decreased hearing is noted.   Left Ear: No drainage or tenderness. Tympanic membrane is not perforated. Tympanic membrane mobility is normal.  No middle ear effusion. No decreased hearing is noted.     Nose:  No mucosal edema, rhinorrhea, septal deviation or polyps. No epistaxis. Turbinate hypertrophy.  Turbinates normal and no turbinate masses.  Right sinus exhibits maxillary sinus tenderness. Right sinus exhibits no frontal sinus tenderness. Left sinus exhibits maxillary sinus tenderness. Left sinus exhibits no frontal sinus tenderness.     Mouth/Throat  Oropharynx clear and moist without lesions or asymmetry and normal uvula midline. She does not have dentures. Normal dentition. No oral lesions or mucous membrane lesions. No oropharyngeal exudate or posterior oropharyngeal erythema. Tonsils present, +2.  Mirror exam not performed due to patient tolerance.  Mirror exam not performed due to patient tolerance.      Neck:  Neck normal without thyromegaly masses, asymmetry, normal tracheal structure, crepitus, and tenderness, thyroid normal, trachea normal and no adenopathy. Normal range of motion present.     She has no cervical adenopathy.     Cardiovascular:   Regular rhythm.      Pulmonary/Chest:   Effort normal.     Psychiatric:   She has a normal mood and affect. Her speech is normal " and behavior is normal.     Neurological:   No cranial nerve deficit.     Skin:   No rash noted.       Procedure    Nasal endoscopy performed.  See procedure note.     Left IT     Left MT     Right IT     Right MT            Data Reviewed    WBC (K/uL)   Date Value   04/24/2020 7.80     Eosinophil % (%)   Date Value   04/24/2020 2.3     Eos # (K/uL)   Date Value   04/24/2020 0.2     Platelets (K/uL)   Date Value   04/24/2020 556 (H)     Glucose (mg/dL)   Date Value   06/05/2019 76     No results found for: IGE    I independently reviewed the images of the CT sinuses dated 6/6/22. Pertinent findings include small john bullosae bilaterally, large inferior turbinates bilaterally, and otherwise clear sinuses.                           Assessment:     1. Perennial allergic rhinitis with seasonal variation    2. Nasal turbinate hypertrophy    3. John bullosa    4. Sleep-disordered breathing         Plan:     I had a long discussion with the patient regarding her condition and the further workup and management options.  She would benefit from bilateral john bullosa resection and inferior turbinate reduction for the treatment of her condition.  I discussed the risks, benefits and alternatives to surgery with the patient, as well as the expected postoperative course.  I gave her the opportunity to ask questions and I answered all of them.  I provided relevant printed information on her condition for her to review at home.  Same-day discharge is anticipated.  She may have anesthesia triage by telephone. She will also need to stop aspirin 1 week prior to surgery. The surgery will be scheduled in the near future.  She will need to return for a postoperative visit 1 week after surgery.     Follow up for surgery.

## 2022-06-23 NOTE — PROGRESS NOTES
"POST-OPERATIVE EXAMINATION    52 y.o. Female who returns for follow after surgery. She is 8 weeks s/p    Procedure  1. Left knee arthroscopic chondroplasty  2. Left knee arthroscopic synovectomy      Her physical therapy was canceled and she got sick last week so she has not been going to therapy.      PHYSICAL EXAMINATION:  Ht 5' 4" (1.626 m)   Wt 77.1 kg (170 lb)   BMI 29.18 kg/m²   General: Well-developed well-nourished 52 y.o. femalein no acute distress   Cardiovascular: Regular rhythm   Lungs: No labored breathing or wheezing appreciated   Neuro: Alert and oriented ×3   Psychiatric: well oriented to person, place and time, demonstrates normal mood and affect   Skin: No rashes, lesions or ulcers, normal temperature, turgor, and texture on involved extremity    ORTHOPEDIC EXAM:  Normal post-operative swelling  Normal post-operative scarring  Strength: WNL  ROM: WNL  Tests: None  Trace effusion    ASSESSMENT:      ICD-10-CM ICD-9-CM   1. S/P left knee arthroscopy  Z98.890 V45.89       PLAN:       1. New referral to physical therapy placed today  2. RTC in 6 weeks  3. Celebrex 200mg prescribed today              "

## 2022-06-24 ENCOUNTER — OFFICE VISIT (OUTPATIENT)
Dept: SPORTS MEDICINE | Facility: CLINIC | Age: 52
End: 2022-06-24
Payer: COMMERCIAL

## 2022-06-24 VITALS — WEIGHT: 170 LBS | BODY MASS INDEX: 29.02 KG/M2 | HEIGHT: 64 IN

## 2022-06-24 DIAGNOSIS — Z98.890 S/P LEFT KNEE ARTHROSCOPY: Primary | ICD-10-CM

## 2022-06-24 PROCEDURE — 99999 PR PBB SHADOW E&M-EST. PATIENT-LVL III: ICD-10-PCS | Mod: PBBFAC,,, | Performed by: ORTHOPAEDIC SURGERY

## 2022-06-24 PROCEDURE — 99024 POSTOP FOLLOW-UP VISIT: CPT | Mod: S$GLB,,, | Performed by: ORTHOPAEDIC SURGERY

## 2022-06-24 PROCEDURE — 1159F MED LIST DOCD IN RCRD: CPT | Mod: CPTII,S$GLB,, | Performed by: ORTHOPAEDIC SURGERY

## 2022-06-24 PROCEDURE — 1159F PR MEDICATION LIST DOCUMENTED IN MEDICAL RECORD: ICD-10-PCS | Mod: CPTII,S$GLB,, | Performed by: ORTHOPAEDIC SURGERY

## 2022-06-24 PROCEDURE — 3008F BODY MASS INDEX DOCD: CPT | Mod: CPTII,S$GLB,, | Performed by: ORTHOPAEDIC SURGERY

## 2022-06-24 PROCEDURE — 99024 PR POST-OP FOLLOW-UP VISIT: ICD-10-PCS | Mod: S$GLB,,, | Performed by: ORTHOPAEDIC SURGERY

## 2022-06-24 PROCEDURE — 99999 PR PBB SHADOW E&M-EST. PATIENT-LVL III: CPT | Mod: PBBFAC,,, | Performed by: ORTHOPAEDIC SURGERY

## 2022-06-24 PROCEDURE — 3008F PR BODY MASS INDEX (BMI) DOCUMENTED: ICD-10-PCS | Mod: CPTII,S$GLB,, | Performed by: ORTHOPAEDIC SURGERY

## 2022-06-24 RX ORDER — CELECOXIB 200 MG/1
200 CAPSULE ORAL DAILY
Qty: 30 CAPSULE | Refills: 0 | Status: SHIPPED | OUTPATIENT
Start: 2022-06-24 | End: 2022-11-21

## 2022-07-21 ENCOUNTER — ANESTHESIA EVENT (OUTPATIENT)
Dept: SURGERY | Facility: HOSPITAL | Age: 52
End: 2022-07-21
Payer: COMMERCIAL

## 2022-07-21 ENCOUNTER — TELEPHONE (OUTPATIENT)
Dept: OTOLARYNGOLOGY | Facility: CLINIC | Age: 52
End: 2022-07-21
Payer: COMMERCIAL

## 2022-07-21 ENCOUNTER — PATIENT MESSAGE (OUTPATIENT)
Dept: OTOLARYNGOLOGY | Facility: CLINIC | Age: 52
End: 2022-07-21
Payer: COMMERCIAL

## 2022-07-21 NOTE — TELEPHONE ENCOUNTER
Called patient. Left a voicemail this morning and afternoon asking her to call back to go over her surgery instructions. I also sent a detailed patient portal message with her instructions for surgery Friday.

## 2022-07-21 NOTE — ANESTHESIA PREPROCEDURE EVALUATION
"[facility]  Anesthesia Pre-Operative Evaluation         Patient Name: Jess Mcleod  YOB: 1970  MRN: [id number]    SUBJECTIVE:     Pre-operative evaluation for Procedure(s) (LRB):  REDUCTION, NASAL TURBINATE john bullosa (Bilateral)     07/21/2022    Jess Mcleod is a 52 y.o. female w/ a significant PMHx of GERD, migraines, and nasal congestion.    Patient now presents for the above procedure(s).      LDA: None documented.    Prev airway:   Intubation     Date/Time: 4/28/2022 7:08 AM  Performed by: Maddie Jacob CRNA  Authorized by: Vince Pino MD      Intubation:     Induction:  Intravenous    Intubated:  Postinduction    Mask Ventilation:  Easy mask    Attempts:  1    Attempted By:  CRNA    Method of Intubation:  Fast track LMA    Difficult Airway Encountered?: No      Complications:  None    Airway Device Size:  3.5    Style/Cuff Inflation:  Cuffed    Secured at:  The lips    Placement Verified By:  Capnometry    Complicating Factors:  None    Findings Post-Intubation:  BS equal bilateral    Drips: None documented.    Patient Active Problem List   Diagnosis    Left ureteral stone    Nausea and vomiting    Acute right-sided low back pain    Chronic right-sided low back pain with right-sided sciatica    Scoliosis    GERD (gastroesophageal reflux disease)    Urge incontinence    Chronic idiopathic constipation    Nephrolithiasis    Hepatic steatosis    Hydroureteronephrosis    Sciatica of right side    Fatigue    Memory deficit    PMS (premenstrual syndrome)    Pain of left calf    Fibroids    Intramural and submucous leiomyoma of uterus    HAIR (obstructive sleep apnea)    Acute medial meniscal tear, left, sequela    Acute pain of left knee       Review of patient's allergies indicates:   Allergen Reactions    Ibuprofen Other (See Comments)     It makes my "ears hurt" when I take large doses.    Opioids - morphine analogues Other (See " Comments)     Causes headaches       Current Outpatient Medications:  No current facility-administered medications for this encounter.    Current Outpatient Medications:     aspirin (ECOTRIN) 81 MG EC tablet, Take 1 tablet (81 mg total) by mouth once daily. (Patient not taking: Reported on 2022), Disp: 28 tablet, Rfl: 0    azelastine (ASTELIN) 137 mcg (0.1 %) nasal spray, SPRAY 1 SPRAY IN EACH NOSTRIL TWICE DAILY, Disp: 90 mL, Rfl: 1    calcium carbonate (OS-TIFFANIE) 500 mg calcium (1,250 mg) tablet, Take 1 tablet by mouth once daily., Disp: , Rfl:     celecoxib (CELEBREX) 200 MG capsule, Take 1 capsule (200 mg total) by mouth once daily., Disp: 30 capsule, Rfl: 0    dextroamphetamine-amphetamine (ADDERALL) 15 mg tablet, Take 15 mg by mouth 2 (two) times daily., Disp: , Rfl:     dextroamphetamine-amphetamine (ADDERALL) 20 mg tablet, Take one tablet by mouth every 8 hours, Disp: 90 tablet, Rfl: 0    HYDROcodone-acetaminophen (NORCO) 7.5-325 mg per tablet, Take 1 tablet by mouth every 4 (four) hours as needed for Pain. (Patient not taking: Reported on 2022), Disp: 20 tablet, Rfl: 0    ibuprofen (ADVIL,MOTRIN) 200 MG tablet, Take 200 mg by mouth every 6 (six) hours as needed for Pain., Disp: , Rfl:     Lactobacillus rhamnosus GG (CULTURELLE) 10 billion cell capsule, Take 1 capsule by mouth once daily., Disp: , Rfl:     magnesium oxide (MAG-OX) 400 mg (241.3 mg magnesium) tablet, Take 400 mg by mouth once daily., Disp: , Rfl:     Facility-Administered Medications Ordered in Other Encounters:     LIDOcaine (PF) 10 mg/ml (1%) injection 10 mg, 1 mL, Intradermal, Once, Checo Escobar MD    Past Surgical History:   Procedure Laterality Date    ARTHROSCOPIC CHONDROPLASTY OF KNEE JOINT Left 2022    Procedure: ARTHROSCOPY, KNEE, WITH CHONDROPLASTY;  Surgeon: Janette Odonnell MD;  Location: [facility];  Service: Orthopedics;  Laterality: Left;     SECTION      EMBOLIZATION N/A 2020     Procedure: EMBOLIZATION, BLOOD VESSEL;  Surgeon: Dean Wheeler MD;  Location: [facility];  Service: Radiology;  Laterality: N/A;    Scoliosis surgery      SYNOVECTOMY OF KNEE Left 4/28/2022    Procedure: SYNOVECTOMY, KNEE;  Surgeon: Janette Odonnell MD;  Location: [facility] OR;  Service: Orthopedics;  Laterality: Left;    UMBILICAL HERNIA REPAIR         Social History     Socioeconomic History    Marital status: Legally    Tobacco Use    Smoking status: Never Smoker    Smokeless tobacco: Never Used   Substance and Sexual Activity    Alcohol use: Not Currently    Drug use: No    Sexual activity: Yes     Partners: Male     Birth control/protection: None       OBJECTIVE:     Vital Signs Range (Last 24H):         Significant Labs:  Lab Results   Component Value Date    WBC 7.80 04/24/2020    HGB 13.4 04/24/2020    HCT 41.9 04/24/2020     (H) 04/24/2020    CHOL 212 (H) 05/20/2018    TRIG 179 (H) 05/20/2018    HDL 45 05/20/2018    ALT 18 06/05/2019    AST 21 06/05/2019     06/05/2019    K 4.1 06/05/2019     06/05/2019    CREATININE 0.7 06/05/2019    BUN 9 06/05/2019    CO2 20 (L) 06/05/2019    TSH 1.058 02/07/2019    HGBA1C 5.4 06/25/2005       Diagnostic Studies: No relevant studies.    EKG:   Results for orders placed or performed during the hospital encounter of 05/19/18   EKG 12-lead    Collection Time: 05/19/18  2:38 PM    Narrative    Test Reason : R10.9,  Blood Pressure :  mmHG  Vent. Rate : 094 BPM     Atrial Rate : 094 BPM     P-R Int : 176 ms          QRS Dur : 078 ms      QT Int : 336 ms       P-R-T Axes : 051 067 030 degrees     QTc Int : 420 ms    Normal sinus rhythm  Normal ECG  No previous ECGs available  Confirmed by KADE FONSECA, ELYSSA ([id number]) on 5/20/2018 10:04:56 AM    Referred By: MORENA   SELF           Confirmed By:ELYSSA BRAUN MD       2D ECHO:  TTE:  No results found for this or any previous visit.    STEVIE:  No results found for this or any previous  visit.    ASSESSMENT/PLAN:           Pre-op Assessment    I have reviewed the Patient Summary Reports.     I have reviewed the Nursing Notes. I have reviewed the NPO Status.   I have reviewed the Medications.     Review of Systems  Anesthesia Hx:  No problems with previous Anesthesia Denies Hx of Anesthetic complications  Denies Family Hx of Anesthesia complications.   Denies Personal Hx of Anesthesia complications.   Social:  Non-Smoker, No Alcohol Use    Hematology/Oncology:  Hematology Normal   Oncology Normal     EENT/Dental:EENT/Dental Normal   Cardiovascular:   Exercise tolerance: good Denies MI.  Denies CAD.     Functional Capacity good / => 4 METS    Pulmonary:  Pulmonary Normal    Renal/:   Chronic Renal Disease nephrolithiasis    Hepatic/GI:   GERD Liver Disease, Hepatic steatosis   OB/GYN/PEDS:  uterine artery embolization 2020    Neurological:   Headaches    Endocrine:  Denies Diabetes  Denies Thyroid Disease    Dermatological:  Skin Normal        Physical Exam  General: Well nourished, Cooperative, Alert and Oriented    Airway:  Mallampati: II / I  Mouth Opening: Normal  Tongue: Normal  Neck ROM: Normal ROM    Dental:  Intact        Anesthesia Plan  Type of Anesthesia, risks & benefits discussed:    Anesthesia Type: Gen ETT  Intra-op Monitoring Plan: Standard ASA Monitors and Art Line  Post Op Pain Control Plan: multimodal analgesia and IV/PO Opioids PRN  Induction:  IV  Airway Plan: Direct and Video, Post-Induction  ASA Score: 2  Day of Surgery Review of History & Physical: H&P Update referred to the surgeon/provider.    Ready For Surgery From Anesthesia Perspective.     .

## 2022-07-22 ENCOUNTER — HOSPITAL ENCOUNTER (OUTPATIENT)
Facility: HOSPITAL | Age: 52
Discharge: HOME | End: 2022-07-22
Attending: OTOLARYNGOLOGY | Admitting: OTOLARYNGOLOGY
Payer: COMMERCIAL

## 2022-07-22 ENCOUNTER — ANESTHESIA (OUTPATIENT)
Dept: SURGERY | Facility: HOSPITAL | Age: 52
End: 2022-07-22
Payer: COMMERCIAL

## 2022-07-22 VITALS
SYSTOLIC BLOOD PRESSURE: 139 MMHG | OXYGEN SATURATION: 100 % | HEIGHT: 64 IN | HEART RATE: 83 BPM | DIASTOLIC BLOOD PRESSURE: 90 MMHG | TEMPERATURE: 97 F | WEIGHT: 170 LBS | RESPIRATION RATE: 16 BRPM | BODY MASS INDEX: 29.02 KG/M2

## 2022-07-22 DIAGNOSIS — J34.89 NASAL OBSTRUCTION: Primary | ICD-10-CM

## 2022-07-22 DIAGNOSIS — N20.1 LEFT URETERAL STONE: ICD-10-CM

## 2022-07-22 LAB
B-HCG UR QL: NEGATIVE
CTP QC/QA: YES
CTP QC/QA: YES
SARS-COV-2 AG RESP QL IA.RAPID: NEGATIVE

## 2022-07-22 PROCEDURE — 36000707: Performed by: OTOLARYNGOLOGY

## 2022-07-22 PROCEDURE — 37000008 HC ANESTHESIA 1ST 15 MINUTES: Performed by: OTOLARYNGOLOGY

## 2022-07-22 PROCEDURE — 36000706: Performed by: OTOLARYNGOLOGY

## 2022-07-22 PROCEDURE — 25000003 PHARM REV CODE 250

## 2022-07-22 PROCEDURE — 71000044 HC DOSC ROUTINE RECOVERY FIRST HOUR: Performed by: OTOLARYNGOLOGY

## 2022-07-22 PROCEDURE — 30140 PR EXCISION TURBINATE,SUBMUCOUS: ICD-10-PCS | Mod: 50,,, | Performed by: OTOLARYNGOLOGY

## 2022-07-22 PROCEDURE — D9220A PRA ANESTHESIA: ICD-10-PCS | Mod: ,,, | Performed by: ANESTHESIOLOGY

## 2022-07-22 PROCEDURE — 31240 NSL/SNS NDSC CNCH BULL RESCJ: CPT | Mod: 50,51,, | Performed by: OTOLARYNGOLOGY

## 2022-07-22 PROCEDURE — 27201423 OPTIME MED/SURG SUP & DEVICES STERILE SUPPLY: Performed by: OTOLARYNGOLOGY

## 2022-07-22 PROCEDURE — 25000003 PHARM REV CODE 250: Mod: TB | Performed by: OTOLARYNGOLOGY

## 2022-07-22 PROCEDURE — 37000009 HC ANESTHESIA EA ADD 15 MINS: Performed by: OTOLARYNGOLOGY

## 2022-07-22 PROCEDURE — C9046 COCAINE HCL NASAL SOLUTION: HCPCS | Mod: TB | Performed by: OTOLARYNGOLOGY

## 2022-07-22 PROCEDURE — 30140 RESECT INFERIOR TURBINATE: CPT | Mod: 50,,, | Performed by: OTOLARYNGOLOGY

## 2022-07-22 PROCEDURE — 81025 URINE PREGNANCY TEST: CPT | Performed by: OTOLARYNGOLOGY

## 2022-07-22 PROCEDURE — 63600175 PHARM REV CODE 636 W HCPCS

## 2022-07-22 PROCEDURE — 71000015 HC POSTOP RECOV 1ST HR: Performed by: OTOLARYNGOLOGY

## 2022-07-22 PROCEDURE — 31240 PR NASAL/SINUS ENDOSCOPY,RMV CONCHA BULL: ICD-10-PCS | Mod: 50,51,, | Performed by: OTOLARYNGOLOGY

## 2022-07-22 PROCEDURE — D9220A PRA ANESTHESIA: Mod: ,,, | Performed by: ANESTHESIOLOGY

## 2022-07-22 RX ORDER — ACETAMINOPHEN 325 MG/1
650 TABLET ORAL
Qty: 12 TABLET | Refills: 0 | Status: SHIPPED | OUTPATIENT
Start: 2022-07-22 | End: 2022-11-21

## 2022-07-22 RX ORDER — METOCLOPRAMIDE HYDROCHLORIDE 5 MG/ML
5 INJECTION INTRAMUSCULAR; INTRAVENOUS EVERY 6 HOURS PRN
Status: DISCONTINUED | OUTPATIENT
Start: 2022-07-22 | End: 2022-07-22 | Stop reason: HOSPADM

## 2022-07-22 RX ORDER — SODIUM CHLORIDE 0.9 % (FLUSH) 0.9 %
3 SYRINGE (ML) INJECTION
Status: DISCONTINUED | OUTPATIENT
Start: 2022-07-22 | End: 2022-07-22 | Stop reason: HOSPADM

## 2022-07-22 RX ORDER — SODIUM CHLORIDE 0.9 % (FLUSH) 0.9 %
2 SYRINGE (ML) INJECTION
Status: DISCONTINUED | OUTPATIENT
Start: 2022-07-22 | End: 2022-07-22 | Stop reason: HOSPADM

## 2022-07-22 RX ORDER — ONDANSETRON 8 MG/1
8 TABLET, ORALLY DISINTEGRATING ORAL EVERY 6 HOURS PRN
Status: DISCONTINUED | OUTPATIENT
Start: 2022-07-22 | End: 2022-07-22 | Stop reason: HOSPADM

## 2022-07-22 RX ORDER — CEFAZOLIN SODIUM/WATER 2 G/20 ML
2 SYRINGE (ML) INTRAVENOUS
Status: DISCONTINUED | OUTPATIENT
Start: 2022-07-22 | End: 2022-07-22 | Stop reason: HOSPADM

## 2022-07-22 RX ORDER — MIDAZOLAM HYDROCHLORIDE 1 MG/ML
INJECTION, SOLUTION INTRAMUSCULAR; INTRAVENOUS
Status: DISCONTINUED | OUTPATIENT
Start: 2022-07-22 | End: 2022-07-22

## 2022-07-22 RX ORDER — ACETAMINOPHEN 325 MG/1
650 TABLET ORAL EVERY 4 HOURS PRN
Status: DISCONTINUED | OUTPATIENT
Start: 2022-07-22 | End: 2022-07-22 | Stop reason: HOSPADM

## 2022-07-22 RX ORDER — FLUTICASONE PROPIONATE 50 MCG
1 SPRAY, SUSPENSION (ML) NASAL DAILY
COMMUNITY
End: 2022-12-14

## 2022-07-22 RX ORDER — ROCURONIUM BROMIDE 10 MG/ML
INJECTION, SOLUTION INTRAVENOUS
Status: DISCONTINUED | OUTPATIENT
Start: 2022-07-22 | End: 2022-07-22

## 2022-07-22 RX ORDER — IBUPROFEN 600 MG/1
600 TABLET ORAL EVERY 6 HOURS PRN
Qty: 12 TABLET | Refills: 0 | Status: CANCELLED | OUTPATIENT
Start: 2022-07-22

## 2022-07-22 RX ORDER — PROPOFOL 10 MG/ML
VIAL (ML) INTRAVENOUS
Status: DISCONTINUED | OUTPATIENT
Start: 2022-07-22 | End: 2022-07-22

## 2022-07-22 RX ORDER — MEPERIDINE HYDROCHLORIDE 50 MG/ML
12.5 INJECTION INTRAMUSCULAR; INTRAVENOUS; SUBCUTANEOUS ONCE AS NEEDED
Status: DISCONTINUED | OUTPATIENT
Start: 2022-07-22 | End: 2022-07-22 | Stop reason: HOSPADM

## 2022-07-22 RX ORDER — LIDOCAINE HYDROCHLORIDE AND EPINEPHRINE 10; 10 MG/ML; UG/ML
INJECTION, SOLUTION INFILTRATION; PERINEURAL
Status: DISCONTINUED | OUTPATIENT
Start: 2022-07-22 | End: 2022-07-22 | Stop reason: HOSPADM

## 2022-07-22 RX ORDER — PHENYLEPHRINE HYDROCHLORIDE 10 MG/ML
INJECTION INTRAVENOUS
Status: DISCONTINUED | OUTPATIENT
Start: 2022-07-22 | End: 2022-07-22

## 2022-07-22 RX ORDER — ONDANSETRON 2 MG/ML
INJECTION INTRAMUSCULAR; INTRAVENOUS
Status: DISCONTINUED | OUTPATIENT
Start: 2022-07-22 | End: 2022-07-22

## 2022-07-22 RX ORDER — FENTANYL CITRATE 50 UG/ML
25 INJECTION, SOLUTION INTRAMUSCULAR; INTRAVENOUS EVERY 5 MIN PRN
Status: DISCONTINUED | OUTPATIENT
Start: 2022-07-22 | End: 2022-07-22 | Stop reason: HOSPADM

## 2022-07-22 RX ORDER — FENTANYL CITRATE 50 UG/ML
INJECTION, SOLUTION INTRAMUSCULAR; INTRAVENOUS
Status: DISCONTINUED | OUTPATIENT
Start: 2022-07-22 | End: 2022-07-22

## 2022-07-22 RX ORDER — LIDOCAINE HYDROCHLORIDE 20 MG/ML
INJECTION, SOLUTION EPIDURAL; INFILTRATION; INTRACAUDAL; PERINEURAL
Status: DISCONTINUED | OUTPATIENT
Start: 2022-07-22 | End: 2022-07-22

## 2022-07-22 RX ORDER — ONDANSETRON 2 MG/ML
4 INJECTION INTRAMUSCULAR; INTRAVENOUS DAILY PRN
Status: DISCONTINUED | OUTPATIENT
Start: 2022-07-22 | End: 2022-07-22 | Stop reason: HOSPADM

## 2022-07-22 RX ORDER — LIDOCAINE HYDROCHLORIDE 10 MG/ML
1 INJECTION, SOLUTION EPIDURAL; INFILTRATION; INTRACAUDAL; PERINEURAL ONCE
Status: DISCONTINUED | OUTPATIENT
Start: 2022-07-22 | End: 2022-07-22 | Stop reason: HOSPADM

## 2022-07-22 RX ORDER — DEXAMETHASONE SODIUM PHOSPHATE 4 MG/ML
INJECTION, SOLUTION INTRA-ARTICULAR; INTRALESIONAL; INTRAMUSCULAR; INTRAVENOUS; SOFT TISSUE
Status: DISCONTINUED | OUTPATIENT
Start: 2022-07-22 | End: 2022-07-22

## 2022-07-22 RX ORDER — COCAINE HYDROCHLORIDE 40 MG/ML
SOLUTION NASAL
Status: DISCONTINUED | OUTPATIENT
Start: 2022-07-22 | End: 2022-07-22 | Stop reason: HOSPADM

## 2022-07-22 RX ORDER — MUPIROCIN 20 MG/G
OINTMENT TOPICAL
Status: DISCONTINUED | OUTPATIENT
Start: 2022-07-22 | End: 2022-07-22 | Stop reason: HOSPADM

## 2022-07-22 RX ADMIN — PROPOFOL 30 MG: 10 INJECTION, EMULSION INTRAVENOUS at 02:07

## 2022-07-22 RX ADMIN — PHENYLEPHRINE HYDROCHLORIDE 80 MCG: 10 INJECTION INTRAVENOUS at 02:07

## 2022-07-22 RX ADMIN — LIDOCAINE HYDROCHLORIDE 100 MG: 20 INJECTION, SOLUTION EPIDURAL; INFILTRATION; INTRACAUDAL; PERINEURAL at 02:07

## 2022-07-22 RX ADMIN — ROCURONIUM BROMIDE 10 MG: 10 INJECTION INTRAVENOUS at 02:07

## 2022-07-22 RX ADMIN — ONDANSETRON 4 MG: 2 INJECTION INTRAMUSCULAR; INTRAVENOUS at 02:07

## 2022-07-22 RX ADMIN — DEXAMETHASONE SODIUM PHOSPHATE 4 MG: 4 INJECTION INTRA-ARTICULAR; INTRALESIONAL; INTRAMUSCULAR; INTRAVENOUS; SOFT TISSUE at 02:07

## 2022-07-22 RX ADMIN — SUGAMMADEX 400 MG: 100 INJECTION, SOLUTION INTRAVENOUS at 02:07

## 2022-07-22 RX ADMIN — MIDAZOLAM 2 MG: 1 INJECTION INTRAMUSCULAR; INTRAVENOUS at 01:07

## 2022-07-22 RX ADMIN — PROPOFOL 60 MG: 10 INJECTION, EMULSION INTRAVENOUS at 02:07

## 2022-07-22 RX ADMIN — ROCURONIUM BROMIDE 50 MG: 10 INJECTION INTRAVENOUS at 02:07

## 2022-07-22 RX ADMIN — FENTANYL CITRATE 50 MCG: 50 INJECTION INTRAMUSCULAR; INTRAVENOUS at 02:07

## 2022-07-22 RX ADMIN — PROPOFOL 140 MG: 10 INJECTION, EMULSION INTRAVENOUS at 02:07

## 2022-07-22 RX ADMIN — FENTANYL CITRATE 100 MCG: 50 INJECTION INTRAMUSCULAR; INTRAVENOUS at 02:07

## 2022-07-22 NOTE — OP NOTE
DATE OF OPERATION: 7/22/2022    SURGEON:  Waldo Giles MD     ASSISTANT SURGEON:  Rodney Burger DO     OPERATION:     1. Bilateral inferior turbinate reduction with submucosal resection.  2. Bilateral endoscopic john bullosa resection.     PREOPERATIVE DIAGNOSIS:      1. Hypertrophic turbinates.  2. John bullosae.     POSTOPERATIVE DIAGNOSIS:      1. Hypertrophic turbinates.  2. John bullosae.     ANESTHESIA: General.     COMPLICATIONS: None.     ESTIMATED BLOOD LOSS: 10 mL     SPECIMEN: None.     WOUND EXPECTANCY: Clean-contaminated.     FINDINGS: Compound inferior turbinate hypertrophy.  Bilateral john bullosae.     INDICATIONS: Anatomic nasal obstruction, not improved with medical therapy.     I discussed the risks, benefits and alternatives of surgical correction of the septal deviation and turbinate hypertrophy with the patient as well as the expected postoperative course. I gave her the opportunity to ask questions and I answered all of them. On the morning of surgery I again met with the patient and reviewed the indications for surgery and she consented to proceed.     DESCRIPTION OF PROCEDURE: The patient was brought to the operating room and placed supine on the operating table. The patient was placed under general anesthesia and intubated. The patient was positioned with a donut under the head.  Cottonoid pledgets soaked with 4% cocaine were placed into the nasal cavity bilaterally for mucosal decongestion. The mucosa of the turbinates were injected with 1% lidocaine with 1:100,000 parts epinephrine.  A time-out was performed to confirm the proper patient, site and procedure. The patient was prepped and draped in the usual fashion.     Attention was first turned to the turbinates.  Incisions were made in the anterior head of the inferior turbinate using a #6400 blade.  A Doni elevator was then tunnelled medially to the turbinate bone and used to segmentally outfracture and morselize the bone.   Then, a 2 mm blade on the powered tissue shaver was tunneled submucosally and used to resect soft tissue of the turbinate while overlying mucosa was preserved.   Finally, the turbinates were outfractured using a Sexton/Boies elevator.  An identical procedure was performed bilaterally.     The john bullosa obstructed access to the middle meatus bilaterally.  A lateral incision was made at the anterior head of the turbinate using an angulated Stammberger antrum punch.  The incision was carried posteriorly and inferiorly using a turbinate scissors.  The lateral half of the conchal cell was then removed, taking care not to destabilize the superior attachment of the middle turbinate.  An identical procedure was performed bilaterally.    At the conclusion of the procedure there was good hemostasis.  Mupirocin ointment was applied to the vestibule bilaterally.  A nasal dressing was applied and the drapes were taken down.  The patient was then turned back toward the anesthesiologist and awakened from anesthesia, extubated and transferred to the recovery room in stable condition.

## 2022-07-22 NOTE — PATIENT INSTRUCTIONS
Do not blow your nose for 1 week.  Sneeze with your mouth open.  Change nasal dressing as needed.  Sleep with your head elevated (about 30 degrees) for 48 hours.  On the day after surgery, begin using saline nasal rinse ([place] Med) every morning and evening.  In case of excessive bleeding, spray Afrin (oxymetazoline) into the nostrils.  You may do this every 4 hours for up to 3 days if needed.  Follow up with Dr. Giles in 1 week as planned.

## 2022-07-22 NOTE — ANESTHESIA PROCEDURE NOTES
Intubation    Date/Time: 7/22/2022 2:11 PM  Performed by: Cierra Kessler MD  Authorized by: Jovany Law MD     Intubation:     Induction:  Intravenous    Intubated:  Postinduction    Mask Ventilation:  Easy with oral airway    Attempts:  2    Attempted By:  Resident anesthesiologist    Method of Intubation:  Direct    Blade:  Ajay 3    Laryngeal View Grade: Grade IIA - cords partially seen      Attempted By (2nd Attempt):  Resident anesthesiologist    Method of Intubation (2nd Attempt):  Video laryngoscopy    Blade (2nd Attempt):  Calhoun 3    Laryngeal View Grade (2nd Attempt): Grade I - full view of cords      Difficult Airway Encountered?: No      Complications:  None    Airway Device:  Oral endotracheal tube    Airway Device Size:  7.0    Style/Cuff Inflation:  Cuffed    Inflation Amount (mL):  8    Tube secured:  23    Secured at:  The lips    Placement Verified By:  Capnometry    Complicating Factors:  None    Findings Post-Intubation:  BS equal bilateral and atraumatic/condition of teeth unchanged

## 2022-07-22 NOTE — ANESTHESIA POSTPROCEDURE EVALUATION
Anesthesia Post Evaluation    Patient: Jess Mcleod    Procedure(s) Performed: Procedure(s) (LRB):  REDUCTION, NASAL TURBINATE john bullosa (Bilateral)    Final Anesthesia Type: general      Patient location during evaluation: PACU  Patient participation: Yes- Able to Participate  Level of consciousness: awake and alert  Post-procedure vital signs: reviewed and stable  Pain management: adequate  Airway patency: patent    PONV status at discharge: No PONV  Anesthetic complications: no      Cardiovascular status: stable  Respiratory status: spontaneous ventilation  Hydration status: euvolemic  Follow-up not needed.          Vitals Value Taken Time   /88 07/22/22 1601   Temp 36.1 °C (97 °F) 07/22/22 1513   Pulse 82 07/22/22 1612   Resp 15 07/22/22 1612   SpO2 100 % 07/22/22 1612   Vitals shown include unvalidated device data.      No case tracking events are documented in the log.      Pain/Tahir Score: Tahir Score: 10 (7/22/2022  4:00 PM)

## 2022-07-22 NOTE — TRANSFER OF CARE
"Anesthesia Transfer of Care Note    Patient: Jess Mcleod    Procedure(s) Performed: Procedure(s) (LRB):  REDUCTION, NASAL TURBINATE john bullosa (Bilateral)    Patient location: PACU    Anesthesia Type: general    Transport from OR: Transported from OR on 6-10 L/min O2 by face mask with adequate spontaneous ventilation    Post pain: adequate analgesia    Post assessment: no apparent anesthetic complications    Post vital signs: stable    Level of consciousness: awake    Nausea/Vomiting: no nausea/vomiting    Complications: none    Transfer of care protocol was followed      Last vitals:   Visit Vitals  BP (!) 141/83 (BP Location: Right arm, Patient Position: Lying)   Pulse 88   Temp 36.9 °C (98.4 °F) (Oral)   Resp 16   Ht 5' 4" (1.626 m)   Wt 77.1 kg (170 lb)   LMP 07/18/2022   SpO2 100%   Breastfeeding No   BMI 29.18 kg/m²     "

## 2022-07-22 NOTE — BRIEF OP NOTE
[facility] - [facility] (2nd Fl)  Brief Operative Note     SUMMARY     Surgery Date:   7/22/2022     SURGEON(S):   Surgeon(s) and Role:     * Waldo Giles MD - Primary     * Rodney Burger MD - Resident - Assisting    ANESTHESIA STAFF:   Jovany Law MD    OR STAFF:   Circulator: Malick Velez RN; Herson Velazquez RN  Relief Scrub: Manju Shine  Scrub Person: ST Rosana      Pre-op Diagnosis:  Nasal turbinate hypertrophy [J34.3]  John bullosa [J34.89]    Post-op Diagnosis:  Post-Op Diagnosis Codes:     * Nasal turbinate hypertrophy [J34.3]     * John bullosa [J34.89]    Procedure(s) (LRB):  REDUCTION, NASAL TURBINATE john bullosa (Bilateral)    Anesthesia: General    Description of the procedure: sudha john bullosa excision/turbinoplasty of middle turbinates, sudha inferior turbinate reduction with outfracture     Findings: incraesed nasal passage airway     Estimated Blood Loss: 5cc         Specimens:   Specimen (24h ago, onward)            None          LENGTH OF SURGERY:   * Missing case tracking time(s) *    Event Time In   In Facility [id number]   In Pre-Procedure 0849   Physician Available    Anesthesia Available    Pre-Op: Bedside Procedure Start    Pre-Op: Bedside Procedure Stop    Pre-Procedure Complete 0915   Out of Pre-Procedure    Holding Start    Holding Stop    Anesthesia Start 1355   Anesthesia Start Data Collection    Setup Start 1335   Setup Complete 1354   In Room 1354   Prep Start    Procedure Prep Complete    Procedure Start 1425   Procedure Closing    Emergence    Procedure Finish 1449   Out of Room    In OR Recovery    Out of OR Recovery    Cleanup Start    Cleanup Complete    Cosmetic Start    Cosmetic Stop    Pain Mgmt In Room    Pain Mgmt Out Room    In Recovery    Anesthesia Finish    Bedside Procedure Start    Bedside Procedure Stop    Recovery Care Complete    Out of Recovery    In Diagnostic Recovery    Out Diagnostic Recovery    In PACU Phase II    Out  PACU Phase II    In PACU Ext    Out PACU Ext    In Recovery [facility]    Out Recovery [facility]    Obs Rec Start    Obs Rec Stop    To Phase II    In Phase II    Phase II Care Complete    Out of Phase II    In Phase II Ext    Out Phase II Ext    Procedural Care Complete    Pain Follow Up Needed    Pain Follow Up Complete    PACU Bed Request      Discharge Note    SUMMARY     Admit Date: 7/22/2022    Discharge Date and Time:   2:51 PM    Hospital Course: Please see the preoperative H&P and other available documentation for full details related to history prior to this admission.  Briefly, pt presented to [facility] for elective outpatient procedure. Procedure, risks and benefits were discussed with patient. All questions were answered. Informed consent was obtained. Pt was taken to the OR and underwent the above procedures without complications. Pt  was transferred to PACU in stable condition and discharged to home the same day.     Final Diagnosis: Post-Op Diagnosis Codes:     * Nasal turbinate hypertrophy [J34.3]     * Cynthia bullosa [J34.89]    Disposition: Home/Self Care    Discharge Medication List:     Medication List      START taking these medications    acetaminophen 325 MG tablet  Commonly known as: TYLENOL  Take 2 tablets (650 mg total) by mouth every 6 to 8 hours as needed for Pain.        CONTINUE taking these medications    aspirin 81 MG EC tablet  Commonly known as: ECOTRIN  Take 1 tablet (81 mg total) by mouth once daily.     azelastine 137 mcg (0.1 %) nasal spray  Commonly known as: ASTELIN  SPRAY 1 SPRAY IN EACH NOSTRIL TWICE DAILY     calcium carbonate 500 mg calcium (1,250 mg) tablet  Commonly known as: OS-TIFFANIE     celecoxib 200 MG capsule  Commonly known as: CeleBREX  Take 1 capsule (200 mg total) by mouth once daily.     * dextroamphetamine-amphetamine 15 mg tablet  Commonly known as: ADDERALL     * dextroamphetamine-amphetamine 20 mg tablet  Commonly known as: ADDERALL  Take one tablet by mouth every 8 hours      fluticasone propionate 50 mcg/actuation nasal spray  Commonly known as: FLONASE     HYDROcodone-acetaminophen 7.5-325 mg per tablet  Commonly known as: NORCO  Take 1 tablet by mouth every 4 (four) hours as needed for Pain.     Lactobacillus rhamnosus GG 10 billion cell capsule  Commonly known as: CULTURELLE     magnesium oxide 400 mg (241.3 mg magnesium) tablet  Commonly known as: MAG-OX         * This list has 2 medication(s) that are the same as other medications prescribed for you. Read the directions carefully, and ask your doctor or other care provider to review them with you.            ASK your doctor about these medications    ibuprofen 200 MG tablet  Commonly known as: ADVIL,MOTRIN     QUEtiapine 25 MG Tab  Commonly known as: SEROQUEL           Where to Get Your Medications      These medications were sent to [facility]  [address] [address]    Hours: Mon-Fri 7a-7p, Sat-Sun 10a-4p Phone: [phone]   · acetaminophen 325 MG tablet           Discharge Procedure Orders   Diet general     No dressing needed     Call MD for:  difficulty breathing, headache or visual disturbances     Call MD for:  redness, tenderness, or signs of infection (pain, swelling, redness, odor or green/yellow discharge around incision site)     Call MD for:  hives     Call MD for:   Order Comments: Persistent bleeding     Activity as tolerated       Follow Up: 1 week in ENT clinic

## 2022-07-27 ENCOUNTER — TELEPHONE (OUTPATIENT)
Dept: OTOLARYNGOLOGY | Facility: CLINIC | Age: 52
End: 2022-07-27
Payer: COMMERCIAL

## 2022-07-27 ENCOUNTER — OFFICE VISIT (OUTPATIENT)
Dept: OTOLARYNGOLOGY | Facility: CLINIC | Age: 52
End: 2022-07-27
Payer: COMMERCIAL

## 2022-07-27 VITALS — BODY MASS INDEX: 28.32 KG/M2 | WEIGHT: 170 LBS | HEIGHT: 65 IN

## 2022-07-27 DIAGNOSIS — J34.3 NASAL TURBINATE HYPERTROPHY: Primary | ICD-10-CM

## 2022-07-27 DIAGNOSIS — J34.89 CONCHA BULLOSA: ICD-10-CM

## 2022-07-27 PROCEDURE — 1160F RVW MEDS BY RX/DR IN RCRD: CPT | Mod: CPTII,S$GLB,, | Performed by: OTOLARYNGOLOGY

## 2022-07-27 PROCEDURE — 3008F PR BODY MASS INDEX (BMI) DOCUMENTED: ICD-10-PCS | Mod: CPTII,S$GLB,, | Performed by: OTOLARYNGOLOGY

## 2022-07-27 PROCEDURE — 31231 NASAL/SINUS ENDOSCOPY: ICD-10-PCS | Mod: S$GLB,,, | Performed by: OTOLARYNGOLOGY

## 2022-07-27 PROCEDURE — 99999 PR PBB SHADOW E&M-EST. PATIENT-LVL IV: ICD-10-PCS | Mod: PBBFAC,,, | Performed by: OTOLARYNGOLOGY

## 2022-07-27 PROCEDURE — 1160F PR REVIEW ALL MEDS BY PRESCRIBER/CLIN PHARMACIST DOCUMENTED: ICD-10-PCS | Mod: CPTII,S$GLB,, | Performed by: OTOLARYNGOLOGY

## 2022-07-27 PROCEDURE — 1159F MED LIST DOCD IN RCRD: CPT | Mod: CPTII,S$GLB,, | Performed by: OTOLARYNGOLOGY

## 2022-07-27 PROCEDURE — 99212 OFFICE O/P EST SF 10 MIN: CPT | Mod: 25,S$GLB,, | Performed by: OTOLARYNGOLOGY

## 2022-07-27 PROCEDURE — 99999 PR PBB SHADOW E&M-EST. PATIENT-LVL IV: CPT | Mod: PBBFAC,,, | Performed by: OTOLARYNGOLOGY

## 2022-07-27 PROCEDURE — 1159F PR MEDICATION LIST DOCUMENTED IN MEDICAL RECORD: ICD-10-PCS | Mod: CPTII,S$GLB,, | Performed by: OTOLARYNGOLOGY

## 2022-07-27 PROCEDURE — 3008F BODY MASS INDEX DOCD: CPT | Mod: CPTII,S$GLB,, | Performed by: OTOLARYNGOLOGY

## 2022-07-27 PROCEDURE — 99212 PR OFFICE/OUTPT VISIT, EST, LEVL II, 10-19 MIN: ICD-10-PCS | Mod: 25,S$GLB,, | Performed by: OTOLARYNGOLOGY

## 2022-07-27 PROCEDURE — 31231 NASAL ENDOSCOPY DX: CPT | Mod: S$GLB,,, | Performed by: OTOLARYNGOLOGY

## 2022-07-27 NOTE — PROCEDURES
Nasal/sinus endoscopy    Date/Time: 7/27/2022 3:45 PM  Performed by: Waldo Giles MD  Authorized by: Waldo Giles MD     Consent Done?:  Yes (Verbal)  Anesthesia:     Local anesthetic:  Lidocaine 4% and Adam-Synephrine 1/2%    Patient tolerance:  Patient tolerated the procedure well with no immediate complications  Nose:     Procedure Performed:  Nasal Endoscopy  External:      No external nasal deformity  Intranasal:      Mucosa no polyps     Mucosa ulcers not present     No mucosa lesions present     Turbinates not enlarged     No septum gross deformity  Nasopharynx:      No mucosa lesions     Adenoids not present     Posterior choanae patent     Eustachian tube patent     Scab as appropriate on IT and MT heads  No purulence or adhesions

## 2022-07-27 NOTE — TELEPHONE ENCOUNTER
----- Message from Tiarra Graff sent at 7/27/2022  2:46 PM CDT -----  Regarding: pt advice  Contact: pt @[phone]  Pt Is calling to speak with someone in  office regarding her appt at 3:45 pt says she will be late ETA4:00

## 2022-07-27 NOTE — PROGRESS NOTES
"  Subjective:      Jess Mcleod is a 52 y.o. female who comes for follow-up 5 days status-post endoscopic john bullosa resection and IT reduction.  Her last visit with me was on 6/23/2022.  Doing well, already breathing better, less fatigue.  Using saline rinse.    SNOT-22 score: : (P) 81  NOSE score:: (P) 80%  ETDQ-7 score:: (P) 2.3        Objective:     Ht 5' 5" (1.651 m)   Wt 77.1 kg (170 lb)   LMP 07/18/2022 Comment: on it currently  BMI 28.29 kg/m²      General:   not in distress   Nasal:  edematous mucosa   no septal hematoma   no bleeding   Oral Cavity:   clear   Oropharynx:   no bleeding   Neck:   nontender       Procedure    Nasal endoscopy performed.  See procedure note.        Data Reviewed    None.      Assessment:     Doing well following bilateral endoscopic john surgery.  She also underwent turbinate surgery which is unrelated to the reason for today's visit.    1. Nasal turbinate hypertrophy    2. John bullosa         Plan:     Continue saline rinse daily.  Follow up in about 1 month (around 8/27/2022) for nasal endoscopy.      "

## 2022-07-28 ENCOUNTER — PATIENT MESSAGE (OUTPATIENT)
Dept: OTOLARYNGOLOGY | Facility: CLINIC | Age: 52
End: 2022-07-28
Payer: COMMERCIAL

## 2022-08-05 ENCOUNTER — OFFICE VISIT (OUTPATIENT)
Dept: SPORTS MEDICINE | Facility: CLINIC | Age: 52
End: 2022-08-05
Payer: COMMERCIAL

## 2022-08-05 VITALS — BODY MASS INDEX: 28.55 KG/M2 | HEIGHT: 65 IN | WEIGHT: 171.38 LBS

## 2022-08-05 DIAGNOSIS — M22.42 CHONDROMALACIA OF PATELLOFEMORAL JOINT, LEFT: ICD-10-CM

## 2022-08-05 DIAGNOSIS — Z98.890 S/P LEFT KNEE ARTHROSCOPY: Primary | ICD-10-CM

## 2022-08-05 PROCEDURE — 1159F MED LIST DOCD IN RCRD: CPT | Mod: CPTII,S$GLB,, | Performed by: ORTHOPAEDIC SURGERY

## 2022-08-05 PROCEDURE — 1159F PR MEDICATION LIST DOCUMENTED IN MEDICAL RECORD: ICD-10-PCS | Mod: CPTII,S$GLB,, | Performed by: ORTHOPAEDIC SURGERY

## 2022-08-05 PROCEDURE — 99214 OFFICE O/P EST MOD 30 MIN: CPT | Mod: S$GLB,,, | Performed by: ORTHOPAEDIC SURGERY

## 2022-08-05 PROCEDURE — 99999 PR PBB SHADOW E&M-EST. PATIENT-LVL III: ICD-10-PCS | Mod: PBBFAC,,, | Performed by: ORTHOPAEDIC SURGERY

## 2022-08-05 PROCEDURE — 3008F BODY MASS INDEX DOCD: CPT | Mod: CPTII,S$GLB,, | Performed by: ORTHOPAEDIC SURGERY

## 2022-08-05 PROCEDURE — 99999 PR PBB SHADOW E&M-EST. PATIENT-LVL III: CPT | Mod: PBBFAC,,, | Performed by: ORTHOPAEDIC SURGERY

## 2022-08-05 PROCEDURE — 99214 PR OFFICE/OUTPT VISIT, EST, LEVL IV, 30-39 MIN: ICD-10-PCS | Mod: S$GLB,,, | Performed by: ORTHOPAEDIC SURGERY

## 2022-08-05 PROCEDURE — 3008F PR BODY MASS INDEX (BMI) DOCUMENTED: ICD-10-PCS | Mod: CPTII,S$GLB,, | Performed by: ORTHOPAEDIC SURGERY

## 2022-08-05 NOTE — PROGRESS NOTES
"POST-OPERATIVE EXAMINATION    52 y.o. Female who returns for follow after surgery. She is 3 months s/p    Procedure  1. Left knee arthroscopic chondroplasty  2. Left knee arthroscopic synovectomy    She has not been able to go to physical therapy or take the Celebrex prescribed at last visit. She states she is still having pain under the knee cap and the medial aspect of her knee.  He wants to proceed with something more.      PHYSICAL EXAMINATION:  Ht 5' 5" (1.651 m)   Wt 77.7 kg (171 lb 6.4 oz)   LMP 07/18/2022 Comment: on it currently  BMI 28.52 kg/m²   General: Well-developed well-nourished 52 y.o. femalein no acute distress   Cardiovascular: Regular rhythm   Lungs: No labored breathing or wheezing appreciated   Neuro: Alert and oriented ×3   Psychiatric: well oriented to person, place and time, demonstrates normal mood and affect   Skin: No rashes, lesions or ulcers, normal temperature, turgor, and texture on involved extremity    ORTHOPEDIC EXAM:  Normal post-operative swelling  Normal post-operative scarring  Strength: WNL  ROM: WNL  Tests: None  Trace effusion    IMAGING:    Xrays including standing AP, 45 degree PA notch view, lateral and sunrise radiographs of the left knee are ordered / images reviewed by me:  There is no normal alignment and normal bone quality.  No fracture or dislocations noted. No significant joint space narrowing in the medial or lateral compartments.  There is mild joint space narrowing in the patellofemoral compartment consistent with grade 2 Kellgren Clay.      ASSESSMENT:      ICD-10-CM ICD-9-CM   1. S/P left knee arthroscopy  Z98.890 V45.89   2. Chondromalacia of patellofemoral joint, left  M22.42 717.7       PLAN:       1. Prior authorization for Euflexxa placed today  2. RTC for Euflexxa injection series #1/3  3. We will attempt a series of HA injections.  We also discussed the chondral lesion that she has in the medial femoral compartment which is likely the source of " her symptoms.  We will try and manage this with HA injections prior to proceeding with any type of definitive cartilage surgery.

## 2022-08-12 ENCOUNTER — PATIENT MESSAGE (OUTPATIENT)
Dept: OTOLARYNGOLOGY | Facility: CLINIC | Age: 52
End: 2022-08-12
Payer: COMMERCIAL

## 2022-08-12 ENCOUNTER — PATIENT MESSAGE (OUTPATIENT)
Dept: SPORTS MEDICINE | Facility: CLINIC | Age: 52
End: 2022-08-12
Payer: COMMERCIAL

## 2022-08-17 ENCOUNTER — TELEPHONE (OUTPATIENT)
Dept: OBSTETRICS AND GYNECOLOGY | Facility: CLINIC | Age: 52
End: 2022-08-17
Payer: COMMERCIAL

## 2022-08-17 DIAGNOSIS — Z12.31 VISIT FOR SCREENING MAMMOGRAM: Primary | ICD-10-CM

## 2022-08-17 NOTE — TELEPHONE ENCOUNTER
----- Message from Brandon Khan sent at 8/17/2022 10:57 AM CDT -----  Type:  Sooner Apoointment Request    Caller is requesting a sooner appointment.  Caller declined first available appointment listed below.  Caller will not accept being placed on the waitlist and is requesting a message be sent to doctor.    Name of Caller: Patient   When is the first available appointment? 02/01/2023  Symptoms: Patient is looking to schedule an annual visit and an appointment to address a major concern (unspecified). Would like a call back to speak with a member of the staff.   Would the patient rather a call back or a response via [facility]? Call   Best Call Back Number: [phone]  Additional Information: Please assist, thank you!

## 2022-08-18 ENCOUNTER — LAB VISIT (OUTPATIENT)
Dept: LAB | Facility: OTHER | Age: 52
End: 2022-08-18
Attending: REGISTERED NURSE
Payer: COMMERCIAL

## 2022-08-18 ENCOUNTER — OFFICE VISIT (OUTPATIENT)
Dept: OBSTETRICS AND GYNECOLOGY | Facility: CLINIC | Age: 52
End: 2022-08-18
Payer: COMMERCIAL

## 2022-08-18 VITALS
HEIGHT: 65 IN | BODY MASS INDEX: 28.65 KG/M2 | WEIGHT: 171.94 LBS | DIASTOLIC BLOOD PRESSURE: 82 MMHG | SYSTOLIC BLOOD PRESSURE: 116 MMHG

## 2022-08-18 DIAGNOSIS — N76.0 ACUTE VAGINITIS: ICD-10-CM

## 2022-08-18 DIAGNOSIS — Z11.3 SCREEN FOR STD (SEXUALLY TRANSMITTED DISEASE): ICD-10-CM

## 2022-08-18 DIAGNOSIS — Z11.3 SCREEN FOR STD (SEXUALLY TRANSMITTED DISEASE): Primary | ICD-10-CM

## 2022-08-18 LAB
CANDIDA RRNA VAG QL PROBE: NEGATIVE
G VAGINALIS RRNA GENITAL QL PROBE: POSITIVE
T VAGINALIS RRNA GENITAL QL PROBE: NEGATIVE

## 2022-08-18 PROCEDURE — 3074F PR MOST RECENT SYSTOLIC BLOOD PRESSURE < 130 MM HG: ICD-10-PCS | Mod: CPTII,S$GLB,, | Performed by: REGISTERED NURSE

## 2022-08-18 PROCEDURE — 87389 HIV-1 AG W/HIV-1&-2 AB AG IA: CPT | Performed by: REGISTERED NURSE

## 2022-08-18 PROCEDURE — 99999 PR PBB SHADOW E&M-EST. PATIENT-LVL III: ICD-10-PCS | Mod: PBBFAC,,, | Performed by: REGISTERED NURSE

## 2022-08-18 PROCEDURE — 1160F PR REVIEW ALL MEDS BY PRESCRIBER/CLIN PHARMACIST DOCUMENTED: ICD-10-PCS | Mod: CPTII,S$GLB,, | Performed by: REGISTERED NURSE

## 2022-08-18 PROCEDURE — 1160F RVW MEDS BY RX/DR IN RCRD: CPT | Mod: CPTII,S$GLB,, | Performed by: REGISTERED NURSE

## 2022-08-18 PROCEDURE — 87510 GARDNER VAG DNA DIR PROBE: CPT | Performed by: REGISTERED NURSE

## 2022-08-18 PROCEDURE — 3079F DIAST BP 80-89 MM HG: CPT | Mod: CPTII,S$GLB,, | Performed by: REGISTERED NURSE

## 2022-08-18 PROCEDURE — 3079F PR MOST RECENT DIASTOLIC BLOOD PRESSURE 80-89 MM HG: ICD-10-PCS | Mod: CPTII,S$GLB,, | Performed by: REGISTERED NURSE

## 2022-08-18 PROCEDURE — 99213 OFFICE O/P EST LOW 20 MIN: CPT | Mod: S$GLB,,, | Performed by: REGISTERED NURSE

## 2022-08-18 PROCEDURE — 1159F MED LIST DOCD IN RCRD: CPT | Mod: CPTII,S$GLB,, | Performed by: REGISTERED NURSE

## 2022-08-18 PROCEDURE — 99999 PR PBB SHADOW E&M-EST. PATIENT-LVL III: CPT | Mod: PBBFAC,,, | Performed by: REGISTERED NURSE

## 2022-08-18 PROCEDURE — 87491 CHLMYD TRACH DNA AMP PROBE: CPT | Performed by: REGISTERED NURSE

## 2022-08-18 PROCEDURE — 3008F BODY MASS INDEX DOCD: CPT | Mod: CPTII,S$GLB,, | Performed by: REGISTERED NURSE

## 2022-08-18 PROCEDURE — 86592 SYPHILIS TEST NON-TREP QUAL: CPT | Performed by: REGISTERED NURSE

## 2022-08-18 PROCEDURE — 99213 PR OFFICE/OUTPT VISIT, EST, LEVL III, 20-29 MIN: ICD-10-PCS | Mod: S$GLB,,, | Performed by: REGISTERED NURSE

## 2022-08-18 PROCEDURE — 1159F PR MEDICATION LIST DOCUMENTED IN MEDICAL RECORD: ICD-10-PCS | Mod: CPTII,S$GLB,, | Performed by: REGISTERED NURSE

## 2022-08-18 PROCEDURE — 36415 COLL VENOUS BLD VENIPUNCTURE: CPT | Performed by: REGISTERED NURSE

## 2022-08-18 PROCEDURE — 3074F SYST BP LT 130 MM HG: CPT | Mod: CPTII,S$GLB,, | Performed by: REGISTERED NURSE

## 2022-08-18 PROCEDURE — 87591 N.GONORRHOEAE DNA AMP PROB: CPT | Performed by: REGISTERED NURSE

## 2022-08-18 PROCEDURE — 86803 HEPATITIS C AB TEST: CPT | Performed by: REGISTERED NURSE

## 2022-08-18 PROCEDURE — 3008F PR BODY MASS INDEX (BMI) DOCUMENTED: ICD-10-PCS | Mod: CPTII,S$GLB,, | Performed by: REGISTERED NURSE

## 2022-08-18 PROCEDURE — 87340 HEPATITIS B SURFACE AG IA: CPT | Performed by: REGISTERED NURSE

## 2022-08-18 NOTE — PROGRESS NOTES
"CC: Vaginal Discharge    Jess Mcleod is a 52 y.o. female  presents with complaint of vaginal discharge for 4-5 years.  She reports itching.  reports odor.  She states the discharge is "like puss sometimes and dark sometimes".  No new sexual partners. Her  is living in [place] and reports symptoms began after intercourse with him. Desires STD screening (full panel).    ROS:  GENERAL: No fever, chills, fatigability or weight loss.  VULVAR: No pain, no lesions and no itching.  VAGINAL: No relaxation, + itching, + discharge, sometimes puss like and dark sometimes, + odor; no abnormal bleeding and no lesions.    PHYSICAL EXAM:  VULVA: normal appearing vulva with no masses, tenderness or lesions   VAGINA: normal appearing vagina with normal color and + white creamy discharge, no lesions   CERVIX: normal appearing cervix without discharge or lesions   UTERUS: uterus is enlarged (fibroids), shape, consistency and nontender   ADNEXA: normal adnexa in size, nontender and no masses    ASSESSMENT and PLAN:    ICD-10-CM ICD-9-CM    1. Screen for STD (sexually transmitted disease)  Z11.3 V74.5 C. trachomatis/N. gonorrhoeae by AMP DNA      HIV-1 and HIV-2 antibodies      RPR      Hepatitis B surface antigen      Vaginosis Screen by DNA Probe      Hepatitis C Antibody   2. Acute vaginitis  N76.0 616.10      PLAN:  Affirm- will treat based on results  STD screening- full panel    Vaginitis Prevention:  a. avoiding feminine products such as deoderant soaps, body wash, bubble bath, douches, scented toilet paper, deoderant tampons or pads, feminine wipes, chronic pad use, etc.  b. avoiding other vulvovaginal irritants such as long hot baths, humidity, tight, synthetic clothing, chlorine and sitting around in wet bathing suits  c. wearing cotton underwear, avoiding thong underwear and no underwear to bed  d. taking showers instead of baths and use a hair dryer on cool setting afterwards to dry  e. wearing " cotton to exercise and shower immediately after exercise and change clothes  f. using polyurethane condoms without spermicide if sexually active and symptoms are triggered by intercourse    FOLLOW UP: PRN lack of improvement.        BJ Markham

## 2022-08-19 ENCOUNTER — PATIENT MESSAGE (OUTPATIENT)
Dept: OBSTETRICS AND GYNECOLOGY | Facility: CLINIC | Age: 52
End: 2022-08-19
Payer: COMMERCIAL

## 2022-08-19 LAB
HIV 1+2 AB+HIV1 P24 AG SERPL QL IA: NEGATIVE
RPR SER QL: NORMAL

## 2022-08-20 DIAGNOSIS — N76.0 BACTERIAL VAGINOSIS: Primary | ICD-10-CM

## 2022-08-20 DIAGNOSIS — B96.89 BACTERIAL VAGINOSIS: Primary | ICD-10-CM

## 2022-08-20 RX ORDER — METRONIDAZOLE 500 MG/1
500 TABLET ORAL 2 TIMES DAILY
Qty: 14 TABLET | Refills: 0 | Status: SHIPPED | OUTPATIENT
Start: 2022-08-20 | End: 2022-08-27

## 2022-08-22 ENCOUNTER — TELEPHONE (OUTPATIENT)
Dept: SPORTS MEDICINE | Facility: CLINIC | Age: 52
End: 2022-08-22
Payer: COMMERCIAL

## 2022-08-22 ENCOUNTER — PATIENT MESSAGE (OUTPATIENT)
Dept: OBSTETRICS AND GYNECOLOGY | Facility: CLINIC | Age: 52
End: 2022-08-22
Payer: COMMERCIAL

## 2022-08-22 ENCOUNTER — PATIENT MESSAGE (OUTPATIENT)
Dept: SPORTS MEDICINE | Facility: CLINIC | Age: 52
End: 2022-08-22
Payer: COMMERCIAL

## 2022-08-22 LAB
C TRACH DNA SPEC QL NAA+PROBE: NOT DETECTED
HBV SURFACE AG SERPL QL IA: NEGATIVE
HCV AB SERPL QL IA: NEGATIVE
N GONORRHOEA DNA SPEC QL NAA+PROBE: NOT DETECTED

## 2022-08-25 ENCOUNTER — PATIENT MESSAGE (OUTPATIENT)
Dept: OTOLARYNGOLOGY | Facility: CLINIC | Age: 52
End: 2022-08-25
Payer: COMMERCIAL

## 2022-08-31 ENCOUNTER — PATIENT MESSAGE (OUTPATIENT)
Dept: OBSTETRICS AND GYNECOLOGY | Facility: CLINIC | Age: 52
End: 2022-08-31
Payer: COMMERCIAL

## 2022-09-02 ENCOUNTER — CLINICAL SUPPORT (OUTPATIENT)
Dept: REHABILITATION | Facility: HOSPITAL | Age: 52
End: 2022-09-02
Attending: ORTHOPAEDIC SURGERY
Payer: COMMERCIAL

## 2022-09-02 DIAGNOSIS — Z98.890 S/P LEFT KNEE ARTHROSCOPY: ICD-10-CM

## 2022-09-02 DIAGNOSIS — M25.562 ACUTE PAIN OF LEFT KNEE: Primary | ICD-10-CM

## 2022-09-02 PROCEDURE — 97164 PT RE-EVAL EST PLAN CARE: CPT | Performed by: PHYSICAL THERAPIST

## 2022-09-02 PROCEDURE — 97110 THERAPEUTIC EXERCISES: CPT | Performed by: PHYSICAL THERAPIST

## 2022-09-06 ENCOUNTER — OFFICE VISIT (OUTPATIENT)
Dept: OTOLARYNGOLOGY | Facility: CLINIC | Age: 52
End: 2022-09-06
Payer: COMMERCIAL

## 2022-09-06 VITALS — HEIGHT: 65 IN | WEIGHT: 170 LBS | BODY MASS INDEX: 28.32 KG/M2

## 2022-09-06 DIAGNOSIS — J34.89 CONCHA BULLOSA: ICD-10-CM

## 2022-09-06 DIAGNOSIS — J34.3 NASAL TURBINATE HYPERTROPHY: ICD-10-CM

## 2022-09-06 DIAGNOSIS — J30.2 PERENNIAL ALLERGIC RHINITIS WITH SEASONAL VARIATION: Primary | ICD-10-CM

## 2022-09-06 DIAGNOSIS — J30.89 PERENNIAL ALLERGIC RHINITIS WITH SEASONAL VARIATION: Primary | ICD-10-CM

## 2022-09-06 PROCEDURE — 31231 NASAL/SINUS ENDOSCOPY: ICD-10-PCS | Mod: S$GLB,,, | Performed by: OTOLARYNGOLOGY

## 2022-09-06 PROCEDURE — 31231 NASAL ENDOSCOPY DX: CPT | Mod: S$GLB,,, | Performed by: OTOLARYNGOLOGY

## 2022-09-06 PROCEDURE — 99999 PR PBB SHADOW E&M-EST. PATIENT-LVL V: ICD-10-PCS | Mod: PBBFAC,,, | Performed by: OTOLARYNGOLOGY

## 2022-09-06 PROCEDURE — 1159F PR MEDICATION LIST DOCUMENTED IN MEDICAL RECORD: ICD-10-PCS | Mod: CPTII,S$GLB,, | Performed by: OTOLARYNGOLOGY

## 2022-09-06 PROCEDURE — 1159F MED LIST DOCD IN RCRD: CPT | Mod: CPTII,S$GLB,, | Performed by: OTOLARYNGOLOGY

## 2022-09-06 PROCEDURE — 99213 OFFICE O/P EST LOW 20 MIN: CPT | Mod: 25,S$GLB,, | Performed by: OTOLARYNGOLOGY

## 2022-09-06 PROCEDURE — 1160F RVW MEDS BY RX/DR IN RCRD: CPT | Mod: CPTII,S$GLB,, | Performed by: OTOLARYNGOLOGY

## 2022-09-06 PROCEDURE — 3008F BODY MASS INDEX DOCD: CPT | Mod: CPTII,S$GLB,, | Performed by: OTOLARYNGOLOGY

## 2022-09-06 PROCEDURE — 1160F PR REVIEW ALL MEDS BY PRESCRIBER/CLIN PHARMACIST DOCUMENTED: ICD-10-PCS | Mod: CPTII,S$GLB,, | Performed by: OTOLARYNGOLOGY

## 2022-09-06 PROCEDURE — 3008F PR BODY MASS INDEX (BMI) DOCUMENTED: ICD-10-PCS | Mod: CPTII,S$GLB,, | Performed by: OTOLARYNGOLOGY

## 2022-09-06 PROCEDURE — 99213 PR OFFICE/OUTPT VISIT, EST, LEVL III, 20-29 MIN: ICD-10-PCS | Mod: 25,S$GLB,, | Performed by: OTOLARYNGOLOGY

## 2022-09-06 PROCEDURE — 99999 PR PBB SHADOW E&M-EST. PATIENT-LVL V: CPT | Mod: PBBFAC,,, | Performed by: OTOLARYNGOLOGY

## 2022-09-06 NOTE — PROGRESS NOTES
"  Physical Therapy Daily Treatment Note     Name: Jess Denney Westfield  Clinic Number: [id number]  Therapy Diagnosis:        Encounter Diagnoses   Name Primary?    Acute medial meniscal tear, left, sequela      Acute pain of left knee        Physician: Janette Odonnell MD     Physician Orders: PT Eval and Treat   Medical Diagnosis from Referral: S83.242S (ICD-10-CM) - Acute medial meniscal tear, left, sequela  Evaluation Date: 5/2/2022  Authorization Period Expiration: 12/31/2022  Plan of Care Expiration: 8/2/2022  Visit # / Visits authorized: 2/20     Time In: 0700  Time Out: 0800  Total Appointment Time (timed & untimed codes): 50 minutes     Precautions: Fall and Weightbearing     Post-Op Plan:  Standard knee arthroscopy protocol  Subjective     Pt reports: min knee pain this morning.   She was compliant with home exercise program.  Response to previous treatment: no issues   Functional change: continued knee pain in gait    Pain: 3/10  Location: left knee      Objective     Jess received therapeutic exercises to develop strength, endurance, ROM, flexibility, and posture for 40 minutes including:    Stationary bike for 10' for increased ROM, circulation, mm strength/endurance  QS heel prop 3x10/3"  SAQ 3x10/3"  SLR seated 2x10/3"  B SL hip abd 2x10/3"  B YTB SL Clamshell 10 x 10"  YTB hip abd bridges 10x/3"      Education on HEP    Jses received the following manual therapy techniques: Joint mobilizations, Manual traction, and Soft tissue Mobilization were applied to the:  for 10 minutes, including: patellar mobs, joint capsular distraction, joint hinge ext, STM, heel cord, quad and HS stretch.      Jess participated in neuromuscular re-education activities to improve: Balance, Coordination, Sense, and Proprioception for  minutes. The following activities were included:      Jess participated in gait training to improve functional mobility and safety for   minutes, including:      Jess received cold pack for " 10 minutes to to decrease circulation, pain, and swelling.      Home Exercises Provided and Patient Education Provided     Education provided:   Compliance with HEP     Written Home Exercises Provided: yes.  Exercises were reviewed and Jess was able to demonstrate them prior to the end of the session.  Jess demonstrated good  understanding of the education provided.     Assessment   Pt tolerating tx well. No increased pain symptoms and noted decreased pain/tightness after tx. Continue with quad and hip strengthening for decreased pain for return to work. VC/TC for correcting form/technique with therex.   Jess Is progressing well towards her goals.   Pt prognosis is Good.     Pt will continue to benefit from skilled outpatient physical therapy to address the deficits listed in the problem list box on initial evaluation, provide pt/family education and to maximize pt's level of independence in the home and community environment.     Pt's spiritual, cultural and educational needs considered and pt agreeable to plan of care and goals.    Anticipated barriers to physical therapy: none     Goals: GOALS: Short Term Goals:  4 weeks  1.Report decreased knee pain  < / =  2/10  to increase tolerance for return to work pain free   2. Increase strength by 1/3 MMT grade in hip ABD to increase tolerance for work  3. Increase strength by 1/3 MMT grade in quad  to increase tolerance for ADL and work activities.  4. Pt to tolerate HEP to improve ROM and independence with ADL's     Long Term Goals: 8 weeks  1.Report decreased knee pain < / = 0/10  to increase tolerance for return to exercise   2.Patient goal: return to work and ADL pain free  3.Increase strength to >/= 4+/5 in quad and glut  to increase tolerance for ADL and work activities.  4. Pt will report at CJ level (20-40% impaired) on FOTO knee to demonstrate increase in LE function with every day tasks.    Plan     Continue with POC     Gary Randoplh, PTA, STS

## 2022-09-06 NOTE — PROGRESS NOTES
"  Subjective:      Jess is a 52 y.o. female who comes for follow-up of nasal obstruction.  Her last visit with me was on 7/27/2022.  Doing better, improved breathing though still mild congestion.  No crusting or mucus, one transient day of olfactory loss, fine today.  Mostly struggling with allergies, itchy eyes, itchy ears, taking Claritin daily for a long time.  Last allergy testing in 1990s.    SNOT-22 score: : (P) 60  NOSE score:: (P) 40%  ETDQ-7 score:: (P) 2.7    The patient's medications, allergies, past medical, surgical, social and family histories were reviewed and updated as appropriate.    A detailed review of systems was obtained with pertinent positives as per the above HPI, and otherwise negative.        Objective:     Ht 5' 5" (1.651 m)   Wt 77.1 kg (170 lb)   BMI 28.29 kg/m²        Constitutional:   She appears well-developed. She is cooperative.     Head:  Normocephalic.     Nose:  No mucosal edema, rhinorrhea, septal deviation or polyps. No epistaxis. Turbinates normal, no turbinate masses and no turbinate hypertrophy.  Right sinus exhibits no maxillary sinus tenderness and no frontal sinus tenderness. Left sinus exhibits no maxillary sinus tenderness and no frontal sinus tenderness.     Mouth/Throat  Oropharynx clear and moist without lesions or asymmetry. No oropharyngeal exudate or posterior oropharyngeal erythema.     Neck:  No adenopathy. Normal range of motion present.     She has no cervical adenopathy.     Procedure    Nasal endoscopy performed.  See procedure note.        Data Reviewed    WBC (K/uL)   Date Value   04/24/2020 7.80     Eosinophil % (%)   Date Value   04/24/2020 2.3     Eos # (K/uL)   Date Value   04/24/2020 0.2     Platelets (K/uL)   Date Value   04/24/2020 556 (H)     Glucose (mg/dL)   Date Value   06/05/2019 76     No results found for: IGE        Assessment:     1. Perennial allergic rhinitis with seasonal variation    2. Nasal turbinate hypertrophy    3. Cynthia " bullosa         Plan:     Resume flonase and astline 1 SEN daily.  Refer to allergy for testing.  Follow up if symptoms worsen or fail to improve.

## 2022-09-06 NOTE — PROCEDURES
Nasal/sinus endoscopy    Date/Time: 9/6/2022 8:45 AM  Performed by: Waldo Giles MD  Authorized by: Waldo Giles MD     Consent Done?:  Yes (Verbal)  Anesthesia:     Local anesthetic:  Lidocaine 4% and Adam-Synephrine 1/2%    Patient tolerance:  Patient tolerated the procedure well with no immediate complications  Nose:     Procedure Performed:  Nasal Endoscopy  External:      No external nasal deformity  Intranasal:      Mucosa no polyps     Mucosa ulcers not present     No mucosa lesions present     Turbinates not enlarged     No septum gross deformity  Nasopharynx:      No mucosa lesions     Adenoids not present     Posterior choanae patent     Eustachian tube patent     No purulence or crusting  Healing well

## 2022-09-07 ENCOUNTER — CLINICAL SUPPORT (OUTPATIENT)
Dept: REHABILITATION | Facility: HOSPITAL | Age: 52
End: 2022-09-07
Attending: ORTHOPAEDIC SURGERY
Payer: COMMERCIAL

## 2022-09-07 DIAGNOSIS — M25.562 ACUTE PAIN OF LEFT KNEE: Primary | ICD-10-CM

## 2022-09-07 PROCEDURE — 97140 MANUAL THERAPY 1/> REGIONS: CPT | Mod: CQ

## 2022-09-07 PROCEDURE — 97110 THERAPEUTIC EXERCISES: CPT | Mod: CQ

## 2022-09-09 ENCOUNTER — TELEPHONE (OUTPATIENT)
Dept: ALLERGY | Facility: CLINIC | Age: 52
End: 2022-09-09
Payer: COMMERCIAL

## 2022-09-09 ENCOUNTER — CLINICAL SUPPORT (OUTPATIENT)
Dept: REHABILITATION | Facility: HOSPITAL | Age: 52
End: 2022-09-09
Attending: ORTHOPAEDIC SURGERY
Payer: COMMERCIAL

## 2022-09-09 DIAGNOSIS — M25.562 ACUTE PAIN OF LEFT KNEE: Primary | ICD-10-CM

## 2022-09-09 PROCEDURE — 97140 MANUAL THERAPY 1/> REGIONS: CPT | Mod: CQ

## 2022-09-09 PROCEDURE — 97110 THERAPEUTIC EXERCISES: CPT | Mod: CQ

## 2022-09-09 NOTE — TELEPHONE ENCOUNTER
----- Message from Valeria Ford sent at 9/9/2022  4:28 PM CDT -----  Type:  Sooner Apoointment Request    Caller is requesting a sooner appointment.  Caller declined first available appointment listed below.  Caller will not accept being placed on the waitlist and is requesting a message be sent to doctor.  Name of Caller:pt   When is the first available appointment?November   Symptoms:referral   Would the patient rather a call back or a response via [facility]? Call back   Best Call Back Number:[phone]  Additional Information: pt would like first available appt asap Pt would prefer morning appt for the earliest that she can get as possible.

## 2022-09-09 NOTE — TELEPHONE ENCOUNTER
----- Message from Valeria Ford sent at 9/9/2022  4:28 PM CDT -----  Type:  Sooner Apoointment Request    Caller is requesting a sooner appointment.  Caller declined first available appointment listed below.  Caller will not accept being placed on the waitlist and is requesting a message be sent to doctor.  Name of Caller:pt   When is the first available appointment?November   Symptoms:referral   Would the patient rather a call back or a response [facility]? Call back   Best Call Back Number:[phone]  Additional Information: pt would like first available appt asap Pt would prefer morning appt for the earliest that she can get as possible.

## 2022-09-09 NOTE — PROGRESS NOTES
"  Physical Therapy Daily Treatment Note     Name: Jess Denney Mcleod  Clinic Number: [id number]  Therapy Diagnosis:        Encounter Diagnoses   Name Primary?    Acute medial meniscal tear, left, sequela      Acute pain of left knee        Physician: Janette Odonnell MD     Physician Orders: PT Eval and Treat   Medical Diagnosis from Referral: S83.242S (ICD-10-CM) - Acute medial meniscal tear, left, sequela  Evaluation Date: 5/2/2022  Authorization Period Expiration: 12/31/2022  Plan of Care Expiration: 8/2/2022  Visit # / Visits authorized: 3/20     Time In: 0805  Time Out: 0900  Total Appointment Time (timed & untimed codes): 50 minutes     Precautions: Fall and Weightbearing     Post-Op Plan:  Standard knee arthroscopy protocol  Subjective     Pt reports: "its feeling a little better"   She was NOT compliant with home exercise program yesterday due to an allergic reaction.   Response to previous treatment: no issues   Functional change: min antalgic gait pattern    Pain: 1-2/10  Location: left knee      Objective     Jess received therapeutic exercises to develop strength, endurance, ROM, flexibility, and posture for 40 minutes including:    Stationary bike for 10' for increased ROM, circulation, mm strength/endurance  Shuttle press 50# 3x10  Shuttle B SL press 25# 3x10 ea  B YTB SL Clamshell 10 x 10"  YTB hip abd bridges 10x/3"  QS heel prop 3x10/3"  SLR 10x/10"          Education on HEP    Jess received the following manual therapy techniques: Joint mobilizations, Manual traction, and Soft tissue Mobilization were applied to the:  for 10 minutes, including: patellar mobs, joint capsular distraction, joint hinge ext, STM, heel cord, quad and HS stretch.      Jess participated in neuromuscular re-education activities to improve: Balance, Coordination, Sense, and Proprioception for  minutes. The following activities were included:      Jess participated in gait training to improve functional mobility and " safety for   minutes, including:      Jess received cold pack for 10 minutes to to decrease circulation, pain, and swelling.      Home Exercises Provided and Patient Education Provided     Education provided:   Compliance with HEP     Written Home Exercises Provided: yes.  Exercises were reviewed and Jess was able to demonstrate them prior to the end of the session.  Jess demonstrated good  understanding of the education provided.     Assessment   Pt tolerating tx well. No increased pain symptoms and noted decreased pain/tightness after tx. Continue with quad and hip strengthening for decreased pain for return to work. VC/TC for correcting form/technique with therex.   Jess Is progressing well towards her goals.   Pt prognosis is Good.     Pt will continue to benefit from skilled outpatient physical therapy to address the deficits listed in the problem list box on initial evaluation, provide pt/family education and to maximize pt's level of independence in the home and community environment.     Pt's spiritual, cultural and educational needs considered and pt agreeable to plan of care and goals.    Anticipated barriers to physical therapy: none     Goals: GOALS: Short Term Goals:  4 weeks  1.Report decreased knee pain  < / =  2/10  to increase tolerance for return to work pain free   2. Increase strength by 1/3 MMT grade in hip ABD to increase tolerance for work  3. Increase strength by 1/3 MMT grade in quad  to increase tolerance for ADL and work activities.  4. Pt to tolerate HEP to improve ROM and independence with ADL's     Long Term Goals: 8 weeks  1.Report decreased knee pain < / = 0/10  to increase tolerance for return to exercise   2.Patient goal: return to work and ADL pain free  3.Increase strength to >/= 4+/5 in quad and glut  to increase tolerance for ADL and work activities.  4. Pt will report at CJ level (20-40% impaired) on FOTO knee to demonstrate increase in LE function with every day  tasks.    Plan     Continue with POC     Gary Randolph, PTA, STS

## 2022-09-13 ENCOUNTER — LAB VISIT (OUTPATIENT)
Dept: LAB | Facility: HOSPITAL | Age: 52
End: 2022-09-13
Attending: ALLERGY & IMMUNOLOGY
Payer: COMMERCIAL

## 2022-09-13 ENCOUNTER — OFFICE VISIT (OUTPATIENT)
Dept: ALLERGY | Facility: CLINIC | Age: 52
End: 2022-09-13
Payer: COMMERCIAL

## 2022-09-13 VITALS — WEIGHT: 172.38 LBS | HEIGHT: 65 IN | BODY MASS INDEX: 28.72 KG/M2

## 2022-09-13 DIAGNOSIS — G47.411 PRIMARY NARCOLEPSY WITH CATAPLEXY: ICD-10-CM

## 2022-09-13 DIAGNOSIS — H10.403 CHRONIC CONJUNCTIVITIS OF BOTH EYES, UNSPECIFIED CHRONIC CONJUNCTIVITIS TYPE: ICD-10-CM

## 2022-09-13 DIAGNOSIS — J31.0 CHRONIC RHINITIS: Primary | ICD-10-CM

## 2022-09-13 DIAGNOSIS — R05.9 COUGH: ICD-10-CM

## 2022-09-13 DIAGNOSIS — N20.0 NEPHROLITHIASIS: ICD-10-CM

## 2022-09-13 DIAGNOSIS — K21.9 GASTROESOPHAGEAL REFLUX DISEASE, UNSPECIFIED WHETHER ESOPHAGITIS PRESENT: ICD-10-CM

## 2022-09-13 DIAGNOSIS — J31.0 CHRONIC RHINITIS: ICD-10-CM

## 2022-09-13 LAB
IGA SERPL-MCNC: 443 MG/DL (ref 40–350)
IGE SERPL-ACNC: <35 IU/ML (ref 0–100)
IGG SERPL-MCNC: 1494 MG/DL (ref 650–1600)
IGM SERPL-MCNC: 34 MG/DL (ref 50–300)

## 2022-09-13 PROCEDURE — 3008F PR BODY MASS INDEX (BMI) DOCUMENTED: ICD-10-PCS | Mod: CPTII,S$GLB,, | Performed by: ALLERGY & IMMUNOLOGY

## 2022-09-13 PROCEDURE — 99999 PR PBB SHADOW E&M-EST. PATIENT-LVL IV: CPT | Mod: PBBFAC,,, | Performed by: ALLERGY & IMMUNOLOGY

## 2022-09-13 PROCEDURE — 86317 IMMUNOASSAY INFECTIOUS AGENT: CPT | Performed by: ALLERGY & IMMUNOLOGY

## 2022-09-13 PROCEDURE — 1160F RVW MEDS BY RX/DR IN RCRD: CPT | Mod: CPTII,S$GLB,, | Performed by: ALLERGY & IMMUNOLOGY

## 2022-09-13 PROCEDURE — 82784 ASSAY IGA/IGD/IGG/IGM EACH: CPT | Mod: 59 | Performed by: ALLERGY & IMMUNOLOGY

## 2022-09-13 PROCEDURE — 1159F PR MEDICATION LIST DOCUMENTED IN MEDICAL RECORD: ICD-10-PCS | Mod: CPTII,S$GLB,, | Performed by: ALLERGY & IMMUNOLOGY

## 2022-09-13 PROCEDURE — 82784 ASSAY IGA/IGD/IGG/IGM EACH: CPT | Performed by: ALLERGY & IMMUNOLOGY

## 2022-09-13 PROCEDURE — 99205 PR OFFICE/OUTPT VISIT, NEW, LEVL V, 60-74 MIN: ICD-10-PCS | Mod: S$GLB,,, | Performed by: ALLERGY & IMMUNOLOGY

## 2022-09-13 PROCEDURE — 82785 ASSAY OF IGE: CPT | Performed by: ALLERGY & IMMUNOLOGY

## 2022-09-13 PROCEDURE — 1160F PR REVIEW ALL MEDS BY PRESCRIBER/CLIN PHARMACIST DOCUMENTED: ICD-10-PCS | Mod: CPTII,S$GLB,, | Performed by: ALLERGY & IMMUNOLOGY

## 2022-09-13 PROCEDURE — 3008F BODY MASS INDEX DOCD: CPT | Mod: CPTII,S$GLB,, | Performed by: ALLERGY & IMMUNOLOGY

## 2022-09-13 PROCEDURE — 99999 PR PBB SHADOW E&M-EST. PATIENT-LVL IV: ICD-10-PCS | Mod: PBBFAC,,, | Performed by: ALLERGY & IMMUNOLOGY

## 2022-09-13 PROCEDURE — 99417 PR PROLONGED SVC, OUTPT, W/WO DIRECT PT CONTACT,  EA ADDTL 15 MIN: ICD-10-PCS | Mod: S$GLB,,, | Performed by: ALLERGY & IMMUNOLOGY

## 2022-09-13 PROCEDURE — 1159F MED LIST DOCD IN RCRD: CPT | Mod: CPTII,S$GLB,, | Performed by: ALLERGY & IMMUNOLOGY

## 2022-09-13 PROCEDURE — 99417 PROLNG OP E/M EACH 15 MIN: CPT | Mod: S$GLB,,, | Performed by: ALLERGY & IMMUNOLOGY

## 2022-09-13 PROCEDURE — 86003 ALLG SPEC IGE CRUDE XTRC EA: CPT | Performed by: ALLERGY & IMMUNOLOGY

## 2022-09-13 PROCEDURE — 86003 ALLG SPEC IGE CRUDE XTRC EA: CPT | Mod: 59 | Performed by: ALLERGY & IMMUNOLOGY

## 2022-09-13 PROCEDURE — 99205 OFFICE O/P NEW HI 60 MIN: CPT | Mod: S$GLB,,, | Performed by: ALLERGY & IMMUNOLOGY
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
## 2022-09-14 ENCOUNTER — CLINICAL SUPPORT (OUTPATIENT)
Dept: REHABILITATION | Facility: HOSPITAL | Age: 52
End: 2022-09-14
Attending: ORTHOPAEDIC SURGERY
Payer: COMMERCIAL

## 2022-09-14 DIAGNOSIS — M25.562 ACUTE PAIN OF LEFT KNEE: Primary | ICD-10-CM

## 2022-09-14 PROCEDURE — 97110 THERAPEUTIC EXERCISES: CPT | Performed by: PHYSICAL THERAPIST

## 2022-09-14 PROCEDURE — 97140 MANUAL THERAPY 1/> REGIONS: CPT | Performed by: PHYSICAL THERAPIST

## 2022-09-16 ENCOUNTER — PATIENT MESSAGE (OUTPATIENT)
Dept: REHABILITATION | Facility: HOSPITAL | Age: 52
End: 2022-09-16
Payer: COMMERCIAL

## 2022-09-16 LAB
A ALTERNATA IGE QN: <0.1 KU/L
A FUMIGATUS IGE QN: <0.1 KU/L
ALLERGEN LATEX IGE: <0.1 KU/L
ALLERGEN NAME: NORMAL
ALLERGEN RESULT: NORMAL
ALMOND IGE QN: <0.1 KU/L
BERMUDA GRASS IGE QN: <0.1 KU/L
CAT DANDER IGE QN: 0.12 KU/L
CATFISH IGE QN: <0.1 KU/L
CEDAR IGE QN: <0.1 KU/L
CHICKEN CLASS: NORMAL
CHICKEN IGE QN: <0.1 KU/L
COW MILK IGE QN: <0.1 KU/L
CRAB IGE QN: <0.1 KU/L
CRAWFISH IGE QN: <0.1 KU/L
D FARINAE IGE QN: 5.67 KU/L
D PTERONYSS IGE QN: 5.89 KU/L
DEPRECATED A ALTERNATA IGE RAST QL: NORMAL
DEPRECATED A FUMIGATUS IGE RAST QL: NORMAL
DEPRECATED ALMOND IGE RAST QL: NORMAL
DEPRECATED BERMUDA GRASS IGE RAST QL: NORMAL
DEPRECATED CAT DANDER IGE RAST QL: ABNORMAL
DEPRECATED CATFISH IGE RAST QL: NORMAL
DEPRECATED CEDAR IGE RAST QL: NORMAL
DEPRECATED COW MILK IGE RAST QL: NORMAL
DEPRECATED CRAB IGE RAST QL: NORMAL
DEPRECATED CRAWFISH IGE RAST QL: NORMAL
DEPRECATED D FARINAE IGE RAST QL: ABNORMAL
DEPRECATED D PTERONYSS IGE RAST QL: ABNORMAL
DEPRECATED DOG DANDER IGE RAST QL: ABNORMAL
DEPRECATED ELDER IGE RAST QL: NORMAL
DEPRECATED ENGL PLANTAIN IGE RAST QL: NORMAL
DEPRECATED FLOUNDER IGE RAST QL: NORMAL
DEPRECATED LOBSTER IGE RAST QL: NORMAL
DEPRECATED ORANGE IGE RAST QL: NORMAL
DEPRECATED PEACH IGE RAST QL: NORMAL
DEPRECATED PECAN/HICK NUT IGE RAST QL: NORMAL
DEPRECATED PECAN/HICK TREE IGE RAST QL: NORMAL
DEPRECATED PINEAPPLE IGE RAST QL: ABNORMAL
DEPRECATED ROACH IGE RAST QL: NORMAL
DEPRECATED SALMON IGE RAST QL: NORMAL
DEPRECATED SHRIMP IGE RAST QL: NORMAL
DEPRECATED TIMOTHY IGE RAST QL: NORMAL
DEPRECATED TUNA IGE RAST QL: NORMAL
DEPRECATED WEST RAGWEED IGE RAST QL: ABNORMAL
DEPRECATED WHITE OAK IGE RAST QL: NORMAL
DOG DANDER IGE QN: 6.8 KU/L
ELDER IGE QN: <0.1 KU/L
ENGL PLANTAIN IGE QN: <0.1 KU/L
FLOUNDER IGE QN: <0.1 KU/L
LATEX CLASS: NORMAL
LOBSTER IGE QN: <0.1 KU/L
ORANGE IGE QN: <0.1 KU/L
PEACH IGE QN: <0.1 KU/L
PECAN/HICK NUT IGE QN: <0.1 KU/L
PECAN/HICK TREE IGE QN: <0.1 KU/L
PINEAPPLE IGE QN: 0.48 KU/L
ROACH IGE QN: <0.1 KU/L
SALMON IGE QN: <0.1 KU/L
SHRIMP IGE QN: <0.1 KU/L
TIMOTHY IGE QN: <0.1 KU/L
TUNA IGE QN: <0.1 KU/L
WEST RAGWEED IGE QN: 0.54 KU/L
WHITE OAK IGE QN: <0.1 KU/L

## 2022-09-20 ENCOUNTER — OFFICE VISIT (OUTPATIENT)
Dept: ALLERGY | Facility: CLINIC | Age: 52
End: 2022-09-20
Payer: COMMERCIAL

## 2022-09-20 VITALS
SYSTOLIC BLOOD PRESSURE: 116 MMHG | HEIGHT: 64 IN | HEART RATE: 107 BPM | DIASTOLIC BLOOD PRESSURE: 64 MMHG | OXYGEN SATURATION: 98 % | BODY MASS INDEX: 29.81 KG/M2 | WEIGHT: 174.63 LBS

## 2022-09-20 DIAGNOSIS — R05.9 COUGH: ICD-10-CM

## 2022-09-20 DIAGNOSIS — H10.13 ALLERGIC CONJUNCTIVITIS, BILATERAL: ICD-10-CM

## 2022-09-20 DIAGNOSIS — K21.9 GASTROESOPHAGEAL REFLUX DISEASE, UNSPECIFIED WHETHER ESOPHAGITIS PRESENT: ICD-10-CM

## 2022-09-20 DIAGNOSIS — J30.89 ALLERGIC RHINITIS DUE TO DUST MITE: Primary | ICD-10-CM

## 2022-09-20 LAB
DEPRECATED S PNEUM12 IGG SER-MCNC: <0.3 UG/ML
DEPRECATED S PNEUM23 IGG SER-MCNC: 3 UG/ML
DEPRECATED S PNEUM4 IGG SER-MCNC: <0.3 UG/ML
DEPRECATED S PNEUM8 IGG SER-MCNC: 1.1 UG/ML
DEPRECATED S PNEUM9 IGG SER-MCNC: 2.5 UG/ML
S PN DA SERO 19F IGG SER-MCNC: 0.8 UG/ML
S PNEUM DA 1 IGG SER-MCNC: 1 UG/ML
S PNEUM DA 14 IGG SER-MCNC: <0.3
S PNEUM DA 18C IGG SER-MCNC: 1.4
S PNEUM DA 3 IGG SER-MCNC: 0.4 UG/ML
S PNEUM DA 5 IGG SER-MCNC: 3.8 UG/ML
S PNEUM DA 6B IGG SER-MCNC: <0.3 UG/ML
S PNEUM DA 7F IGG SER-MCNC: 0.5 UG/ML
S PNEUM DA 9V IGG SER-MCNC: 2 UG/ML

## 2022-09-20 PROCEDURE — 99214 OFFICE O/P EST MOD 30 MIN: CPT | Mod: S$GLB,,, | Performed by: ALLERGY & IMMUNOLOGY

## 2022-09-20 PROCEDURE — 1160F RVW MEDS BY RX/DR IN RCRD: CPT | Mod: CPTII,S$GLB,, | Performed by: ALLERGY & IMMUNOLOGY

## 2022-09-20 PROCEDURE — 99999 PR PBB SHADOW E&M-EST. PATIENT-LVL IV: CPT | Mod: PBBFAC,,, | Performed by: ALLERGY & IMMUNOLOGY

## 2022-09-20 PROCEDURE — 3008F BODY MASS INDEX DOCD: CPT | Mod: CPTII,S$GLB,, | Performed by: ALLERGY & IMMUNOLOGY

## 2022-09-20 PROCEDURE — 3074F SYST BP LT 130 MM HG: CPT | Mod: CPTII,S$GLB,, | Performed by: ALLERGY & IMMUNOLOGY

## 2022-09-20 PROCEDURE — 3074F PR MOST RECENT SYSTOLIC BLOOD PRESSURE < 130 MM HG: ICD-10-PCS | Mod: CPTII,S$GLB,, | Performed by: ALLERGY & IMMUNOLOGY

## 2022-09-20 PROCEDURE — 3008F PR BODY MASS INDEX (BMI) DOCUMENTED: ICD-10-PCS | Mod: CPTII,S$GLB,, | Performed by: ALLERGY & IMMUNOLOGY

## 2022-09-20 PROCEDURE — 3078F PR MOST RECENT DIASTOLIC BLOOD PRESSURE < 80 MM HG: ICD-10-PCS | Mod: CPTII,S$GLB,, | Performed by: ALLERGY & IMMUNOLOGY

## 2022-09-20 PROCEDURE — 99999 PR PBB SHADOW E&M-EST. PATIENT-LVL IV: ICD-10-PCS | Mod: PBBFAC,,, | Performed by: ALLERGY & IMMUNOLOGY

## 2022-09-20 PROCEDURE — 1159F PR MEDICATION LIST DOCUMENTED IN MEDICAL RECORD: ICD-10-PCS | Mod: CPTII,S$GLB,, | Performed by: ALLERGY & IMMUNOLOGY

## 2022-09-20 PROCEDURE — 1159F MED LIST DOCD IN RCRD: CPT | Mod: CPTII,S$GLB,, | Performed by: ALLERGY & IMMUNOLOGY

## 2022-09-20 PROCEDURE — 1160F PR REVIEW ALL MEDS BY PRESCRIBER/CLIN PHARMACIST DOCUMENTED: ICD-10-PCS | Mod: CPTII,S$GLB,, | Performed by: ALLERGY & IMMUNOLOGY

## 2022-09-20 PROCEDURE — 99214 PR OFFICE/OUTPT VISIT, EST, LEVL IV, 30-39 MIN: ICD-10-PCS | Mod: S$GLB,,, | Performed by: ALLERGY & IMMUNOLOGY

## 2022-09-20 PROCEDURE — 3078F DIAST BP <80 MM HG: CPT | Mod: CPTII,S$GLB,, | Performed by: ALLERGY & IMMUNOLOGY

## 2022-09-20 RX ORDER — CLINDAMYCIN HYDROCHLORIDE 150 MG/1
150 CAPSULE ORAL 4 TIMES DAILY
COMMUNITY
Start: 2022-09-15 | End: 2022-12-14
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
## 2022-09-21 ENCOUNTER — PATIENT MESSAGE (OUTPATIENT)
Dept: REHABILITATION | Facility: HOSPITAL | Age: 52
End: 2022-09-21
Payer: COMMERCIAL

## 2022-09-21 DIAGNOSIS — J31.0 CHRONIC RHINITIS: Primary | ICD-10-CM

## 2022-09-28 ENCOUNTER — CLINICAL SUPPORT (OUTPATIENT)
Dept: REHABILITATION | Facility: HOSPITAL | Age: 52
End: 2022-09-28
Attending: ORTHOPAEDIC SURGERY
Payer: COMMERCIAL

## 2022-09-28 DIAGNOSIS — M25.562 ACUTE PAIN OF LEFT KNEE: Primary | ICD-10-CM

## 2022-09-28 PROCEDURE — 97110 THERAPEUTIC EXERCISES: CPT | Mod: CQ

## 2022-09-28 PROCEDURE — 97140 MANUAL THERAPY 1/> REGIONS: CPT | Mod: CQ

## 2022-09-30 ENCOUNTER — CLINICAL SUPPORT (OUTPATIENT)
Dept: REHABILITATION | Facility: HOSPITAL | Age: 52
End: 2022-09-30
Attending: ORTHOPAEDIC SURGERY
Payer: COMMERCIAL

## 2022-09-30 DIAGNOSIS — M25.562 ACUTE PAIN OF LEFT KNEE: Primary | ICD-10-CM

## 2022-09-30 PROCEDURE — 97140 MANUAL THERAPY 1/> REGIONS: CPT | Mod: CQ

## 2022-09-30 PROCEDURE — 97110 THERAPEUTIC EXERCISES: CPT | Mod: CQ

## 2022-09-30 NOTE — PROGRESS NOTES
"  Physical Therapy Daily Treatment Note     Name: Jess Denney Mcleod  Clinic Number: [id number]  Therapy Diagnosis:        Encounter Diagnoses   Name Primary?    Acute medial meniscal tear, left, sequela      Acute pain of left knee        Physician: Janette Odonnell MD     Physician Orders: PT Eval and Treat   Medical Diagnosis from Referral: S83.242S (ICD-10-CM) - Acute medial meniscal tear, left, sequela  Evaluation Date: 5/2/2022  Authorization Period Expiration: 12/31/2022  Plan of Care Expiration: 8/2/2022  Visit # / Visits authorized: 6/20     Time In: 0800  Time Out: 0900  Total Appointment Time (timed & untimed codes): 53 minutes     Precautions: Fall and Weightbearing     Post-Op Plan:  Standard knee arthroscopy protocol  Subjective     Pt reports: c/o min stiffness due to cooler weather. Overall doing better   She was compliant with home exercise program yesterday due to an allergic reaction.   Response to previous treatment: mm fatigue   Functional change: min antalgic gait pattern    Pain: 2/10  Location: left knee      Objective     Jess received therapeutic exercises to develop strength, endurance, ROM, flexibility, and posture for 50 minutes including:    Stationary bike for 10' for increased ROM, circulation, mm strength/endurance lv 3  QS heel prop 3x10/3"  L SLR 1# 3x12/#  SAQ 5 lbs 3x10/3"  B YTB SL Clamshell 30x/3"  YTB hip abd bridges 10x/3"  Shuttle press 50# 3x10 eccentric control   Shuttle B SL press 25# 3x10 ea eccentric control   Standing plinth mini squats 3x10/3"np  Slant board heel cord stretch 3x/30"np  Education on HEP    Jess received the following manual therapy techniques: Joint mobilizations, Manual traction, and Soft tissue Mobilization were applied to the: L Knee  for 10 minutes, including: patellar mobs, joint capsular distraction, joint hinge ext, STM, heel cord, quad and HS stretch.      Jess participated in neuromuscular re-education activities to improve: Balance, " Coordination, Sense, and Proprioception for  minutes. The following activities were included:      Jess participated in gait training to improve functional mobility and safety for   minutes, including:      Jess received cold pack for 10 minutes to to decrease circulation, pain, and swelling.      Home Exercises Provided and Patient Education Provided     Education provided:   Compliance with HEP     Written Home Exercises Provided: yes.  Exercises were reviewed and Jess was able to demonstrate them prior to the end of the session.  Jess demonstrated good  understanding of the education provided.     Assessment   Pt tolerating tx well. Continued progress in quad strength with min increase in wt today. VC/TC for correcting form/technique with therex and quad activation. Continue to progress with quad and posterior chain strengthening per Supervising PT.   Jess Is progressing well towards her goals.   Pt prognosis is Good.     Pt will continue to benefit from skilled outpatient physical therapy to address the deficits listed in the problem list box on initial evaluation, provide pt/family education and to maximize pt's level of independence in the home and community environment.     Pt's spiritual, cultural and educational needs considered and pt agreeable to plan of care and goals.    Anticipated barriers to physical therapy: none     Goals: GOALS: Short Term Goals:  4 weeks  1.Report decreased knee pain  < / =  2/10  to increase tolerance for return to work pain free   2. Increase strength by 1/3 MMT grade in hip ABD to increase tolerance for work  3. Increase strength by 1/3 MMT grade in quad  to increase tolerance for ADL and work activities.  4. Pt to tolerate HEP to improve ROM and independence with ADL's     Long Term Goals: 8 weeks  1.Report decreased knee pain < / = 0/10  to increase tolerance for return to exercise   2.Patient goal: return to work and ADL pain free  3.Increase strength to >/= 4+/5 in  quad and glut  to increase tolerance for ADL and work activities.  4. Pt will report at CJ level (20-40% impaired) on FOTO knee to demonstrate increase in LE function with every day tasks.    Plan     Continue with POC     Gary Randolph, PTA, STS

## 2022-10-05 ENCOUNTER — CLINICAL SUPPORT (OUTPATIENT)
Dept: REHABILITATION | Facility: HOSPITAL | Age: 52
End: 2022-10-05
Attending: ORTHOPAEDIC SURGERY
Payer: COMMERCIAL

## 2022-10-05 DIAGNOSIS — M25.562 ACUTE PAIN OF LEFT KNEE: Primary | ICD-10-CM

## 2022-10-05 PROCEDURE — 97110 THERAPEUTIC EXERCISES: CPT | Performed by: PHYSICAL THERAPIST

## 2022-10-05 NOTE — PROGRESS NOTES
"  Physical Therapy Daily Treatment Note     Name: Jess Denney Presto  Clinic Number: [id number]  Therapy Diagnosis:        Encounter Diagnoses   Name Primary?    Acute medial meniscal tear, left, sequela      Acute pain of left knee        Physician: Janette Odonnell MD     Physician Orders: PT Eval and Treat   Medical Diagnosis from Referral: S83.242S (ICD-10-CM) - Acute medial meniscal tear, left, sequela  Evaluation Date: 5/2/2022  Authorization Period Expiration: 12/31/2022  Plan of Care Expiration: 8/2/2022  Visit # / Visits authorized: 6/20     Time In: 0700  Time Out: 0755  Total Appointment Time (timed & untimed codes): 55 minutes     Precautions: Fall and Weightbearing     Post-Op Plan:  Standard knee arthroscopy protocol  Subjective     Pt reports: Feling good and that her mobility is improving. Not feeling stiffness anymore.  She was compliant with home exercise program yesterday due to an allergic reaction.   Response to previous treatment: mm fatigue   Functional change: min antalgic gait pattern    Pain: 2/10  Location: left knee      Objective     Jess received therapeutic exercises to develop strength, endurance, ROM, flexibility, and posture for 50 minutes including:    Stationary bike for 10' for increased ROM, circulation, mm strength/endurance lv 3  QS heel prop 3x10/3"  L SLR 2# 4 x 8  LAQ 10 lbs 3x10/3"  B YTB SL Clamshell 30x/3"  GTB hip abd bridges 10x10"  Shuttle B SL press 33.5# 3x10 ea eccentric control   Lateral walks with GTB at knees 5 laps  Education on HEP    Jess received the following manual therapy techniques: Joint mobilizations, Manual traction, and Soft tissue Mobilization were applied to the: L Knee  for 5 minutes, including:   Assessment of ROM and joint mobility.      Jess participated in neuromuscular re-education activities to improve: Balance, Coordination, Sense, and Proprioception for  minutes. The following activities were included:      Jess participated in gait " training to improve functional mobility and safety for   minutes, including:      Jess received cold pack for 10 minutes to to decrease circulation, pain, and swelling.      Home Exercises Provided and Patient Education Provided     Education provided:   Compliance with HEP     Written Home Exercises Provided: yes.  Exercises were reviewed and Jess was able to demonstrate them prior to the end of the session.  Jess demonstrated good  understanding of the education provided.     Assessment   Progressing well with strength and mobility training. Has been able to exercise more vigorously with decreased irritation. Continue slow, steady progress of quad and posterior chain strengthening.     Jess Is progressing well towards her goals.   Pt prognosis is Good.     Pt will continue to benefit from skilled outpatient physical therapy to address the deficits listed in the problem list box on initial evaluation, provide pt/family education and to maximize pt's level of independence in the home and community environment.     Pt's spiritual, cultural and educational needs considered and pt agreeable to plan of care and goals.    Anticipated barriers to physical therapy: none     Goals: GOALS: Short Term Goals:  4 weeks  1.Report decreased knee pain  < / =  2/10  to increase tolerance for return to work pain free   2. Increase strength by 1/3 MMT grade in hip ABD to increase tolerance for work  3. Increase strength by 1/3 MMT grade in quad  to increase tolerance for ADL and work activities.  4. Pt to tolerate HEP to improve ROM and independence with ADL's     Long Term Goals: 8 weeks  1.Report decreased knee pain < / = 0/10  to increase tolerance for return to exercise   2.Patient goal: return to work and ADL pain free  3.Increase strength to >/= 4+/5 in quad and glut  to increase tolerance for ADL and work activities.  4. Pt will report at CJ level (20-40% impaired) on FOTO knee to demonstrate increase in LE function with  every day tasks.    Plan     Continue with POC     Shay Garcia, PT, STS

## 2022-10-12 ENCOUNTER — CLINICAL SUPPORT (OUTPATIENT)
Dept: REHABILITATION | Facility: HOSPITAL | Age: 52
End: 2022-10-12
Payer: COMMERCIAL

## 2022-10-12 DIAGNOSIS — M25.562 ACUTE PAIN OF LEFT KNEE: Primary | ICD-10-CM

## 2022-10-12 PROCEDURE — 97110 THERAPEUTIC EXERCISES: CPT | Performed by: PHYSICAL THERAPIST

## 2022-10-12 NOTE — PROGRESS NOTES
"  Physical Therapy Daily Treatment Note     Name: Jess Denney [place]  Clinic Number: [id number]  Therapy Diagnosis:        Encounter Diagnoses   Name Primary?    Acute medial meniscal tear, left, sequela      Acute pain of left knee        Physician: Janette Odonnell MD     Physician Orders: PT Eval and Treat   Medical Diagnosis from Referral: S83.242S (ICD-10-CM) - Acute medial meniscal tear, left, sequela  Evaluation Date: 5/2/2022  Authorization Period Expiration: 12/31/2022  Plan of Care Expiration: 8/2/2022  Visit # / Visits authorized: 8/20     Time In: 0657  Time Out: 81337  Total Appointment Time (timed & untimed codes): 54 minutes     Precautions: Fall and Weightbearing     Post-Op Plan:  Standard knee arthroscopy protocol  Subjective     Pt reports: Knee keeps feeling better and better  She was compliant with home exercise program yesterday due to an allergic reaction.   Response to previous treatment: mm fatigue   Functional change: min antalgic gait pattern    Pain: 2/10  Location: left knee      Objective     Jess received therapeutic exercises to develop strength, endurance, ROM, flexibility, and posture for 50 minutes including:    Stationary bike for 10' for increased ROM, circulation, mm strength/endurance lv 3  QS heel prop 3x10/3"  L SLR 2# 4 x 8  SAQ 5 lbs 3 x 15  LAQ 10 lbs 3x10/3"  Lateral walks with GTB at knees 5 laps  Education on HEP  Heel raise 3 x 15    Jess received the following manual therapy techniques: Joint mobilizations, Manual traction, and Soft tissue Mobilization were applied to the: L Knee  for 5 minutes, including:   Assessment of ROM and joint mobility.      Jess participated in neuromuscular re-education activities to improve: Balance, Coordination, Sense, and Proprioception for  minutes. The following activities were included:      Jess participated in gait training to improve functional mobility and safety for   minutes, including:      Jess received cold pack for 10 " minutes to to decrease circulation, pain, and swelling.      Home Exercises Provided and Patient Education Provided     Education provided:   Compliance with HEP     Written Home Exercises Provided: yes.  Exercises were reviewed and Jess was able to demonstrate them prior to the end of the session.  Jess demonstrated good  understanding of the education provided.     Assessment   Progressing with mobility and strength training. Had a moment where she felt light headed while lying supine so we held supine exercises for today. Continue with strength progression.    Jess Is progressing well towards her goals.   Pt prognosis is Good.     Pt will continue to benefit from skilled outpatient physical therapy to address the deficits listed in the problem list box on initial evaluation, provide pt/family education and to maximize pt's level of independence in the home and community environment.     Pt's spiritual, cultural and educational needs considered and pt agreeable to plan of care and goals.    Anticipated barriers to physical therapy: none     Goals: GOALS: Short Term Goals:  4 weeks  1.Report decreased knee pain  < / =  2/10  to increase tolerance for return to work pain free   2. Increase strength by 1/3 MMT grade in hip ABD to increase tolerance for work  3. Increase strength by 1/3 MMT grade in quad  to increase tolerance for ADL and work activities.  4. Pt to tolerate HEP to improve ROM and independence with ADL's     Long Term Goals: 8 weeks  1.Report decreased knee pain < / = 0/10  to increase tolerance for return to exercise   2.Patient goal: return to work and ADL pain free  3.Increase strength to >/= 4+/5 in quad and glut  to increase tolerance for ADL and work activities.  4. Pt will report at CJ level (20-40% impaired) on FOTO knee to demonstrate increase in LE function with every day tasks.    Plan     Continue with POC     Shay Garcia, PT, DPT, OCS

## 2022-10-14 ENCOUNTER — PATIENT MESSAGE (OUTPATIENT)
Dept: REHABILITATION | Facility: HOSPITAL | Age: 52
End: 2022-10-14
Payer: COMMERCIAL

## 2022-10-19 ENCOUNTER — CLINICAL SUPPORT (OUTPATIENT)
Dept: REHABILITATION | Facility: HOSPITAL | Age: 52
End: 2022-10-19
Payer: COMMERCIAL

## 2022-10-19 DIAGNOSIS — M25.562 ACUTE PAIN OF LEFT KNEE: Primary | ICD-10-CM

## 2022-10-19 PROCEDURE — 97110 THERAPEUTIC EXERCISES: CPT | Performed by: PHYSICAL THERAPIST

## 2022-10-19 NOTE — PROGRESS NOTES
"  Physical Therapy Daily Treatment Note     Name: Jess Denney Fisher  Clinic Number: [id number]  Therapy Diagnosis:        Encounter Diagnoses   Name Primary?    Acute medial meniscal tear, left, sequela      Acute pain of left knee        Physician: Janette Odonnell MD     Physician Orders: PT Eval and Treat   Medical Diagnosis from Referral: S83.242S (ICD-10-CM) - Acute medial meniscal tear, left, sequela  Evaluation Date: 5/2/2022  Authorization Period Expiration: 12/31/2022  Plan of Care Expiration: 8/2/2022  Visit # / Visits authorized: 9/20     Time In: 0658  Time Out: 0801  Total Appointment Time (timed & untimed codes): 55 minutes     Precautions: Fall and Weightbearing     Post-Op Plan:  Standard knee arthroscopy protocol  Subjective     Pt reports: No knee symptoms today. Still unable to lie flat.   She was compliant with home exercise program yesterday due to an allergic reaction.   Response to previous treatment: mm fatigue   Functional change: min antalgic gait pattern    Pain: 2/10  Location: left knee      Objective     Jess received therapeutic exercises to develop strength, endurance, ROM, flexibility, and posture for 55 minutes including:    Stationary bike for 10' for increased ROM, circulation, mm strength/endurance lvl 3  L SLR 2# 4 x 8  SAQ 5 lbs 3 x 15  LAQ 10 lbs 3x10/3"  Lateral walks with GTB at knees 5 laps  Education on HEP  Heel raise 3 x 15    Jess received the following manual therapy techniques: Joint mobilizations, Manual traction, and Soft tissue Mobilization were applied to the: L Knee  for 00 minutes, including:   Assessment of ROM and joint mobility.      Jess participated in neuromuscular re-education activities to improve: Balance, Coordination, Sense, and Proprioception for  minutes. The following activities were included:      Jess participated in gait training to improve functional mobility and safety for   minutes, including:      Jess received cold pack for 10 minutes " to to decrease circulation, pain, and swelling.      Home Exercises Provided and Patient Education Provided     Education provided:   Compliance with HEP     Written Home Exercises Provided: yes.  Exercises were reviewed and Jess was able to demonstrate them prior to the end of the session.  Jess demonstrated good  understanding of the education provided.     Assessment   Still unable to lie supine. Doing well with her exercise progression. Continue to focus on quadriceps and posterior chain strength to support knees.     Jess Is progressing well towards her goals.   Pt prognosis is Good.     Pt will continue to benefit from skilled outpatient physical therapy to address the deficits listed in the problem list box on initial evaluation, provide pt/family education and to maximize pt's level of independence in the home and community environment.     Pt's spiritual, cultural and educational needs considered and pt agreeable to plan of care and goals.    Anticipated barriers to physical therapy: none     Goals: GOALS: Short Term Goals:  4 weeks  1.Report decreased knee pain  < / =  2/10  to increase tolerance for return to work pain free (met)  2. Increase strength by 1/3 MMT grade in hip ABD to increase tolerance for work (met)  3. Increase strength by 1/3 MMT grade in quad  to increase tolerance for ADL and work activities. (Met)  4. Pt to tolerate HEP to improve ROM and independence with ADL's (met)     Long Term Goals: 8 weeks  1.Report decreased knee pain < / = 0/10  to increase tolerance for return to exercise (progressing, not met)  2.Patient goal: return to work and ADL pain free (progressing, not met)  3.Increase strength to >/= 4+/5 in quad and glut  to increase tolerance for ADL and work activities. (progressing, not met)  4. Pt will report at CJ level (20-40% impaired) on FOTO knee to demonstrate increase in LE function with every day tasks. (progressing, not met)    Plan     Continue with DK Day  Ernieg, PT, DPT, OCS

## 2022-10-26 ENCOUNTER — CLINICAL SUPPORT (OUTPATIENT)
Dept: REHABILITATION | Facility: HOSPITAL | Age: 52
End: 2022-10-26
Payer: COMMERCIAL

## 2022-10-26 DIAGNOSIS — M25.562 ACUTE PAIN OF LEFT KNEE: Primary | ICD-10-CM

## 2022-10-26 PROCEDURE — 97110 THERAPEUTIC EXERCISES: CPT | Mod: CQ

## 2022-10-26 PROCEDURE — 97140 MANUAL THERAPY 1/> REGIONS: CPT | Mod: CQ

## 2022-10-26 NOTE — PROGRESS NOTES
"  Physical Therapy Daily Treatment Note     Name: Jess Denney [place]  Clinic Number: [id number]  Therapy Diagnosis:        Encounter Diagnoses   Name Primary?    Acute medial meniscal tear, left, sequela      Acute pain of left knee        Physician: Janette Odonnell MD     Physician Orders: PT Eval and Treat   Medical Diagnosis from Referral: S83.242S (ICD-10-CM) - Acute medial meniscal tear, left, sequela  Evaluation Date: 5/2/2022  Authorization Period Expiration: 12/31/2022  Plan of Care Expiration: 8/2/2022  Visit # / Visits authorized: 10/20     Time In: 0705  Time Out: 0800  Total Appointment Time (timed & untimed codes): 55 minutes     Precautions: Fall and Weightbearing     Post-Op Plan:  Standard knee arthroscopy protocol  Subjective     Pt reports: min pain in L knee this morning due to cold weather   She was compliant with home exercise program yesterday  Response to previous treatment: mm fatigue   Functional change: min antalgic gait pattern    Pain: 1-2/10  Location: left knee      Objective     Jess received therapeutic exercises to develop strength, endurance, ROM, flexibility, and posture for 45 minutes including:    Stationary bike for 10' for increased ROM, circulation, mm strength/endurance lvl 3  L QS hyperext 3x10/3"  L SLR 2#  3x10/3"  SAQ 5 lbs 3x15  LAQ 10 lbs 3x10/3"  Lateral walks with GTB at knees 5 laps np  Heel raise 3 x 15    Jess received the following manual therapy techniques: Joint mobilizations, Manual traction, and Soft tissue Mobilization were applied to the: L Knee  for 10 minutes, including: joint capsular mobs, patellar mobs, hinge ext mob, HS, quad stretch  and heel cord         Jess participated in neuromuscular re-education activities to improve: Balance, Coordination, Sense, and Proprioception for  minutes. The following activities were included:      Jess participated in gait training to improve functional mobility and safety for   minutes, including:      Jess " received cold pack for 0 minutes to to decrease circulation, pain, and swelling.      Home Exercises Provided and Patient Education Provided     Education provided:   Compliance with HEP     Written Home Exercises Provided: yes.  Exercises were reviewed and Jess was able to demonstrate them prior to the end of the session.  Jess demonstrated good  understanding of the education provided.     Assessment   Pt tolerating tx well. Good quad activation noted with QS into hyperext w/o pain. VC/TC for correcting form/technique with therex. Continue to focus on quadriceps and posterior chain strength to support knees per Supervising PT.   Jess Is progressing well towards her goals.   Pt prognosis is Good.     Pt will continue to benefit from skilled outpatient physical therapy to address the deficits listed in the problem list box on initial evaluation, provide pt/family education and to maximize pt's level of independence in the home and community environment.     Pt's spiritual, cultural and educational needs considered and pt agreeable to plan of care and goals.    Anticipated barriers to physical therapy: none     Goals: GOALS: Short Term Goals:  4 weeks  1.Report decreased knee pain  < / =  2/10  to increase tolerance for return to work pain free (met)  2. Increase strength by 1/3 MMT grade in hip ABD to increase tolerance for work (met)  3. Increase strength by 1/3 MMT grade in quad  to increase tolerance for ADL and work activities. (Met)  4. Pt to tolerate HEP to improve ROM and independence with ADL's (met)     Long Term Goals: 8 weeks  1.Report decreased knee pain < / = 0/10  to increase tolerance for return to exercise (progressing, not met)  2.Patient goal: return to work and ADL pain free (progressing, not met)  3.Increase strength to >/= 4+/5 in quad and glut  to increase tolerance for ADL and work activities. (progressing, not met)  4. Pt will report at CJ level (20-40% impaired) on FOTO knee to  demonstrate increase in LE function with every day tasks. (progressing, not met)    Plan     Continue with POC     Gary Randolph, PTA, STS

## 2022-10-31 ENCOUNTER — CLINICAL SUPPORT (OUTPATIENT)
Dept: REHABILITATION | Facility: HOSPITAL | Age: 52
End: 2022-10-31
Payer: COMMERCIAL

## 2022-10-31 DIAGNOSIS — M25.562 ACUTE PAIN OF LEFT KNEE: Primary | ICD-10-CM

## 2022-10-31 PROCEDURE — 97110 THERAPEUTIC EXERCISES: CPT | Performed by: PHYSICAL THERAPIST

## 2022-10-31 NOTE — PROGRESS NOTES
"  Physical Therapy Daily Treatment Note     Name: Jess Denney [place]  Clinic Number: [id number]  Therapy Diagnosis:        Encounter Diagnoses   Name Primary?    Acute medial meniscal tear, left, sequela      Acute pain of left knee        Physician: Janette Odonnell MD     Physician Orders: PT Eval and Treat   Medical Diagnosis from Referral: S83.242S (ICD-10-CM) - Acute medial meniscal tear, left, sequela  Evaluation Date: 5/2/2022  Authorization Period Expiration: 12/31/2022  Plan of Care Expiration: 8/2/2022  Visit # / Visits authorized: 11/20     Time In: 0714  Time Out: 0803  Total Appointment Time (timed & untimed codes): 40 minutes     Precautions: Fall and Weightbearing     Post-Op Plan:  Standard knee arthroscopy protocol  Subjective     Pt reports: Mild pain but overall doing well. Does not feel like she can ay flat because she has a cold.  She was compliant with home exercise program yesterday  Response to previous treatment: mm fatigue   Functional change: min antalgic gait pattern    Pain: 1-2/10  Location: left knee      Objective     Jess received therapeutic exercises to develop strength, endurance, ROM, flexibility, and posture for 40 minutes including:    Stationary bike for 10' for increased ROM, circulation, mm strength/endurance lvl 3  L QS hyperext 3x10/3"  L SLR 2#  3x10/3"  SAQ 5 lbs 3x15  LAQ 10 lbs 3x10/3"  Step ups 6" 3 x 10 ea  Lateral walks with GTB at knees 5 laps np  Heel raise 3 x 15    Jess received the following manual therapy techniques: Joint mobilizations, Manual traction, and Soft tissue Mobilization were applied to the: L Knee  for 10 minutes, including: joint capsular mobs, patellar mobs, hinge ext mob, HS, quad stretch  and heel cord         Jess participated in neuromuscular re-education activities to improve: Balance, Coordination, Sense, and Proprioception for  minutes. The following activities were included:      Jess participated in gait training to improve " functional mobility and safety for   minutes, including:      Jess received cold pack for 0 minutes to to decrease circulation, pain, and swelling.      Home Exercises Provided and Patient Education Provided     Education provided:   Compliance with HEP     Written Home Exercises Provided: yes.  Exercises were reviewed and Jess was able to demonstrate them prior to the end of the session.  Jess demonstrated good  understanding of the education provided.     Assessment   Focused on standing exercises secondary to patient asking. Doing well with strengthening. Continue with POC.     Jess Is progressing well towards her goals.   Pt prognosis is Good.     Pt will continue to benefit from skilled outpatient physical therapy to address the deficits listed in the problem list box on initial evaluation, provide pt/family education and to maximize pt's level of independence in the home and community environment.     Pt's spiritual, cultural and educational needs considered and pt agreeable to plan of care and goals.    Anticipated barriers to physical therapy: none     Goals: GOALS: Short Term Goals:  4 weeks  1.Report decreased knee pain  < / =  2/10  to increase tolerance for return to work pain free (met)  2. Increase strength by 1/3 MMT grade in hip ABD to increase tolerance for work (met)  3. Increase strength by 1/3 MMT grade in quad  to increase tolerance for ADL and work activities. (Met)  4. Pt to tolerate HEP to improve ROM and independence with ADL's (met)     Long Term Goals: 8 weeks  1.Report decreased knee pain < / = 0/10  to increase tolerance for return to exercise (progressing, not met)  2.Patient goal: return to work and ADL pain free (progressing, not met)  3.Increase strength to >/= 4+/5 in quad and glut  to increase tolerance for ADL and work activities. (progressing, not met)  4. Pt will report at CJ level (20-40% impaired) on FOTO knee to demonstrate increase in LE function with every day tasks.  (progressing, not met)    Plan     Continue with POC     Shay Garcia, PT, DPT, OCS

## 2022-11-09 ENCOUNTER — CLINICAL SUPPORT (OUTPATIENT)
Dept: REHABILITATION | Facility: HOSPITAL | Age: 52
End: 2022-11-09
Payer: COMMERCIAL

## 2022-11-09 DIAGNOSIS — M25.562 ACUTE PAIN OF LEFT KNEE: Primary | ICD-10-CM

## 2022-11-09 PROCEDURE — 97110 THERAPEUTIC EXERCISES: CPT | Performed by: PHYSICAL THERAPIST

## 2022-11-10 NOTE — PROGRESS NOTES
"  Physical Therapy Daily Treatment Note     Name: Jess Denney [place]  Clinic Number: [id number]  Therapy Diagnosis:        Encounter Diagnoses   Name Primary?    Acute medial meniscal tear, left, sequela      Acute pain of left knee        Physician: Janette Odonnell MD     Physician Orders: PT Eval and Treat   Medical Diagnosis from Referral: S83.242S (ICD-10-CM) - Acute medial meniscal tear, left, sequela  Evaluation Date: 5/2/2022  Authorization Period Expiration: 12/31/2022  Plan of Care Expiration: 8/2/2022  Visit # / Visits authorized: 12/20     Time In: 0705  Time Out: 0800  Total Appointment Time (timed & untimed codes): 40 minutes     Precautions: Fall and Weightbearing     Post-Op Plan:  Standard knee arthroscopy protocol  Subjective     Pt reports: Knee is feeling better but still has been sick with respiratory issues.   She was compliant with home exercise program yesterday  Response to previous treatment: mm fatigue   Functional change: min antalgic gait pattern    Pain: 1-2/10  Location: left knee      Objective     Jess received therapeutic exercises to develop strength, endurance, ROM, flexibility, and posture for 40 minutes including:    Stationary bike for 10' for increased ROM, circulation, mm strength/endurance lvl 3  L QS hyperext 3x10/3"  L SLR 2#  3x10/3"  SAQ 5 lbs 3x15  LAQ 10 lbs 3x10/3"  Step ups 6" 3 x 10 ea  Lateral walks with GTB at knees 5 laps np  Heel raise 3 x 15    Jess received the following manual therapy techniques: Joint mobilizations, Manual traction, and Soft tissue Mobilization were applied to the: L Knee  for 10 minutes, including: joint capsular mobs, patellar mobs, hinge ext mob, HS, quad stretch  and heel cord         Jess participated in neuromuscular re-education activities to improve: Balance, Coordination, Sense, and Proprioception for  minutes. The following activities were included:      Jess participated in gait training to improve functional mobility and " safety for   minutes, including:      Jess received cold pack for 0 minutes to to decrease circulation, pain, and swelling.      Home Exercises Provided and Patient Education Provided     Education provided:   Compliance with HEP     Written Home Exercises Provided: yes.  Exercises were reviewed and Jess was able to demonstrate them prior to the end of the session.  Jess demonstrated good  understanding of the education provided.     Assessment   Continue to focus on strengthening as her respiratory symptoms allow.     Jess Is progressing well towards her goals.   Pt prognosis is Good.     Pt will continue to benefit from skilled outpatient physical therapy to address the deficits listed in the problem list box on initial evaluation, provide pt/family education and to maximize pt's level of independence in the home and community environment.     Pt's spiritual, cultural and educational needs considered and pt agreeable to plan of care and goals.    Anticipated barriers to physical therapy: none     Goals: GOALS: Short Term Goals:  4 weeks  1.Report decreased knee pain  < / =  2/10  to increase tolerance for return to work pain free (met)  2. Increase strength by 1/3 MMT grade in hip ABD to increase tolerance for work (met)  3. Increase strength by 1/3 MMT grade in quad  to increase tolerance for ADL and work activities. (Met)  4. Pt to tolerate HEP to improve ROM and independence with ADL's (met)     Long Term Goals: 8 weeks  1.Report decreased knee pain < / = 0/10  to increase tolerance for return to exercise (progressing, not met)  2.Patient goal: return to work and ADL pain free (progressing, not met)  3.Increase strength to >/= 4+/5 in quad and glut  to increase tolerance for ADL and work activities. (progressing, not met)  4. Pt will report at CJ level (20-40% impaired) on FOTO knee to demonstrate increase in LE function with every day tasks. (progressing, not met)    Plan     Continue with DK Day  Ernieg, PT, DPT, OCS

## 2022-11-16 ENCOUNTER — CLINICAL SUPPORT (OUTPATIENT)
Dept: REHABILITATION | Facility: HOSPITAL | Age: 52
End: 2022-11-16
Payer: COMMERCIAL

## 2022-11-16 DIAGNOSIS — M25.562 ACUTE PAIN OF LEFT KNEE: Primary | ICD-10-CM

## 2022-11-16 PROCEDURE — 97110 THERAPEUTIC EXERCISES: CPT

## 2022-11-16 PROCEDURE — 97140 MANUAL THERAPY 1/> REGIONS: CPT

## 2022-11-16 NOTE — PROGRESS NOTES
"  Physical Therapy Daily Treatment Note     Name: Jess Denney [place]  Clinic Number: [id number]  Therapy Diagnosis:        Encounter Diagnoses   Name Primary?    Acute medial meniscal tear, left, sequela      Acute pain of left knee        Physician: Janette Odonnell MD     Physician Orders: PT Eval and Treat   Medical Diagnosis from Referral: S83.242S (ICD-10-CM) - Acute medial meniscal tear, left, sequela  Evaluation Date: 5/2/2022  Authorization Period Expiration: 12/31/2022  Plan of Care Expiration: 8/2/2022  Visit # / Visits authorized: 13/20     Time In: 0710  Time Out: 0755  Total Appointment Time (timed & untimed codes): 45 minutes     Precautions: Fall and Weightbearing     Post-Op Plan:  Standard knee arthroscopy protocol  Subjective     Pt reports: Feeling better from her cold. Still has pain in the knee when sitting for long periods and with stairs.   She was compliant with home exercise program yesterday  Response to previous treatment: mm fatigue   Functional change: min antalgic gait pattern    Pain: not formally assessed  Location: left knee      Objective     Jess received therapeutic exercises to develop strength, endurance, ROM, flexibility, and posture for 35 minutes including:    Stationary bike for 10' for increased ROM, circulation, mm strength/endurance lvl 3  L QS hyperext 3x10/3"  L SLR 2#  3x10/3"  SAQ 5 lbs 3x15    Not Performed Today:  LAQ 10 lbs 3x10/3"  Step ups 6" 3 x 10 ea  Lateral walks with GTB at knees 5 laps np  Heel raise 3 x 15    Jess received the following manual therapy techniques: Joint mobilizations, Manual traction, and Soft tissue Mobilization were applied to the: L Knee  for 10 minutes, including: joint capsular mobs, patellar mobs, hinge ext mob, HS, quad stretch  and heel cord       Home Exercises Provided and Patient Education Provided     Education provided:   Compliance with HEP     Written Home Exercises Provided: yes.  Exercises were reviewed and Jess was able " to demonstrate them prior to the end of the session.  Jess demonstrated good  understanding of the education provided.     Assessment   Continue to focus on strengthening as her respiratory symptoms allow.     Jess Is progressing well towards her goals.   Pt prognosis is Good.     Pt will continue to benefit from skilled outpatient physical therapy to address the deficits listed in the problem list box on initial evaluation, provide pt/family education and to maximize pt's level of independence in the home and community environment.     Pt's spiritual, cultural and educational needs considered and pt agreeable to plan of care and goals.    Anticipated barriers to physical therapy: none     Goals: GOALS: Short Term Goals:  4 weeks  1.Report decreased knee pain  < / =  2/10  to increase tolerance for return to work pain free (met)  2. Increase strength by 1/3 MMT grade in hip ABD to increase tolerance for work (met)  3. Increase strength by 1/3 MMT grade in quad  to increase tolerance for ADL and work activities. (Met)  4. Pt to tolerate HEP to improve ROM and independence with ADL's (met)     Long Term Goals: 8 weeks  1.Report decreased knee pain < / = 0/10  to increase tolerance for return to exercise (progressing, not met)  2.Patient goal: return to work and ADL pain free (progressing, not met)  3.Increase strength to >/= 4+/5 in quad and glut  to increase tolerance for ADL and work activities. (progressing, not met)  4. Pt will report at CJ level (20-40% impaired) on FOTO knee to demonstrate increase in LE function with every day tasks. (progressing, not met)    Plan     Continue with POC     Emani Fernandez, PT, DPT

## 2022-11-18 ENCOUNTER — CLINICAL SUPPORT (OUTPATIENT)
Dept: REHABILITATION | Facility: HOSPITAL | Age: 52
End: 2022-11-18
Payer: COMMERCIAL

## 2022-11-18 DIAGNOSIS — M25.562 ACUTE PAIN OF LEFT KNEE: Primary | ICD-10-CM

## 2022-11-18 PROCEDURE — 97110 THERAPEUTIC EXERCISES: CPT | Mod: CQ

## 2022-11-18 PROCEDURE — 97140 MANUAL THERAPY 1/> REGIONS: CPT | Mod: CQ

## 2022-11-18 NOTE — PROGRESS NOTES
"  Physical Therapy Daily Treatment Note     Name: Jess Denney [place]  Clinic Number: [id number]  Therapy Diagnosis:        Encounter Diagnoses   Name Primary?    Acute medial meniscal tear, left, sequela      Acute pain of left knee        Physician: Janette Odonnell MD     Physician Orders: PT Eval and Treat   Medical Diagnosis from Referral: S83.242S (ICD-10-CM) - Acute medial meniscal tear, left, sequela  Evaluation Date: 5/2/2022  Authorization Period Expiration: 12/31/2022  Plan of Care Expiration: 8/2/2022  Visit # / Visits authorized: 14/20     Time In: 0810  Time Out: 0900  Total Appointment Time (timed & untimed codes): 50 minutes     Precautions: Fall and Weightbearing     Post-Op Plan:  Standard knee arthroscopy protocol  Subjective     Pt reports: min soreness B knee she contributes to medication for narcolepsy   She was compliant with home exercise program yesterday  Response to previous treatment: mm fatigue   Functional change: min antalgic gait pattern    Pain: 3/10  Location: left knee      Objective     Jess received therapeutic exercises to develop strength, endurance, ROM, flexibility, and posture for 40 minutes including:    Elliptical  for 10' for increased ROM, circulation, mm strength/endurance lvl 2  B shuttle press 62.5# 4x10  B SL shuttle press 25# 3x10 ea   L QS hyperext 3x10/3"  L SLR 2#  3x10/3"  SAQ 5 lbs 3x15    Not Performed Today:  LAQ 10 lbs 3x10/3"  Step ups 6" 3 x 10 ea  Lateral walks with GTB at knees 5 laps np  Heel raise 3 x 15    Jess received the following manual therapy techniques: Joint mobilizations, Manual traction, and Soft tissue Mobilization were applied to the: L Knee  for 10 minutes, including: joint capsular mobs, patellar mobs, hinge ext mob, HS, quad stretch  and heel cord       Home Exercises Provided and Patient Education Provided     Education provided:   Compliance with HEP     Written Home Exercises Provided: yes.  Exercises were reviewed and Jess was " able to demonstrate them prior to the end of the session.  Jess demonstrated good  understanding of the education provided.     Assessment   Pt tolerating tx well. She was able to tolerate elliptical today for increased mm strengthening and endurance. focus still on B LE strengthening with addition of shuttle press today with appropriate mm fatigue. VC/TC for correcting form/technique with therex. Continue to progress as tolerated.   Jess Is progressing well towards her goals.   Pt prognosis is Good.     Pt will continue to benefit from skilled outpatient physical therapy to address the deficits listed in the problem list box on initial evaluation, provide pt/family education and to maximize pt's level of independence in the home and community environment.     Pt's spiritual, cultural and educational needs considered and pt agreeable to plan of care and goals.    Anticipated barriers to physical therapy: none     Goals: GOALS: Short Term Goals:  4 weeks  1.Report decreased knee pain  < / =  2/10  to increase tolerance for return to work pain free (met)  2. Increase strength by 1/3 MMT grade in hip ABD to increase tolerance for work (met)  3. Increase strength by 1/3 MMT grade in quad  to increase tolerance for ADL and work activities. (Met)  4. Pt to tolerate HEP to improve ROM and independence with ADL's (met)     Long Term Goals: 8 weeks  1.Report decreased knee pain < / = 0/10  to increase tolerance for return to exercise (progressing, not met)  2.Patient goal: return to work and ADL pain free (progressing, not met)  3.Increase strength to >/= 4+/5 in quad and glut  to increase tolerance for ADL and work activities. (progressing, not met)  4. Pt will report at CJ level (20-40% impaired) on FOTO knee to demonstrate increase in LE function with every day tasks. (progressing, not met)    Plan     Continue with POC     Gayr Randolph, PTA, STS

## 2022-11-21 ENCOUNTER — TELEPHONE (OUTPATIENT)
Dept: SLEEP MEDICINE | Facility: CLINIC | Age: 52
End: 2022-11-21
Payer: COMMERCIAL

## 2022-11-21 ENCOUNTER — OFFICE VISIT (OUTPATIENT)
Dept: NEUROLOGY | Facility: CLINIC | Age: 52
End: 2022-11-21
Payer: COMMERCIAL

## 2022-11-21 VITALS
WEIGHT: 170.19 LBS | HEART RATE: 100 BPM | HEIGHT: 64 IN | SYSTOLIC BLOOD PRESSURE: 126 MMHG | DIASTOLIC BLOOD PRESSURE: 80 MMHG | BODY MASS INDEX: 29.06 KG/M2

## 2022-11-21 DIAGNOSIS — G47.411 PRIMARY NARCOLEPSY WITH CATAPLEXY: Primary | ICD-10-CM

## 2022-11-21 PROCEDURE — 99204 OFFICE O/P NEW MOD 45 MIN: CPT | Mod: S$GLB,,, | Performed by: PSYCHIATRY & NEUROLOGY

## 2022-11-21 PROCEDURE — 3008F BODY MASS INDEX DOCD: CPT | Mod: CPTII,S$GLB,, | Performed by: PSYCHIATRY & NEUROLOGY

## 2022-11-21 PROCEDURE — 1159F PR MEDICATION LIST DOCUMENTED IN MEDICAL RECORD: ICD-10-PCS | Mod: CPTII,S$GLB,, | Performed by: PSYCHIATRY & NEUROLOGY

## 2022-11-21 PROCEDURE — 99204 PR OFFICE/OUTPT VISIT, NEW, LEVL IV, 45-59 MIN: ICD-10-PCS | Mod: S$GLB,,, | Performed by: PSYCHIATRY & NEUROLOGY

## 2022-11-21 PROCEDURE — 1159F MED LIST DOCD IN RCRD: CPT | Mod: CPTII,S$GLB,, | Performed by: PSYCHIATRY & NEUROLOGY

## 2022-11-21 PROCEDURE — 3079F DIAST BP 80-89 MM HG: CPT | Mod: CPTII,S$GLB,, | Performed by: PSYCHIATRY & NEUROLOGY

## 2022-11-21 PROCEDURE — 3079F PR MOST RECENT DIASTOLIC BLOOD PRESSURE 80-89 MM HG: ICD-10-PCS | Mod: CPTII,S$GLB,, | Performed by: PSYCHIATRY & NEUROLOGY

## 2022-11-21 PROCEDURE — 1160F PR REVIEW ALL MEDS BY PRESCRIBER/CLIN PHARMACIST DOCUMENTED: ICD-10-PCS | Mod: CPTII,S$GLB,, | Performed by: PSYCHIATRY & NEUROLOGY

## 2022-11-21 PROCEDURE — 1160F RVW MEDS BY RX/DR IN RCRD: CPT | Mod: CPTII,S$GLB,, | Performed by: PSYCHIATRY & NEUROLOGY

## 2022-11-21 PROCEDURE — 3074F SYST BP LT 130 MM HG: CPT | Mod: CPTII,S$GLB,, | Performed by: PSYCHIATRY & NEUROLOGY

## 2022-11-21 PROCEDURE — 99999 PR PBB SHADOW E&M-EST. PATIENT-LVL V: CPT | Mod: PBBFAC,,, | Performed by: PSYCHIATRY & NEUROLOGY

## 2022-11-21 PROCEDURE — 3008F PR BODY MASS INDEX (BMI) DOCUMENTED: ICD-10-PCS | Mod: CPTII,S$GLB,, | Performed by: PSYCHIATRY & NEUROLOGY

## 2022-11-21 PROCEDURE — 99999 PR PBB SHADOW E&M-EST. PATIENT-LVL V: ICD-10-PCS | Mod: PBBFAC,,, | Performed by: PSYCHIATRY & NEUROLOGY

## 2022-11-21 PROCEDURE — 3074F PR MOST RECENT SYSTOLIC BLOOD PRESSURE < 130 MM HG: ICD-10-PCS | Mod: CPTII,S$GLB,, | Performed by: PSYCHIATRY & NEUROLOGY

## 2022-11-21 RX ORDER — DEXTROAMPHETAMINE SACCHARATE, AMPHETAMINE ASPARTATE, DEXTROAMPHETAMINE SULFATE AND AMPHETAMINE SULFATE 5; 5; 5; 5 MG/1; MG/1; MG/1; MG/1
1 TABLET ORAL 3 TIMES DAILY
Qty: 90 TABLET | Refills: 0 | Status: SHIPPED | OUTPATIENT
Start: 2022-12-22 | End: 2022-12-14

## 2022-11-21 RX ORDER — DEXTROAMPHETAMINE SACCHARATE, AMPHETAMINE ASPARTATE, DEXTROAMPHETAMINE SULFATE AND AMPHETAMINE SULFATE 5; 5; 5; 5 MG/1; MG/1; MG/1; MG/1
1 TABLET ORAL 3 TIMES DAILY
Qty: 90 TABLET | Refills: 0 | Status: SHIPPED | OUTPATIENT
Start: 2023-01-22 | End: 2023-02-20 | Stop reason: SDUPTHER

## 2022-11-21 RX ORDER — DEXTROAMPHETAMINE SACCHARATE, AMPHETAMINE ASPARTATE, DEXTROAMPHETAMINE SULFATE AND AMPHETAMINE SULFATE 5; 5; 5; 5 MG/1; MG/1; MG/1; MG/1
1 TABLET ORAL 3 TIMES DAILY
Qty: 90 TABLET | Refills: 0 | Status: SHIPPED | OUTPATIENT
Start: 2022-11-21 | End: 2022-12-14

## 2022-11-21 NOTE — PROGRESS NOTES
[facility] NEUROLOGY  [facility], [place]    Date: 11/21/22  Patient Name: Jess Mcleod   MRN: [id number]   PCP: Doris eSxton  Referring Provider: JOHNNY Mena III, MD    Chief Complaint:  Narcolepsy  Subjective:   Patient seen in consultation at the request of JOHNNY Mena III, MD for the evaluation of narcolepsy. A copy of this note will be sent to the referring physician.        HPI:   Ms. Jess Mcleod is a 52 y.o. female presenting for evaluation of narcolepsy with cataplexy.    Sleep study in the past: yes; originally dx with HAIR then found to have narcolepsy with caraplexy by a separate specialist    Cataplexy: vocal symptoms, muscle weakness when moved by music, no association with anger, denies heavy laughing as a problem, pleasant emotions typically bring on symptoms    Hypnagogic hallucinations: describes more symptoms in her youth, seeing herself moving and not really moving    Sleep paralysis: used to be much more prevalent but happens less these days with treatment    Medications failed in the past: provigil, tried quetiapine for sleep while on adderall, antidepressants in the past that worsened underlying issues with incontinence     Medication successes: adderall 20 tid; at least 50% improvement, interfered with sleep sometimes though    Depression/anxiety/ADHD: none; was diagnosed with ADHD in the past that turned out to just part of the narcolepsy hx    Worse right before menstruation; has tried eliminating cycles in the past but ran into issues, continues to pursue option with OBGYN     PAST MEDICAL HISTORY:  Past Medical History:   Diagnosis Date    Arthritis     Chronic back pain     Chronic neck pain     Fibrocystic breast     Fibroid, uterus     GERD (gastroesophageal reflux disease)     Herpes     Migraine headache     Narcolepsy     Nephrolithiasis 5/20/2018    Sciatica of right side 5/20/2018    Scoliosis     Urge incontinence 5/20/2018       PAST  SURGICAL HISTORY:  Past Surgical History:   Procedure Laterality Date    ARTHROSCOPIC CHONDROPLASTY OF KNEE JOINT Left 2022    Procedure: ARTHROSCOPY, KNEE, WITH CHONDROPLASTY;  Surgeon: Janette Odonnell MD;  Location: [facility] OR;  Service: Orthopedics;  Laterality: Left;     SECTION      EMBOLIZATION N/A 2020    Procedure: EMBOLIZATION, BLOOD VESSEL;  Surgeon: Dean Wheeler MD;  Location: [facility] CATH LAB;  Service: Radiology;  Laterality: N/A;    NASAL TURBINATE REDUCTION Bilateral 2022    Procedure: REDUCTION, NASAL TURBINATE john bullosa;  Surgeon: Waldo Giles MD;  Location: [address];  Service: ENT;  Laterality: Bilateral;    Scoliosis surgery      SINUS SURGERY      SYNOVECTOMY OF KNEE Left 2022    Procedure: SYNOVECTOMY, KNEE;  Surgeon: Janette Odonnell MD;  Location: [facility] OR;  Service: Orthopedics;  Laterality: Left;    UMBILICAL HERNIA REPAIR         CURRENT MEDS:  Current Outpatient Medications   Medication Sig Dispense Refill    azelastine (ASTELIN) 137 mcg (0.1 %) nasal spray SPRAY 1 SPRAY IN EACH NOSTRIL TWICE DAILY 90 mL 1    calcium carbonate (OS-TIFFANIE) 500 mg calcium (1,250 mg) tablet Take 1 tablet by mouth once daily.      ibuprofen (ADVIL,MOTRIN) 200 MG tablet Take 200 mg by mouth every 6 (six) hours as needed for Pain.      Lactobac no.41/Bifidobact no.7 (PROBIOTIC-10 ORAL) Take by mouth.      Lactobacillus rhamnosus GG (CULTURELLE) 10 billion cell capsule Take 1 capsule by mouth once daily.      magnesium oxide (MAG-OX) 400 mg (241.3 mg magnesium) tablet Take 400 mg by mouth once daily.      multivitamin with minerals tablet Take 1 tablet by mouth once daily.      omega-3/dha/epa/dpa/fish oil (OMEGA-3 2100 ORAL) Take by mouth.      clindamycin (CLEOCIN) 150 MG capsule Take 150 mg by mouth 4 (four) times daily.      dextroamphetamine-amphetamine (ADDERALL) 20 mg tablet Take 1 tablet by mouth 3 (three) times daily. 90 tablet 0    [START ON 2022]  "dextroamphetamine-amphetamine (ADDERALL) 20 mg tablet Take 1 tablet by mouth 3 (three) times daily. 90 tablet 0    [START ON 1/22/2023] dextroamphetamine-amphetamine (ADDERALL) 20 mg tablet Take 1 tablet by mouth 3 (three) times daily. 90 tablet 0    fluticasone propionate (FLONASE) 50 mcg/actuation nasal spray 1 spray by Each Nostril route once daily. Patient states she feels dizzy when taking       No current facility-administered medications for this visit.     Facility-Administered Medications Ordered in Other Visits   Medication Dose Route Frequency Provider Last Rate Last Admin    LIDOcaine (PF) 10 mg/ml (1%) injection 10 mg  1 mL Intradermal Once Checo Escobar MD           ALLERGIES:  Review of patient's allergies indicates:   Allergen Reactions    Ibuprofen Other (See Comments)     It makes my "ears hurt" when I take large doses.    Opioids - morphine analogues Other (See Comments)     Morphine causes headaches       FAMILY HISTORY:  Family History   Problem Relation Age of Onset    Breast cancer Paternal Aunt     Allergies Other     Asthma Son         exercise induced    Eczema Daughter     Colon cancer Neg Hx     Ovarian cancer Neg Hx        SOCIAL HISTORY:  Social History     Tobacco Use    Smoking status: Never    Smokeless tobacco: Never   Substance Use Topics    Alcohol use: Not Currently    Drug use: No       Review of Systems:  Gen: no fever, no chills, no generalized feeling of weakness   HEENT: no double vision, no blurred vision, no eye pain, no eye exudates. no nasal congestion,   no traumatic injury of head, no neck pain, no neck stiffness. no photophobia or phonophobia at this time. ?    Heart: no chest pain, no SOB    Lungs: no SOB, no cough    MSK: no weakness of legs, intact ROM    ABD: no abd pain, no N/V/D/C, no difficulty with defecation.    Extremities: No leg pain, no edema.       Objective:     Vitals:    11/21/22 1359   BP: 126/80   BP Location: Left arm   Patient Position: " "Sitting   Pulse: 100   Weight: 77.2 kg (170 lb 3.2 oz)   Height: 5' 4" (1.626 m)     General: female in NAD, alert and awake, Aox3, well groomed. ?    ? ?    HEENT: Head is NC/AT EOMI, pupil size: 4 mm B/L, no nystagmus noted; hearing grossly intact b/l. Mucous membrane moist, uvula midline, no pharyngeal erythema, exudates or discharges.      Neck: Supple. no nuchal rigidity.      Cardiovascular: well perfused, no cyanosis        Respiratory: Symmetric chest rise noted       Musculoskeletal: Muscle tone noted to be adequate for patient age, muscle mass is WNL. No spontaneous movements or fasciculations noted during this examination.       Neurological Examination.    Mental status: AA&O x3; Affect/mood is euthymic/congruent; no aphasia noted during examination. Patient answers simple questions appropriately & follows simple commands; no dysarthria or expressive aphasia; no sandra-neglect or extinction. Vocabulary/word finding: excellent.       Cranial Nerves: II-XII grossly intact.      Muscle Function:  Full and symmetric use of bilateral upper and lower extremities against gravity     Gait: adequate casual gait with stride length and arm swing WNL.  Stable without the use of cane or walker    Assessment:   Jess Mcleod is a 52 y.o. female presenting for evaluation of narcolepsy with cataplexy.  This patient has been followed by sleep medicine in the past.  As her recent sleep specialist retired, she was seeking a new provider.  We discussed that her case would be best served with a sleep medicine specialist; we will initiate referral today.  In the interim, refills will be provided for her current regimen of Adderall 20 mg 3 times daily.  Benefits, risks, side effects, alternatives discussed at length.    Plan:     Problem List Items Addressed This Visit    None  Visit Diagnoses       Primary narcolepsy with cataplexy    -  Primary    Relevant Medications    dextroamphetamine-amphetamine (ADDERALL) 20 mg " tablet    dextroamphetamine-amphetamine (ADDERALL) 20 mg tablet (Start on 12/22/2022)    dextroamphetamine-amphetamine (ADDERALL) 20 mg tablet (Start on 1/22/2023)    Other Relevant Orders    Ambulatory referral/consult to Sleep Disorders          - refill provided for Adderall 20 mg 3 times daily with delayed prescriptions for the next 2 months for a total of a 3 month supply  - referral to sleep Medicine   - routine safety concerns and recommendations related to narcolepsy discussed at length including the avoidance of excessive caffeine, opioids, or medications that may cause drowsiness.  - follow-up with our clinic in 3 months if not already following with a new sleep medicine provider    I spent a total of 45 minutes on the day of the visit. This includes face to face time and non-face to face time preparing to see the patient (eg, review of tests), obtaining and/or reviewing separately obtained history, documenting clinical information in the electronic or other health record, independently interpreting results and communicating results to the patient/family/caregiver, or care coordinator.    A dictation device was used to produce this document. Use of such devices sometimes results in grammatical errors or replacement of words that sound similarly.    Soy Gresham, DO

## 2022-11-22 ENCOUNTER — TELEPHONE (OUTPATIENT)
Dept: SLEEP MEDICINE | Facility: CLINIC | Age: 52
End: 2022-11-22
Payer: COMMERCIAL

## 2022-11-30 ENCOUNTER — OFFICE VISIT (OUTPATIENT)
Dept: OBSTETRICS AND GYNECOLOGY | Facility: CLINIC | Age: 52
End: 2022-11-30
Payer: COMMERCIAL

## 2022-11-30 VITALS — WEIGHT: 174.19 LBS | DIASTOLIC BLOOD PRESSURE: 98 MMHG | SYSTOLIC BLOOD PRESSURE: 142 MMHG | BODY MASS INDEX: 29.9 KG/M2

## 2022-11-30 DIAGNOSIS — R10.2 PELVIC PAIN: ICD-10-CM

## 2022-11-30 DIAGNOSIS — Z01.419 ENCOUNTER FOR GYNECOLOGICAL EXAMINATION WITHOUT ABNORMAL FINDING: Primary | ICD-10-CM

## 2022-11-30 DIAGNOSIS — K21.9 GASTROESOPHAGEAL REFLUX DISEASE, UNSPECIFIED WHETHER ESOPHAGITIS PRESENT: ICD-10-CM

## 2022-11-30 DIAGNOSIS — G47.33 OSA (OBSTRUCTIVE SLEEP APNEA): ICD-10-CM

## 2022-11-30 DIAGNOSIS — Z12.31 VISIT FOR SCREENING MAMMOGRAM: ICD-10-CM

## 2022-11-30 PROCEDURE — 87624 HPV HI-RISK TYP POOLED RSLT: CPT | Performed by: OBSTETRICS & GYNECOLOGY

## 2022-11-30 PROCEDURE — 3077F PR MOST RECENT SYSTOLIC BLOOD PRESSURE >= 140 MM HG: ICD-10-PCS | Mod: CPTII,S$GLB,, | Performed by: OBSTETRICS & GYNECOLOGY

## 2022-11-30 PROCEDURE — 99999 PR PBB SHADOW E&M-EST. PATIENT-LVL III: CPT | Mod: PBBFAC,,, | Performed by: OBSTETRICS & GYNECOLOGY

## 2022-11-30 PROCEDURE — 1160F PR REVIEW ALL MEDS BY PRESCRIBER/CLIN PHARMACIST DOCUMENTED: ICD-10-PCS | Mod: CPTII,S$GLB,, | Performed by: OBSTETRICS & GYNECOLOGY

## 2022-11-30 PROCEDURE — 3080F PR MOST RECENT DIASTOLIC BLOOD PRESSURE >= 90 MM HG: ICD-10-PCS | Mod: CPTII,S$GLB,, | Performed by: OBSTETRICS & GYNECOLOGY

## 2022-11-30 PROCEDURE — 87086 URINE CULTURE/COLONY COUNT: CPT | Performed by: OBSTETRICS & GYNECOLOGY

## 2022-11-30 PROCEDURE — 3008F BODY MASS INDEX DOCD: CPT | Mod: CPTII,S$GLB,, | Performed by: OBSTETRICS & GYNECOLOGY

## 2022-11-30 PROCEDURE — 99999 PR PBB SHADOW E&M-EST. PATIENT-LVL III: ICD-10-PCS | Mod: PBBFAC,,, | Performed by: OBSTETRICS & GYNECOLOGY

## 2022-11-30 PROCEDURE — 99396 PR PREVENTIVE VISIT,EST,40-64: ICD-10-PCS | Mod: S$GLB,,, | Performed by: OBSTETRICS & GYNECOLOGY

## 2022-11-30 PROCEDURE — 1160F RVW MEDS BY RX/DR IN RCRD: CPT | Mod: CPTII,S$GLB,, | Performed by: OBSTETRICS & GYNECOLOGY

## 2022-11-30 PROCEDURE — 81514 NFCT DS BV&VAGINITIS DNA ALG: CPT | Performed by: OBSTETRICS & GYNECOLOGY

## 2022-11-30 PROCEDURE — 3077F SYST BP >= 140 MM HG: CPT | Mod: CPTII,S$GLB,, | Performed by: OBSTETRICS & GYNECOLOGY

## 2022-11-30 PROCEDURE — 99396 PREV VISIT EST AGE 40-64: CPT | Mod: S$GLB,,, | Performed by: OBSTETRICS & GYNECOLOGY

## 2022-11-30 PROCEDURE — 88175 CYTOPATH C/V AUTO FLUID REDO: CPT | Performed by: OBSTETRICS & GYNECOLOGY

## 2022-11-30 PROCEDURE — 3008F PR BODY MASS INDEX (BMI) DOCUMENTED: ICD-10-PCS | Mod: CPTII,S$GLB,, | Performed by: OBSTETRICS & GYNECOLOGY

## 2022-11-30 PROCEDURE — 1159F PR MEDICATION LIST DOCUMENTED IN MEDICAL RECORD: ICD-10-PCS | Mod: CPTII,S$GLB,, | Performed by: OBSTETRICS & GYNECOLOGY

## 2022-11-30 PROCEDURE — 1159F MED LIST DOCD IN RCRD: CPT | Mod: CPTII,S$GLB,, | Performed by: OBSTETRICS & GYNECOLOGY

## 2022-11-30 PROCEDURE — 3080F DIAST BP >= 90 MM HG: CPT | Mod: CPTII,S$GLB,, | Performed by: OBSTETRICS & GYNECOLOGY

## 2022-12-01 LAB
BACTERIAL VAGINOSIS DNA: NEGATIVE
CANDIDA GLABRATA DNA: NEGATIVE
CANDIDA KRUSEI DNA: NEGATIVE
CANDIDA RRNA VAG QL PROBE: NEGATIVE
T VAGINALIS RRNA GENITAL QL PROBE: NEGATIVE

## 2022-12-02 ENCOUNTER — CLINICAL SUPPORT (OUTPATIENT)
Dept: REHABILITATION | Facility: HOSPITAL | Age: 52
End: 2022-12-02
Payer: COMMERCIAL

## 2022-12-02 DIAGNOSIS — M25.562 ACUTE PAIN OF LEFT KNEE: Primary | ICD-10-CM

## 2022-12-02 LAB — BACTERIA UR CULT: NO GROWTH

## 2022-12-02 PROCEDURE — 97110 THERAPEUTIC EXERCISES: CPT | Mod: CQ

## 2022-12-02 PROCEDURE — 97140 MANUAL THERAPY 1/> REGIONS: CPT | Mod: CQ

## 2022-12-02 NOTE — PROGRESS NOTES
"  Physical Therapy Daily Treatment Note     Name: Jess Mcleod  Clinic Number: [id number]  Therapy Diagnosis:        Encounter Diagnoses   Name Primary?    Acute medial meniscal tear, left, sequela      Acute pain of left knee        Physician: Janette Odonnell MD     Physician Orders: PT Eval and Treat   Medical Diagnosis from Referral: S83.242S (ICD-10-CM) - Acute medial meniscal tear, left, sequela  Evaluation Date: 5/2/2022  Authorization Period Expiration: 12/31/2022  Plan of Care Expiration: 8/2/2022  Visit # / Visits authorized: 15/20     Time In: 0805  Time Out: 0900  Total Appointment Time (timed & untimed codes): 55 minutes     Precautions: Fall and Weightbearing     Post-Op Plan:  Standard knee arthroscopy protocol  Subjective     Pt reports: min soreness/pain B knee, "I don't like this cold weather"    She was compliant with home exercise program yesterday  Response to previous treatment: mm fatigue   Functional change: min antalgic gait pattern    Pain: 4/10  Location: left knee      Objective     Jess received therapeutic exercises to develop strength, endurance, ROM, flexibility, and posture for 45 minutes including:    Stationary bike for 10' for increased ROM, circulation, mm strength/endurance lvl 3  B shuttle press 62.5# 4x10  B SL shuttle press 37.5# 3x10 ea   B heel raises shuttle 62.5# 3x10  B lateral step up 3x10 6" 3x10  GTB B clamshells 3x10   L SLR 1#  3x10/3"    Not Performed Today:  LAQ 10 lbs 3x10/3"  Step ups 6" 3 x 10 ea  Lateral walks with GTB at knees 5 laps np  Heel raise 3 x 15    Jess received the following manual therapy techniques: Joint mobilizations, Manual traction, and Soft tissue Mobilization were applied to the: L Knee  for 10 minutes, including: joint capsular mobs, patellar mobs, hinge ext mob, HS, quad stretch  and heel cord       Home Exercises Provided and Patient Education Provided     Education provided:   Compliance with HEP     Written Home Exercises " Provided: yes.  Exercises were reviewed and Jess was able to demonstrate them prior to the end of the session.  Jess demonstrated good  understanding of the education provided.     Assessment   Pt tolerating tx well.  B LE strengthening and ROM. Appropriated mm soreness and fatigue after tx.  VC/TC for correcting form/technique with therex. Continue to progress as tolerated. No adverse pain symptoms during tx.   Jess Is progressing well towards her goals.   Pt prognosis is Good.     Pt will continue to benefit from skilled outpatient physical therapy to address the deficits listed in the problem list box on initial evaluation, provide pt/family education and to maximize pt's level of independence in the home and community environment.     Pt's spiritual, cultural and educational needs considered and pt agreeable to plan of care and goals.    Anticipated barriers to physical therapy: none     Goals: GOALS: Short Term Goals:  4 weeks  1.Report decreased knee pain  < / =  2/10  to increase tolerance for return to work pain free (met)  2. Increase strength by 1/3 MMT grade in hip ABD to increase tolerance for work (met)  3. Increase strength by 1/3 MMT grade in quad  to increase tolerance for ADL and work activities. (Met)  4. Pt to tolerate HEP to improve ROM and independence with ADL's (met)     Long Term Goals: 8 weeks  1.Report decreased knee pain < / = 0/10  to increase tolerance for return to exercise (progressing, not met)  2.Patient goal: return to work and ADL pain free (progressing, not met)  3.Increase strength to >/= 4+/5 in quad and glut  to increase tolerance for ADL and work activities. (progressing, not met)  4. Pt will report at CJ level (20-40% impaired) on FOTO knee to demonstrate increase in LE function with every day tasks. (progressing, not met)    Plan     Continue with POC     Gary Randolph, PTA, STS

## 2022-12-08 ENCOUNTER — PATIENT MESSAGE (OUTPATIENT)
Dept: OBSTETRICS AND GYNECOLOGY | Facility: CLINIC | Age: 52
End: 2022-12-08
Payer: COMMERCIAL

## 2022-12-08 RX ORDER — IBUPROFEN 200 MG
200 TABLET ORAL EVERY 6 HOURS PRN
Status: CANCELLED | OUTPATIENT
Start: 2022-12-08

## 2022-12-12 ENCOUNTER — TELEPHONE (OUTPATIENT)
Dept: OBSTETRICS AND GYNECOLOGY | Facility: CLINIC | Age: 52
End: 2022-12-12
Payer: COMMERCIAL

## 2022-12-12 NOTE — TELEPHONE ENCOUNTER
Called patient to discuss her desire to begin on Lysteda for pain control with menstrual cycles.  Left a message to have the patient contact the office to discuss in further detail.

## 2022-12-13 ENCOUNTER — HOSPITAL ENCOUNTER (OUTPATIENT)
Dept: RADIOLOGY | Facility: HOSPITAL | Age: 52
Discharge: HOME | End: 2022-12-13
Attending: OBSTETRICS & GYNECOLOGY
Payer: COMMERCIAL

## 2022-12-13 DIAGNOSIS — R10.2 PELVIC PAIN: ICD-10-CM

## 2022-12-13 PROCEDURE — 76856 US PELVIS COMP WITH TRANSVAG NON-OB (XPD): ICD-10-PCS | Mod: 26,,, | Performed by: RADIOLOGY

## 2022-12-13 PROCEDURE — 76830 US PELVIS COMP WITH TRANSVAG NON-OB (XPD): ICD-10-PCS | Mod: 26,,, | Performed by: RADIOLOGY

## 2022-12-13 PROCEDURE — 76856 US EXAM PELVIC COMPLETE: CPT | Mod: 26,,, | Performed by: RADIOLOGY

## 2022-12-13 PROCEDURE — 76830 TRANSVAGINAL US NON-OB: CPT | Mod: 26,,, | Performed by: RADIOLOGY

## 2022-12-13 PROCEDURE — 76830 TRANSVAGINAL US NON-OB: CPT | Mod: TC

## 2022-12-14 ENCOUNTER — CLINICAL SUPPORT (OUTPATIENT)
Dept: REHABILITATION | Facility: HOSPITAL | Age: 52
End: 2022-12-14
Payer: COMMERCIAL

## 2022-12-14 DIAGNOSIS — M25.562 ACUTE PAIN OF LEFT KNEE: Primary | ICD-10-CM

## 2022-12-14 PROCEDURE — 97110 THERAPEUTIC EXERCISES: CPT

## 2022-12-14 PROCEDURE — 97140 MANUAL THERAPY 1/> REGIONS: CPT

## 2022-12-14 NOTE — PROGRESS NOTES
"  Physical Therapy Daily Treatment Note     Name: Jess Mcleod  Clinic Number: [id number]  Therapy Diagnosis:        Encounter Diagnoses   Name Primary?    Acute medial meniscal tear, left, sequela      Acute pain of left knee        Physician: Janette Odonnell MD     Physician Orders: PT Eval and Treat   Medical Diagnosis from Referral: S83.242S (ICD-10-CM) - Acute medial meniscal tear, left, sequela  Evaluation Date: 5/2/2022  Authorization Period Expiration: 12/31/2022  Plan of Care Expiration: 8/2/2022  Visit # / Visits authorized: 16/20     Time In: 0805  Time Out: 0900  Total Appointment Time (timed & untimed codes): 55 minutes     Precautions: Fall and Weightbearing     Post-Op Plan:  Standard knee arthroscopy protocol  Subjective     Pt reports: tightness on medial knee, really busy and can't do Home Exercise Program regularly    She was compliant with home exercise program yesterday  Response to previous treatment: mm fatigue   Functional change: min antalgic gait pattern    Pain: not formally assessed  Location: left knee      Objective     Jess received therapeutic exercises to develop strength, endurance, ROM, flexibility, and posture for 45 minutes including:    Stationary bike for 10' for increased ROM, circulation, mm strength/endurance lvl 3  B shuttle press 62.5# 4x10  B SL shuttle press 37.5# 3x10 ea   B heel raises shuttle 62.5# 3x10  B lateral step up 3x10 6" 3x10  L SLR 1#  3x10/3"    Not Performed Today:  LAQ 10 lbs 3x10/3"  Step ups 6" 3 x 10 ea  Lateral walks with GTB at knees 5 laps np  Heel raise 3 x 15  GTB B clamshells 3x10     Jess received the following manual therapy techniques: Joint mobilizations, Manual traction, and Soft tissue Mobilization were applied to the: L Knee  for 10 minutes, including: joint capsular mobs, patellar mobs, hinge ext mob, HS, quad stretch  and heel cord       Home Exercises Provided and Patient Education Provided     Education provided:   Compliance " with HEP     Written Home Exercises Provided: yes.  Exercises were reviewed and Jess was able to demonstrate them prior to the end of the session.  Jess demonstrated good  understanding of the education provided.     Assessment   Jess tolerated treatment well today with appropriate muscle fatigue. She was having some dizziness that I believe may be due to a medication she is on. She will talk to her doctor about that. Due to the dizziness, we avoided the supine position. Continue to progress as tolerated.  Jess Is progressing well towards her goals.   Pt prognosis is Good.     Pt will continue to benefit from skilled outpatient physical therapy to address the deficits listed in the problem list box on initial evaluation, provide pt/family education and to maximize pt's level of independence in the home and community environment.     Pt's spiritual, cultural and educational needs considered and pt agreeable to plan of care and goals.    Anticipated barriers to physical therapy: none     Goals: GOALS: Short Term Goals:  4 weeks  1.Report decreased knee pain  < / =  2/10  to increase tolerance for return to work pain free (met)  2. Increase strength by 1/3 MMT grade in hip ABD to increase tolerance for work (met)  3. Increase strength by 1/3 MMT grade in quad  to increase tolerance for ADL and work activities. (Met)  4. Pt to tolerate HEP to improve ROM and independence with ADL's (met)     Long Term Goals: 8 weeks  1.Report decreased knee pain < / = 0/10  to increase tolerance for return to exercise (progressing, not met)  2.Patient goal: return to work and ADL pain free (progressing, not met)  3.Increase strength to >/= 4+/5 in quad and glut  to increase tolerance for ADL and work activities. (progressing, not met)  4. Pt will report at CJ level (20-40% impaired) on FOTO knee to demonstrate increase in LE function with every day tasks. (progressing, not met)    Plan     Continue with POC     Emani Fernandez, PT,  DPT

## 2022-12-15 NOTE — PROGRESS NOTES
HISTORY OF PRESENT ILLNESS:    Jess Mcleod is a 52 y.o. female, , Patient's last menstrual period was 10/17/2022 (exact date).,  presents for a routine exam and has no complaints.    Past Medical History:   Diagnosis Date    Arthritis     Chronic back pain     Chronic neck pain     Fibrocystic breast     Fibroid, uterus     GERD (gastroesophageal reflux disease)     Herpes     Migraine headache     Narcolepsy     Nephrolithiasis 2018    Sciatica of right side 2018    Scoliosis     Urge incontinence 2018       Past Surgical History:   Procedure Laterality Date    ARTHROSCOPIC CHONDROPLASTY OF KNEE JOINT Left 2022    Procedure: ARTHROSCOPY, KNEE, WITH CHONDROPLASTY;  Surgeon: Janette Odonnell MD;  Location: [facility] OR;  Service: Orthopedics;  Laterality: Left;     SECTION      EMBOLIZATION N/A 2020    Procedure: EMBOLIZATION, BLOOD VESSEL;  Surgeon: Dean Wheeler MD;  Location: [facility], [place], operated by [facility] CATH LAB;  Service: Radiology;  Laterality: N/A;    NASAL TURBINATE REDUCTION Bilateral 2022    Procedure: REDUCTION, NASAL TURBINATE john bullosa;  Surgeon: Waldo Giles MD;  Location: [address];  Service: ENT;  Laterality: Bilateral;    Scoliosis surgery      SINUS SURGERY      SYNOVECTOMY OF KNEE Left 2022    Procedure: SYNOVECTOMY, KNEE;  Surgeon: Janette Odonnell MD;  Location: [facility] OR;  Service: Orthopedics;  Laterality: Left;    UMBILICAL HERNIA REPAIR         MEDICATIONS AND ALLERGIES:      Current Outpatient Medications:     azelastine (ASTELIN) 137 mcg (0.1 %) nasal spray, SPRAY 1 SPRAY IN EACH NOSTRIL TWICE DAILY, Disp: 90 mL, Rfl: 1    calcium carbonate (OS-TIFFANIE) 500 mg calcium (1,250 mg) tablet, Take 1 tablet by mouth once daily., Disp: , Rfl:     [START ON 2023] dextroamphetamine-amphetamine (ADDERALL) 20 mg tablet, Take 1 tablet by mouth 3 (three) times daily., Disp: 90 tablet, Rfl: 0    ibuprofen (ADVIL,MOTRIN) 200 MG tablet, Take 200 mg by  "mouth every 6 (six) hours as needed for Pain., Disp: , Rfl:     Lactobac no.41/Bifidobact no.7 (PROBIOTIC-10 ORAL), Take by mouth., Disp: , Rfl:     magnesium oxide (MAG-OX) 400 mg (241.3 mg magnesium) tablet, Take 400 mg by mouth once daily., Disp: , Rfl:     multivitamin with minerals tablet, Take 1 tablet by mouth once daily., Disp: , Rfl:     omega-3/dha/epa/dpa/fish oil (OMEGA-3 2100 ORAL), Take by mouth., Disp: , Rfl:   No current facility-administered medications for this visit.    Facility-Administered Medications Ordered in Other Visits:     LIDOcaine (PF) 10 mg/ml (1%) injection 10 mg, 1 mL, Intradermal, Once, Checo Escobar MD    Review of patient's allergies indicates:   Allergen Reactions    Ibuprofen Other (See Comments)     It makes my "ears hurt" when I take large doses.    Opioids - morphine analogues Other (See Comments)     Morphine causes headaches       Family History   Problem Relation Age of Onset    Breast cancer Paternal Aunt     Allergies Other     Asthma Son         exercise induced    Eczema Daughter     Colon cancer Neg Hx     Ovarian cancer Neg Hx        Social History     Socioeconomic History    Marital status: Legally    Tobacco Use    Smoking status: Never    Smokeless tobacco: Never   Substance and Sexual Activity    Alcohol use: Not Currently    Drug use: No    Sexual activity: Not Currently     Partners: Male     Birth control/protection: None       COMPREHENSIVE GYN HISTORY:  PAP History: Denies abnormal Paps.  Infection History: Denies STDs. Denies PID.  Benign History: Denies uterine fibroids. Denies ovarian cysts. Denies endometriosis. Denies other conditions.  Cancer History: Denies cervical cancer. Denies uterine cancer or hyperplasia. Denies ovarian cancer. Denies vulvar cancer or pre-cancer. Denies vaginal cancer or pre-cancer. Denies breast cancer. Denies colon cancer.  Sexual Activity History: Reports currently being sexually active  Menstrual History: " Monthly. Mod then light flow.   Dysmenorrhea History: Reports mild dysmenorrhea.       ROS:  GENERAL: No weight changes. No swelling. No fatigue. No fever.  CARDIOVASCULAR: No chest pain. No shortness of breath. No leg cramps.   NEUROLOGICAL: No headaches. No vision changes.  BREASTS: No pain. No lumps. No discharge.  ABDOMEN: No pain. No nausea. No vomiting. No diarrhea. No constipation.  REPRODUCTIVE: No abnormal bleeding.   VULVA: No pain. No lesions. No itching.  VAGINA: No relaxation. No itching. No odor. No discharge. No lesions.  URINARY: No incontinence. No nocturia. No frequency. No dysuria.    BP (!) 142/98 (BP Location: Left arm, Patient Position: Sitting, BP Method: Medium (Manual))   Wt 79 kg (174 lb 2.6 oz)   LMP 10/17/2022 (Exact Date)   BMI 29.90 kg/m²     PE:  APPEARANCE: Well nourished, well developed, in no acute distress.  AFFECT: WNL, alert and oriented x 3.  SKIN: No acne or hirsutism.  NECK: Neck symmetric, without masses or thyromegaly.  NODES: No inguinal, cervical, axillary or femoral lymph node enlargement.  CHEST: Good respiratory effort.   ABDOMEN: Soft. No tenderness or masses. No hepatosplenomegaly. No hernias.  BREASTS: Symmetrical, no skin changes, visible lesions, palpable masses or nipple discharge bilaterally.  PELVIC: External female genitalia without lesions.  Female hair distribution. Adequate perineal body, Normal urethral meatus. Vagina moist and well rugated without lesions or discharge.  No significant cystocele or rectocele present. Cervix pink without lesions, discharge or tenderness. Uterus is 4-6 week size, regular, mobile and nontender. Adnexa without masses or tenderness.  EXTREMITIES: No edema    DIAGNOSIS:  1. Encounter for gynecological examination without abnormal finding    2. Visit for screening mammogram    3. Gastroesophageal reflux disease, unspecified whether esophagitis present    4. HAIR (obstructive sleep apnea)    5. Pelvic pain        PLAN:    Orders  Placed This Encounter    Urine culture    Vaginosis Screen by DNA Probe    HPV DNA, High Risk with Reflex to Genotype 16,18    US Pelvis Comp with Transvag NON-OB (xpd)    Pap Smear, Thin Prep       COUNSELING:  The patient was counseled today on:  -A.C.S. Pap and pelvic exam guidelines (pap every 3 years), recomendations for yearly mammogram;  -to follow up with her PCP for other health maintenance.    FOLLOW-UP with me in 3 months

## 2022-12-16 ENCOUNTER — CLINICAL SUPPORT (OUTPATIENT)
Dept: REHABILITATION | Facility: HOSPITAL | Age: 52
End: 2022-12-16
Payer: COMMERCIAL

## 2022-12-16 DIAGNOSIS — M25.562 ACUTE PAIN OF LEFT KNEE: Primary | ICD-10-CM

## 2022-12-16 PROCEDURE — 97140 MANUAL THERAPY 1/> REGIONS: CPT | Mod: CQ

## 2022-12-16 PROCEDURE — 97110 THERAPEUTIC EXERCISES: CPT | Mod: CQ

## 2022-12-16 NOTE — PROGRESS NOTES
"  Physical Therapy Daily Treatment Note     Name: Jess Mcleod  Clinic Number: [id number]  Therapy Diagnosis:        Encounter Diagnoses   Name Primary?    Acute medial meniscal tear, left, sequela      Acute pain of left knee        Physician: Janette Odonnell MD     Physician Orders: PT Eval and Treat   Medical Diagnosis from Referral: S83.242S (ICD-10-CM) - Acute medial meniscal tear, left, sequela  Evaluation Date: 5/2/2022  Authorization Period Expiration: 12/31/2022  Plan of Care Expiration: 8/2/2022  Visit # / Visits authorized: 17/20     Time In: 0800  Time Out: 0900  Total Appointment Time (timed & untimed codes): 55 minutes     Precautions: Fall and Weightbearing     Post-Op Plan:  Standard knee arthroscopy protocol  Subjective     Pt reports: min pain/tightness on medial knee   She was "sometimes" compliant with home exercise program   Response to previous treatment: felt OK  Functional change: min antalgic gait pattern    Pain: 2/10  Location: left knee      Objective     Jess received therapeutic exercises to develop strength, endurance, ROM, flexibility, and posture for 45 minutes including:    Stationary bike for 10' for increased ROM, circulation, mm strength/endurance lvl 4  B shuttle press 62.5# 4x10  B SL shuttle press 37.5# 3x10 ea   B heel raises shuttle 62.5# 3x10  B lateral step up 3x10 6" 3x10  GTB B clamshells 30x ea  GTB bridges hip abd 3x10/3"  B SLR 1#  3x10/3"    Not Performed Today:  LAQ 10 lbs 3x10/3"  Step ups 6" 3 x 10 ea  Lateral walks with GTB at knees 5 laps np  Heel raise 3 x 15  GTB B clamshells 3x10     Jess received the following manual therapy techniques: Joint mobilizations, Manual traction, and Soft tissue Mobilization were applied to the: L Knee  for 10 minutes, including: joint capsular mobs, patellar mobs, hinge ext mob, HS, quad stretch  and heel cord       Home Exercises Provided and Patient Education Provided     Education provided:   Compliance with HEP "     Written Home Exercises Provided: yes.  Exercises were reviewed and Jess was able to demonstrate them prior to the end of the session.  Jess demonstrated good  understanding of the education provided.     Assessment   Pt tolerating tx well. Continue with ROM and strengthening activity. VC/TC for correcting form/technique with therex. Continue to progress as tolerated   Jess Is progressing well towards her goals.   Pt prognosis is Good.     Pt will continue to benefit from skilled outpatient physical therapy to address the deficits listed in the problem list box on initial evaluation, provide pt/family education and to maximize pt's level of independence in the home and community environment.     Pt's spiritual, cultural and educational needs considered and pt agreeable to plan of care and goals.    Anticipated barriers to physical therapy: none     Goals: GOALS: Short Term Goals:  4 weeks  1.Report decreased knee pain  < / =  2/10  to increase tolerance for return to work pain free (met)  2. Increase strength by 1/3 MMT grade in hip ABD to increase tolerance for work (met)  3. Increase strength by 1/3 MMT grade in quad  to increase tolerance for ADL and work activities. (Met)  4. Pt to tolerate HEP to improve ROM and independence with ADL's (met)     Long Term Goals: 8 weeks  1.Report decreased knee pain < / = 0/10  to increase tolerance for return to exercise (progressing, not met)  2.Patient goal: return to work and ADL pain free (progressing, not met)  3.Increase strength to >/= 4+/5 in quad and glut  to increase tolerance for ADL and work activities. (progressing, not met)  4. Pt will report at CJ level (20-40% impaired) on FOTO knee to demonstrate increase in LE function with every day tasks. (progressing, not met)    Plan     Continue with POC     Gary Randolph, PTA, STS

## 2022-12-28 ENCOUNTER — CLINICAL SUPPORT (OUTPATIENT)
Dept: REHABILITATION | Facility: HOSPITAL | Age: 52
End: 2022-12-28
Payer: COMMERCIAL

## 2022-12-28 DIAGNOSIS — M25.562 ACUTE PAIN OF LEFT KNEE: Primary | ICD-10-CM

## 2022-12-28 PROCEDURE — 97110 THERAPEUTIC EXERCISES: CPT | Mod: CQ

## 2022-12-28 PROCEDURE — 97140 MANUAL THERAPY 1/> REGIONS: CPT | Mod: CQ

## 2022-12-28 NOTE — PROGRESS NOTES
"  Physical Therapy Daily Treatment Note     Name: Jess Mcleod  Clinic Number: [id number]  Therapy Diagnosis:        Encounter Diagnoses   Name Primary?    Acute medial meniscal tear, left, sequela      Acute pain of left knee        Physician: Janette Odonnell MD     Physician Orders: PT Eval and Treat   Medical Diagnosis from Referral: S83.242S (ICD-10-CM) - Acute medial meniscal tear, left, sequela  Evaluation Date: 5/2/2022  Authorization Period Expiration: 12/31/2022  Plan of Care Expiration: 8/2/2022  Visit # / Visits authorized: 18/20     Time In: 0800  Time Out: 0900  Total Appointment Time (timed & untimed codes): 55 minutes     Precautions: Fall and Weightbearing     Post-Op Plan:  Standard knee arthroscopy protocol  Subjective     Pt reports: no pain this morning but c/o stiffness   She was NOT compliant with home exercise program   Response to previous treatment: no issues   Functional change: continues with stiffness in morning and initial difficulty with knee ROM    Pain: 0/10  Location: left knee      Objective     Jess received therapeutic exercises to develop strength, endurance, ROM, flexibility, and posture for 45 minutes including:    Stationary bike for 10' for increased ROM, circulation, mm strength/endurance lvl 4  GTB B clamshells 30x ea  GTB bridges hip abd 3x10/3"  B SLR 1#  3x10/3"  B shuttle press 75# 3x10  B SL shuttle press 50# 3x10 ea   B heel raises shuttle 75# 3x10  B lateral step up 3x10 6" 3x10  B SLR 1#  3x10/3"    Not Performed Today:  LAQ 10 lbs 3x10/3"  Step ups 6" 3 x 10 ea  Lateral walks with GTB at knees 5 laps np  Heel raise 3 x 15  GTB B clamshells 3x10     Jess received the following manual therapy techniques: Joint mobilizations, Manual traction, and Soft tissue Mobilization were applied to the: L Knee  for 10 minutes, including: joint capsular distraction, patellar mobs, hinge ext mob, HS, quad stretch  and heel cord       Home Exercises Provided and Patient " Education Provided     Education provided:   Compliance with HEP     Written Home Exercises Provided: yes.  Exercises were reviewed and Jess was able to demonstrate them prior to the end of the session.  Jess demonstrated good  understanding of the education provided.     Assessment   Pt tolerating tx well. Continue with ROM and strengthening activity. Demonstrating improved strength with increased wt with several exercise VC/TC for correcting form/technique with therex. Appropriate mm fatigue after tx. Continue to progress as tolerated   Jess Is progressing well towards her goals.   Pt prognosis is Good.     Pt will continue to benefit from skilled outpatient physical therapy to address the deficits listed in the problem list box on initial evaluation, provide pt/family education and to maximize pt's level of independence in the home and community environment.     Pt's spiritual, cultural and educational needs considered and pt agreeable to plan of care and goals.    Anticipated barriers to physical therapy: none     Goals: GOALS: Short Term Goals:  4 weeks  1.Report decreased knee pain  < / =  2/10  to increase tolerance for return to work pain free (met)  2. Increase strength by 1/3 MMT grade in hip ABD to increase tolerance for work (met)  3. Increase strength by 1/3 MMT grade in quad  to increase tolerance for ADL and work activities. (Met)  4. Pt to tolerate HEP to improve ROM and independence with ADL's (met)     Long Term Goals: 8 weeks  1.Report decreased knee pain < / = 0/10  to increase tolerance for return to exercise (progressing, not met)  2.Patient goal: return to work and ADL pain free (progressing, not met)  3.Increase strength to >/= 4+/5 in quad and glut  to increase tolerance for ADL and work activities. (progressing, not met)  4. Pt will report at CJ level (20-40% impaired) on FOTO knee to demonstrate increase in LE function with every day tasks. (progressing, not met)    Plan     Continue  with POC     Gary Randolph, PTA, STS

## 2023-01-03 ENCOUNTER — HOSPITAL ENCOUNTER (OUTPATIENT)
Dept: RADIOLOGY | Facility: OTHER | Age: 53
Discharge: HOME | End: 2023-01-03
Attending: OBSTETRICS & GYNECOLOGY
Payer: COMMERCIAL

## 2023-01-03 DIAGNOSIS — Z12.31 VISIT FOR SCREENING MAMMOGRAM: ICD-10-CM

## 2023-01-03 PROCEDURE — 77067 MAMMO DIGITAL SCREENING BILAT WITH TOMO: ICD-10-PCS | Mod: 26,,, | Performed by: RADIOLOGY

## 2023-01-03 PROCEDURE — 77067 SCR MAMMO BI INCL CAD: CPT | Mod: 26,,, | Performed by: RADIOLOGY

## 2023-01-03 PROCEDURE — 77063 MAMMO DIGITAL SCREENING BILAT WITH TOMO: ICD-10-PCS | Mod: 26,,, | Performed by: RADIOLOGY

## 2023-01-03 PROCEDURE — 77067 SCR MAMMO BI INCL CAD: CPT | Mod: TC

## 2023-01-03 PROCEDURE — 77063 BREAST TOMOSYNTHESIS BI: CPT | Mod: 26,,, | Performed by: RADIOLOGY

## 2023-02-06 ENCOUNTER — PATIENT MESSAGE (OUTPATIENT)
Dept: SPORTS MEDICINE | Facility: CLINIC | Age: 53
End: 2023-02-06
Payer: COMMERCIAL

## 2023-02-06 ENCOUNTER — TELEPHONE (OUTPATIENT)
Dept: SPORTS MEDICINE | Facility: CLINIC | Age: 53
End: 2023-02-06
Payer: COMMERCIAL

## 2023-02-06 DIAGNOSIS — M79.673 PAIN OF FOOT, UNSPECIFIED LATERALITY: Primary | ICD-10-CM

## 2023-02-07 ENCOUNTER — TELEPHONE (OUTPATIENT)
Dept: ADMINISTRATIVE | Facility: HOSPITAL | Age: 53
End: 2023-02-07
Payer: COMMERCIAL

## 2023-02-13 ENCOUNTER — OFFICE VISIT (OUTPATIENT)
Dept: SPORTS MEDICINE | Facility: CLINIC | Age: 53
End: 2023-02-13
Payer: COMMERCIAL

## 2023-02-13 VITALS
HEART RATE: 93 BPM | DIASTOLIC BLOOD PRESSURE: 82 MMHG | HEIGHT: 64 IN | BODY MASS INDEX: 29.19 KG/M2 | SYSTOLIC BLOOD PRESSURE: 126 MMHG | WEIGHT: 171 LBS

## 2023-02-13 DIAGNOSIS — M22.42 CHONDROMALACIA OF PATELLOFEMORAL JOINT, LEFT: ICD-10-CM

## 2023-02-13 DIAGNOSIS — Z98.890 S/P LEFT KNEE ARTHROSCOPY: ICD-10-CM

## 2023-02-13 DIAGNOSIS — M54.16 LUMBAR RADICULOPATHY: Primary | ICD-10-CM

## 2023-02-13 PROCEDURE — 99999 PR PBB SHADOW E&M-EST. PATIENT-LVL III: CPT | Mod: PBBFAC,,, | Performed by: ORTHOPAEDIC SURGERY

## 2023-02-13 PROCEDURE — 99214 PR OFFICE/OUTPT VISIT, EST, LEVL IV, 30-39 MIN: ICD-10-PCS | Mod: S$GLB,,, | Performed by: ORTHOPAEDIC SURGERY

## 2023-02-13 PROCEDURE — 3079F DIAST BP 80-89 MM HG: CPT | Mod: CPTII,S$GLB,, | Performed by: ORTHOPAEDIC SURGERY

## 2023-02-13 PROCEDURE — 99999 PR PBB SHADOW E&M-EST. PATIENT-LVL III: ICD-10-PCS | Mod: PBBFAC,,, | Performed by: ORTHOPAEDIC SURGERY

## 2023-02-13 PROCEDURE — 1159F MED LIST DOCD IN RCRD: CPT | Mod: CPTII,S$GLB,, | Performed by: ORTHOPAEDIC SURGERY

## 2023-02-13 PROCEDURE — 3008F PR BODY MASS INDEX (BMI) DOCUMENTED: ICD-10-PCS | Mod: CPTII,S$GLB,, | Performed by: ORTHOPAEDIC SURGERY

## 2023-02-13 PROCEDURE — 3074F SYST BP LT 130 MM HG: CPT | Mod: CPTII,S$GLB,, | Performed by: ORTHOPAEDIC SURGERY

## 2023-02-13 PROCEDURE — 99214 OFFICE O/P EST MOD 30 MIN: CPT | Mod: S$GLB,,, | Performed by: ORTHOPAEDIC SURGERY

## 2023-02-13 PROCEDURE — 3008F BODY MASS INDEX DOCD: CPT | Mod: CPTII,S$GLB,, | Performed by: ORTHOPAEDIC SURGERY

## 2023-02-13 PROCEDURE — 3074F PR MOST RECENT SYSTOLIC BLOOD PRESSURE < 130 MM HG: ICD-10-PCS | Mod: CPTII,S$GLB,, | Performed by: ORTHOPAEDIC SURGERY

## 2023-02-13 PROCEDURE — 1159F PR MEDICATION LIST DOCUMENTED IN MEDICAL RECORD: ICD-10-PCS | Mod: CPTII,S$GLB,, | Performed by: ORTHOPAEDIC SURGERY

## 2023-02-13 PROCEDURE — 3079F PR MOST RECENT DIASTOLIC BLOOD PRESSURE 80-89 MM HG: ICD-10-PCS | Mod: CPTII,S$GLB,, | Performed by: ORTHOPAEDIC SURGERY

## 2023-02-13 RX ORDER — METHYLPREDNISOLONE 4 MG/1
TABLET ORAL
Qty: 21 EACH | Refills: 0 | Status: SHIPPED | OUTPATIENT
Start: 2023-02-13 | End: 2023-03-06

## 2023-02-13 NOTE — PROGRESS NOTES
"POST-OPERATIVE EXAMINATION    53 y.o. Female who returns for follow after surgery. She is 9.5 months s/p    Procedure  1. Left knee arthroscopic chondroplasty  2. Left knee arthroscopic synovectomy    She has developed medial sided radiating pain that goes into the big toe. She has noticed a change in the toe nail.  She states her toe sometimes will get a tingling feeling.  She does not feel as strong as she used to feel around the area of the toe.      PHYSICAL EXAMINATION:  /82 (BP Location: Right arm, Patient Position: Sitting, BP Method: Large (Automatic))   Pulse 93   Ht 5' 4" (1.626 m)   Wt 77.6 kg (171 lb)   BMI 29.35 kg/m²   General: Well-developed well-nourished 53 y.o. femalein no acute distress   Cardiovascular: Regular rhythm   Lungs: No labored breathing or wheezing appreciated   Neuro: Alert and oriented ×3   Psychiatric: well oriented to person, place and time, demonstrates normal mood and affect   Skin: No rashes, lesions or ulcers, normal temperature, turgor, and texture on involved extremity    ORTHOPEDIC EXAM:  Normal post-operative swelling  Normal post-operative scarring  Strength: WNL  ROM: WNL  Tests: None    THORACIC/LUMBAR SPINE     Inspection   Normal skin color and appearance, no ecchymosis, no swelling, and no scars.  No increased lumbar lordosis. Pelvis is level without tilt.  No scoliosis.      ROM   Full forward flexion, extension, side bending and rotation.  There is no increased pain with ROM testing.      Palpation     There is no tenderness of the spinous processes, iliac crests, posterior superior iliac spines, sacroiliac joints, sacrum, coccyx, and greater trochanters.      Neuro   Straight leg raise is positive on the left      L4 neurologic testing reveals 5/5 strength in TA , 2/2 knee reflex, and decreased sensation in L4 dermatome.      L5 neurologic testing reveals 5/5 strength in EHL and decreased sensation in L5 dermatome.      S1 neurologic testing reveals 5/5 " strength in peroneals, 2/2 achilles tendon reflex, and decreased sensation in S1 dermatome.      IMAGING:    Xrays including standing AP, 45 degree PA notch view, lateral and sunrise radiographs of the left knee are ordered / images reviewed by me:  There is no normal alignment and normal bone quality.  No fracture or dislocations noted. No significant joint space narrowing in the medial or lateral compartments.  There is mild joint space narrowing in the patellofemoral compartment consistent with grade 2 Kellgren Clay.      ASSESSMENT:      ICD-10-CM ICD-9-CM   1. Lumbar radiculopathy  M54.16 724.4   2. S/P left knee arthroscopy  Z98.890 V45.89   3. Chondromalacia of patellofemoral joint, left  M22.42 717.7         PLAN:       1. Medrol dose pack prescribed today  2. Referral to physical therapy placed today  3. RTC in 6 weeks  4. I advised her to maintain her appointment with her podiatrist.  5. I also advised her that we may need to MRI her lumbar spine as well as obtain dedicated imaging considering she has a long history of scoliosis with previous fixation back in 19 88

## 2023-02-14 ENCOUNTER — OFFICE VISIT (OUTPATIENT)
Dept: PODIATRY | Facility: CLINIC | Age: 53
End: 2023-02-14
Payer: COMMERCIAL

## 2023-02-14 ENCOUNTER — LAB VISIT (OUTPATIENT)
Dept: LAB | Facility: OTHER | Age: 53
End: 2023-02-14
Attending: OBSTETRICS & GYNECOLOGY
Payer: COMMERCIAL

## 2023-02-14 ENCOUNTER — HOSPITAL ENCOUNTER (OUTPATIENT)
Dept: RADIOLOGY | Facility: HOSPITAL | Age: 53
Discharge: HOME | End: 2023-02-14
Attending: PODIATRIST
Payer: COMMERCIAL

## 2023-02-14 ENCOUNTER — PROCEDURE VISIT (OUTPATIENT)
Dept: OBSTETRICS AND GYNECOLOGY | Facility: CLINIC | Age: 53
End: 2023-02-14
Payer: COMMERCIAL

## 2023-02-14 VITALS
DIASTOLIC BLOOD PRESSURE: 85 MMHG | WEIGHT: 170.44 LBS | SYSTOLIC BLOOD PRESSURE: 121 MMHG | HEART RATE: 86 BPM | BODY MASS INDEX: 29.1 KG/M2 | TEMPERATURE: 98 F | OXYGEN SATURATION: 97 % | HEIGHT: 64 IN

## 2023-02-14 VITALS
HEIGHT: 64 IN | WEIGHT: 171.94 LBS | BODY MASS INDEX: 29.35 KG/M2 | SYSTOLIC BLOOD PRESSURE: 110 MMHG | DIASTOLIC BLOOD PRESSURE: 70 MMHG

## 2023-02-14 DIAGNOSIS — K21.9 GASTROESOPHAGEAL REFLUX DISEASE, UNSPECIFIED WHETHER ESOPHAGITIS PRESENT: ICD-10-CM

## 2023-02-14 DIAGNOSIS — B35.3 TINEA PEDIS OF LEFT FOOT: ICD-10-CM

## 2023-02-14 DIAGNOSIS — D21.9 FIBROIDS: ICD-10-CM

## 2023-02-14 DIAGNOSIS — E78.2 MIXED HYPERLIPIDEMIA: ICD-10-CM

## 2023-02-14 DIAGNOSIS — N92.0 MENORRHAGIA WITH REGULAR CYCLE: ICD-10-CM

## 2023-02-14 DIAGNOSIS — N93.8 DUB (DYSFUNCTIONAL UTERINE BLEEDING): ICD-10-CM

## 2023-02-14 DIAGNOSIS — M79.673 PAIN OF FOOT, UNSPECIFIED LATERALITY: ICD-10-CM

## 2023-02-14 DIAGNOSIS — G60.9 IDIOPATHIC PERIPHERAL NEUROPATHY: ICD-10-CM

## 2023-02-14 DIAGNOSIS — L60.9 DISEASE OF NAIL: ICD-10-CM

## 2023-02-14 DIAGNOSIS — G47.33 OSA (OBSTRUCTIVE SLEEP APNEA): ICD-10-CM

## 2023-02-14 DIAGNOSIS — R29.898 WEAKNESS OF LEFT FOOT: Primary | ICD-10-CM

## 2023-02-14 DIAGNOSIS — D22.9 MULTIPLE NEVI: ICD-10-CM

## 2023-02-14 DIAGNOSIS — R29.898 WEAKNESS OF LEFT FOOT: ICD-10-CM

## 2023-02-14 DIAGNOSIS — N93.8 DUB (DYSFUNCTIONAL UTERINE BLEEDING): Primary | ICD-10-CM

## 2023-02-14 PROBLEM — K21.00 GASTROESOPHAGEAL REFLUX DISEASE WITH ESOPHAGITIS: Status: ACTIVE | Noted: 2017-10-31

## 2023-02-14 LAB
B-HCG UR QL: NEGATIVE
BASOPHILS # BLD AUTO: 0.03 K/UL (ref 0–0.2)
BASOPHILS NFR BLD: 0.5 % (ref 0–1.9)
CTP QC/QA: YES
DIFFERENTIAL METHOD: NORMAL
EOSINOPHIL # BLD AUTO: 0.1 K/UL (ref 0–0.5)
EOSINOPHIL NFR BLD: 1.5 % (ref 0–8)
ERYTHROCYTE [DISTWIDTH] IN BLOOD BY AUTOMATED COUNT: 13.5 % (ref 11.5–14.5)
HCT VFR BLD AUTO: 41.1 % (ref 37–48.5)
HGB BLD-MCNC: 13.5 G/DL (ref 12–16)
IMM GRANULOCYTES # BLD AUTO: 0.01 K/UL (ref 0–0.04)
IMM GRANULOCYTES NFR BLD AUTO: 0.2 % (ref 0–0.5)
LYMPHOCYTES # BLD AUTO: 2.5 K/UL (ref 1–4.8)
LYMPHOCYTES NFR BLD: 37.7 % (ref 18–48)
MCH RBC QN AUTO: 31 PG (ref 27–31)
MCHC RBC AUTO-ENTMCNC: 32.8 G/DL (ref 32–36)
MCV RBC AUTO: 94 FL (ref 82–98)
MONOCYTES # BLD AUTO: 0.5 K/UL (ref 0.3–1)
MONOCYTES NFR BLD: 6.8 % (ref 4–15)
NEUTROPHILS # BLD AUTO: 3.5 K/UL (ref 1.8–7.7)
NEUTROPHILS NFR BLD: 53.3 % (ref 38–73)
NRBC BLD-RTO: 0 /100 WBC
PLATELET # BLD AUTO: 393 K/UL (ref 150–450)
PMV BLD AUTO: 10 FL (ref 9.2–12.9)
RBC # BLD AUTO: 4.36 M/UL (ref 4–5.4)
T4 FREE SERPL-MCNC: 0.8 NG/DL (ref 0.71–1.51)
TSH SERPL DL<=0.005 MIU/L-ACNC: 1.52 UIU/ML (ref 0.4–4)
WBC # BLD AUTO: 6.58 K/UL (ref 3.9–12.7)

## 2023-02-14 PROCEDURE — 81025 URINE PREGNANCY TEST: CPT | Mod: S$GLB,,, | Performed by: OBSTETRICS & GYNECOLOGY

## 2023-02-14 PROCEDURE — 99999 PR PBB SHADOW E&M-EST. PATIENT-LVL V: CPT | Mod: PBBFAC,,, | Performed by: PODIATRIST

## 2023-02-14 PROCEDURE — 58100 BIOPSY (GYNECOLOGICAL): ICD-10-PCS | Mod: S$GLB,,, | Performed by: OBSTETRICS & GYNECOLOGY

## 2023-02-14 PROCEDURE — 99203 OFFICE O/P NEW LOW 30 MIN: CPT | Mod: S$GLB,,, | Performed by: PODIATRIST

## 2023-02-14 PROCEDURE — 3008F PR BODY MASS INDEX (BMI) DOCUMENTED: ICD-10-PCS | Mod: CPTII,S$GLB,, | Performed by: PODIATRIST

## 2023-02-14 PROCEDURE — 1160F RVW MEDS BY RX/DR IN RCRD: CPT | Mod: CPTII,S$GLB,, | Performed by: PODIATRIST

## 2023-02-14 PROCEDURE — 99999 PR PBB SHADOW E&M-EST. PATIENT-LVL V: ICD-10-PCS | Mod: PBBFAC,,, | Performed by: PODIATRIST

## 2023-02-14 PROCEDURE — 81025 POCT URINE PREGNANCY: ICD-10-PCS | Mod: S$GLB,,, | Performed by: OBSTETRICS & GYNECOLOGY

## 2023-02-14 PROCEDURE — 73630 X-RAY EXAM OF FOOT: CPT | Mod: TC,LT

## 2023-02-14 PROCEDURE — 84443 ASSAY THYROID STIM HORMONE: CPT | Performed by: OBSTETRICS & GYNECOLOGY

## 2023-02-14 PROCEDURE — 3079F PR MOST RECENT DIASTOLIC BLOOD PRESSURE 80-89 MM HG: ICD-10-PCS | Mod: CPTII,S$GLB,, | Performed by: PODIATRIST

## 2023-02-14 PROCEDURE — 88305 TISSUE EXAM BY PATHOLOGIST: CPT | Performed by: PATHOLOGY

## 2023-02-14 PROCEDURE — 88305 TISSUE EXAM BY PATHOLOGIST: CPT | Mod: 26,,, | Performed by: PATHOLOGY

## 2023-02-14 PROCEDURE — 3008F BODY MASS INDEX DOCD: CPT | Mod: CPTII,S$GLB,, | Performed by: PODIATRIST

## 2023-02-14 PROCEDURE — 58100 BIOPSY OF UTERUS LINING: CPT | Mod: S$GLB,,, | Performed by: OBSTETRICS & GYNECOLOGY

## 2023-02-14 PROCEDURE — 99203 PR OFFICE/OUTPT VISIT, NEW, LEVL III, 30-44 MIN: ICD-10-PCS | Mod: S$GLB,,, | Performed by: PODIATRIST

## 2023-02-14 PROCEDURE — 1159F MED LIST DOCD IN RCRD: CPT | Mod: CPTII,S$GLB,, | Performed by: PODIATRIST

## 2023-02-14 PROCEDURE — 85025 COMPLETE CBC W/AUTO DIFF WBC: CPT | Performed by: OBSTETRICS & GYNECOLOGY

## 2023-02-14 PROCEDURE — 1159F PR MEDICATION LIST DOCUMENTED IN MEDICAL RECORD: ICD-10-PCS | Mod: CPTII,S$GLB,, | Performed by: PODIATRIST

## 2023-02-14 PROCEDURE — 1160F PR REVIEW ALL MEDS BY PRESCRIBER/CLIN PHARMACIST DOCUMENTED: ICD-10-PCS | Mod: CPTII,S$GLB,, | Performed by: PODIATRIST

## 2023-02-14 PROCEDURE — 36415 COLL VENOUS BLD VENIPUNCTURE: CPT | Performed by: OBSTETRICS & GYNECOLOGY

## 2023-02-14 PROCEDURE — 3079F DIAST BP 80-89 MM HG: CPT | Mod: CPTII,S$GLB,, | Performed by: PODIATRIST

## 2023-02-14 PROCEDURE — 84439 ASSAY OF FREE THYROXINE: CPT | Performed by: OBSTETRICS & GYNECOLOGY

## 2023-02-14 PROCEDURE — 73630 XR FOOT COMPLETE 3 VIEW LEFT: ICD-10-PCS | Mod: 26,LT,, | Performed by: RADIOLOGY

## 2023-02-14 PROCEDURE — 3074F PR MOST RECENT SYSTOLIC BLOOD PRESSURE < 130 MM HG: ICD-10-PCS | Mod: CPTII,S$GLB,, | Performed by: PODIATRIST

## 2023-02-14 PROCEDURE — 73630 X-RAY EXAM OF FOOT: CPT | Mod: 26,LT,, | Performed by: RADIOLOGY

## 2023-02-14 PROCEDURE — 3074F SYST BP LT 130 MM HG: CPT | Mod: CPTII,S$GLB,, | Performed by: PODIATRIST

## 2023-02-14 PROCEDURE — 88305 TISSUE EXAM BY PATHOLOGIST: ICD-10-PCS | Mod: 26,,, | Performed by: PATHOLOGY

## 2023-02-14 RX ORDER — TRANEXAMIC ACID 650 MG/1
1300 TABLET ORAL 3 TIMES DAILY
Qty: 30 TABLET | Refills: 1 | Status: SHIPPED | OUTPATIENT
Start: 2023-02-14

## 2023-02-14 NOTE — PROGRESS NOTES
Subjective:      Patient ID: Jess Mcleod is a 53 y.o. female.    Chief Complaint:   Nail Problem (Left foot great toe- weakness) and Foot Problem (Discoloration on bottom of feet)    Jess is a 53 y.o. female who presents to the clinic complaining of  toe weakness and discoloration of nails and feet Jess is inquiring about treatment options.    Relates her knee and foot problems started after she tripped going up some stairs a year ago.  She relates she gets occasional weakness in her left toe after walking a block or so.  Patient works as a  and does both sitting and standing  She relates that she is in physical therapy for her knee and in March she will be transitioning to physical therapy for her back  Patient relates that she does have some skin issues in between her toes on the left foot as well as changes to her left big toenails.  She uses olive oil l on her toenail occasionally    Patient relates uses Lamisil on her skin  She also lives with her brother who possibly also has fungus     Patient was referred by her sports medicine doctor  Visit 2/13/23:    POST-OPERATIVE EXAMINATION  53 y.o. Female who returns for follow after surgery. She is 9.5 months s/p  Procedure  1. Left knee arthroscopic chondroplasty  2. Left knee arthroscopic synovectomy     She has developed medial sided radiating pain that goes into the big toe. She has noticed a change in the toe nail.  She states her toe sometimes will get a tingling feeling.  She does not feel as strong as she used to feel around the area of the toe.    PLAN:        1. Medrol dose pack prescribed today  2. Referral to physical therapy placed today  3. RTC in 6 weeks  4. I advised her to maintain her appointment with her podiatrist.  5. I also advised her that we may need to MRI her lumbar spine as well as obtain dedicated imaging considering she has a long history of scoliosis with previous fixation back in 19 88     Past Medical History:    Diagnosis Date    Arthritis     Chronic back pain     Chronic neck pain     Fibrocystic breast     Fibroid, uterus     GERD (gastroesophageal reflux disease)     Herpes     Migraine headache     Narcolepsy     Nephrolithiasis 2018    Sciatica of right side 2018    Scoliosis     Urge incontinence 2018     Past Surgical History:   Procedure Laterality Date    ARTHROSCOPIC CHONDROPLASTY OF KNEE JOINT Left 2022    Procedure: ARTHROSCOPY, KNEE, WITH CHONDROPLASTY;  Surgeon: Janette Odonnell MD;  Location: [facility] OR;  Service: Orthopedics;  Laterality: Left;     SECTION      EMBOLIZATION N/A 2020    Procedure: EMBOLIZATION, BLOOD VESSEL;  Surgeon: Dean Wheeler MD;  [place] [facility] [place] by [facility] CATH LAB;  Service: Radiology;  Laterality: N/A;    NASAL TURBINATE REDUCTION Bilateral 2022    Procedure: REDUCTION, NASAL TURBINATE john bullosa;  Surgeon: Waldo Giles MD;  Location: [address];  Service: ENT;  Laterality: Bilateral;    Scoliosis surgery      SINUS SURGERY      SYNOVECTOMY OF KNEE Left 2022    Procedure: SYNOVECTOMY, KNEE;  Surgeon: Janetet Odonnell MD;  Location: [facility] OR;  Service: Orthopedics;  Laterality: Left;    UMBILICAL HERNIA REPAIR       Current Outpatient Medications on File Prior to Visit   Medication Sig Dispense Refill    azelastine (ASTELIN) 137 mcg (0.1 %) nasal spray SPRAY 1 SPRAY IN EACH NOSTRIL TWICE DAILY 90 mL 1    calcium carbonate (OS-TIFFANIE) 500 mg calcium (1,250 mg) tablet Take 1 tablet by mouth once daily.      dextroamphetamine-amphetamine (ADDERALL) 20 mg tablet Take 1 tablet by mouth 3 (three) times daily. 90 tablet 0    ibuprofen (ADVIL,MOTRIN) 200 MG tablet Take 200 mg by mouth every 6 (six) hours as needed for Pain.      Lactobac no.41/Bifidobact no.7 (PROBIOTIC-10 ORAL) Take by mouth.      magnesium oxide (MAG-OX) 400 mg (241.3 mg magnesium) tablet Take 400 mg by mouth once daily.      methylPREDNISolone (MEDROL DOSEPACK) 4 mg tablet  "use as directed 21 each 0    multivitamin with minerals tablet Take 1 tablet by mouth once daily.      omega-3/dha/epa/dpa/fish oil (OMEGA-3 2100 ORAL) Take by mouth.      [DISCONTINUED] QUEtiapine (SEROQUEL) 25 MG Tab TK 1 T PO QHS  2     Current Facility-Administered Medications on File Prior to Visit   Medication Dose Route Frequency Provider Last Rate Last Admin    LIDOcaine (PF) 10 mg/ml (1%) injection 10 mg  1 mL Intradermal Once Checo Escobar MD         Review of patient's allergies indicates:   Allergen Reactions    Ibuprofen Other (See Comments)     It makes my "ears hurt" when I take large doses.    Opioids - morphine analogues Other (See Comments)     Morphine causes headaches       Review of Systems   Constitutional: Negative for chills, decreased appetite, fever, malaise/fatigue, night sweats, weight gain and weight loss.   Cardiovascular:  Negative for chest pain, claudication, dyspnea on exertion, leg swelling, palpitations and syncope.   Respiratory:  Negative for cough and shortness of breath.    Endocrine: Negative for cold intolerance and heat intolerance.   Hematologic/Lymphatic: Negative for bleeding problem. Does not bruise/bleed easily.   Skin:  Positive for color change and nail changes. Negative for dry skin, flushing, itching, poor wound healing, rash, skin cancer, suspicious lesions and unusual hair distribution.   Musculoskeletal:  Positive for back pain. Negative for arthritis, falls, gout, joint pain, joint swelling, muscle cramps, muscle weakness, myalgias, neck pain and stiffness.   Gastrointestinal:  Negative for diarrhea, nausea and vomiting.   Neurological:  Positive for focal weakness. Negative for dizziness, light-headedness, numbness, paresthesias, tremors, vertigo and weakness.   Psychiatric/Behavioral:  Negative for altered mental status and depression. The patient does not have insomnia.    Allergic/Immunologic: Negative.          Objective:       Vitals:    02/14/23 " "0735   BP: 121/85   Pulse: 86   Temp: 98.2 °F (36.8 °C)   SpO2: 97%   Weight: 77.3 kg (170 lb 6.7 oz)   Height: 5' 4" (1.626 m)   PainSc:   1   PainLoc: Toe   77.3 kg (170 lb 6.7 oz)     Physical Exam  Vitals reviewed.   Constitutional:       General: She is not in acute distress.     Appearance: She is well-developed. She is not ill-appearing, toxic-appearing or diaphoretic.      Comments: Proper supportive shoegear      Cardiovascular:      Pulses:           Dorsalis pedis pulses are 2+ on the right side and 2+ on the left side.        Posterior tibial pulses are 1+ on the right side and 1+ on the left side.   Musculoskeletal:         General: No tenderness or deformity.      Right lower leg: No edema.      Left lower leg: No edema.      Right ankle: Normal.      Right Achilles Tendon: Normal. No tenderness.      Left ankle: Normal.      Left Achilles Tendon: Normal. No tenderness.      Right foot: Decreased range of motion. No deformity, tenderness or bony tenderness.      Left foot: Decreased range of motion. No deformity, tenderness or bony tenderness.      Comments: No pain on palpation with range of motion    Strength 5/5 in all directions there is a slight decrease to left plantar flexion of the hallux with a mild tremor upon resistance       Feet:      Right foot:      Protective Sensation: 10 sites tested.  10 sites sensed.      Skin integrity: No ulcer, blister, skin breakdown, erythema, warmth, callus or dry skin.      Toenail Condition: Right toenails are normal.      Left foot:      Protective Sensation: 10 sites tested.  10 sites sensed.      Skin integrity: No ulcer, blister, skin breakdown, erythema, warmth, callus or dry skin.      Toenail Condition: Left toenails are normal.      Comments: [place] is diminished to all digits left more than right    Mild dystrophy to nails no appearance of mycosis    Right interspaces clear  Left interspaces have severe maceration no signs of bacterial " infection    Multiple nevi.     Skin:     General: Skin is warm.      Capillary Refill: Capillary refill takes 2 to 3 seconds.      Coloration: Skin is not pale.      Findings: No erythema or rash.   Neurological:      Mental Status: She is alert and oriented to person, place, and time.      Sensory: Sensory deficit present.   Psychiatric:         Attention and Perception: Attention normal.         Mood and Affect: Mood normal.         Speech: Speech normal.         Behavior: Behavior normal.         Thought Content: Thought content normal.         Cognition and Memory: Cognition normal.         Judgment: Judgment normal.             Assessment:       Encounter Diagnoses   Name Primary?    Pain of foot, unspecified laterality     Weakness of left foot Yes    Disease of nail     Multiple nevi     Tinea pedis of left foot     Idiopathic peripheral neuropathy          Plan:       Jess was seen today for nail problem and foot problem.    Diagnoses and all orders for this visit:    Weakness of left foot  -     X-Ray Foot Complete Left; Future    Pain of foot, unspecified laterality  -     Ambulatory referral/consult to Podiatry  -     X-Ray Foot Complete Left; Future    Disease of nail    Multiple nevi  -     Ambulatory referral/consult to Dermatology; Future    Tinea pedis of left foot    Idiopathic peripheral neuropathy      I counseled the patient on her conditions, their implications and medical management.    - discussed with patient that left hallux occasional weakness is likely secondary to her back.  Patient can discussed with physical therapy at her next visit    If this does not resolve with back treatment can consider further workup    Recommend x-ray as patient has known injury last year; likely will not change plan    - regards to nail appears dystrophic recommend continue proper shoe gear as well as apply Vaseline or Aquaphor    -discussed in detail tinea treatment; recommend family members be treated as  well    Patient relates she has a fatty liver and is unable to take oral Lamisil      - applied   gentian violet as a drying agent    - recommend Dermatology eval for any atypia nevi.  May need biopsy.  Patient relates she has 1 on her arm as well    - follow-up in 6 weeks for the athlete's foot infection to make sure it is resolved            Follow up in about 6 weeks (around 3/28/2023).

## 2023-02-14 NOTE — PROCEDURES
Biopsy (Gynecological)    Date/Time: 2/14/2023 1:30 PM  Performed by: Kayla Thomas MD  Authorized by: Kayla Thomas MD     Consent Done?:  Yes (Written)   Patient was prepped and draped in the normal sterile fashion.  Local anesthesia used?: No      Biopsy Location:  Uterus  Estimated blood loss (cc):  10   Patient tolerated the procedure well with no immediate complications.     Pelvic rest for 2 weeks. Bleeding, pain and fever precautions given.       Cycles occur 1-2 months lasting 14 days and occasional spotting using 2-3 overnight pads per day with occasional clotting. S/0 UAE on 2/6/2023    Narrative & Impression  EXAMINATION:  US PELVIS COMP WITH TRANSVAG NON-OB (XPD)     CLINICAL HISTORY:  Pelvic and perineal pain     TECHNIQUE:  Transabdominal sonography of the pelvis was performed, followed by transvaginal sonography to better evaluate the uterus and ovaries.     COMPARISON:  Pelvic ultrasound 12/05/2019 and 06/07/2018     FINDINGS:  Uterus:     Size: 9.7 x 5.8 x 7.3 cm     Masses: Multiple intramural fibroids, largest measuring 3.2 x 4.0 x 4.4 cm within the superior fundus with calcifications.  Additional possibly pedunculated fibroid adjacent to the left ovary measuring 1.4 x 1.0 x 1.1 cm.  Multiple nabothian cysts present.     Endometrium: Normal in this pre menopausal patient, measuring 5 mm.     Right ovary:     Size: 2.5 x 2.1 x 1.5 cm     Appearance: Physiologic follicle measuring 0.9 x 0.6 x 0.8 cm     Vascular flow: Normal.     Left ovary:     Size: 4.5 x 2.6 x 3.0 cm     Appearance: Physiologic follicle measuring 1.9 x 1.4 x 1.0.     Vascular Flow: Normal.     Free Fluid:     None.     Impression:     Multiple uterine fibroids, one of which appears pedunculated and adjacent to the left adnexa.     Electronically signed by resident: Christal Sheets  Date:                                            12/13/2022  Time:                                           10:03     Electronically  signed by: Raúl White MD  Date:                                            12/13/2022  Time:                                           10:17

## 2023-02-16 ENCOUNTER — TELEPHONE (OUTPATIENT)
Dept: SPORTS MEDICINE | Facility: CLINIC | Age: 53
End: 2023-02-16
Payer: COMMERCIAL

## 2023-02-16 NOTE — TELEPHONE ENCOUNTER
LVM for patient, I see that you schedule yourself onto [facility] schedule on Monday for a follow up, just wanted to see if you wanted to reschedule your appointment due to Dr. Odonnell note he wanted you to follow up in 6 wk.

## 2023-02-17 ENCOUNTER — CLINICAL SUPPORT (OUTPATIENT)
Dept: REHABILITATION | Facility: HOSPITAL | Age: 53
End: 2023-02-17
Attending: ORTHOPAEDIC SURGERY
Payer: COMMERCIAL

## 2023-02-17 ENCOUNTER — PATIENT MESSAGE (OUTPATIENT)
Dept: OBSTETRICS AND GYNECOLOGY | Facility: CLINIC | Age: 53
End: 2023-02-17
Payer: COMMERCIAL

## 2023-02-17 DIAGNOSIS — M25.662 DECREASED RANGE OF MOTION (ROM) OF LEFT KNEE: ICD-10-CM

## 2023-02-17 DIAGNOSIS — M53.86 DECREASED ROM OF LUMBAR SPINE: ICD-10-CM

## 2023-02-17 DIAGNOSIS — Z74.09 DECREASED FUNCTIONAL MOBILITY AND ENDURANCE: ICD-10-CM

## 2023-02-17 DIAGNOSIS — M54.16 LUMBAR RADICULOPATHY: ICD-10-CM

## 2023-02-17 DIAGNOSIS — Z98.890 S/P LEFT KNEE ARTHROSCOPY: ICD-10-CM

## 2023-02-17 DIAGNOSIS — R29.898 DECREASED STRENGTH OF LOWER EXTREMITY: ICD-10-CM

## 2023-02-17 DIAGNOSIS — R20.2 NUMBNESS AND TINGLING OF LEFT LEG: ICD-10-CM

## 2023-02-17 DIAGNOSIS — M22.42 CHONDROMALACIA OF PATELLOFEMORAL JOINT, LEFT: ICD-10-CM

## 2023-02-17 DIAGNOSIS — R20.0 NUMBNESS AND TINGLING OF LEFT LEG: ICD-10-CM

## 2023-02-17 LAB
FINAL PATHOLOGIC DIAGNOSIS: NORMAL
GROSS: NORMAL
Lab: NORMAL

## 2023-02-17 PROCEDURE — 97140 MANUAL THERAPY 1/> REGIONS: CPT

## 2023-02-17 PROCEDURE — 97162 PT EVAL MOD COMPLEX 30 MIN: CPT

## 2023-02-17 NOTE — PLAN OF CARE
"[facility]   Physical Therapy Initial Evaluation       Name: Jess Mcleod  Clinic Number: [id number]    Therapy Diagnosis:   Encounter Diagnoses   Name Primary?    S/P left knee arthroscopy     Lumbar radiculopathy     Chondromalacia of patellofemoral joint, left     Decreased strength of lower extremity     Decreased range of motion (ROM) of left knee     Decreased ROM of lumbar spine     Decreased functional mobility and endurance     Numbness and tingling of left leg         Physician: Janette Odonnell MD    Physician Orders: PT Eval and Treat low back and knee  Medical Diagnosis from Referral: Z98.890 (ICD-10-CM) - S/P left knee arthroscopy M54.16 (ICD-10-CM) - Lumbar radiculopathy M22.42 (ICD-10-CM) - Chondromalacia of patellofemoral joint, left   Evaluation Date: 2/17/2023  Authorization Period Expiration: 12/20/2023  Plan of Care Expiration: 4/28/2023  Progress Note Due: 10 visit  Visit # / Visits authorized: 1/ 1   FOTO: 1/3    Precautions: Standard and Fall     Time In: 7:16 AM  Time Out: 8:02 AM  Total Appointment Time (timed & untimed codes): 46 minutes    SUBJECTIVE     Date of onset: April 2022    History of current condition - Jess reports to the clinic with complaints of left knee and radicular lower leg pain that begins at the knee and terminates at the big toe. Patient reports these symptoms began after her injury in the beginning of 2022 and followed through till after her surgery in April 2022. She did receive physical therapy status post left knee arthroscopy and had a positive response from her treatment. Therapy was terminated in December of 2022 because of personal factors/work schedule but would like to continue with therapy as it was "making a difference". Knee symptoms are located at medial joint line and are aggravated with stairs, walking, and sitting for prolonged periods. Additionally, she complains of radiating pain from her knee to her big toe which " she believes started after her surgery. She describes this pain as weakness, achy, and intermittent numbness/tingling. Greatest limiting factors are walking and stairs. Patient has a long standing history of back pain due to scoliosis in which she has a Ramos susan implanted and wears a back brace for support.      Falls: No falls within the last year.    Imaging, X-rays foot : FINDINGS: Foot complete three views left: There is a flatfoot deformity. There is a hallux valgus deformity. No acute fracture dislocation bone destruction seen. No coalition seen.    Prior Therapy: Prior therapy after knee scope which ended in December 2022  Social History: lives with their family  Occupation:  and majority of the day is walking/standing/sitting  Prior Level of Function: Patient was independent in all functional mobility and activities of daily living.  Current Level of Function: Patient is moderately impaired in ambulation, bending, stair climbing, and lifting secondary to decreased knee and lumbar range of motion, gross lower extremity strength, lumbo pelvic motor control, and increased pain levels.    Pain:  Current 0/10, worst 8/10, best 0/10   Location: left knee , lower legs, and toes   Description: Aching, Tingling, Numb, and weakness  Aggravating Factors: Sitting, Standing, Bending, Walking, Lifting, and Getting out of bed/chair  Easing Factors: hot bath and rest    Patients goals: Would like to return to her daily activities prior to her injury in April such as walking for longer distances, lifting objects, and moving around better.      Medical History:   Past Medical History:   Diagnosis Date    Arthritis     Chronic back pain     Chronic neck pain     Fibrocystic breast     Fibroid, uterus     GERD (gastroesophageal reflux disease)     Herpes     Migraine headache     Narcolepsy     Nephrolithiasis 5/20/2018    Sciatica of right side 5/20/2018    Scoliosis     Urge incontinence 5/20/2018     Surgical  "History:   Jess Mcleod  has a past surgical history that includes Umbilical hernia repair;  section; Scoliosis surgery; Embolization (N/A, 2020); Arthroscopic chondroplasty of knee joint (Left, 2022); Synovectomy of knee (Left, 2022); Nasal turbinate reduction (Bilateral, 2022); and Sinus surgery.    Medications:   Jess has a current medication list which includes the following prescription(s): azelastine, calcium carbonate, dextroamphetamine-amphetamine, ibuprofen, lactobac no.41/bifidobact no.7, magnesium oxide, methylprednisolone, multivitamin with minerals, omega-3/dha/epa/dpa/fish oil, tranexamic acid, and [DISCONTINUED] quetiapine, and the following Facility-Administered Medications: lidocaine (pf) 10 mg/ml (1%).    Allergies:   Review of patient's allergies indicates:   Allergen Reactions    Ibuprofen Other (See Comments)     It makes my "ears hurt" when I take large doses.    Opioids - morphine analogues Other (See Comments)     Morphine causes headaches        OBJECTIVE     Observation: Pt ambulates with slight antalgic gait pattern noting decreased stride length, decreased stride maria g, Trendelenburg gait.      Postural examination: Rounded shoulder and forward head    Palpation: left medial tibiofemoral joint line.     Strength  Right LE   Left LE     Hip Ext 3+/5 Hip Ext 3+/5    Hip Flexion: 3+/5 Hip Flexion: 3+/5   Hip ER:  3+/5 Hip ER: 3+/5   Hip IR: 3+/5 Hip IR: 3+/5   Hip Abduction: 3+/5 Hip Abduction 3+/5   Hip Adduction (seated) 4/5 Hip Adduction (seated) 4/5   Knee Extension: 4/5 Knee Extension: 4/5   Knee Flexion: 3+/5 Knee Flexion: 3+/5   Ankle Dorsiflexion: 4/5 Ankle Dorsiflexion: 4/5   Ankle Plantarflexion: 4+/5 Ankle Plantarflexion: 4+/5      Range of Motion:  R knee Active L knee Active   Flexion 125  113   Extension 0  0     Functional Mobility Assessment:  Double leg Squat: forward trunk lean at hip and minimal use of bilateral " quadricep    Balance Assessment:    Evaluation   Single Limb Stance R LE 8 seconds  (<10 sec = HIGH FALL RISK)   Single Limb Stance L LE 7 seconds  (<10 sec = HIGH FALL RISK)      Endurance Assessment:    Evaluation   Timed Up and Go 16 seconds   30 sec STS  5x         Table: Population Norms for TUG    Age  Average TUG    60 - 69 years  8.1 seconds    70 - 79 years  9.2 seconds    80 - 99 years  11.3 seconds     Special Tests:    Right Left   Valgus Stress Test Neg Neg   Varus Stress test Neg Neg   Lachman's test Neg Neg   Posterior Lachman Neg Neg   Jenifer's Test Neg Neg   Thessaly's Test Neg Neg   Patellar Grind Test Neg Neg       Lumbar Range of Motion: * Denotes pain     ROM   Flexion Mid tibia   Extension 8 degrees   Left Side Bending Tibiofemoral joint line   Right Side Bending Tibiofemoral joint line     Neural Tension Testing:  SLR: L (Neg); R (Neg)  Slump Test: L (Neg); R (Neg)     Sensation: Slightly diminished on left lower extremity through as compared to right.    Limitation/Restriction for FOTO Knee Survey    Therapist reviewed FOTO scores for Jess Mcleod on 2/17/2023.   FOTO documents entered into [employer] - see Media section.    Limitation Score: 42%         TREATMENT     Total Treatment time (time-based codes) separate from Evaluation: 9 minutes      Jess received the treatments listed below:      manual therapy techniques: Joint mobilizations were applied to the left knee for 9 minutes, including:  Grade I-III patella mobilizations in all directions  Grade I-II PA lumbar mobilizations    PATIENT EDUCATION AND HOME EXERCISES     Education provided:   - Activity modification  - Anatomy of injury/symptoms  - Home exercise program     Written Home Exercises Provided: No home exercise program given this date due to time constraint. Will provide written instructions next visit.    ASSESSMENT     Jess is a 53 y.o. female referred to outpatient Physical Therapy with a medical diagnosis of status  post left knee arthroscopy, lumbar radiculopathy, chondromalacia of patellofemoral joint, left. Patient presents with decreased lumbar and left knee range of motion, gross lower extremity strength, lumbo pelvic and quadricep motor control, and increased pain. She is functionally limited in ambulation, stair climbing, bending, and lifting tasks. Patient responded well to previous therapy for left knee but radicular lower extremity symptoms are new for this episode. She rated her functional impairments as slight with a FOTO intake score of 58. She was educated on plan of care and consented to treatment. No contraindications to therapy were identified. Patient prognosis is Good. Patient will benefit from skilled outpatient Physical Therapy to address the deficits stated above and in the chart below, provide patient /family education, and to maximize patientt's level of independence.     Plan of care discussed with patient: Yes  Patient's spiritual, cultural and educational needs considered and patient is agreeable to the plan of care and goals as stated below:     Anticipated Barriers for therapy: None    Medical Necessity is demonstrated by the following  History  Co-morbidities and personal factors that may impact the plan of care Co-morbidities:   prior lumbar surgery and prior knee surgery, GERD, migraines, narcolepsy, scoliosis     Personal Factors:   lifestyle     high   Examination  Body Structures and Functions, activity limitations and participation restrictions that may impact the plan of care Body Regions:   back  lower extremities    Body Systems:    ROM  strength  gross coordinated movement  balance  gait  transitions  motor control    Participation Restrictions:   None    Activity limitations:   Learning and applying knowledge  no deficits    General Tasks and Commands  no deficits    Communication  no deficits    Mobility  walking    Self care  no deficits    Domestic Life  no  deficits    Interactions/Relationships  no deficits    Life Areas  no deficits    Community and Social Life  no deficits         moderate   Clinical Presentation evolving clinical presentation with changing clinical characteristics moderate   Decision Making/ Complexity Score: moderate     Goals:  Short Term Goals: 4 weeks  1. Patient will report decreased left knee and radicular symptom pain  </= 5/10 to increase tolerance for walking tasks.  2. Patient will increase left knee ROM to at least 125 degrees in order to perform sit to stands without difficulty.  3. Patient will increase strength by 1/3 MMT grade in bilateral lower extremity to increase tolerance for ADL and work activities.  4. Patient to tolerate HEP to improve ROM and independence with ADL's    Long Term Goals: 8-10 weeks  1. Patient will improve TUG score to at least 10 seconds to decrease risk for falls.  2. Patient goal will be ambulate for at least 20 minutes to improve performance with endurance tasks.   3. Patient will increase strength to 4+/5 in bilateral lower extremity to increase tolerance for ADL and work activities.  4. Patient will report at least a 32% limitation on FOTO indicating a clinically significant change in overall function.   5. Patient will improve 30 second sit to stand to at least 10x to improve performance with transfer tasks.     PLAN   Plan of care Certification: 2/17/2023 to 4/28/2023.    Outpatient Physical Therapy 2 times weekly for 8-10 weeks to include the following interventions: Gait Training, Manual Therapy, Moist Heat/ Ice, Neuromuscular Re-ed, Patient Education, Therapeutic Activities, and Therapeutic Exercise.     Odette Phillips, PT, DPT      I CERTIFY THE NEED FOR THESE SERVICES FURNISHED UNDER THIS PLAN OF TREATMENT AND WHILE UNDER MY CARE   Physician's comments:     Physician's Signature: ___________________________________________________

## 2023-02-20 ENCOUNTER — APPOINTMENT (OUTPATIENT)
Dept: RADIOLOGY | Facility: OTHER | Age: 53
End: 2023-02-20
Attending: PHYSICIAN ASSISTANT
Payer: COMMERCIAL

## 2023-02-20 ENCOUNTER — OFFICE VISIT (OUTPATIENT)
Dept: SPORTS MEDICINE | Facility: CLINIC | Age: 53
End: 2023-02-20
Payer: COMMERCIAL

## 2023-02-20 VITALS
DIASTOLIC BLOOD PRESSURE: 80 MMHG | WEIGHT: 171 LBS | SYSTOLIC BLOOD PRESSURE: 122 MMHG | HEIGHT: 64 IN | BODY MASS INDEX: 29.19 KG/M2

## 2023-02-20 DIAGNOSIS — G47.411 PRIMARY NARCOLEPSY WITH CATAPLEXY: ICD-10-CM

## 2023-02-20 DIAGNOSIS — M54.9 DORSALGIA, UNSPECIFIED: ICD-10-CM

## 2023-02-20 DIAGNOSIS — M54.42 CHRONIC LEFT-SIDED LOW BACK PAIN WITH LEFT-SIDED SCIATICA: Primary | ICD-10-CM

## 2023-02-20 DIAGNOSIS — M54.50 LUMBAR SPINE PAIN: ICD-10-CM

## 2023-02-20 DIAGNOSIS — G89.29 CHRONIC LEFT-SIDED LOW BACK PAIN WITH LEFT-SIDED SCIATICA: Primary | ICD-10-CM

## 2023-02-20 DIAGNOSIS — M54.16 LUMBAR RADICULOPATHY, CHRONIC: ICD-10-CM

## 2023-02-20 DIAGNOSIS — M54.16 LUMBAR RADICULOPATHY: ICD-10-CM

## 2023-02-20 DIAGNOSIS — M54.50 LUMBAR SPINE PAIN: Primary | ICD-10-CM

## 2023-02-20 PROCEDURE — 72110 X-RAY EXAM L-2 SPINE 4/>VWS: CPT | Mod: 26,,, | Performed by: RADIOLOGY

## 2023-02-20 PROCEDURE — 3074F PR MOST RECENT SYSTOLIC BLOOD PRESSURE < 130 MM HG: ICD-10-PCS | Mod: CPTII,S$GLB,, | Performed by: PHYSICIAN ASSISTANT

## 2023-02-20 PROCEDURE — 3079F DIAST BP 80-89 MM HG: CPT | Mod: CPTII,S$GLB,, | Performed by: PHYSICIAN ASSISTANT

## 2023-02-20 PROCEDURE — 3008F PR BODY MASS INDEX (BMI) DOCUMENTED: ICD-10-PCS | Mod: CPTII,S$GLB,, | Performed by: PHYSICIAN ASSISTANT

## 2023-02-20 PROCEDURE — 99999 PR PBB SHADOW E&M-EST. PATIENT-LVL III: CPT | Mod: PBBFAC,,, | Performed by: PHYSICIAN ASSISTANT

## 2023-02-20 PROCEDURE — 3008F BODY MASS INDEX DOCD: CPT | Mod: CPTII,S$GLB,, | Performed by: PHYSICIAN ASSISTANT

## 2023-02-20 PROCEDURE — 3074F SYST BP LT 130 MM HG: CPT | Mod: CPTII,S$GLB,, | Performed by: PHYSICIAN ASSISTANT

## 2023-02-20 PROCEDURE — 72110 XR LUMBAR SPINE COMPLETE 5 VIEW: ICD-10-PCS | Mod: 26,,, | Performed by: RADIOLOGY

## 2023-02-20 PROCEDURE — 1159F PR MEDICATION LIST DOCUMENTED IN MEDICAL RECORD: ICD-10-PCS | Mod: CPTII,S$GLB,, | Performed by: PHYSICIAN ASSISTANT

## 2023-02-20 PROCEDURE — 99215 PR OFFICE/OUTPT VISIT, EST, LEVL V, 40-54 MIN: ICD-10-PCS | Mod: S$GLB,,, | Performed by: PHYSICIAN ASSISTANT

## 2023-02-20 PROCEDURE — 99215 OFFICE O/P EST HI 40 MIN: CPT | Mod: S$GLB,,, | Performed by: PHYSICIAN ASSISTANT

## 2023-02-20 PROCEDURE — 1159F MED LIST DOCD IN RCRD: CPT | Mod: CPTII,S$GLB,, | Performed by: PHYSICIAN ASSISTANT

## 2023-02-20 PROCEDURE — 3079F PR MOST RECENT DIASTOLIC BLOOD PRESSURE 80-89 MM HG: ICD-10-PCS | Mod: CPTII,S$GLB,, | Performed by: PHYSICIAN ASSISTANT

## 2023-02-20 PROCEDURE — 72110 X-RAY EXAM L-2 SPINE 4/>VWS: CPT | Mod: TC

## 2023-02-20 PROCEDURE — 99999 PR PBB SHADOW E&M-EST. PATIENT-LVL III: ICD-10-PCS | Mod: PBBFAC,,, | Performed by: PHYSICIAN ASSISTANT

## 2023-02-20 RX ORDER — DEXTROAMPHETAMINE SACCHARATE, AMPHETAMINE ASPARTATE, DEXTROAMPHETAMINE SULFATE AND AMPHETAMINE SULFATE 5; 5; 5; 5 MG/1; MG/1; MG/1; MG/1
1 TABLET ORAL 3 TIMES DAILY
Qty: 90 TABLET | Refills: 0 | Status: CANCELLED | OUTPATIENT
Start: 2023-02-20 | End: 2023-03-22

## 2023-02-20 RX ORDER — DEXTROAMPHETAMINE SACCHARATE, AMPHETAMINE ASPARTATE, DEXTROAMPHETAMINE SULFATE AND AMPHETAMINE SULFATE 5; 5; 5; 5 MG/1; MG/1; MG/1; MG/1
1 TABLET ORAL 3 TIMES DAILY
Qty: 90 TABLET | Refills: 0 | Status: SHIPPED | OUTPATIENT
Start: 2023-02-20 | End: 2023-03-22

## 2023-02-20 RX ORDER — DEXTROAMPHETAMINE SACCHARATE, AMPHETAMINE ASPARTATE, DEXTROAMPHETAMINE SULFATE AND AMPHETAMINE SULFATE 5; 5; 5; 5 MG/1; MG/1; MG/1; MG/1
1 TABLET ORAL 3 TIMES DAILY
Qty: 90 TABLET | Refills: 0 | Status: SHIPPED | OUTPATIENT
Start: 2023-03-23 | End: 2023-04-22

## 2023-02-20 NOTE — PROGRESS NOTES
ESTABLISHED PATIENT    HISTORY OF PRESENT ILLNESS   53 y.o. Female with a history of left leg pain and weakness who works as a .  She reports having left knee and foot problems starting after an injury she had a year ago where she tripped going up a flight of stairs.  She ultimately underwent a knee arthroscopy with chondroplasty and synovectomy on 2022.  She has done well postoperatively with regards to her knee but complains of having continued pain and weakness in her left leg.  This is thought to be related to lumbar radiculopathy and her history of scoliosis.  She was recently seen by Dr. Odonnell and Dr. Brewer.  Physical therapy has been ordered and she went for her initial visit last Friday.  She states the pain is increasing and she is having difficulty walking.  She is having numbness and tingling in her big toe.       - mechanical symptoms, - instability    Is affecting ADLs.  Pain is 8/10 at it's worst.        PAST MEDICAL HISTORY    Past Medical History:   Diagnosis Date    Arthritis     Chronic back pain     Chronic neck pain     Fibrocystic breast     Fibroid, uterus     GERD (gastroesophageal reflux disease)     Herpes     Migraine headache     Narcolepsy     Nephrolithiasis 2018    Sciatica of right side 2018    Scoliosis     Urge incontinence 2018       PAST SURGICAL HISTORY     Past Surgical History:   Procedure Laterality Date    ARTHROSCOPIC CHONDROPLASTY OF KNEE JOINT Left 2022    Procedure: ARTHROSCOPY, KNEE, WITH CHONDROPLASTY;  Surgeon: Janette Odonnell MD;  Location: [facility] OR;  Service: Orthopedics;  Laterality: Left;     SECTION      EMBOLIZATION N/A 2020    Procedure: EMBOLIZATION, BLOOD VESSEL;  Surgeon: Dean Wheeler MD;  Location: [facility] CATH LAB;  Service: Radiology;  Laterality: N/A;    NASAL TURBINATE REDUCTION Bilateral 2022    Procedure: REDUCTION, NASAL TURBINATE john bullosa;  Surgeon: Waldo Giles MD;   Location: [facility] OR [facility] FLR;  Service: ENT;  Laterality: Bilateral;    Scoliosis surgery      SINUS SURGERY      SYNOVECTOMY OF KNEE Left 04/28/2022    Procedure: SYNOVECTOMY, KNEE;  Surgeon: Janette Odonnell MD;  Location: [facility] OR;  Service: Orthopedics;  Laterality: Left;    UMBILICAL HERNIA REPAIR         FAMILY HISTORY    Family History   Problem Relation Age of Onset    Breast cancer Paternal Aunt     Allergies Other     Asthma Son         exercise induced    Eczema Daughter     Colon cancer Neg Hx     Ovarian cancer Neg Hx        SOCIAL HISTORY    Social History     Socioeconomic History    Marital status: Legally    Tobacco Use    Smoking status: Never    Smokeless tobacco: Never   Substance and Sexual Activity    Alcohol use: Not Currently    Drug use: No    Sexual activity: Not Currently     Partners: Male     Birth control/protection: None       MEDICATIONS      Current Outpatient Medications:     azelastine (ASTELIN) 137 mcg (0.1 %) nasal spray, SPRAY 1 SPRAY IN EACH NOSTRIL TWICE DAILY, Disp: 90 mL, Rfl: 1    calcium carbonate (OS-TIFFANIE) 500 mg calcium (1,250 mg) tablet, Take 1 tablet by mouth once daily., Disp: , Rfl:     dextroamphetamine-amphetamine (ADDERALL) 20 mg tablet, Take 1 tablet by mouth 3 (three) times daily., Disp: 90 tablet, Rfl: 0    ibuprofen (ADVIL,MOTRIN) 200 MG tablet, Take 200 mg by mouth every 6 (six) hours as needed for Pain., Disp: , Rfl:     Lactobac no.41/Bifidobact no.7 (PROBIOTIC-10 ORAL), Take by mouth., Disp: , Rfl:     magnesium oxide (MAG-OX) 400 mg (241.3 mg magnesium) tablet, Take 400 mg by mouth once daily., Disp: , Rfl:     methylPREDNISolone (MEDROL DOSEPACK) 4 mg tablet, use as directed, Disp: 21 each, Rfl: 0    multivitamin with minerals tablet, Take 1 tablet by mouth once daily., Disp: , Rfl:     omega-3/dha/epa/dpa/fish oil (OMEGA-3 2100 ORAL), Take by mouth., Disp: , Rfl:     tranexamic acid (LYSTEDA) 650 mg tablet, Take 2 tablets (1,300 mg total) by mouth 3  "(three) times daily. Do not take for longer than 5 days, Disp: 30 tablet, Rfl: 1  No current facility-administered medications for this visit.    Facility-Administered Medications Ordered in Other Visits:     LIDOcaine (PF) 10 mg/ml (1%) injection 10 mg, 1 mL, Intradermal, Once, Checo Escobar MD    ALLERGIES     Review of patient's allergies indicates:   Allergen Reactions    Ibuprofen Other (See Comments)     It makes my "ears hurt" when I take large doses.    Opioids - morphine analogues Other (See Comments)     Morphine causes headaches         REVIEW OF SYSTEMS   Constitution: Negative. Negative for chills, fever and night sweats.   HENT: Negative for congestion and headaches.    Eyes: Negative for blurred vision, left vision loss and right vision loss.   Cardiovascular: Negative for chest pain and syncope.   Respiratory: Negative for cough and shortness of breath.    Endocrine: Negative for polydipsia, polyphagia and polyuria.   Hematologic/Lymphatic: Negative for bleeding problem. Does not bruise/bleed easily.   Skin: Negative for dry skin, itching and rash.   Musculoskeletal: Negative for falls. Positive for left leg pain and weakness.  Gastrointestinal: Negative for abdominal pain and bowel incontinence.   Genitourinary: Negative for bladder incontinence and nocturia.   Neurological: Negative for disturbances in coordination, loss of balance and seizures.   Psychiatric/Behavioral: Negative for depression. The patient does not have insomnia.    Allergic/Immunologic: Negative for hives and persistent infections.     PHYSICAL EXAMINATION    Vitals: /80 (BP Location: Right arm, Patient Position: Sitting, BP Method: Medium (Manual))   Ht 5' 4" (1.626 m)   Wt 77.6 kg (171 lb)   LMP 12/06/2022   BMI 29.35 kg/m²     General: The patient appears active and healthy with no apparent physical problems.  No disturbance of mood or affect is demonstrated. Alert and Oriented.    HEENT: Eyes normal, pupils " equally round, nose normal.    Resp: Equal and symmetrical chest rises. No wheezing    CV: Regular rate    Neck: Supple; nonpainful range of motion.    Extremities: no cyanosis, clubbing, edema, or diffuse swelling.  Palpable pulses, good capillary refill of the digits.  No coolness, discoloration, edema or obvious varicosities.    Skin: no lesions noted.    Lymphatic: no detected adenopathy in the upper or lower extremities.    Neurologic: normal mental status, normal reflexes, normal gait and balance.  Patient is alert and oriented to person, place and time.  No flaccidity or spasticity is noted.  No motor or sensory deficits are noted.  Light touch is intact    Orthopaedic: Spine Musculoskeletal Exam    Gait    Antalgic: left    Inspection    Thoracic scoliosis: left    Lumbar scoliosis: left    Thoracolumbar    Erythema: none    Swelling: none    Edema (right lower extremity): none    Edema (left lower extremity): none    Incision: well-healed    Palpation    Thoracolumbar    Tenderness: present      Diffuse: yes      Paraspinous: left      Gluteal: left      Piriformis: left      SI Joint: left    Left      Masses: none      Spasms: none      Crepitus: none      Muscle tone: normal    Range of Motion    Thoracolumbar       Flexion: 75%. Flexion detail: pain.     Extension: 75%. Extension detail: pain.       Left      Lateral bendin%. Lateral bending detail: pain.       Lateral rotation: 75%. Lateral rotation detail: pain.      Strength    Thoracolumbar        Left      Extensor hallucis longus: 4/5. Extensor hallucis longus is affected by pain.       Tibialis anterior: 4+/5.       Plantar flexion: 4+/5. Plantar flexion is affected by pain.       Quadriceps: 4+/5.       Hamstrin+/5.       Hip flexion: 4+/5.      Sensory    Thoracolumbar      Left      Anterior knee: decreased      Anterior leg: decreased      Medial leg: decreased      Posterior leg: decreased      Dorsum foot: decreased    Special  Tests    Thoracolumbar      Left      HIMA test: negative      SLR: back pain and pain radiates to left leg      SLR pain at: 30 degrees      IMAGING    X-rays including five views of the lumbar spine were completed today.  I have personally reviewed the images.  There are no acute findings.  The metallic susan from her previous surgery appears to be in good position.  There is evidence of levoscoliosis that is relatively unchanged from her previous x-rays done in 2018.  There are some degenerative changes seen at the L4-5 level where there is loss of disc space and spondylitic changes of the vertebral bodies.  This appears to be worse on the left.    IMPRESSION       ICD-10-CM ICD-9-CM   1. Chronic left-sided low back pain with left-sided sciatica  M54.42 724.2    G89.29 724.3     338.29   2. Lumbar radiculopathy  M54.16 724.4       MEDICATIONS PRESCRIBED      None    RECOMMENDATIONS     MRI lumbar spine for evaluation of suspected neurocompression resulting in left-sided radicular symptoms  Continue physical therapy  RTC in 1 week to discuss MRI results      All questions were answered, pt will contact us for questions or concerns in the interim.

## 2023-02-20 NOTE — PROGRESS NOTES
Due to pharmacy shortage, the patient was unable to obtain previously prescribed Adderall being used for treatment of narcolepsy with cataplexy.  New prescriptions initiated today.     data reviewed at length; narcotic 60, sedative 30, stimulant 140, overdose risk score 190.  Has not filled Adderall prescription since 11/23/2022

## 2023-02-23 NOTE — PROGRESS NOTES
"[facility]   Physical Therapy Treatment Note     Name: Jess Mcleod  Clinic Number: [id number]    Therapy Diagnosis:   Encounter Diagnoses   Name Primary?    Decreased strength of lower extremity Yes    Decreased range of motion (ROM) of left knee     Decreased functional mobility and endurance     Numbness and tingling of left leg      Physician: Janette Odonnell MD    Visit Date: 2/24/2023    Physician Orders: PT Eval and Treat low back and knee  Medical Diagnosis from Referral: Z98.890 (ICD-10-CM) - S/P left knee arthroscopy M54.16 (ICD-10-CM) - Lumbar radiculopathy M22.42 (ICD-10-CM) - Chondromalacia of patellofemoral joint, left   Evaluation Date: 2/17/2023  Authorization Period Expiration: 12/20/2023  Plan of Care Expiration: 4/28/2023  Progress Note Due: 10 visit  Visit # / Visits authorized: 1/20 + eval  FOTO: 1/3     Precautions: Standard and Fall     PTA Visit #: 0/5     Time In: 7:14 AM  Time Out: 7:58 AM  Total Billable Time: 44 minutes    SUBJECTIVE     Pt reports: Patient reports her low back "was inflamed". She had a follow up with NATALIA Andersen, on Monday where they performed an X-ray and is scheduled for MRI next week.   She was compliant with home exercise program.  Response to previous treatment: No adverse reactions.   Functional change: No functional change reported but did report improvement in symptoms after performing home exercise program when low back "flared up".     Pain: 3/10  Location: bilateral back  and knee      OBJECTIVE     Objective Measures updated at progress report unless specified.     Treatment     Jess received the treatments listed below:      therapeutic exercises to develop strength, endurance, ROM, flexibility, and core stabilization for 18 minutes including:  NuStep 10' LV 2 for endogenous release of opioids and cardiovascular endurance  Double leg shuttle 2.5 cords 3x15  Single leg shuttle 2 cords 3x10 bilateral    manual therapy " techniques: Joint mobilizations were applied to the left knee for 00 minutes, including:  Grade I-III patella mobilizations in all directions  Grade I-II PA lumbar mobilizations  Sidelying lumbar gapping    **NOT TODAY**    neuromuscular re-education activities to improve: Balance, Coordination, Proprioception, Posture, and Motor Control for 14 minutes including:  Paloff press GTB 3x15 for core stabilization and motor control  Standing rows GTB for coordination with pulling tasks 3x15 each  Standing rows GTB for coordination with pulling tasks 3x15 each    therapeutic activities to improve functional performance for 11 minutes, including:  Seated lumbar flexion with compound row for functional bending and pulling 3x15  Sit to stands from standard chair height 4x8    Patient Education and Home Exercises     Home Exercises Provided and Patient Education Provided     Education provided:   - Continue with home exercise program  - Movement every 30 minutes at work     Written Home Exercises Provided: Patient instructed to cont prior HEP. Exercises were reviewed and Jess was able to demonstrate them prior to the end of the session.  Jess demonstrated good  understanding of the education provided. See EMR under Patient Instructions for exercises provided during therapy sessions    ASSESSMENT     Jess tolerated all exercise interventions without any adverse reactions. Elicited radicular symptom into right big toe while on the shuttle towards the last two reps and it was reduced with a rest break. She was able to continue with single leg shuttle resistance denoting decreased irritability. Patient's greatest limiting factor remains gross lower extremity strength and core stabilization with compound rotational movements; therefore, this will remain a main focus with interventions each session. Will evaluate next session and progress as tolerated.     Jess Is progressing well towards her goals.   Pt prognosis is Good.      Pt will continue to benefit from skilled outpatient physical therapy to address the deficits listed in the problem list box on initial evaluation, provide pt/family education and to maximize pt's level of independence in the home and community environment.     Pt's spiritual, cultural and educational needs considered and pt agreeable to plan of care and goals.     Anticipated barriers to physical therapy: Therapy frequency    Goals: Short Term Goals: 4 weeks  1. Patient will report decreased left knee and radicular symptom pain  </= 5/10 to increase tolerance for walking tasks. (Progressing, Not Met)  2. Patient will increase left knee ROM to at least 125 degrees in order to perform sit to stands without difficulty. (Progressing, Not Met)  3. Patient will increase strength by 1/3 MMT grade in bilateral lower extremity to increase tolerance for ADL and work activities. (Progressing, Not Met)  4. Patient to tolerate HEP to improve ROM and independence with ADL's. (Progressing, Not Met)     Long Term Goals: 8-10 weeks  1. Patient will improve TUG score to at least 10 seconds to decrease risk for falls. (Progressing, Not Met)  2. Patient goal will be ambulate for at least 20 minutes to improve performance with endurance tasks. (Progressing, Not Met)  3. Patient will increase strength to 4+/5 in bilateral lower extremity to increase tolerance for ADL and work activities. (Progressing, Not Met)  4. Patient will report at least a 32% limitation on FOTO indicating a clinically significant change in overall function. (Progressing, Not Met)  5. Patient will improve 30 second sit to stand to at least 10x to improve performance with transfer tasks. (Progressing, Not Met)    PLAN     Continue per plan of care with emphasis on patient education, gross lower extremity strengthening, lumbar spine mobilizations, and functional strengthening.     Odette Phillips, PT

## 2023-02-24 ENCOUNTER — TELEPHONE (OUTPATIENT)
Dept: SPORTS MEDICINE | Facility: CLINIC | Age: 53
End: 2023-02-24
Payer: COMMERCIAL

## 2023-02-24 ENCOUNTER — CLINICAL SUPPORT (OUTPATIENT)
Dept: REHABILITATION | Facility: HOSPITAL | Age: 53
End: 2023-02-24
Payer: COMMERCIAL

## 2023-02-24 DIAGNOSIS — Z74.09 DECREASED FUNCTIONAL MOBILITY AND ENDURANCE: ICD-10-CM

## 2023-02-24 DIAGNOSIS — R20.2 NUMBNESS AND TINGLING OF LEFT LEG: ICD-10-CM

## 2023-02-24 DIAGNOSIS — M25.662 DECREASED RANGE OF MOTION (ROM) OF LEFT KNEE: ICD-10-CM

## 2023-02-24 DIAGNOSIS — R29.898 DECREASED STRENGTH OF LOWER EXTREMITY: Primary | ICD-10-CM

## 2023-02-24 DIAGNOSIS — R20.0 NUMBNESS AND TINGLING OF LEFT LEG: ICD-10-CM

## 2023-02-24 PROCEDURE — 97112 NEUROMUSCULAR REEDUCATION: CPT

## 2023-02-24 PROCEDURE — 97530 THERAPEUTIC ACTIVITIES: CPT

## 2023-02-24 PROCEDURE — 97110 THERAPEUTIC EXERCISES: CPT

## 2023-02-24 NOTE — TELEPHONE ENCOUNTER
Called and spoke with patient to let her know that I have faxed over the referral of her MRI today to [facility] [address] at [phone]    ----- Message from Drew Dela Cruz sent at 2/24/2023  1:33 PM CST -----  Name of Who is Calling:walker on behalf of  RADHA WILKINS [[id number]]            What is the request in detail: Patient is requesting call back to get Order ([id number]) needs to [facility] [address]  [phone]          What Number to Call Back: [phone] opt2, opt3 -[facility]    Patient :[phone]

## 2023-02-27 ENCOUNTER — OFFICE VISIT (OUTPATIENT)
Dept: SLEEP MEDICINE | Facility: CLINIC | Age: 53
End: 2023-02-27
Payer: COMMERCIAL

## 2023-02-27 VITALS
DIASTOLIC BLOOD PRESSURE: 62 MMHG | BODY MASS INDEX: 28.39 KG/M2 | SYSTOLIC BLOOD PRESSURE: 137 MMHG | HEART RATE: 100 BPM | WEIGHT: 165.38 LBS

## 2023-02-27 DIAGNOSIS — G47.411 PRIMARY NARCOLEPSY WITH CATAPLEXY: ICD-10-CM

## 2023-02-27 DIAGNOSIS — G47.30 SLEEP APNEA, UNSPECIFIED TYPE: ICD-10-CM

## 2023-02-27 DIAGNOSIS — Z13.39 ADHD (ATTENTION DEFICIT HYPERACTIVITY DISORDER) EVALUATION: Primary | ICD-10-CM

## 2023-02-27 DIAGNOSIS — G47.00 INSOMNIA, UNSPECIFIED TYPE: ICD-10-CM

## 2023-02-27 PROCEDURE — 99215 PR OFFICE/OUTPT VISIT, EST, LEVL V, 40-54 MIN: ICD-10-PCS | Mod: S$GLB,,, | Performed by: PSYCHIATRY & NEUROLOGY

## 2023-02-27 PROCEDURE — 3075F SYST BP GE 130 - 139MM HG: CPT | Mod: CPTII,S$GLB,, | Performed by: PSYCHIATRY & NEUROLOGY

## 2023-02-27 PROCEDURE — 3008F PR BODY MASS INDEX (BMI) DOCUMENTED: ICD-10-PCS | Mod: CPTII,S$GLB,, | Performed by: PSYCHIATRY & NEUROLOGY

## 2023-02-27 PROCEDURE — 1159F MED LIST DOCD IN RCRD: CPT | Mod: CPTII,S$GLB,, | Performed by: PSYCHIATRY & NEUROLOGY

## 2023-02-27 PROCEDURE — 3008F BODY MASS INDEX DOCD: CPT | Mod: CPTII,S$GLB,, | Performed by: PSYCHIATRY & NEUROLOGY

## 2023-02-27 PROCEDURE — 1159F PR MEDICATION LIST DOCUMENTED IN MEDICAL RECORD: ICD-10-PCS | Mod: CPTII,S$GLB,, | Performed by: PSYCHIATRY & NEUROLOGY

## 2023-02-27 PROCEDURE — 99999 PR PBB SHADOW E&M-EST. PATIENT-LVL IV: ICD-10-PCS | Mod: PBBFAC,,, | Performed by: PSYCHIATRY & NEUROLOGY

## 2023-02-27 PROCEDURE — 3075F PR MOST RECENT SYSTOLIC BLOOD PRESS GE 130-139MM HG: ICD-10-PCS | Mod: CPTII,S$GLB,, | Performed by: PSYCHIATRY & NEUROLOGY

## 2023-02-27 PROCEDURE — 99215 OFFICE O/P EST HI 40 MIN: CPT | Mod: S$GLB,,, | Performed by: PSYCHIATRY & NEUROLOGY

## 2023-02-27 PROCEDURE — 3078F PR MOST RECENT DIASTOLIC BLOOD PRESSURE < 80 MM HG: ICD-10-PCS | Mod: CPTII,S$GLB,, | Performed by: PSYCHIATRY & NEUROLOGY

## 2023-02-27 PROCEDURE — 99999 PR PBB SHADOW E&M-EST. PATIENT-LVL IV: CPT | Mod: PBBFAC,,, | Performed by: PSYCHIATRY & NEUROLOGY

## 2023-02-27 PROCEDURE — 3078F DIAST BP <80 MM HG: CPT | Mod: CPTII,S$GLB,, | Performed by: PSYCHIATRY & NEUROLOGY

## 2023-02-27 RX ORDER — DEXTROAMPHETAMINE SACCHARATE, AMPHETAMINE ASPARTATE MONOHYDRATE, DEXTROAMPHETAMINE SULFATE AND AMPHETAMINE SULFATE 7.5; 7.5; 7.5; 7.5 MG/1; MG/1; MG/1; MG/1
30 CAPSULE, EXTENDED RELEASE ORAL DAILY
Qty: 30 CAPSULE | Refills: 0 | Status: SHIPPED | OUTPATIENT
Start: 2023-02-27 | End: 2023-05-09 | Stop reason: SDUPTHER

## 2023-02-27 RX ORDER — ESZOPICLONE 3 MG/1
TABLET, FILM COATED ORAL
Qty: 30 TABLET | Refills: 0 | Status: SHIPPED | OUTPATIENT
Start: 2023-02-27

## 2023-02-27 RX ORDER — DEXTROAMPHETAMINE SACCHARATE, AMPHETAMINE ASPARTATE, DEXTROAMPHETAMINE SULFATE AND AMPHETAMINE SULFATE 5; 5; 5; 5 MG/1; MG/1; MG/1; MG/1
TABLET ORAL
Qty: 30 TABLET | Refills: 0 | Status: SHIPPED | OUTPATIENT
Start: 2023-02-27 | End: 2023-05-09 | Stop reason: SDUPTHER

## 2023-02-27 NOTE — PROGRESS NOTES
Jess Mcleod is a 53 y.o. female is here to be evaluated for a sleep disorder; previously seen Dr. Swanson, Dr. Murguia and Dr. Smith.    Sleepy  all her life since [place]  She finds that she would trip  or drop thimgs + cant talk when emotional.  She     She was also diagnosed with moderate HAIR - could not tolerate CPAP; had a sinus surgery - feels that her snoring has improved.    + has been having sleep paralysis;   + vivid dreams;     Recently off her medications is taking many naps; can dream during naps - not refreshing.    She was diagnoed with NArcolespy in 2021 - had PSG/MSLT; AHI was 15; SaO2 86%; MSL - 4.2 min; 2 SOREMS by Dr Swanson; started seeing Dr. Jones soon after who started her on Adderall 40 mg IR. It has been helping her with alertness. 40 mg in AM and 20 mg at 3 PM - made it harder to sleep at night. She feels that she developes tolerance to Adderall if taking it to often. She believes that she has trued Modafinuil and failed it in the past. Never tried long acting stimulants befoe.    She reports symptoms of ADHD but has never been treated.       Jess Mcleod  denies symptoms concerning for parasomnia except for occasional somniloquy. USed to when she was younger.  Jess Mcleod denied symptoms concerning for RLS; nocturnal leg movements have not been noticed.   The patient does not experience sleep related leg cramps.       She has been struggling         Medications pertinent to sleep  disorders taken currently: Adderall 20 mg - used to take BID; develops tolerance.  Previous  medications taken  for sleep disorders:  failed  200 mg Miodafinil; does not recall Methylphenidate. Does not recall trying Sonata/Lunesta/Restoril  Tried Seroquel - made her tired next day; was still tired; would not kick inby the mornuinng. ProZak was previously prescribed for depression.    + urfge incontinentce    Sleep studies   2021 - had PSG/MSLT; AHI was 15; SaO2 86%; MSL -  4.2 min; 2 SOREMS     Could not tolerate CPAP; feeling better since sinus surgery - no snoring, but still reports interrupted sleep    Occupation:; brain  Bed partner: lives with her mother. Taking care of her  Exercise routine: limited by excessive daytime sleepiness    Caffeine:  AM beverages per day  Alcohol: no    EPWORTH SLEEPINESS SCALE TOTAL SCORE  2/27/2023 5/28/2021   Total score 24 22         EPWORTH SLEEPINESS SCALE 2/27/2023   Sitting and reading 3   Watching TV 3   Sitting, inactive in a public place (e.g. a theatre or a meeting) 3   As a passenger in a car for an hour without a break 3   Lying down to rest in the afternoon when circumstances permit 3   Sitting and talking to someone 3   Sitting quietly after a lunch without alcohol 3   In a car, while stopped for a few minutes in traffic 3   Total score 24       EPWORTH SLEEPINESS SCALE 2/27/2023   Sitting and reading 3   Watching TV 3   Sitting, inactive in a public place (e.g. a theatre or a meeting) 3   As a passenger in a car for an hour without a break 3   Lying down to rest in the afternoon when circumstances permit 3   Sitting and talking to someone 3   Sitting quietly after a lunch without alcohol 3   In a car, while stopped for a few minutes in traffic 3   Total score 24     Sleep Clinic New Patient 5/28/2021   What time do you go to bed on a week day? (Give a range) 10 30 to 11 pm   What time do you go to bed on a day off? (Give a range) Stay in bed all weekend.  Times vary.   How long does it take you to fall asleep? (Give a range) As soon as I lay down, I typically fall asleep.   On average, how many times per night do you wake up? 3 to 4.   How long does it take you to fall back into sleep? (Give a range) Usually, I can go back to sleep unless it is after 2 a.m-4 am ., then I struggle to get back to sleep around 6 am.   What time do you wake up to start your day on a week day? (Give a range) 5-7 am   What time do you wake up to  start your day on a day off? (Give a range) When my body allows me.  Between 7-10 usually.   What time do you get out of bed? (Give a range) 9 to 10   On average, how many hours do you sleep? I am in the bed around 10:30 pm, but I log typically 4 hours of sleep.   On average, how many naps do you take per day? If I can, I would take at least two to three; however, where I work that is impossible.   Rate your sleep quality from 0 to 5 (0-poor, 5-great). 3   Have you experienced:  Weight gain?   How much weight have you lost or gained (in lbs.) in the last year? It keeps going back and forth.   On average, how many times per night do you go to the bathroom?  At least once.   Have you ever had a sleep study/CPAP machine/surgery for sleep apnea? Yes   Have you ever had a CPAP machine for sleep apnea? Yes   Have you ever had surgery for sleep apnea? Yes       Sleep Clinic ROS  5/28/2021   Difficulty breathing through the nose?  Yes   Sore throat or dry mouth in the morning? Yes   Irregular or very fast heart beat?  Sometimes   Shortness of breath?  No   Acid reflux? Yes   Body aches and pains?  Yes   Morning headaches? Sometimes   Dizziness? Sometimes   Mood changes?  Yes   Do you exercise?  Sometimes   Do you feel like moving your legs a lot?  Sometimes       DME:         PAST MEDICAL HISTORY:    Active Ambulatory Problems     Diagnosis Date Noted    Left ureteral stone 05/19/2018    Nausea and vomiting 05/19/2018    Acute right-sided low back pain 05/19/2018    Chronic right-sided low back pain with right-sided sciatica 05/20/2018    Scoliosis 05/20/2018    GERD (gastroesophageal reflux disease) 05/20/2018    Urge incontinence 05/20/2018    Chronic idiopathic constipation 05/20/2018    Nephrolithiasis 05/20/2018    Hepatic steatosis 05/20/2018    Hydroureteronephrosis 05/20/2018    Sciatica of right side 05/20/2018    Fatigue 02/26/2019    Memory deficit 02/26/2019    PMS (premenstrual syndrome) 03/24/2019    Pain of  left calf 2019    Fibroids 2020    Intramural and submucous leiomyoma of uterus 2020    HAIR (obstructive sleep apnea)     Acute medial meniscal tear, left, sequela 2022    Acute pain of left knee 2022    Allergic rhinitis due to dust mite 2022    Gastroesophageal reflux disease with esophagitis 10/31/2017    Mixed hyperlipidemia 2018    Decreased strength of lower extremity 2023    Decreased range of motion (ROM) of left knee 2023    Decreased ROM of lumbar spine 2023    Decreased functional mobility and endurance 2023    Numbness and tingling of left leg 2023     Resolved Ambulatory Problems     Diagnosis Date Noted    No Resolved Ambulatory Problems     Past Medical History:   Diagnosis Date    Arthritis     Chronic back pain     Chronic neck pain     Fibrocystic breast     Fibroid, uterus     Herpes     Migraine headache     Narcolepsy                 PAST SURGICAL HISTORY:    Past Surgical History:   Procedure Laterality Date    ARTHROSCOPIC CHONDROPLASTY OF KNEE JOINT Left 2022    Procedure: ARTHROSCOPY, KNEE, WITH CHONDROPLASTY;  Surgeon: Janette Odonnell MD;  Location: [facility];  Service: Orthopedics;  Laterality: Left;     SECTION      EMBOLIZATION N/A 2020    Procedure: EMBOLIZATION, BLOOD VESSEL;  Surgeon: Dean Wheeler MD;  Location: [facility] CATH LAB;  Service: Radiology;  Laterality: N/A;    NASAL TURBINATE REDUCTION Bilateral 2022    Procedure: REDUCTION, NASAL TURBINATE john bullosa;  Surgeon: Waldo Giles MD;  Location: [address];  Service: ENT;  Laterality: Bilateral;    Scoliosis surgery      SINUS SURGERY      SYNOVECTOMY OF KNEE Left 2022    Procedure: SYNOVECTOMY, KNEE;  Surgeon: Janette Odonnell MD;  Location: [facility] OR;  Service: Orthopedics;  Laterality: Left;    UMBILICAL HERNIA REPAIR           FAMILY HISTORY:                Family History   Problem Relation Age of Onset     "Breast cancer Paternal Aunt     Allergies Other     Asthma Son         exercise induced    Eczema Daughter     Colon cancer Neg Hx     Ovarian cancer Neg Hx        SOCIAL HISTORY:          Tobacco:   Social History     Tobacco Use   Smoking Status Never   Smokeless Tobacco Never       alcohol use:    Social History     Substance and Sexual Activity   Alcohol Use Not Currently                   ALLERGIES:    Review of patient's allergies indicates:   Allergen Reactions    Ibuprofen Other (See Comments)     It makes my "ears hurt" when I take large doses.    Opioids - morphine analogues Other (See Comments)     Morphine causes headaches       CURRENT MEDICATIONS:    Current Outpatient Medications   Medication Sig Dispense Refill    dextroamphetamine-amphetamine (ADDERALL) 20 mg tablet Take 1 tablet by mouth 3 (three) times daily. 90 tablet 0    [START ON 3/23/2023] dextroamphetamine-amphetamine (ADDERALL) 20 mg tablet Take 1 tablet by mouth 3 (three) times daily. 90 tablet 0    Lactobac no.41/Bifidobact no.7 (PROBIOTIC-10 ORAL) Take by mouth.      magnesium oxide (MAG-OX) 400 mg (241.3 mg magnesium) tablet Take 400 mg by mouth once daily.      methylPREDNISolone (MEDROL DOSEPACK) 4 mg tablet use as directed 21 each 0    multivitamin with minerals tablet Take 1 tablet by mouth once daily.      omega-3/dha/epa/dpa/fish oil (OMEGA-3 2100 ORAL) Take by mouth.      tranexamic acid (LYSTEDA) 650 mg tablet Take 2 tablets (1,300 mg total) by mouth 3 (three) times daily. Do not take for longer than 5 days 30 tablet 1    azelastine (ASTELIN) 137 mcg (0.1 %) nasal spray SPRAY 1 SPRAY IN EACH NOSTRIL TWICE DAILY 90 mL 1    calcium carbonate (OS-TIFFANIE) 500 mg calcium (1,250 mg) tablet Take 1 tablet by mouth once daily.      ibuprofen (ADVIL,MOTRIN) 200 MG tablet Take 200 mg by mouth every 6 (six) hours as needed for Pain.       No current facility-administered medications for this visit.     Facility-Administered Medications Ordered " in Other Visits   Medication Dose Route Frequency Provider Last Rate Last Admin    LIDOcaine (PF) 10 mg/ml (1%) injection 10 mg  1 mL Intradermal Once Checo Escobar MD                          PHYSICAL EXAM:  /62 (BP Location: Left arm, Patient Position: Sitting, BP Method: Large (Automatic))   Pulse 100   Wt 75 kg (165 lb 6.4 oz)   LMP 12/06/2022   BMI 28.39 kg/m²   GENERAL: Normal development, well groomed.  HEENT:   HEENT:  Conjunctivae are non-erythematous; Pupils equal, round, and reactive to light; Nose is symmetrical; Nasal mucosa is pink and moist; Septum is midline; Inferior turbinates are normal; ; Modified Mallampati:III; Pnormal; Dentition is fair; No TMJ tenderness; Jaw opening and protrusion without click and without discomfort.  NECK: Supple. Neck circumference is 15 inches.   SKIN: On face and neck: No abrasions, no rashes, no lesions.  No subcutaneous nodules are palpable.  RESPIRATORY: Chest is clear to auscultation.  Normal chest expansion and non-labored breathing at rest.  CARDIOVASCULAR: Normal S1, S2.  No murmurs, gallops or rubs. No carotid bruits bilaterally.  No edema. No clubbing. No cyanosis.    NEURO: Oriented to time, place and person. Normal attention span and concentration. Gait normal.    PSYCH: Affect is full. Mood is normal  MUSCULOSKELETAL: Moves 4 extremities. Gait normal.           ASSESSMENT:    Narcolepsy with cataplexy - + excessive daytime sleepiness, h/o sleep paralysis, + sleep hallucinations. FAiling Adderall IR 20 mg BID -0 developing tolerance. Concurrent ADHD<    2. Sleep Apnea NEC.  Previously diagnosed HAIR; snoring has improved since sinus surgery, however her sleep remains interrupted. She has not been retested since the surgery.             PLAN:    Diagnostic: HST - to re-evaluate for HAIR status post sinus surgery.    Will start Adderall 30 mg XR  and will decrease Adderall IR  20 mg to 1 pills per day - but can split it in 2 parts.   Will add  Lunesta 3 mg    During our discussion today, we talked about the etiology of  narcolepsy, prognosis, treatment modalities. 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  Will start the paperwork    The importance of testing for residual HAIR  as well as the potential ramifications of untreated sleep apnea, which could include daytime sleepiness, hypertension, heart disease and/or stroke.  We discussed potential treatment options, which could include weight loss, body positioning, continuous positive airway pressure (CPAP), or referral for surgical consideration. Meanwhile, she  is urged to avoid supine sleep, weight gain and alcoholic beverages since all of these can worsen HAIR.     The patient was given open opportunity to ask questions and/or express concerns about treatment plan. Two point patient identifier confirmed.       Precautions: The patient was advised to abstain from driving should he feel sleepy or drowsy.    Follow up: MD after the sleep study has been completed.   61-minute visit. >50% spent counseling patient and coordination of care.  The patient was  cautioned against drowsy driving.

## 2023-02-27 NOTE — PATIENT INSTRUCTIONS
Patient here today with increased WOB, fever, cough, congestion. Has steroid and abx at home- parents state patient continues to vomit it up. Decreased PO intake, saturate diaper noted in triage. SLEEP LAB ([facility] or [facility]) will contact you to schedulethe sleep study. Their number is [phone] (ext 2). Please call them if you do not hear from them in 2 weeks from now.  The [facility] Lab is located on 7th floor of the [facility]; [place] lab is located in [facility].    SLEEP CLINIC (my assistant) will call you when the sleep study results are ready - if you have not heard from us by 2 weeks from the date of the study, please call [phone] (ext 1) or you can use [facility] to contact me.    You are advised to abstain from driving should you feel sleepy or drowsy.

## 2023-02-28 ENCOUNTER — TELEPHONE (OUTPATIENT)
Dept: SLEEP MEDICINE | Facility: OTHER | Age: 53
End: 2023-02-28
Payer: COMMERCIAL

## 2023-02-28 ENCOUNTER — TELEPHONE (OUTPATIENT)
Dept: SPORTS MEDICINE | Facility: CLINIC | Age: 53
End: 2023-02-28
Payer: COMMERCIAL

## 2023-02-28 NOTE — TELEPHONE ENCOUNTER
Called and spoke with patient she is schedule for MRI on 03/09/23 and reschedule her f/u with Ganesh on 0/13/23 for her result of MRI and asked to make sure to bring MRI disc and report on the date of her apt.    ----- Message from Jhony Reyes sent at 2/28/2023  1:45 PM CST -----  Regarding: [facility]ISE - MRI  Contact: Self  Pt stated she called earlier this week or last week and left a message stating that she need to speak to the staff pt ask for a call back as soon as possible please, it's kathy important- regarding her schedule MRI for today      Contact info

## 2023-03-02 ENCOUNTER — TELEPHONE (OUTPATIENT)
Dept: OBSTETRICS AND GYNECOLOGY | Facility: CLINIC | Age: 53
End: 2023-03-02
Payer: COMMERCIAL

## 2023-03-02 NOTE — TELEPHONE ENCOUNTER
----- Message from Kayla Thomas MD sent at 3/2/2023  2:50 PM CST -----  Please call patient to inform her of benign endometrial biopsy.  Please assure that patient keeps her follow-up appointment if scheduled or that she has a follow up appointment in 3 months if therapy has been initiated.  If patient has further questions, please forward information to me.

## 2023-03-07 ENCOUNTER — CLINICAL SUPPORT (OUTPATIENT)
Dept: REHABILITATION | Facility: HOSPITAL | Age: 53
End: 2023-03-07
Payer: COMMERCIAL

## 2023-03-07 DIAGNOSIS — Z74.09 DECREASED FUNCTIONAL MOBILITY AND ENDURANCE: ICD-10-CM

## 2023-03-07 DIAGNOSIS — R29.898 DECREASED STRENGTH OF LOWER EXTREMITY: Primary | ICD-10-CM

## 2023-03-07 DIAGNOSIS — M25.662 DECREASED RANGE OF MOTION (ROM) OF LEFT KNEE: ICD-10-CM

## 2023-03-07 DIAGNOSIS — R20.0 NUMBNESS AND TINGLING OF LEFT LEG: ICD-10-CM

## 2023-03-07 DIAGNOSIS — R20.2 NUMBNESS AND TINGLING OF LEFT LEG: ICD-10-CM

## 2023-03-07 PROCEDURE — 97110 THERAPEUTIC EXERCISES: CPT

## 2023-03-07 PROCEDURE — 97530 THERAPEUTIC ACTIVITIES: CPT

## 2023-03-07 PROCEDURE — 97112 NEUROMUSCULAR REEDUCATION: CPT

## 2023-03-07 NOTE — PROGRESS NOTES
"[facility]   Physical Therapy Treatment Note     Name: Jess Mcleod  Clinic Number: [id number]    Therapy Diagnosis:   Encounter Diagnoses   Name Primary?    Decreased strength of lower extremity Yes    Decreased range of motion (ROM) of left knee     Decreased functional mobility and endurance     Numbness and tingling of left leg      Physician: Janette Odonnell MD    Visit Date: 3/7/2023    Physician Orders: PT Eval and Treat low back and knee  Medical Diagnosis from Referral: Z98.890 (ICD-10-CM) - S/P left knee arthroscopy M54.16 (ICD-10-CM) - Lumbar radiculopathy M22.42 (ICD-10-CM) - Chondromalacia of patellofemoral joint, left   Evaluation Date: 2/17/2023  Authorization Period Expiration: 12/20/2023  Plan of Care Expiration: 4/28/2023  Progress Note Due: 10 visit  Visit # / Visits authorized: 2/20 + eval  FOTO: 1/3     Precautions: Standard and Fall     PTA Visit #: 0/5     Time In: 7:16 AM  Time Out: 7:59 AM  Total Billable Time: 42 minutes    SUBJECTIVE     Pt reports: Jess reports to the clinic with continued complaints of great toe "weakness and swelling" and states that she was unable to attend therapy last week because of a busy work schedule. MRI scheduled for Thursday.    She was compliant with home exercise program.  Response to previous treatment: No adverse reactions.   Functional change: No functional change reported.     Pain: 3/10  Location: bilateral back and knee      OBJECTIVE     Objective Measures updated at progress report unless specified.     Treatment     Jess received the treatments listed below:      therapeutic exercises to develop strength, endurance, ROM, flexibility, and core stabilization for 17 minutes including:  NuStep 10' LV 2 for endogenous release of opioids and cardiovascular endurance  Double leg shuttle 3 cords 3x15  Single leg shuttle 2 cords 3x15 bilateral    manual therapy techniques: Joint mobilizations were applied to the left " "knee for 00 minutes, including:  **NOT TODAY**  Grade I-III patella mobilizations in all directions  Grade I-II PA lumbar mobilizations  Sidelying lumbar gapping    neuromuscular re-education activities to improve: Balance, Coordination, Proprioception, Posture, and Motor Control for 13 minutes including:  Paloff press GTB 3x15 for core stabilization and motor control  Standing rows GTB for coordination with pulling tasks 3x15 each  Standing rows GTB for coordination with pulling tasks 3x15 each  Standing hip abduction 3x15 for balance and proprioception with upper extremity assist    therapeutic activities to improve functional performance for 12 minutes, including:  Seated lumbar flexion with compound row for functional bending and pulling 3x15  Sit to stands from standard chair height on blue foam pad 4x8 with 5# dumbbell     Patient Education and Home Exercises     Home Exercises Provided and Patient Education Provided     Education provided:   - Continue with home exercise program  - Movement every 30 minutes at work     Written Home Exercises Provided: Patient instructed to cont prior HEP. Exercises were reviewed and Jess was able to demonstrate them prior to the end of the session.  Jess demonstrated good  understanding of the education provided. See EMR under Patient Instructions for exercises provided during therapy sessions    ASSESSMENT     Due to time constraint, unable to complete all exercises but did focus on functional exercises and neuro muscular exercises to assist in improving daily performance. She reported increased great toe "weakness" while performing double leg and single leg shuttle; therefore, educated patient on pushing through her hindfoot to release pressure through forefoot which reduced the symptoms significantly. Will evaluate next session and progress as tolerated.     Jess Is progressing well towards her goals.   Pt prognosis is Good.     Pt will continue to benefit from skilled " outpatient physical therapy to address the deficits listed in the problem list box on initial evaluation, provide pt/family education and to maximize pt's level of independence in the home and community environment.     Pt's spiritual, cultural and educational needs considered and pt agreeable to plan of care and goals.     Anticipated barriers to physical therapy: Therapy frequency    Goals: Short Term Goals: 4 weeks  1. Patient will report decreased left knee and radicular symptom pain  </= 5/10 to increase tolerance for walking tasks. (Progressing, Not Met)  2. Patient will increase left knee ROM to at least 125 degrees in order to perform sit to stands without difficulty. (Progressing, Not Met)  3. Patient will increase strength by 1/3 MMT grade in bilateral lower extremity to increase tolerance for ADL and work activities. (Progressing, Not Met)  4. Patient to tolerate HEP to improve ROM and independence with ADL's. (Progressing, Not Met)     Long Term Goals: 8-10 weeks  1. Patient will improve TUG score to at least 10 seconds to decrease risk for falls. (Progressing, Not Met)  2. Patient goal will be ambulate for at least 20 minutes to improve performance with endurance tasks. (Progressing, Not Met)  3. Patient will increase strength to 4+/5 in bilateral lower extremity to increase tolerance for ADL and work activities. (Progressing, Not Met)  4. Patient will report at least a 32% limitation on FOTO indicating a clinically significant change in overall function. (Progressing, Not Met)  5. Patient will improve 30 second sit to stand to at least 10x to improve performance with transfer tasks. (Progressing, Not Met)    PLAN     Continue per plan of care with emphasis on patient education, gross lower extremity strengthening, lumbar spine mobilizations, and functional strengthening.     Odette Phillips, PT

## 2023-03-09 ENCOUNTER — CLINICAL SUPPORT (OUTPATIENT)
Dept: REHABILITATION | Facility: HOSPITAL | Age: 53
End: 2023-03-09
Payer: COMMERCIAL

## 2023-03-09 DIAGNOSIS — R20.2 NUMBNESS AND TINGLING OF LEFT LEG: ICD-10-CM

## 2023-03-09 DIAGNOSIS — R20.0 NUMBNESS AND TINGLING OF LEFT LEG: ICD-10-CM

## 2023-03-09 DIAGNOSIS — M25.662 DECREASED RANGE OF MOTION (ROM) OF LEFT KNEE: ICD-10-CM

## 2023-03-09 DIAGNOSIS — Z74.09 DECREASED FUNCTIONAL MOBILITY AND ENDURANCE: ICD-10-CM

## 2023-03-09 DIAGNOSIS — R29.898 DECREASED STRENGTH OF LOWER EXTREMITY: Primary | ICD-10-CM

## 2023-03-09 PROCEDURE — 97110 THERAPEUTIC EXERCISES: CPT

## 2023-03-09 PROCEDURE — 97112 NEUROMUSCULAR REEDUCATION: CPT

## 2023-03-09 PROCEDURE — 97530 THERAPEUTIC ACTIVITIES: CPT

## 2023-03-09 NOTE — PROGRESS NOTES
[facility]   Physical Therapy Treatment Note     Name: Jess Mcleod  Clinic Number: [id number]    Therapy Diagnosis:   Encounter Diagnoses   Name Primary?    Decreased strength of lower extremity Yes    Decreased range of motion (ROM) of left knee     Decreased functional mobility and endurance     Numbness and tingling of left leg      Physician: Janette Odonnell MD    Visit Date: 3/9/2023    Physician Orders: PT Eval and Treat low back and knee  Medical Diagnosis from Referral: Z98.890 (ICD-10-CM) - S/P left knee arthroscopy M54.16 (ICD-10-CM) - Lumbar radiculopathy M22.42 (ICD-10-CM) - Chondromalacia of patellofemoral joint, left   Evaluation Date: 2/17/2023  Authorization Period Expiration: 12/20/2023  Plan of Care Expiration: 4/28/2023  Progress Note Due: 10 visit  Visit # / Visits authorized: 3/20 + eval  FOTO: 1/3     Precautions: Standard and Fall     PTA Visit #: 0/5     Time In: 7:02 AM  Time Out: 7:58 AM  Total Billable Time: 55 minutes    SUBJECTIVE     Pt reports: Jess states that her foot weakness/pain was better after the session last time and she returns to the clinic this morning without any pain.   She was compliant with home exercise program.  Response to previous treatment: No adverse reactions.   Functional change: No functional change reported.     Pain: 0/10  Location: bilateral back and knee      OBJECTIVE     Objective Measures updated at progress report unless specified.     Treatment     Jess received the treatments listed below:      therapeutic exercises to develop strength, endurance, ROM, flexibility, and core stabilization for 17 minutes including:  NuStep 10' LV 2 for endogenous release of opioids and cardiovascular endurance  Double leg shuttle 3 cords 3x15  Single leg shuttle 2 cords 3x15 bilateral    manual therapy techniques: Joint mobilizations were applied to the left knee for 00 minutes, including:  **NOT TODAY**  Grade I-III patella  mobilizations in all directions  Grade I-II PA lumbar mobilizations  Sidelying lumbar gapping    neuromuscular re-education activities to improve: Balance, Coordination, Proprioception, Posture, and Motor Control for 26 minutes including:  Paloff press GTB 3x15 for core stabilization and motor control  Standing rows GTB for coordination with pulling tasks 3x15 each  Standing rows GTB for coordination with pulling tasks 3x15 each  Standing hip abduction 3x15 for balance and proprioception with upper extremity assist  Supine glut bridges with 3 sec hold for glut and lumbo pelvic motor control 3x15   Straight leg raise 4# 3x15 bilateral for eccentric proximal hip motor control  Side lying hip abduction 4# 3x15 bilateral for eccentric hip abductor motor control    therapeutic activities to improve functional performance for 12 minutes, including:  Seated lumbar flexion with compound row for functional bending and pulling 3x15  Sit to stands from standard chair height on blue foam pad 4x8 with 5# dumbbell     Patient Education and Home Exercises     Home Exercises Provided and Patient Education Provided     Education provided:   - Continue with home exercise program  - Movement every 30 minutes at work     Written Home Exercises Provided: Patient instructed to cont prior HEP. Exercises were reviewed and Jess was able to demonstrate them prior to the end of the session.  Jess demonstrated good  understanding of the education provided. See EMR under Patient Instructions for exercises provided during therapy sessions    ASSESSMENT     Interventions continue to focus on lumbar strengthening, motor control, and proximal hip strength to assist in improved performance with functional tasks. She demonstrates difficulty with paloff press due to the anti-rotational stabilization; therefore will continue to emphasis interventions within the frontal plane. Plan to perform quadruped exercises to assist in abdominal and lumbo pelvic  motor control. Will evaluate next session and progress as tolerated.     Jess Is progressing well towards her goals.   Pt prognosis is Good.     Pt will continue to benefit from skilled outpatient physical therapy to address the deficits listed in the problem list box on initial evaluation, provide pt/family education and to maximize pt's level of independence in the home and community environment.     Pt's spiritual, cultural and educational needs considered and pt agreeable to plan of care and goals.     Anticipated barriers to physical therapy: Therapy frequency    Goals: Short Term Goals: 4 weeks  1. Patient will report decreased left knee and radicular symptom pain  </= 5/10 to increase tolerance for walking tasks. (Progressing, Not Met)  2. Patient will increase left knee ROM to at least 125 degrees in order to perform sit to stands without difficulty. (Progressing, Not Met)  3. Patient will increase strength by 1/3 MMT grade in bilateral lower extremity to increase tolerance for ADL and work activities. (Progressing, Not Met)  4. Patient to tolerate HEP to improve ROM and independence with ADL's. (Progressing, Not Met)     Long Term Goals: 8-10 weeks  1. Patient will improve TUG score to at least 10 seconds to decrease risk for falls. (Progressing, Not Met)  2. Patient goal will be ambulate for at least 20 minutes to improve performance with endurance tasks. (Progressing, Not Met)  3. Patient will increase strength to 4+/5 in bilateral lower extremity to increase tolerance for ADL and work activities. (Progressing, Not Met)  4. Patient will report at least a 32% limitation on FOTO indicating a clinically significant change in overall function. (Progressing, Not Met)  5. Patient will improve 30 second sit to stand to at least 10x to improve performance with transfer tasks. (Progressing, Not Met)    PLAN     Continue per plan of care with emphasis on patient education, gross lower extremity strengthening,  lumbar spine mobilizations, and functional strengthening.     Odette Phillips, PT

## 2023-03-10 ENCOUNTER — TELEPHONE (OUTPATIENT)
Dept: SLEEP MEDICINE | Facility: OTHER | Age: 53
End: 2023-03-10
Payer: COMMERCIAL

## 2023-03-13 ENCOUNTER — OFFICE VISIT (OUTPATIENT)
Dept: SPORTS MEDICINE | Facility: CLINIC | Age: 53
End: 2023-03-13
Payer: COMMERCIAL

## 2023-03-13 VITALS
BODY MASS INDEX: 28.17 KG/M2 | HEIGHT: 64 IN | DIASTOLIC BLOOD PRESSURE: 78 MMHG | SYSTOLIC BLOOD PRESSURE: 122 MMHG | WEIGHT: 165 LBS

## 2023-03-13 DIAGNOSIS — M48.061 LUMBAR FORAMINAL STENOSIS: ICD-10-CM

## 2023-03-13 DIAGNOSIS — M54.16 LUMBAR RADICULOPATHY: Primary | ICD-10-CM

## 2023-03-13 DIAGNOSIS — Z98.890 S/P LEFT KNEE ARTHROSCOPY: ICD-10-CM

## 2023-03-13 DIAGNOSIS — M48.061 SPINAL STENOSIS OF LUMBAR REGION WITHOUT NEUROGENIC CLAUDICATION: ICD-10-CM

## 2023-03-13 PROCEDURE — 1159F MED LIST DOCD IN RCRD: CPT | Mod: CPTII,S$GLB,, | Performed by: PHYSICIAN ASSISTANT

## 2023-03-13 PROCEDURE — 99999 PR PBB SHADOW E&M-EST. PATIENT-LVL III: ICD-10-PCS | Mod: PBBFAC,,, | Performed by: PHYSICIAN ASSISTANT

## 2023-03-13 PROCEDURE — 3074F PR MOST RECENT SYSTOLIC BLOOD PRESSURE < 130 MM HG: ICD-10-PCS | Mod: CPTII,S$GLB,, | Performed by: PHYSICIAN ASSISTANT

## 2023-03-13 PROCEDURE — 3078F PR MOST RECENT DIASTOLIC BLOOD PRESSURE < 80 MM HG: ICD-10-PCS | Mod: CPTII,S$GLB,, | Performed by: PHYSICIAN ASSISTANT

## 2023-03-13 PROCEDURE — 1159F PR MEDICATION LIST DOCUMENTED IN MEDICAL RECORD: ICD-10-PCS | Mod: CPTII,S$GLB,, | Performed by: PHYSICIAN ASSISTANT

## 2023-03-13 PROCEDURE — 1160F RVW MEDS BY RX/DR IN RCRD: CPT | Mod: CPTII,S$GLB,, | Performed by: PHYSICIAN ASSISTANT

## 2023-03-13 PROCEDURE — 3008F PR BODY MASS INDEX (BMI) DOCUMENTED: ICD-10-PCS | Mod: CPTII,S$GLB,, | Performed by: PHYSICIAN ASSISTANT

## 2023-03-13 PROCEDURE — 99214 PR OFFICE/OUTPT VISIT, EST, LEVL IV, 30-39 MIN: ICD-10-PCS | Mod: S$GLB,,, | Performed by: PHYSICIAN ASSISTANT

## 2023-03-13 PROCEDURE — 1160F PR REVIEW ALL MEDS BY PRESCRIBER/CLIN PHARMACIST DOCUMENTED: ICD-10-PCS | Mod: CPTII,S$GLB,, | Performed by: PHYSICIAN ASSISTANT

## 2023-03-13 PROCEDURE — 3008F BODY MASS INDEX DOCD: CPT | Mod: CPTII,S$GLB,, | Performed by: PHYSICIAN ASSISTANT

## 2023-03-13 PROCEDURE — 99214 OFFICE O/P EST MOD 30 MIN: CPT | Mod: S$GLB,,, | Performed by: PHYSICIAN ASSISTANT

## 2023-03-13 PROCEDURE — 3074F SYST BP LT 130 MM HG: CPT | Mod: CPTII,S$GLB,, | Performed by: PHYSICIAN ASSISTANT

## 2023-03-13 PROCEDURE — 99999 PR PBB SHADOW E&M-EST. PATIENT-LVL III: CPT | Mod: PBBFAC,,, | Performed by: PHYSICIAN ASSISTANT

## 2023-03-13 PROCEDURE — 3078F DIAST BP <80 MM HG: CPT | Mod: CPTII,S$GLB,, | Performed by: PHYSICIAN ASSISTANT

## 2023-03-13 NOTE — PROGRESS NOTES
ESTABLISHED PATIENT    History 3/13/2023:  Jessica returns here today for follow-up evaluation of her lumbar spine and left leg weakness.  An MRI was ordered at her last visit and she is here today to discuss the results.  She has been undergoing physical therapy and reports having some improvement in her pain but continues to have weakness in her leg and big toe.  She continues to have some discomfort along the medial aspect of her knee with radiation down her leg and into her foot.    History 2/20/2023:  53 y.o. Female with a history of left leg pain and weakness who works as a .  She reports having left knee and foot problems starting after an injury she had a year ago where she tripped going up a flight of stairs.  She ultimately underwent a knee arthroscopy with chondroplasty and synovectomy on April 28, 2022.  She has done well postoperatively with regards to her knee but complains of having continued pain and weakness in her left leg.  This is thought to be related to lumbar radiculopathy and her history of scoliosis.  She was recently seen by Dr. Odonnell and Dr. Brewer.  Physical therapy has been ordered and she went for her initial visit last Friday.  She states the pain is increasing and she is having difficulty walking.  She is having numbness and tingling in her big toe.       - mechanical symptoms, - instability    Is affecting ADLs.  Pain is 8/10 at it's worst.        PAST MEDICAL HISTORY    Past Medical History:   Diagnosis Date    Arthritis     Chronic back pain     Chronic neck pain     Fibrocystic breast     Fibroid, uterus     GERD (gastroesophageal reflux disease)     Herpes     Migraine headache     Narcolepsy     Nephrolithiasis 5/20/2018    Sciatica of right side 5/20/2018    Scoliosis     Urge incontinence 5/20/2018       PAST SURGICAL HISTORY     Past Surgical History:   Procedure Laterality Date    ARTHROSCOPIC CHONDROPLASTY OF KNEE JOINT Left 04/28/2022    Procedure: ARTHROSCOPY,  KNEE, WITH CHONDROPLASTY;  Surgeon: Janette Odonnell MD;  Location: [facility] OR;  Service: Orthopedics;  Laterality: Left;     SECTION      EMBOLIZATION N/A 2020    Procedure: EMBOLIZATION, BLOOD VESSEL;  Surgeon: Dean Wheeler MD;  Location: [facility] CATH LAB;  Service: Radiology;  Laterality: N/A;    NASAL TURBINATE REDUCTION Bilateral 2022    Procedure: REDUCTION, NASAL TURBINATE john bullosa;  Surgeon: Waldo Giles MD;  Location: [facility] OR [facility] FLR;  Service: ENT;  Laterality: Bilateral;    Scoliosis surgery      SINUS SURGERY      SYNOVECTOMY OF KNEE Left 2022    Procedure: SYNOVECTOMY, KNEE;  Surgeon: Janette Odonnell MD;  Location: [facility] OR;  Service: Orthopedics;  Laterality: Left;    UMBILICAL HERNIA REPAIR         FAMILY HISTORY    Family History   Problem Relation Age of Onset    Breast cancer Paternal Aunt     Allergies Other     Asthma Son         exercise induced    Eczema Daughter     Colon cancer Neg Hx     Ovarian cancer Neg Hx        SOCIAL HISTORY    Social History     Socioeconomic History    Marital status: Legally    Tobacco Use    Smoking status: Never    Smokeless tobacco: Never   Substance and Sexual Activity    Alcohol use: Not Currently    Drug use: No    Sexual activity: Not Currently     Partners: Male     Birth control/protection: None       MEDICATIONS      Current Outpatient Medications:     azelastine (ASTELIN) 137 mcg (0.1 %) nasal spray, SPRAY 1 SPRAY IN EACH NOSTRIL TWICE DAILY, Disp: 90 mL, Rfl: 1    calcium carbonate (OS-TIFFANIE) 500 mg calcium (1,250 mg) tablet, Take 1 tablet by mouth once daily., Disp: , Rfl:     dextroamphetamine-amphetamine (ADDERALL XR) 30 MG 24 hr capsule, Take 1 capsule (30 mg total) by mouth once daily., Disp: 30 capsule, Rfl: 0    dextroamphetamine-amphetamine (ADDERALL) 20 mg tablet, Take 1 tablet by mouth 3 (three) times daily., Disp: 90 tablet, Rfl: 0    [START ON 3/23/2023] dextroamphetamine-amphetamine (ADDERALL) 20  "mg tablet, Take 1 tablet by mouth 3 (three) times daily., Disp: 90 tablet, Rfl: 0    dextroamphetamine-amphetamine (ADDERALL) 20 mg tablet, 1 pill PO daily, Disp: 30 tablet, Rfl: 0    eszopiclone (LUNESTA) 3 mg Tab, 1 tablet by mouth nightly as needed at bedtime for insomnia, Disp: 30 tablet, Rfl: 0    ibuprofen (ADVIL,MOTRIN) 200 MG tablet, Take 200 mg by mouth every 6 (six) hours as needed for Pain., Disp: , Rfl:     Lactobac no.41/Bifidobact no.7 (PROBIOTIC-10 ORAL), Take by mouth., Disp: , Rfl:     magnesium oxide (MAG-OX) 400 mg (241.3 mg magnesium) tablet, Take 400 mg by mouth once daily., Disp: , Rfl:     multivitamin with minerals tablet, Take 1 tablet by mouth once daily., Disp: , Rfl:     omega-3/dha/epa/dpa/fish oil (OMEGA-3 2100 ORAL), Take by mouth., Disp: , Rfl:     tranexamic acid (LYSTEDA) 650 mg tablet, Take 2 tablets (1,300 mg total) by mouth 3 (three) times daily. Do not take for longer than 5 days, Disp: 30 tablet, Rfl: 1  No current facility-administered medications for this visit.    Facility-Administered Medications Ordered in Other Visits:     LIDOcaine (PF) 10 mg/ml (1%) injection 10 mg, 1 mL, Intradermal, Once, Checo Escobar MD    ALLERGIES     Review of patient's allergies indicates:   Allergen Reactions    Ibuprofen Other (See Comments)     It makes my "ears hurt" when I take large doses.    Opioids - morphine analogues Other (See Comments)     Morphine causes headaches         REVIEW OF SYSTEMS   Constitution: Negative. Negative for chills, fever and night sweats.   HENT: Negative for congestion and headaches.    Eyes: Negative for blurred vision, left vision loss and right vision loss.   Cardiovascular: Negative for chest pain and syncope.   Respiratory: Negative for cough and shortness of breath.    Endocrine: Negative for polydipsia, polyphagia and polyuria.   Hematologic/Lymphatic: Negative for bleeding problem. Does not bruise/bleed easily.   Skin: Negative for dry skin, " "itching and rash.   Musculoskeletal: Negative for falls. Positive for left leg pain and weakness.  Gastrointestinal: Negative for abdominal pain and bowel incontinence.   Genitourinary: Negative for bladder incontinence and nocturia.   Neurological: Negative for disturbances in coordination, loss of balance and seizures.   Psychiatric/Behavioral: Negative for depression. The patient does not have insomnia.    Allergic/Immunologic: Negative for hives and persistent infections.     PHYSICAL EXAMINATION    Vitals: /78   Ht 5' 4" (1.626 m)   Wt 74.8 kg (165 lb)   LMP 12/06/2022   BMI 28.32 kg/m²     General: The patient appears active and healthy with no apparent physical problems.  No disturbance of mood or affect is demonstrated. Alert and Oriented.    HEENT: Eyes normal, pupils equally round, nose normal.    Resp: Equal and symmetrical chest rises. No wheezing    CV: Regular rate    Neck: Supple; nonpainful range of motion.    Extremities: no cyanosis, clubbing, edema, or diffuse swelling.  Palpable pulses, good capillary refill of the digits.  No coolness, discoloration, edema or obvious varicosities.    Skin: no lesions noted.    Lymphatic: no detected adenopathy in the upper or lower extremities.    Neurologic: normal mental status, normal reflexes, normal gait and balance.  Patient is alert and oriented to person, place and time.  No flaccidity or spasticity is noted.  No motor or sensory deficits are noted.  Light touch is intact    Orthopaedic: Spine Musculoskeletal Exam    Gait    Antalgic: left    Inspection    Thoracic scoliosis: left    Lumbar scoliosis: left    Thoracolumbar    Erythema: none    Swelling: none    Edema (right lower extremity): none    Edema (left lower extremity): none    Incision: well-healed    Palpation    Thoracolumbar    Tenderness: present      Diffuse: yes      Paraspinous: left      Gluteal: left      Piriformis: left      SI Joint: left    Left      Masses: none      " Spasms: none      Crepitus: none      Muscle tone: normal    Range of Motion    Thoracolumbar       Flexion: 75%. Flexion detail: pain.     Extension: 75%. Extension detail: pain.       Left      Lateral bendin%. Lateral bending detail: pain.       Lateral rotation: 75%. Lateral rotation detail: pain.      Strength    Thoracolumbar        Left      Extensor hallucis longus: 4/5. Extensor hallucis longus is affected by pain.       Tibialis anterior: 4+/5.       Plantar flexion: 4+/5. Plantar flexion is affected by pain.       Quadriceps: 4+/5.       Hamstrin+/5.       Hip flexion: 4+/5.      Sensory    Thoracolumbar      Left      Anterior knee: decreased      Anterior leg: decreased      Medial leg: decreased      Posterior leg: decreased      Dorsum foot: decreased    Special Tests    Thoracolumbar      Left      HIMA test: negative      SLR: back pain and pain radiates to left leg      SLR pain at: 30 degrees      IMAGING    An MRI of the lumbar spine was completed at [facility] on 03/10/2023.  I have personally reviewed the images and report.  There are generalized disc desiccation and degenerative findings throughout the lumbar spine but most pronounced at the L4-5 level.  At this level, there is loss of disc space height with prominent endplate osteophyte formation, bilateral facet hypertrophy, and ligamentum flavum hypertrophy.  There are endplate marrow signal changes on the left with broad base bulging and retrolisthesis of L5.  These factors results in significant narrowing of the central canal, compromise of the lateral recess, and narrowing of the inferior neural foramen particularly on the left.    X-rays including five views of the lumbar spine were completed today.  I have personally reviewed the images.  There are no acute findings.  The metallic susan from her previous surgery appears to be in good position.  There is evidence of levoscoliosis that is relatively unchanged from her previous x-rays  done in 2018.  There are some degenerative changes seen at the L4-5 level where there is loss of disc space and spondylitic changes of the vertebral bodies.  This appears to be worse on the left.    IMPRESSION       ICD-10-CM ICD-9-CM   1. Lumbar radiculopathy  M54.16 724.4   2. Spinal stenosis of lumbar region without neurogenic claudication  M48.061 724.02   3. Lumbar foraminal stenosis  M48.061 724.02   4. S/P left knee arthroscopy  Z98.890 V45.89     We discussed her MRI findings in depth today.  We discussed her prognosis and treatment options.  I offered to send her to pain management for potential diagnostic injection which may help to confirm the origin of her symptoms, but she declined.  We also discussed gabapentin and Lyrica but she is concerned about the potential side effects and risks.  She would like to continue with conservative measures for now and will follow-up again in a few weeks for repeat evaluation.    MEDICATIONS PRESCRIBED      None    RECOMMENDATIONS     Continue physical therapy focused on core strengthening  RTC in 6 weeks for repeat evaluation  Consider pain management referral for L4-5 injection if symptoms persist      All questions were answered, pt will contact us for questions or concerns in the interim.

## 2023-03-15 ENCOUNTER — TELEPHONE (OUTPATIENT)
Dept: SLEEP MEDICINE | Facility: OTHER | Age: 53
End: 2023-03-15
Payer: COMMERCIAL

## 2023-03-16 ENCOUNTER — CLINICAL SUPPORT (OUTPATIENT)
Dept: REHABILITATION | Facility: HOSPITAL | Age: 53
End: 2023-03-16
Payer: COMMERCIAL

## 2023-03-16 DIAGNOSIS — R20.2 NUMBNESS AND TINGLING OF LEFT LEG: ICD-10-CM

## 2023-03-16 DIAGNOSIS — M25.662 DECREASED RANGE OF MOTION (ROM) OF LEFT KNEE: ICD-10-CM

## 2023-03-16 DIAGNOSIS — Z74.09 DECREASED FUNCTIONAL MOBILITY AND ENDURANCE: ICD-10-CM

## 2023-03-16 DIAGNOSIS — R20.0 NUMBNESS AND TINGLING OF LEFT LEG: ICD-10-CM

## 2023-03-16 DIAGNOSIS — R29.898 DECREASED STRENGTH OF LOWER EXTREMITY: Primary | ICD-10-CM

## 2023-03-16 PROCEDURE — 97530 THERAPEUTIC ACTIVITIES: CPT

## 2023-03-16 PROCEDURE — 97110 THERAPEUTIC EXERCISES: CPT

## 2023-03-16 NOTE — PROGRESS NOTES
"[facility]   Physical Therapy Treatment Note     Name: Jess Mcleod  Clinic Number: [id number]    Therapy Diagnosis:   Encounter Diagnoses   Name Primary?    Decreased strength of lower extremity Yes    Decreased range of motion (ROM) of left knee     Decreased functional mobility and endurance     Numbness and tingling of left leg      Physician: Janette Odonnell MD    Visit Date: 3/16/2023    Physician Orders: PT Eval and Treat low back and knee  Medical Diagnosis from Referral: Z98.890 (ICD-10-CM) - S/P left knee arthroscopy M54.16 (ICD-10-CM) - Lumbar radiculopathy M22.42 (ICD-10-CM) - Chondromalacia of patellofemoral joint, left   Evaluation Date: 2/17/2023  Authorization Period Expiration: 12/20/2023  Plan of Care Expiration: 4/28/2023  Progress Note Due: 10 visit  Visit # / Visits authorized: 4/20 + eval  FOTO: 1/3     Precautions: Standard and Fall     PTA Visit #: 0/5     Time In: 7:01 AM  Time Out: 8:02 AM  Total Billable Time: 61 minutes    SUBJECTIVE     Pt reports: Patient reports frustrations with her recovery process since her initial injuries. She believes the pain in her low back and knee are unrelated. She states that she felt a "catching" sensation towards the end of the NuStep.   She was compliant with home exercise program.  Response to previous treatment: No adverse reactions.   Functional change: No functional change reported this session.     Pain: 0/10  Location: bilateral back and knee      OBJECTIVE     Objective Measures updated at progress report unless specified.     Treatment     Jess received the treatments listed below:      therapeutic exercises to develop strength, endurance, ROM, flexibility, and core stabilization for 10 minutes including:  NuStep 10' LV 2 for endogenous release of opioids and cardiovascular endurance    **Not Today**  Double leg shuttle 3 cords 3x15  Single leg shuttle 2 cords 3x15 bilateral    manual therapy techniques: Joint " mobilizations were applied to the left knee for 00 minutes, including:  **NOT TODAY**  Grade I-III patella mobilizations in all directions  Grade I-II PA lumbar mobilizations  Sidelying lumbar gapping    neuromuscular re-education activities to improve: Balance, Coordination, Proprioception, Posture, and Motor Control for 00 minutes including:  **Not Today**  Standing rows GTB for coordination with pulling tasks 3x15 each  Standing hip abduction 3x15 for balance and proprioception with upper extremity assist  Supine glut bridges with 3 sec hold for glut and lumbo pelvic motor control 3x15   Straight leg raise 4# 3x15 bilateral for eccentric proximal hip motor control  Side lying hip abduction 4# 3x15 bilateral for eccentric hip abductor motor control    therapeutic activities to improve functional performance for 51 minutes, including:  Seated lumbar flexion with compound row for functional bending and pulling 3x20 CC 25#  Sit to stands from standard chair height on blue foam pad 3x8 with 5# dumbbell   Standing chop for lumbar rotational movements 3x15 15# CC bilateral  Paloff press BTB 3x15 for core stabilization and motor control    Patient Education and Home Exercises     Home Exercises Provided and Patient Education Provided     Education provided:   - Continue with home exercise program  - Movement every 30 minutes at work     Written Home Exercises Provided: Patient instructed to cont prior HEP. Exercises were reviewed and Jess was able to demonstrate them prior to the end of the session.  Jess demonstrated good  understanding of the education provided. See EMR under Patient Instructions for exercises provided during therapy sessions    ASSESSMENT     Jess returns after follow up with PA and it was recommended to continue interventions focusing on L4-L5 and core strengthening. Interventions addressed these recommendations with seated lumbar flexion with pulling mechanics at 25#, Sit to stands with overhead  press at 5#, standing rotational chops at 25#, and paloff press for core stabilization with moderate resistance band. She was able to perform all of these interventions without any adverse reactions denoting improvement in overall functional strength, mobility, and sustaining higher loads. Therapist educated patient on focusing on the positive results that she is making in therapy measuring minimal to no pain with majority of exercises this date. Will continue to progress each visit as tolerated.     Jess Is progressing well towards her goals.   Pt prognosis is Good.     Pt will continue to benefit from skilled outpatient physical therapy to address the deficits listed in the problem list box on initial evaluation, provide pt/family education and to maximize pt's level of independence in the home and community environment.     Pt's spiritual, cultural and educational needs considered and pt agreeable to plan of care and goals.     Anticipated barriers to physical therapy: Therapy frequency    Goals: Short Term Goals: 4 weeks  1. Patient will report decreased left knee and radicular symptom pain  </= 5/10 to increase tolerance for walking tasks. (Progressing, Not Met)  2. Patient will increase left knee ROM to at least 125 degrees in order to perform sit to stands without difficulty. (Progressing, Not Met)  3. Patient will increase strength by 1/3 MMT grade in bilateral lower extremity to increase tolerance for ADL and work activities. (Progressing, Not Met)  4. Patient to tolerate HEP to improve ROM and independence with ADL's. (Progressing, Not Met)     Long Term Goals: 8-10 weeks  1. Patient will improve TUG score to at least 10 seconds to decrease risk for falls. (Progressing, Not Met)  2. Patient goal will be ambulate for at least 20 minutes to improve performance with endurance tasks. (Progressing, Not Met)  3. Patient will increase strength to 4+/5 in bilateral lower extremity to increase tolerance for ADL  and work activities. (Progressing, Not Met)  4. Patient will report at least a 32% limitation on FOTO indicating a clinically significant change in overall function. (Progressing, Not Met)  5. Patient will improve 30 second sit to stand to at least 10x to improve performance with transfer tasks. (Progressing, Not Met)    PLAN     Continue per plan of care with emphasis on patient education, gross lower extremity strengthening, lumbar spine mobilizations, and functional strengthening.     Odette Phillips, PT

## 2023-03-20 ENCOUNTER — TELEPHONE (OUTPATIENT)
Dept: SLEEP MEDICINE | Facility: OTHER | Age: 53
End: 2023-03-20
Payer: COMMERCIAL

## 2023-03-21 ENCOUNTER — HOSPITAL ENCOUNTER (OUTPATIENT)
Dept: SLEEP MEDICINE | Facility: OTHER | Age: 53
Discharge: HOME | End: 2023-03-21
Attending: PSYCHIATRY & NEUROLOGY
Payer: COMMERCIAL

## 2023-03-21 ENCOUNTER — CLINICAL SUPPORT (OUTPATIENT)
Dept: REHABILITATION | Facility: HOSPITAL | Age: 53
End: 2023-03-21
Payer: COMMERCIAL

## 2023-03-21 DIAGNOSIS — G47.33 OSA (OBSTRUCTIVE SLEEP APNEA): Primary | ICD-10-CM

## 2023-03-21 DIAGNOSIS — G47.30 SLEEP APNEA, UNSPECIFIED TYPE: ICD-10-CM

## 2023-03-21 DIAGNOSIS — Z74.09 DECREASED FUNCTIONAL MOBILITY AND ENDURANCE: ICD-10-CM

## 2023-03-21 DIAGNOSIS — R20.0 NUMBNESS AND TINGLING OF LEFT LEG: ICD-10-CM

## 2023-03-21 DIAGNOSIS — M25.662 DECREASED RANGE OF MOTION (ROM) OF LEFT KNEE: ICD-10-CM

## 2023-03-21 DIAGNOSIS — R20.2 NUMBNESS AND TINGLING OF LEFT LEG: ICD-10-CM

## 2023-03-21 DIAGNOSIS — R29.898 DECREASED STRENGTH OF LOWER EXTREMITY: Primary | ICD-10-CM

## 2023-03-21 PROCEDURE — 95800 SLP STDY UNATTENDED: CPT

## 2023-03-21 PROCEDURE — 97530 THERAPEUTIC ACTIVITIES: CPT

## 2023-03-21 PROCEDURE — 97110 THERAPEUTIC EXERCISES: CPT

## 2023-03-21 NOTE — PROGRESS NOTES
"[facility]   Physical Therapy Treatment Note/ Reassessment     Name: Jess Mcleod  Clinic Number: [id number]    Therapy Diagnosis:   Encounter Diagnoses   Name Primary?    Decreased strength of lower extremity Yes    Decreased range of motion (ROM) of left knee     Decreased functional mobility and endurance     Numbness and tingling of left leg      Physician: Janette Odonnell MD    Visit Date: 3/21/2023    Physician Orders: PT Eval and Treat low back and knee  Medical Diagnosis from Referral: Z98.890 (ICD-10-CM) - S/P left knee arthroscopy M54.16 (ICD-10-CM) - Lumbar radiculopathy M22.42 (ICD-10-CM) - Chondromalacia of patellofemoral joint, left   Evaluation Date: 2/17/2023  Authorization Period Expiration: 12/20/2023  Plan of Care Expiration: 4/28/2023  Progress Note Due: 10 visit  Visit # / Visits authorized: 5/20 + eval  FOTO: 1/3     Precautions: Standard and Fall     PTA Visit #: 0/5     Time In: 7:02 AM  Time Out: 7:58 AM  Total Billable Time: 56 minutes    SUBJECTIVE     Pt reports: Jess reports no changes since last therapy session. She states, "I just have a lot going on right now."  She was compliant with home exercise program.  Response to previous treatment: No adverse reactions.   Functional change: No functional change reported this session.     Pain: 4/10  Location: bilateral back and knee      OBJECTIVE     Objective Measures updated at progress report unless specified.      Strength  Right LE (RE) Left LE (RE)   Hip Ext 4/5 Hip Ext 4/5    Hip Flexion: 4/5 Hip Flexion: 4/5   Hip ER:  4/5 Hip ER: 4/5   Hip IR: 4/5 Hip IR: 4/5   Hip Abduction: 4-/5 Hip Abduction 4-/5   Hip Adduction (seated) 4+/5 Hip Adduction (seated) 4+/5   Knee Extension: 4/5 Knee Extension: 4/5   Knee Flexion: 4/5 Knee Flexion: 4-/5   Ankle Dorsiflexion: 4/5 Ankle Dorsiflexion: 4/5   Ankle Plantarflexion: 5/5 Ankle Plantarflexion: 5/5      Range of Motion:  R knee Active L knee Active   Flexion " 125   113 (IE); 125 (RE)   Extension 0   0      Balance Assessment:    Evaluation   Single Limb Stance R LE 8 seconds (IE); 10 seconds (RE)  (<10 sec = HIGH FALL RISK)   Single Limb Stance L LE 7 seconds (IE); 26 seconds (RE)  (<10 sec = HIGH FALL RISK)      Endurance Assessment:    Evaluation   Timed Up and Go 16 seconds (IE); 12 seconds (RE)   30 sec STS  5x (IE); 12x (RE)         Table: Population Norms for TUG    Age  Average TUG    60 - 69 years  8.1 seconds    70 - 79 years  9.2 seconds    80 - 99 years  11.3 seconds     Lumbar Range of Motion: * Denotes pain     ROM   Flexion Mid tibia; Dorsal foot (RE)   Extension 8 degrees; 15 degrees (RE)   Left Side Bending Tibiofemoral joint line; No change (RE)   Right Side Bending Tibiofemoral joint line; No change (RE)         Limitation/Restriction for FOTO Knee Survey     Therapist reviewed FOTO scores for Jess Mcleod on 2/17/2023.   FOTO documents entered into [email or web address] - see Media section.     Limitation Score: 42% (IE); 28% (RE)           Treatment     Jess received the treatments listed below:      therapeutic exercises to develop strength, endurance, ROM, flexibility, and core stabilization for 10 minutes including:  NuStep 10' LV 2 for endogenous release of opioids and cardiovascular endurance    **Not Today**  Double leg shuttle 3 cords 3x15  Single leg shuttle 2 cords 3x15 bilateral    manual therapy techniques: Joint mobilizations were applied to the left knee for 00 minutes, including:  **NOT TODAY**  Grade I-III patella mobilizations in all directions  Grade I-II PA lumbar mobilizations  Sidelying lumbar gapping    neuromuscular re-education activities to improve: Balance, Coordination, Proprioception, Posture, and Motor Control for 00 minutes including:  **Not Today**  Standing rows GTB for coordination with pulling tasks 3x15 each  Standing hip abduction 3x15 for balance and proprioception with upper extremity assist  Supine glut bridges with 3 sec  hold for glut and lumbo pelvic motor control 3x15   Straight leg raise 4# 3x15 bilateral for eccentric proximal hip motor control  Side lying hip abduction 4# 3x15 bilateral for eccentric hip abductor motor control    therapeutic activities to improve functional performance for 46 minutes, including:  **Reassessment performed**  Seated lumbar flexion with compound row for functional bending and pulling 3x20 CC 25#  Sit to stands from standard chair height on blue foam pad 3x8 with 5# dumbbell   Standing chop for lumbar rotational movements 3x15 15# CC bilateral  Paloff press BTB 3x15 for core stabilization and motor control  Hooklying abdominal ball crunches (forward/obliques) 3' each     Patient Education and Home Exercises     Home Exercises Provided and Patient Education Provided     Education provided:   - Continue with home exercise program  - Movement every 30 minutes at work     Written Home Exercises Provided: Patient instructed to cont prior HEP. Exercises were reviewed and Jess was able to demonstrate them prior to the end of the session.  Jess demonstrated good  understanding of the education provided. See EMR under Patient Instructions for exercises provided during therapy sessions    ASSESSMENT     Jess has been seen in Physical Therapy for 5 visits for low back and left knee pain and is making steady progress with Physical Therapy interventions. She has improved lumbar and knee range of motion, gross lower extremity strength, TUG score, 30 second sit to stand, single limb balance, and FOTO score since her evaluation. She remains limited in strength and complaints of lower leg/big toe pain/weakness with a pain that can reach up to 6/10. She is making steady progress towards her functional goals and also improved her Functional outcome score. Therefore, she would benefit from continued skilled therapy to address functional limitations consisting of therapeutic exercise and activities, neuromuscular  endurance training and manual joint mobilizations, manipulations and dry needling to further improve muscle endurance, muscle hypertrophy, strength, and power to return to functional activities with no limitations.     Jess Is progressing well towards her goals.   Pt prognosis is Good.     Pt will continue to benefit from skilled outpatient physical therapy to address the deficits listed in the problem list box on initial evaluation, provide pt/family education and to maximize pt's level of independence in the home and community environment.     Pt's spiritual, cultural and educational needs considered and pt agreeable to plan of care and goals.     Anticipated barriers to physical therapy: Therapy frequency    Goals: Short Term Goals: 4 weeks  1. Patient will report decreased left knee and radicular symptom pain  </= 5/10 to increase tolerance for walking tasks. (Progressing, Not Met)  2. Patient will increase left knee ROM to at least 125 degrees in order to perform sit to stands without difficulty. (Met)  3. Patient will increase strength by 1/3 MMT grade in bilateral lower extremity to increase tolerance for ADL and work activities. (Met)  4. Patient to tolerate HEP to improve ROM and independence with ADL's. (Met)     Long Term Goals: 8-10 weeks  1. Patient will improve TUG score to at least 10 seconds to decrease risk for falls. (Progressing, Not Met)  2. Patient goal will be ambulate for at least 20 minutes to improve performance with endurance tasks. (Progressing, Not Met)  3. Patient will increase strength to 4+/5 in bilateral lower extremity to increase tolerance for ADL and work activities. (Progressing, Not Met)  4. Patient will report at least a 32% limitation on FOTO indicating a clinically significant change in overall function. (Met)  5. Patient will improve 30 second sit to stand to at least 10x to improve performance with transfer tasks. (Met)    PLAN     Continue per plan of care with emphasis on  patient education, gross lower extremity strengthening, lumbar spine mobilizations, and functional strengthening.     Odette Phillips, PT

## 2023-03-24 ENCOUNTER — TELEPHONE (OUTPATIENT)
Dept: SLEEP MEDICINE | Facility: CLINIC | Age: 53
End: 2023-03-24
Payer: COMMERCIAL

## 2023-03-24 NOTE — TELEPHONE ENCOUNTER
----- Message from Kiki Saini sent at 3/24/2023  1:10 PM CDT -----  Name of Who is Calling: Rabia/ [employer]          What is the request in detail: Rabia is calling regarding the the prior auth. She said that the prior auth for primary insurance was sent, but not the secondary insurance.           Can the clinic reply by [facility]:          What Number to Call Back if not in [facility]:

## 2023-03-28 PROCEDURE — 95806 PR SLEEP STUDY, UNATTENDED, SIMUL RECORD HR/O2 SAT/RESP FLOW/RESP EFFT: ICD-10-PCS | Mod: 26,,, | Performed by: PSYCHIATRY & NEUROLOGY

## 2023-03-28 PROCEDURE — 95806 SLEEP STUDY UNATT&RESP EFFT: CPT | Mod: 26,,, | Performed by: PSYCHIATRY & NEUROLOGY

## 2023-03-30 ENCOUNTER — PATIENT MESSAGE (OUTPATIENT)
Dept: SLEEP MEDICINE | Facility: CLINIC | Age: 53
End: 2023-03-30
Payer: COMMERCIAL

## 2023-04-06 ENCOUNTER — TELEPHONE (OUTPATIENT)
Dept: SLEEP MEDICINE | Facility: CLINIC | Age: 53
End: 2023-04-06
Payer: COMMERCIAL

## 2023-04-06 NOTE — TELEPHONE ENCOUNTER
----- Message from Sierra Ball sent at 4/6/2023  4:15 PM CDT -----  Name of Who is Calling:Rabia (Kaity)              What is the request in detail:[employer] is approved and patient has to call [facility] to schedule her shipment ([phone])              Can the clinic reply by [facility]:no              What Number to Call Back if not in [facility]:[phone]

## 2023-04-11 ENCOUNTER — CLINICAL SUPPORT (OUTPATIENT)
Dept: REHABILITATION | Facility: HOSPITAL | Age: 53
End: 2023-04-11
Payer: COMMERCIAL

## 2023-04-11 DIAGNOSIS — M25.662 DECREASED RANGE OF MOTION (ROM) OF LEFT KNEE: ICD-10-CM

## 2023-04-11 DIAGNOSIS — R20.0 NUMBNESS AND TINGLING OF LEFT LEG: ICD-10-CM

## 2023-04-11 DIAGNOSIS — R20.2 NUMBNESS AND TINGLING OF LEFT LEG: ICD-10-CM

## 2023-04-11 DIAGNOSIS — R29.898 DECREASED STRENGTH OF LOWER EXTREMITY: Primary | ICD-10-CM

## 2023-04-11 DIAGNOSIS — Z74.09 DECREASED FUNCTIONAL MOBILITY AND ENDURANCE: ICD-10-CM

## 2023-04-11 PROCEDURE — 97530 THERAPEUTIC ACTIVITIES: CPT

## 2023-04-11 PROCEDURE — 97112 NEUROMUSCULAR REEDUCATION: CPT

## 2023-04-11 NOTE — PROGRESS NOTES
[facility]   Physical Therapy Treatment Note    Name: Jess Mcleod  Clinic Number: [id number]    Therapy Diagnosis:   Encounter Diagnoses   Name Primary?    Decreased strength of lower extremity Yes    Decreased range of motion (ROM) of left knee     Decreased functional mobility and endurance     Numbness and tingling of left leg      Physician: Janette Odonnell MD    Visit Date: 4/11/2023    Physician Orders: PT Eval and Treat low back and knee  Medical Diagnosis from Referral: Z98.890 (ICD-10-CM) - S/P left knee arthroscopy M54.16 (ICD-10-CM) - Lumbar radiculopathy M22.42 (ICD-10-CM) - Chondromalacia of patellofemoral joint, left   Evaluation Date: 2/17/2023  Authorization Period Expiration: 12/20/2023  Plan of Care Expiration: 4/28/2023  Progress Note Due: 10 visit  Visit # / Visits authorized: 6/20 + eval  FOTO: 1/3     Precautions: Standard and Fall     PTA Visit #: 0/5     Time In: 7:03 AM  Time Out: 7:59 AM  Total Billable Time: 56 minutes    SUBJECTIVE     Pt reports: Jess reports that she has been unable to attend therapy due to being exposed to COVID. She reports that her big toe symptoms have gotten much better and she feels overall that her low back and knee are getting better. She has follow up next week with MD/PA and new evaluation with podiatry.   She was compliant with home exercise program.  Response to previous treatment: No adverse reactions.   Functional change: Improvements in big toe symptoms    Pain: 2/10 knee  Location: bilateral back and knee      OBJECTIVE     Objective Measures updated at progress report unless specified.     Treatment     Jess received the treatments listed below:      therapeutic exercises to develop strength, endurance, ROM, flexibility, and core stabilization for 00 minutes including:  NuStep 10' LV 2 for endogenous release of opioids and cardiovascular endurance - NT    manual therapy techniques: Joint mobilizations were applied  to the left knee for 00 minutes, including:  **NOT TODAY**  Grade I-III patella mobilizations in all directions  Grade I-II PA lumbar mobilizations  Sidelying lumbar gapping    neuromuscular re-education activities to improve: Balance, Coordination, Proprioception, Posture, and Motor Control for 23 minutes including:  Supine glut bridges with 3 sec hold for glut and lumbo pelvic motor control 3x15   Side lying hip abduction 4# 3x15 bilateral for eccentric hip abductor motor control  Double leg shuttle 3 cords 3x15 for quadriceps motor control  Single leg shuttle 2 cords 3x15 bilateral for quadriceps motor control    therapeutic activities to improve functional performance for 33 minutes, including:  Seated lumbar flexion with compound row for functional bending and pulling 3x20 CC 25#  Sit to stands from standard chair height on blue foam pad 3x8 with 5# dumbbell   Standing chop for lumbar rotational movements 3x15 15# CC bilateral  Paloff press BTB 3x15 for core stabilization and motor control  Hooklying abdominal ball crunches (forward/obliques) 3' each   Supine 90/90 LTR with yellow ball 20x  Supine V-ups 1/2 distance 3x10    Patient Education and Home Exercises     Home Exercises Provided and Patient Education Provided     Education provided:   - Continue with home exercise program  - Movement every 30 minutes at work     Written Home Exercises Provided: Patient instructed to cont prior HEP. Exercises were reviewed and Jess was able to demonstrate them prior to the end of the session.  Jess demonstrated good  understanding of the education provided. See EMR under Patient Instructions for exercises provided during therapy sessions    ASSESSMENT     Jess returns after one week breaks due to exposure to COVID. Subjective reports of improvement in symptoms especially with big toe noting positive response to lumbar stabilization/abdominal core motor control exercise interventions. She remains with complaints of  knee pain but no adverse reactions to the exercises performed throughout session. Will continue with current plan of care due to positive response. Will progress as tolerated.     Jess Is progressing well towards her goals.   Pt prognosis is Good.     Pt will continue to benefit from skilled outpatient physical therapy to address the deficits listed in the problem list box on initial evaluation, provide pt/family education and to maximize pt's level of independence in the home and community environment.     Pt's spiritual, cultural and educational needs considered and pt agreeable to plan of care and goals.     Anticipated barriers to physical therapy: Therapy frequency    Goals: Short Term Goals: 4 weeks  1. Patient will report decreased left knee and radicular symptom pain  </= 5/10 to increase tolerance for walking tasks. (Progressing, Not Met)  2. Patient will increase left knee ROM to at least 125 degrees in order to perform sit to stands without difficulty. (Met)  3. Patient will increase strength by 1/3 MMT grade in bilateral lower extremity to increase tolerance for ADL and work activities. (Met)  4. Patient to tolerate HEP to improve ROM and independence with ADL's. (Met)     Long Term Goals: 8-10 weeks  1. Patient will improve TUG score to at least 10 seconds to decrease risk for falls. (Progressing, Not Met)  2. Patient goal will be ambulate for at least 20 minutes to improve performance with endurance tasks. (Progressing, Not Met)  3. Patient will increase strength to 4+/5 in bilateral lower extremity to increase tolerance for ADL and work activities. (Progressing, Not Met)  4. Patient will report at least a 32% limitation on FOTO indicating a clinically significant change in overall function. (Met)  5. Patient will improve 30 second sit to stand to at least 10x to improve performance with transfer tasks. (Met)    PLAN     Continue per plan of care with emphasis on patient education, gross lower  extremity strengthening, lumbar spine mobilizations, and functional strengthening.     Odette Phillips, PT

## 2023-04-17 ENCOUNTER — PATIENT MESSAGE (OUTPATIENT)
Dept: SLEEP MEDICINE | Facility: CLINIC | Age: 53
End: 2023-04-17
Payer: COMMERCIAL

## 2023-04-17 ENCOUNTER — PATIENT MESSAGE (OUTPATIENT)
Dept: OBSTETRICS AND GYNECOLOGY | Facility: CLINIC | Age: 53
End: 2023-04-17
Payer: COMMERCIAL

## 2023-04-18 ENCOUNTER — PATIENT MESSAGE (OUTPATIENT)
Dept: SLEEP MEDICINE | Facility: CLINIC | Age: 53
End: 2023-04-18
Payer: COMMERCIAL

## 2023-04-18 DIAGNOSIS — G47.411 NARCOLEPSY WITH CATAPLEXY: ICD-10-CM

## 2023-04-18 DIAGNOSIS — G47.411 NARCOLEPSY WITH CATAPLEXY: Primary | ICD-10-CM

## 2023-04-18 RX ORDER — MODAFINIL 200 MG/1
TABLET ORAL
Qty: 60 TABLET | Refills: 5 | Status: SHIPPED | OUTPATIENT
Start: 2023-04-18

## 2023-04-18 RX ORDER — MODAFINIL 200 MG/1
TABLET ORAL
Qty: 30 TABLET | Refills: 5 | Status: SHIPPED | OUTPATIENT
Start: 2023-04-18 | End: 2023-04-18 | Stop reason: SDUPTHER

## 2023-05-03 ENCOUNTER — PATIENT MESSAGE (OUTPATIENT)
Dept: SLEEP MEDICINE | Facility: CLINIC | Age: 53
End: 2023-05-03
Payer: COMMERCIAL

## 2023-05-09 DIAGNOSIS — Z13.39 ADHD (ATTENTION DEFICIT HYPERACTIVITY DISORDER) EVALUATION: ICD-10-CM

## 2023-05-09 RX ORDER — DEXTROAMPHETAMINE SACCHARATE, AMPHETAMINE ASPARTATE, DEXTROAMPHETAMINE SULFATE AND AMPHETAMINE SULFATE 5; 5; 5; 5 MG/1; MG/1; MG/1; MG/1
TABLET ORAL
Qty: 60 TABLET | Refills: 0 | Status: SHIPPED | OUTPATIENT
Start: 2023-05-09 | End: 2023-06-08 | Stop reason: ALTCHOICE

## 2023-05-09 RX ORDER — DEXTROAMPHETAMINE SACCHARATE, AMPHETAMINE ASPARTATE, DEXTROAMPHETAMINE SULFATE AND AMPHETAMINE SULFATE 5; 5; 5; 5 MG/1; MG/1; MG/1; MG/1
TABLET ORAL
Qty: 30 TABLET | Refills: 0 | Status: SHIPPED | OUTPATIENT
Start: 2023-05-09 | End: 2023-05-09 | Stop reason: SDUPTHER

## 2023-05-09 RX ORDER — DEXTROAMPHETAMINE SACCHARATE, AMPHETAMINE ASPARTATE MONOHYDRATE, DEXTROAMPHETAMINE SULFATE AND AMPHETAMINE SULFATE 7.5; 7.5; 7.5; 7.5 MG/1; MG/1; MG/1; MG/1
30 CAPSULE, EXTENDED RELEASE ORAL DAILY
Qty: 30 CAPSULE | Refills: 0 | Status: SHIPPED | OUTPATIENT
Start: 2023-05-09 | End: 2023-06-08 | Stop reason: ALTCHOICE

## 2023-05-09 NOTE — PROGRESS NOTES
Will order Adderalls to [facility]  , I think I prefer the 30 mg XR Adderall, along with two 20 mg Adderall.  I was able to get through my days without a whole lot of ups and downs.

## 2023-05-17 DIAGNOSIS — G89.29 CHRONIC PAIN OF LEFT KNEE: Primary | ICD-10-CM

## 2023-05-17 DIAGNOSIS — M25.562 CHRONIC PAIN OF LEFT KNEE: Primary | ICD-10-CM

## 2023-06-08 ENCOUNTER — OFFICE VISIT (OUTPATIENT)
Dept: SLEEP MEDICINE | Facility: CLINIC | Age: 53
End: 2023-06-08
Payer: COMMERCIAL

## 2023-06-08 DIAGNOSIS — Z13.39 ADHD (ATTENTION DEFICIT HYPERACTIVITY DISORDER) EVALUATION: ICD-10-CM

## 2023-06-08 DIAGNOSIS — G47.419 NARCOLEPSY WITHOUT CATAPLEXY: ICD-10-CM

## 2023-06-08 DIAGNOSIS — G47.33 OSA (OBSTRUCTIVE SLEEP APNEA): Primary | ICD-10-CM

## 2023-06-08 PROCEDURE — 99214 PR OFFICE/OUTPT VISIT, EST, LEVL IV, 30-39 MIN: ICD-10-PCS | Mod: 95,,, | Performed by: PSYCHIATRY & NEUROLOGY

## 2023-06-08 PROCEDURE — 99214 OFFICE O/P EST MOD 30 MIN: CPT | Mod: 95,,, | Performed by: PSYCHIATRY & NEUROLOGY

## 2023-06-08 RX ORDER — DEXTROAMPHETAMINE SACCHARATE, AMPHETAMINE ASPARTATE MONOHYDRATE, DEXTROAMPHETAMINE SULFATE AND AMPHETAMINE SULFATE 5; 5; 5; 5 MG/1; MG/1; MG/1; MG/1
20 CAPSULE, EXTENDED RELEASE ORAL EVERY MORNING
Qty: 30 CAPSULE | Refills: 0 | Status: SHIPPED | OUTPATIENT
Start: 2023-06-08 | End: 2023-07-19 | Stop reason: DRUGHIGH

## 2023-06-08 RX ORDER — DEXTROAMPHETAMINE SACCHARATE, AMPHETAMINE ASPARTATE, DEXTROAMPHETAMINE SULFATE AND AMPHETAMINE SULFATE 2.5; 2.5; 2.5; 2.5 MG/1; MG/1; MG/1; MG/1
TABLET ORAL
Qty: 90 TABLET | Refills: 0 | Status: SHIPPED | OUTPATIENT
Start: 2023-06-08

## 2023-06-08 NOTE — PROGRESS NOTES
The patient location is: home  The chief complaint leading to consultation is: sleep disorder  Visit type: audiovisual  Each patient to whom he or she provides medical services by telemedicine is:  (1) informed of the relationship between the physician and patient and the respective role of any other health care provider with respect to management of the patient; and (2) notified that he or she may decline to receive medical services by telemedicine and may withdraw from such care at any time.  31 minutes of total time spent on the encounter, which includes face to face time and non-face to face time preparing to see the patient (eg, review of tests), Obtaining and/or reviewing separately obtained history, Documenting clinical information in the electronic or other health record, Independently interpreting results (not separately reported) and communicating results to the patient/family/caregiver, or Care coordination (not separately reported).         EPWORTH SLEEPINESS SCALE TOTAL SCORE  6/8/2023 2/27/2023 5/28/2021   Total score 22 24 22       Jess Mcleod is a 53 y.o. female seen today for NArcoelspy and the sleep sutdy follow up. Last seen on 2/27/2023    She is taking Wakix now - she feels that adding Wakix helps her staying awake throughout the day  Adderall XR 30 is still needed ; but wondering if she still needs short acting - she has been out of it for a while.      She thinks she has concurrent ADHD (used to take Focaling and both of her kids have ADHD). That may be the reason she still feels she is functioning better with Adderalll.  She is open to trying to reduce the dose or switch to just one form (long or short acting)    She still reports significantly interrupted sleep - Still have a few Lunesta's left - taking rarely      Discussed sleep study with re: with the patiewnt re: moderte HAIR  She has prior experience using CPAP - had eye dryness and 3 respiratory infections. Used to wash her  supplies well and had supplies      Discussed sleep study - aleternatice options- still wants to try CPAP  Will order        Sleep schedule (updated 2023):    Currently goes to bed around 10 to 12 due to work demands (staying on computer late)  Sleep latency: not long on a good day and on a bad bad. Using blue light filters all day  Awakenings: 1-2 if she ;stays asleep better if goes to bad late; can take her 2 hrs to return to sleep. Bad nights happen at least 3 times a week.  Waking up in the mornin-6 AM;   or 7:30 AM                                                                                                                                                                                                                    Taking naps during the day on weeknds  __________________________  Initial history:Sleepy  all her life since ScaleMP  She finds that she would trip  or drop thimgs + cant talk when emotional.    She was also diagnosed with moderate HAIR - could not tolerate CPAP; had a sinus surgery - feels that her snoring has improved. Difficulty using CPAP in the past due to dry eyes and frequent URI.  + has been having sleep paralysis;   + vivid dreams;   She was diagnoed with NArcolespy in  - had PSG/MSLT; AHI was 15; SaO2 86%; MSL - 4.2 min; 2 SOREMS by Dr Swanson; started seeing Dr. Jones soon after who started her on Adderall 40 mg IR. It has been helping her with alertness. 40 mg in AM and 20 mg at 3 PM - made it harder to sleep at night. She feels that she developes tolerance to Adderall if taking it to often. She believes that she has trued Modafinuil and failed it in the past. Never tried long acting stimulants befoe.  She reports symptoms of ADHD but has never been treated. Used to take Focalin in the past. Has 2 kids with ADHD>   _________________________    Medications pertinent to sleep  disorders taken currently: Adderall 20 mg - used to take BID; develops  tolerance.  Previous  medications taken  for sleep disorders:  failed  200 mg Miodafinil; does not recall Methylphenidate. Does not recall trying Sonata/Lunesta/Restoril  Tried Seroquel - made her tired next day; was still tired; would not kick inby the mornuinng. ProZak was previously prescribed for depression.    + urfge incontinentce    Sleep studies   2021 - had PSG/MSLT; AHI was 15; SaO2 86%; MSL - 4.2 min; 2 SOREMS     Could not tolerate CPAP; feeling better since sinus surgery - no snoring, but still reports interrupted sleep    HST 3/2023:. Mild sleep disordered breathing (AHI=14) is  noted based on a 4% hypopnea desaturation criteria, entirely in the supine position (14 events/hour). The patient slept  supine 94.5% of the night based on valid recording time of 6.94 hours and is 1.1 times as likely to have apneas/hypopneas  when supine. When considering more subtle measures of sleep disordered breathing, the overall respiratory disturbance  index is moderate(RDI=24) based on a 1% hypopnea desaturation criteria with confirmation by surrogate arousal  indicators. The apneas/hypopneas are accompanied by minimal oxygen desaturation (percent time below 90% SpO2:  0.2%, Min SpO2: 87.8%).       Caffeine:  AM beverages per day  Alcohol: no    EPWORTH SLEEPINESS SCALE TOTAL SCORE  6/8/2023 2/27/2023 5/28/2021   Total score 22 24 22         EPWORTH SLEEPINESS SCALE 2/27/2023   Sitting and reading 3   Watching TV 3   Sitting, inactive in a public place (e.g. a theatre or a meeting) 3   As a passenger in a car for an hour without a break 3   Lying down to rest in the afternoon when circumstances permit 3   Sitting and talking to someone 3   Sitting quietly after a lunch without alcohol 3   In a car, while stopped for a few minutes in traffic 3   Total score 24       EPWORTH SLEEPINESS SCALE 2/27/2023   Sitting and reading 3   Watching TV 3   Sitting, inactive in a public place (e.g. a theatre or a meeting) 3   As a  passenger in a car for an hour without a break 3   Lying down to rest in the afternoon when circumstances permit 3   Sitting and talking to someone 3   Sitting quietly after a lunch without alcohol 3   In a car, while stopped for a few minutes in traffic 3   Total score 24     Sleep Clinic New Patient 5/28/2021   What time do you go to bed on a week day? (Give a range) 10 30 to 11 pm   What time do you go to bed on a day off? (Give a range) Stay in bed all weekend.  Times vary.   How long does it take you to fall asleep? (Give a range) As soon as I lay down, I typically fall asleep.   On average, how many times per night do you wake up? 3 to 4.   How long does it take you to fall back into sleep? (Give a range) Usually, I can go back to sleep unless it is after 2 a.m-4 am ., then I struggle to get back to sleep around 6 am.   What time do you wake up to start your day on a week day? (Give a range) 5-7 am   What time do you wake up to start your day on a day off? (Give a range) When my body allows me.  Between 7-10 usually.   What time do you get out of bed? (Give a range) 9 to 10   On average, how many hours do you sleep? I am in the bed around 10:30 pm, but I log typically 4 hours of sleep.   On average, how many naps do you take per day? If I can, I would take at least two to three; however, where I work that is impossible.   Rate your sleep quality from 0 to 5 (0-poor, 5-great). 3   Have you experienced:  Weight gain?   How much weight have you lost or gained (in lbs.) in the last year? It keeps going back and forth.   On average, how many times per night do you go to the bathroom?  At least once.   Have you ever had a sleep study/CPAP machine/surgery for sleep apnea? Yes   Have you ever had a CPAP machine for sleep apnea? Yes   Have you ever had surgery for sleep apnea? Yes       PHYSICAL EXAM:  There were no vitals taken for this visit.        ASSESSMENT:    Narcolepsy with cataplexy - + excessive daytime  sleepiness, h/o sleep paralysis, + sleep hallucinations. FAiling Adderall IR 20 mg BID -0 developing tolerance. Concurrent ADHD<    2. HAIR - moderate.  Previously diagnosed HAIR; snoring has improved since sinus surgery, however her sleep remains interrupted. She has not been retested since the surgery.             PLAN:    Sleep study were discussed with graphs and chart demonstration, all the questions were answered.     Will send Adderall 20 xr and 3 of 10's Adderalls IR to [facility]    She will continue taking Wakix.   The patient has agreed to try CPAP again    During our discussion today, we talked about the etiology of  narcolepsy, prognosis, treatment modalities. 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  We discussed potential treatment options, which could include weight loss, body positioning, continuous positive airway pressure (CPAP), or referral for surgical consideration. Meanwhile, she  is urged to avoid supine sleep, weight gain and alcoholic beverages since all of these can worsen HAIR.     The patient was given open opportunity to ask questions and/or express concerns about treatment plan. Two point patient identifier confirmed.       Precautions: The patient was advised to abstain from driving should he feel sleepy or drowsy.    Follow up: MD after the sleep study has been completed.   More than 50% of this 31 min visit were spent in counseling and care coordination.       ESS ([place] Sleepiness Scale) and other sleep medicine related questionnaires were reviewed with the patient.

## 2023-07-18 ENCOUNTER — PATIENT MESSAGE (OUTPATIENT)
Dept: ALLERGY | Facility: CLINIC | Age: 53
End: 2023-07-18
Payer: COMMERCIAL

## 2023-07-18 DIAGNOSIS — J34.89 NASAL OBSTRUCTION: ICD-10-CM

## 2023-07-18 DIAGNOSIS — Z91.09 HISTORY OF ENVIRONMENTAL ALLERGIES: ICD-10-CM

## 2023-07-18 RX ORDER — AZELASTINE 1 MG/ML
SPRAY, METERED NASAL
Qty: 90 ML | Refills: 1 | Status: SHIPPED | OUTPATIENT
Start: 2023-07-18 | End: 2023-08-25 | Stop reason: SDUPTHER

## 2023-07-19 ENCOUNTER — TELEPHONE (OUTPATIENT)
Dept: SLEEP MEDICINE | Facility: CLINIC | Age: 53
End: 2023-07-19
Payer: COMMERCIAL

## 2023-07-19 DIAGNOSIS — G47.33 OSA (OBSTRUCTIVE SLEEP APNEA): ICD-10-CM

## 2023-07-19 DIAGNOSIS — Z78.9 INTOLERANCE OF CONTINUOUS POSITIVE AIRWAY PRESSURE (CPAP) VENTILATION: Primary | ICD-10-CM

## 2023-07-19 DIAGNOSIS — F90.9 ATTENTION DEFICIT HYPERACTIVITY DISORDER (ADHD), UNSPECIFIED ADHD TYPE: ICD-10-CM

## 2023-07-19 RX ORDER — DEXTROAMPHETAMINE SACCHARATE, AMPHETAMINE ASPARTATE MONOHYDRATE, DEXTROAMPHETAMINE SULFATE AND AMPHETAMINE SULFATE 7.5; 7.5; 7.5; 7.5 MG/1; MG/1; MG/1; MG/1
30 CAPSULE, EXTENDED RELEASE ORAL EVERY MORNING
Qty: 30 CAPSULE | Refills: 0 | Status: SHIPPED | OUTPATIENT
Start: 2023-07-19 | End: 2023-08-25 | Stop reason: SDUPTHER

## 2023-07-20 ENCOUNTER — PATIENT MESSAGE (OUTPATIENT)
Dept: SPORTS MEDICINE | Facility: CLINIC | Age: 53
End: 2023-07-20
Payer: COMMERCIAL

## 2023-07-24 DIAGNOSIS — G47.411 NARCOLEPSY WITH CATAPLEXY: Primary | ICD-10-CM

## 2023-08-21 ENCOUNTER — PATIENT MESSAGE (OUTPATIENT)
Dept: SPORTS MEDICINE | Facility: CLINIC | Age: 53
End: 2023-08-21
Payer: COMMERCIAL

## 2023-08-25 ENCOUNTER — PATIENT MESSAGE (OUTPATIENT)
Dept: SPORTS MEDICINE | Facility: CLINIC | Age: 53
End: 2023-08-25
Payer: COMMERCIAL

## 2023-08-25 DIAGNOSIS — J34.89 NASAL OBSTRUCTION: ICD-10-CM

## 2023-08-25 DIAGNOSIS — F90.9 ATTENTION DEFICIT HYPERACTIVITY DISORDER (ADHD), UNSPECIFIED ADHD TYPE: ICD-10-CM

## 2023-08-25 DIAGNOSIS — Z91.09 HISTORY OF ENVIRONMENTAL ALLERGIES: ICD-10-CM

## 2023-08-25 RX ORDER — DEXTROAMPHETAMINE SACCHARATE, AMPHETAMINE ASPARTATE MONOHYDRATE, DEXTROAMPHETAMINE SULFATE AND AMPHETAMINE SULFATE 7.5; 7.5; 7.5; 7.5 MG/1; MG/1; MG/1; MG/1
30 CAPSULE, EXTENDED RELEASE ORAL EVERY MORNING
Qty: 30 CAPSULE | Refills: 0 | Status: SHIPPED | OUTPATIENT
Start: 2023-08-25

## 2023-08-25 RX ORDER — AZELASTINE 1 MG/ML
SPRAY, METERED NASAL
Qty: 90 ML | Refills: 1 | Status: SHIPPED | OUTPATIENT
Start: 2023-08-25

## 2023-08-25 NOTE — TELEPHONE ENCOUNTER
Requested Prescriptions     Pending Prescriptions Disp Refills    dextroamphetamine-amphetamine (ADDERALL XR) 30 MG 24 hr capsule 30 capsule 0     Sig: Take 1 capsule (30 mg total) by mouth every morning.     06/08/2023 LOV

## 2023-11-16 ENCOUNTER — OFFICE VISIT (OUTPATIENT)
Dept: OTOLARYNGOLOGY | Facility: CLINIC | Age: 53
End: 2023-11-16
Payer: COMMERCIAL

## 2023-11-16 VITALS — WEIGHT: 178 LBS | BODY MASS INDEX: 30.39 KG/M2 | HEIGHT: 64 IN

## 2023-11-16 DIAGNOSIS — J30.89 PERENNIAL ALLERGIC RHINITIS WITH SEASONAL VARIATION: Primary | ICD-10-CM

## 2023-11-16 DIAGNOSIS — Z78.9 INTOLERANCE OF CONTINUOUS POSITIVE AIRWAY PRESSURE (CPAP) VENTILATION: ICD-10-CM

## 2023-11-16 DIAGNOSIS — G47.33 OSA (OBSTRUCTIVE SLEEP APNEA): ICD-10-CM

## 2023-11-16 DIAGNOSIS — J30.2 PERENNIAL ALLERGIC RHINITIS WITH SEASONAL VARIATION: Primary | ICD-10-CM

## 2023-11-16 DIAGNOSIS — J34.3 HYPERTROPHY OF INFERIOR NASAL TURBINATE: ICD-10-CM

## 2023-11-16 PROCEDURE — 3008F PR BODY MASS INDEX (BMI) DOCUMENTED: ICD-10-PCS | Mod: CPTII,S$GLB,, | Performed by: OTOLARYNGOLOGY

## 2023-11-16 PROCEDURE — 99214 PR OFFICE/OUTPT VISIT, EST, LEVL IV, 30-39 MIN: ICD-10-PCS | Mod: S$GLB,,, | Performed by: OTOLARYNGOLOGY

## 2023-11-16 PROCEDURE — 99214 OFFICE O/P EST MOD 30 MIN: CPT | Mod: S$GLB,,, | Performed by: OTOLARYNGOLOGY

## 2023-11-16 PROCEDURE — 1159F MED LIST DOCD IN RCRD: CPT | Mod: CPTII,S$GLB,, | Performed by: OTOLARYNGOLOGY

## 2023-11-16 PROCEDURE — 3008F BODY MASS INDEX DOCD: CPT | Mod: CPTII,S$GLB,, | Performed by: OTOLARYNGOLOGY

## 2023-11-16 PROCEDURE — 1159F PR MEDICATION LIST DOCUMENTED IN MEDICAL RECORD: ICD-10-PCS | Mod: CPTII,S$GLB,, | Performed by: OTOLARYNGOLOGY

## 2023-11-16 RX ORDER — FLUTICASONE PROPIONATE 50 MCG
2 SPRAY, SUSPENSION (ML) NASAL 2 TIMES DAILY
Qty: 18.2 ML | Refills: 3 | Status: SHIPPED | OUTPATIENT
Start: 2023-11-16 | End: 2023-11-28 | Stop reason: SDUPTHER

## 2023-11-16 RX ORDER — AZELASTINE 1 MG/ML
1 SPRAY, METERED NASAL 2 TIMES DAILY
Qty: 30 ML | Refills: 3 | Status: SHIPPED | OUTPATIENT
Start: 2023-11-16

## 2023-11-16 NOTE — PROGRESS NOTES
OTOLARYNGOLOGY CLINIC NOTE  Date:  2023     Chief complaint:  Chief Complaint   Patient presents with    inspire consult      sleep study       History of Present Illness  Jess Mcleod is a 53 y.o. female  presenting today for a new evaluation and treatment of alta and difficulty with cpap. Referred by kaitlin tatum. Gets recurrent sinus  infections and respiratory infections when she would wear cpap. It also made her feel like her eyeballs were going to pop out.     Has vivid dreams. Sleep study (PSG/MSLT)  showed overall ahi of 15.1  oxygen rafy of 86.0%.  The supine AHI was 23.8 and the REM AHI was 23.6. no central apneas.  there was also evidence of narcolepsy. Had HST 3-21-23 with AHI of 14.     Gets keloids.Has a lorena susan for scoliosis  Has been dealing with some dental issues recently as well       Had sinus surgery with Dr Alcantara in . Has a lot of allergy issues. Has dust , mold allergy. Saw dr schneider in the past as well. Gets congestion in the nose. Does get sinus infections but not sure when the last one was. She thinks it was in the beginning of October.     Past Medical History  Past Medical History:   Diagnosis Date    Arthritis     Chronic back pain     Chronic neck pain     Fibrocystic breast     Fibroid, uterus     GERD (gastroesophageal reflux disease)     Herpes     Migraine headache     Narcolepsy     Nephrolithiasis 2018    Sciatica of right side 2018    Scoliosis     Urge incontinence 2018        Past Surgical History  Past Surgical History:   Procedure Laterality Date    ARTHROSCOPIC CHONDROPLASTY OF KNEE JOINT Left 2022    Procedure: ARTHROSCOPY, KNEE, WITH CHONDROPLASTY;  Surgeon: Janette Odonnell MD;  Location: [facility] OR;  Service: Orthopedics;  Laterality: Left;     SECTION      EMBOLIZATION N/A 2020    Procedure: EMBOLIZATION, BLOOD VESSEL;  Surgeon: Dean Wheeler MD;  Location: [facility] CATH LAB;  Service: Radiology;   Laterality: N/A;    NASAL TURBINATE REDUCTION Bilateral 07/22/2022    Procedure: REDUCTION, NASAL TURBINATE john bullosa;  Surgeon: Waldo Giles MD;  Location: [address];  Service: ENT;  Laterality: Bilateral;    Scoliosis surgery      SINUS SURGERY      SYNOVECTOMY OF KNEE Left 04/28/2022    Procedure: SYNOVECTOMY, KNEE;  Surgeon: Janette Odonnell MD;  Location: [facility];  Service: Orthopedics;  Laterality: Left;    UMBILICAL HERNIA REPAIR          Medications  Current Outpatient Medications on File Prior to Visit   Medication Sig Dispense Refill    azelastine (ASTELIN) 137 mcg (0.1 %) nasal spray SPRAY 1 SPRAY IN EACH NOSTRIL TWICE DAILY 90 mL 1    calcium carbonate (OS-TIFFANIE) 500 mg calcium (1,250 mg) tablet Take 1 tablet by mouth once daily.      dextroamphetamine-amphetamine (ADDERALL XR) 30 MG 24 hr capsule Take 1 capsule (30 mg total) by mouth every morning. 30 capsule 0    dextroamphetamine-amphetamine (ADDERALL) 10 mg Tab take 1 tablet by mouth 3 times daily 90 tablet 0    eszopiclone (LUNESTA) 3 mg Tab 1 tablet by mouth nightly as needed at bedtime for insomnia 30 tablet 0    ibuprofen (ADVIL,MOTRIN) 200 MG tablet Take 200 mg by mouth every 6 (six) hours as needed for Pain.      Lactobac no.41/Bifidobact no.7 (PROBIOTIC-10 ORAL) Take by mouth.      magnesium oxide (MAG-OX) 400 mg (241.3 mg magnesium) tablet Take 400 mg by mouth once daily.      modafiniL (PROVIGIL) 200 MG Tab take 1 tablet by mouth twice daily 60 tablet 5    multivitamin with minerals tablet Take 1 tablet by mouth once daily.      omega-3/dha/epa/dpa/fish oil (OMEGA-3 2100 ORAL) Take by mouth.      pitolisant 17.8 mg Tab Take 17.8 mg by mouth once daily. 30 tablet 5    tranexamic acid (LYSTEDA) 650 mg tablet Take 2 tablets (1,300 mg total) by mouth 3 (three) times daily. Do not take for longer than 5 days 30 tablet 1    [DISCONTINUED] QUEtiapine (SEROQUEL) 25 MG Tab TK 1 T PO QHS  2     Current Facility-Administered Medications  "on File Prior to Visit   Medication Dose Route Frequency Provider Last Rate Last Admin    LIDOcaine (PF) 10 mg/ml (1%) injection 10 mg  1 mL Intradermal Once Checo Escobar MD           Review of Systems  Review of Systems   Constitutional:  Positive for malaise/fatigue.   Eyes: Negative.    Gastrointestinal:  Positive for constipation and heartburn.   Musculoskeletal:  Positive for back pain.   Skin: Negative.    Neurological:  Positive for headaches.   Psychiatric/Behavioral:  Positive for depression. The patient is nervous/anxious.       Answers submitted by the patient for this visit:  Review of Symptoms Questionnaire  (Submitted on 11/15/2023)  appetite change : Yes  sinus pressure : Yes  Sinus infection(s)?: Yes  Tooth/Dental Problems?: Yes  trouble swallowing: Yes  Voice Change?: Yes  Snoring?: Yes  Sleep Apnea?: Yes  Foot swelling?: Yes  Acid Reflux?: Yes  Urinating too frequently?: Yes  Muscle aches / pain?: Yes  Food Allergies?: Yes  Seasonal Allergies?: Yes  Cold all of the time? : Yes  Hot all of the time? : Yes  decreased concentration: Yes  sleep disturbance: Yes  Social History   reports that she has never smoked. She has never used smokeless tobacco. She reports that she does not currently use alcohol. She reports that she does not use drugs.     Family History  Family History   Problem Relation Age of Onset    Breast cancer Paternal Aunt     Allergies Other     Asthma Son         exercise induced    Eczema Daughter     Colon cancer Neg Hx     Ovarian cancer Neg Hx         Physical Exam   There were no vitals filed for this visit. Body mass index is 30.56 kg/m².  Weight: 80.7 kg (178 lb 0.3 oz)   Height: 5' 4" (162.6 cm)     GENERAL: no acute distress.  HEAD: normocephalic.   EYES: lids and lashes normal. No scleral icterus  EARS: external ear without lesion, normal pinna shape and position.    NOSE: external nose without significant bony abnormality; turbinate hypertrophy mild on anterior " rhinoscopy  ORAL CAVITY/OROPHARYNX: tongue  mobile. Symmetric palate rise. Uvula midline.   NECK: trachea midline.   RESPIRATORY: no stridor, no stertor. Voice normal. Respirations nonlabored.  NEURO: alert, responds to questions appropriately.   PSYCH:mood appropriate      Imaging:  The patient does not have any pertinent and/or recent imaging of the head and neck.     Labs:  CBC  Recent Labs   Lab 02/14/23  1440   WBC 6.58   Hemoglobin 13.5   Hematocrit 41.1   MCV 94   Platelets 393     BMP      COAGS        Assessment  1. Intolerance of continuous positive airway pressure (CPAP) ventilation  - Ambulatory referral/consult to ENT  - Polysomnogram (CPAP will be added if patient meets diagnostic criteria.); Future    2. HAIR (obstructive sleep apnea)  - Ambulatory referral/consult to ENT  - Polysomnogram (CPAP will be added if patient meets diagnostic criteria.); Future    3. Perennial allergic rhinitis with seasonal variation    4. Hypertrophy of inferior nasal turbinate       Plan:  Discussed plan of care with patient in detail and all questions answered. Patient reported understanding of plan of care. I gave the patient the opportunity to ask questions and patient confirmed all questions answered to satisfaction.   Allergic rhinitis(AR) and turbinate hypertrophy:  s/p surgery with dr haynes discussed about pathophysiology of allergic disease and sinus disease. We discussed about treatment options for this including but not limited to medications and potential surgeries as well as when surgery is indicated.  - I recommend dual nasal spray therapy as combo of steroid and antihistamine nasal spray has been shown in evidence based studies to be better than either alone.   -Discussed medication administration technique ( point toward outer corner of eye and not towards nasal septum) and use nasal saline prior to medication sprays.   -We discussed importance of saline use prior to medication sprays to help sprays work  better and to clean the nose.   -Counseled on risk of bleeding, risk of increased intraocular pressure/cataract with long term use.   -Discussed how to increase and decrease nasal spray regimen based on symptoms and that sprays can be used on prn basis but work best when used daily and take about 2-3 weeks to get to max level . If patient using sprays on a prn basis and symptoms not controlled should go back to daily /bid use  -discussed as well as gave patient physical documentation with photos  about the saline and other medication sprays ( paperwork summarized discussion topics)    Obstructive sleep apnea, intolerant to CPAP:  we discussed about the pathophysiology of HAIR and untreated moderate to severe HAIR. We discussed that CPAP is the gold standard therapy (I.e most beneficial) for HAIR.  We discussed today that inspire leads to return of AHI to normal range in only 60-70% of patients.however 90% have improved subjective fatigue and better sleep reported by bed partners. The patient is aware of cpap being the gold standard and potential limitations with inspire and that even though dise showed no concentric  collapse, this does not guarantee that hair will be cured . however of surgical options that exist this does have a better cure rate than other things such as UP3       The patient has difficulty with tolerating CPAP intolerant and is interested in exploring alternative other options to manage disease.    We discussed about pros and cons of inspire as well as indications, alternatives .  This included a discussion about the implant itself and what the surgery would entail including incision placement, postop expectations, risks to nerves and what this would result in ( lip weakness, tongue weakness) . We also discussed the purpose of the DISE and how it will assess sources of obstruction. We discussed other surgical options that exist but are not curative of HAIR such as septoplasty that can help with mask  tolerance. I will look for this at time of surgery to see if the pt would be candidate and present all options for treatment after DISE . We discussed nonsurgical option of oral appliance as well . She is not interested in this option     Discussed concern regarding that she also has narcolepsy . As of now she does not meet criteria - ahi on hst is close and did qualify with prior psg. Will get new psg and if ahi over 15 can perform next step to see candidacy which is dise. I will see her back in 3-4 months but advised her to message me after psg completed ( discussed it takes 2 weeks to read) and if ahi over 15 can schedule dise without her having to come in since we already discussed everything today. If ahi less than 15 , only other option besides cpap would be oral appliance.     I spent a total of 30 minutes on the day of the visit.  This includes face to face time and non-face to face time preparing to see the patient (eg, review of tests), obtaining and/or reviewing separately obtained history, documenting clinical information in the electronic or other health record, independently interpreting results and communicating results to the patient/family/caregiver, or care coordinator.    Please be aware that this note has been generated with the assistance of MModal voice-to-text.  Please excuse any spelling or grammatical errors.

## 2023-11-17 ENCOUNTER — PATIENT MESSAGE (OUTPATIENT)
Dept: SLEEP MEDICINE | Facility: CLINIC | Age: 53
End: 2023-11-17
Payer: COMMERCIAL

## 2023-11-17 ENCOUNTER — TELEPHONE (OUTPATIENT)
Dept: SLEEP MEDICINE | Facility: CLINIC | Age: 53
End: 2023-11-17
Payer: COMMERCIAL

## 2023-11-17 NOTE — TELEPHONE ENCOUNTER
She is having some insomnia given men opausal symptoms, took lunesta but mentioned her schedule was just too busy/so not sure if can't take or what. May reconsider trying xywav (mom has dementia and she can't be knocked out for 8hr) and/or needs to get back on wakix or stimulant or wake promoting meds. MD will see her message Monday to address with her

## 2023-11-24 ENCOUNTER — HOSPITAL ENCOUNTER (OUTPATIENT)
Dept: RADIOLOGY | Facility: HOSPITAL | Age: 53
Discharge: HOME | End: 2023-11-24
Attending: PHYSICIAN ASSISTANT
Payer: COMMERCIAL

## 2023-11-24 DIAGNOSIS — G89.29 CHRONIC PAIN OF LEFT KNEE: ICD-10-CM

## 2023-11-24 DIAGNOSIS — M25.562 CHRONIC PAIN OF LEFT KNEE: ICD-10-CM

## 2023-11-24 PROCEDURE — 73564 XR KNEE ORTHO LEFT WITH FLEXION: ICD-10-PCS | Mod: 26,LT,, | Performed by: RADIOLOGY

## 2023-11-24 PROCEDURE — 73562 X-RAY EXAM OF KNEE 3: CPT | Mod: TC,59,RT

## 2023-11-24 PROCEDURE — 73562 XR KNEE ORTHO LEFT WITH FLEXION: ICD-10-PCS | Mod: 26,RT,, | Performed by: RADIOLOGY

## 2023-11-24 PROCEDURE — 73562 X-RAY EXAM OF KNEE 3: CPT | Mod: 26,RT,, | Performed by: RADIOLOGY

## 2023-11-24 PROCEDURE — 73564 X-RAY EXAM KNEE 4 OR MORE: CPT | Mod: 26,LT,, | Performed by: RADIOLOGY

## 2023-11-28 RX ORDER — FLUTICASONE PROPIONATE 50 MCG
2 SPRAY, SUSPENSION (ML) NASAL 2 TIMES DAILY
Qty: 18.2 ML | Refills: 11 | Status: SHIPPED | OUTPATIENT
Start: 2023-11-28

## 2023-12-04 ENCOUNTER — PATIENT MESSAGE (OUTPATIENT)
Dept: SPORTS MEDICINE | Facility: CLINIC | Age: 53
End: 2023-12-04
Payer: COMMERCIAL

## 2023-12-04 ENCOUNTER — TELEPHONE (OUTPATIENT)
Dept: SPORTS MEDICINE | Facility: CLINIC | Age: 53
End: 2023-12-04
Payer: COMMERCIAL

## 2023-12-04 DIAGNOSIS — M54.42 CHRONIC LEFT-SIDED LOW BACK PAIN WITH LEFT-SIDED SCIATICA: ICD-10-CM

## 2023-12-04 DIAGNOSIS — M48.061 SPINAL STENOSIS OF LUMBAR REGION WITHOUT NEUROGENIC CLAUDICATION: ICD-10-CM

## 2023-12-04 DIAGNOSIS — M48.061 LUMBAR FORAMINAL STENOSIS: ICD-10-CM

## 2023-12-04 DIAGNOSIS — G89.29 CHRONIC LEFT-SIDED LOW BACK PAIN WITH LEFT-SIDED SCIATICA: ICD-10-CM

## 2023-12-04 DIAGNOSIS — M54.16 LUMBAR RADICULOPATHY: Primary | ICD-10-CM

## 2023-12-08 DIAGNOSIS — M54.50 LOW BACK PAIN, UNSPECIFIED BACK PAIN LATERALITY, UNSPECIFIED CHRONICITY, UNSPECIFIED WHETHER SCIATICA PRESENT: Primary | ICD-10-CM

## 2023-12-15 DIAGNOSIS — G47.419 NARCOLEPSY WITHOUT CATAPLEXY: ICD-10-CM

## 2023-12-23 ENCOUNTER — HOSPITAL ENCOUNTER (OUTPATIENT)
Dept: SLEEP MEDICINE | Facility: OTHER | Age: 53
Discharge: HOME | End: 2023-12-23
Payer: COMMERCIAL

## 2023-12-23 DIAGNOSIS — G47.33 OSA (OBSTRUCTIVE SLEEP APNEA): ICD-10-CM

## 2023-12-23 DIAGNOSIS — Z78.9 INTOLERANCE OF CONTINUOUS POSITIVE AIRWAY PRESSURE (CPAP) VENTILATION: ICD-10-CM

## 2023-12-23 PROCEDURE — 95810 POLYSOM 6/> YRS 4/> PARAM: CPT

## 2023-12-24 PROBLEM — Z78.9 INTOLERANCE OF CONTINUOUS POSITIVE AIRWAY PRESSURE (CPAP) VENTILATION: Status: ACTIVE | Noted: 2023-12-24

## 2023-12-24 NOTE — PROGRESS NOTES
Jess Mcleod to [facility] on 12/23/2023 for an overnight baseline study.     Pt did not meet criteria for split night study.     Post study information given to pt in AM

## 2023-12-29 PROCEDURE — 95810 PR POLYSOMNOGRAPHY, 4 OR MORE: ICD-10-PCS | Mod: 26,,, | Performed by: INTERNAL MEDICINE

## 2023-12-29 PROCEDURE — 95810 POLYSOM 6/> YRS 4/> PARAM: CPT | Mod: 26,,, | Performed by: INTERNAL MEDICINE

## 2023-12-29 NOTE — PROCEDURES
"[facility]    Polysomnography Interpretation Report    Patient Name:  RADHA WILKINS  MRN#:  [id number]  :  1970  Study Date:  2023  Referring Provider:  IRVING NAVARRO MD    Indications for Polysomnography:  The patient is a 53 year old Female who is 5' 4" and weighs 178.0 lbs.  Her BMI equals 30.8.  [place] was - and Neck Circumference was -.  A full night polysomnogram was performed to evaluate for -.    Polysomnogram Data  A full night polysomnogram recorded the standard physiologic parameters including EEG, EOG, EMG, EKG, nasal and oral airflow.  Respiratory parameters of chest and abdominal movements were recorded with [place]Crystal motion transducers.  Oxygen saturation was recorded by pulse oximetry.    Sleep Architecture  The total recording time of the polysomnogram was 450.3 minutes.  The total sleep time was 337.0 minutes.  The patient spent 11.7% of total sleep time in Stage N1, 45.8% in Stage N2, 16.8% in Stages N3, and 25.7% in REM.  Sleep latency was 15.1 minutes.  REM latency was 61.5 minutes.  Sleep Efficiency was 74.8%.  Wake after sleep onset was 98.5 minutes.    Respiratory Events  The polysomnogram revealed a presence of 8 obstructive, 1 central, and - mixed apneas resulting in a Total Apnea index of 1.6 events per hour.  There were 47 hypopneas resulting in a Total Hypopnea index of 8.4 events per hour.  The combined Apnea/Hypopnea index was 10.0 events per hour.  There were a total of - RERA events resulting in a Respiratory Disturbance Index (RDI) of 10.0 events per hour.     Mean oxygen saturation was 94.5%.  The lowest oxygen saturation during sleep was 83.0%.  Time spent ?88% oxygen saturation was 0.9 minutes (0.2%).      Limb Activity  There were - limb movements recorded.      Cardiac:  single lead EKG revealed normal sinus rhythm     Oxygenation:  Hypoxemia was seen only in relation to obstructive events.    Impression:  -overall mild obstructive sleep " "apnea     Recommendations:    -treatment for HAIR seen in this study should be considered  -the patient has follow up with Sleep Medicine          hCeco Disla MD    (This Sleep Study was interpreted by a Board Certified Sleep Specialist who conducted an epoch-by-epoch review of the entire raw data recording.)  (The indication for this sleep study was reviewed and deemed appropriate by AASM Practice Parameters or other reasons by a Board Certified Sleep Specialist.)    [facility]    Diagnostic PSG Report    Patient Name: RADHA WILKINS Study Date: 12/23/2023   YOB: 1970 MRN #: [id number]   Age: 53 year MONTY #: [id number]   Sex: Female Referring Provider: IRVING NAVARRO MD   Height: 5' 4" Recording Tech: Gadiel Butt RPSGT   Weight: 178.0 lbs Scoring Tech: Berto España RRT RPSGT   BMI: 30.8 Interpreting Physician: -   ESS: - Neck Circumference: -     Study Overview    Lights Off: 09:48:25 PM  Count Index   Lights On: 05:18:45 AM Awakenings: 49 8.7   Time in Bed: 450.3 min. Arousals: 85 15.1   Total Sleep Time: 337.0 min. Apneas & Hypopneas: 56 10.0    Sleep Efficiency: 74.8% Limb Movements: - -   Sleep Latency: 15.1 min. Snores: - -   Wake After Sleep Onset: 98.5 min. Desaturations: 81 14.4    REM Latency from Sleep Onset: 61.5 min. Minimum SpO2 TST: 83.0%        Sleep Architecture   % of Time in Bed  Stages Time (mins) % Sleep Time   Wake 114.0    Stage N1 39.5 11.7%   Stage N2 154.5 45.8%   Stage N3 56.5 16.8%   REM 86.5 25.7%         Arousal Summary     NREM REM Sleep Index   Respiratory Arousals 11 1 12 2.1   PLM Arousals - - - -   Isolated Limb Movement Arousals - - - -   Spontaneous Arousals 46 27 73 13.0   Total 57 28 85 15.1       Limb Movement Summary     Count Index   Isolated Limb Movements - -   Periodic Limb Movements (PLMs) - -   Total Limb Movements - -         Respiratory Summary     By Sleep Stage By Body Position Total    NREM REM Supine Non-Supine    Time (min) " 250.5 86.5 230.0 107.0 337.0           Obstructive Apnea 3 5 8 - 8   Mixed Apnea - - - - -   Central Apnea 1 - 1 - 1   Central Apnea Index 0.2 - 0.2 - 0.2   Total Apneas 4 5 9 - 9   Total Apnea Index 1.0 3.5 2.3 - 1.6           Hypopnea 33 14 35 12 47   Hypopnea Index 7.9 9.7 9.1 6.7 8.4           Apnea & Hypopnea 37 19 44 12 56   Apnea & Hypopnea Index 8.9 13.2 11.5 6.7 10.0           RERAs - - - - -   RERA Index - - - - -           RDI 8.9 13.2 11.5 6.7 10.0     Scoring Criteria: Hypopneas scored at 3% desaturation criteria.    Respiratory Event Durations     Apnea Hypopnea    NREM REM NREM REM   Average (seconds) 12.3 16.9 15.8 13.4   Maximum (seconds) 13.2 30.9 31.7 23.7       Oxygen Saturation Summary     Wake NREM REM TST TIB   Average SpO2 95.2% 94.2% 94.5% 94.3% 94.5%   Minimum SpO2 91.0% 90.0% 83.0% 83.0% 83.0%   Maximum SpO2 98.0% 98.0% 98.0% 98.0% 98.0%       Oxygen Saturation Distribution    Range (%) Time in range (min) Time in range (%)   90.0 - 100.0 432.6 98.0%   80.0 - 90.0 2.3 0.5%   70.0 - 80.0 - -   60.0 - 70.0 - -   50.0 - 60.0 - -   0.0 - 50.0 - -   Time Spent ?88% SpO2    Range (%) Time in range (min) Time in range (%)   0.0 - 88.0 0.9 0.2%          Count Index   Desaturations 81 14.4      Cardiac Summary     Wake NREM REM Sleep Total   Average Pulse Rate (BPM) 88.0 84.8 89.3 85.9 86.4   Minimum Pulse Rate (BPM) 75.0 74.0 79.0 74.0 74.0   Maximum Pulse Rate (BPM) 101.0 98.0 101.0 101.0 101.0     Pulse Rate Distribution    Range (bpm) Time in range (min) Time in range (%)   0.0 - 40.0 - -   40.0 - 60.0 - -   60.0 - 80.0 25.0 5.7%   80.0 - 100.0 417.2 94.3%   100.0 - 120.0 0.1 0.0%   120.0 - 140.0 - -   140.0 - 200.0 - -     EtCO2 Summary    Stage Min (mmHg) Average (mmHg) Max (mmHg)   Wake - - -   NREM(1+2+3) - - -   REM - - -     Range (mmHg) Time in range (min) Time in range (%)   - - -   - - -   - - -   - - -   - - -     TcCO2 Summary    Stage Min (mmHg) Average (mmHg) Max (mmHg)   Wake - -  -   NREM(1+2+3) - - -   REM - - -     Range (mmHg) Time in range (min) Time in range (%)   20.0 - 40.0 - -   40.0 - 50.0 - -   50.0 - 55.0 - -   55.0 - 100.0 - -   Excluded data <20.0 & >65.0 451.0 100.0%     Comments    -

## 2024-01-11 ENCOUNTER — TELEPHONE (OUTPATIENT)
Dept: SLEEP MEDICINE | Facility: CLINIC | Age: 54
End: 2024-01-11
Payer: COMMERCIAL

## 2024-01-11 ENCOUNTER — PATIENT MESSAGE (OUTPATIENT)
Dept: SLEEP MEDICINE | Facility: CLINIC | Age: 54
End: 2024-01-11
Payer: COMMERCIAL

## 2024-01-25 DIAGNOSIS — G47.411 NARCOLEPSY WITH CATAPLEXY: ICD-10-CM

## 2024-01-25 DIAGNOSIS — G47.419 NARCOLEPSY WITHOUT CATAPLEXY: Primary | ICD-10-CM

## 2024-01-25 RX ORDER — SOLRIAMFETOL 150 MG/1
TABLET, FILM COATED ORAL
Qty: 30 TABLET | Refills: 5 | Status: SHIPPED | OUTPATIENT
Start: 2024-01-25

## 2024-02-19 ENCOUNTER — TELEPHONE (OUTPATIENT)
Dept: OBSTETRICS AND GYNECOLOGY | Facility: CLINIC | Age: 54
End: 2024-02-19
Payer: COMMERCIAL

## 2024-03-21 ENCOUNTER — OFFICE VISIT (OUTPATIENT)
Dept: OTOLARYNGOLOGY | Facility: CLINIC | Age: 54
End: 2024-03-21
Payer: COMMERCIAL

## 2024-03-21 DIAGNOSIS — G47.33 OSA (OBSTRUCTIVE SLEEP APNEA): Primary | ICD-10-CM

## 2024-03-21 PROCEDURE — 99213 OFFICE O/P EST LOW 20 MIN: CPT | Mod: S$GLB,,, | Performed by: OTOLARYNGOLOGY

## 2024-03-21 NOTE — PROGRESS NOTES
OTOLARYNGOLOGY CLINIC NOTE  Date:  03/21/2024     Chief complaint:  Follow up sleep study      History of Present Illness  Jess Mcleod is a 54 y.o. female  presenting today for a followup.  Sleep Efficiency was 74.8% slept on left side then right side for portion of study and also supine. AHI 10. HST AHI 14 but supine entire time. Ahi on side vs supine not listed that I saw . Patient states she sleeps on a wedge pillow at home and never is supine.   She feels improvement in reflux with not eating meat and feels that is helping sleep quality,.   Her medication for narcolepsy was changed  and she states that it is not working because it  is not strong enough. She states she is doing other measures to help with this and I inquired about this however she would not elaborate despite attempts to clarify this.    Has a strap that she uses to push her jaw forward. Sleeps on left side with a body pillow  Thinks her overall sleep quality has improved since has been going into perimenapause    I last saw the patient on 11-16 - 23. Below text is copied from  note on that date describing history of present illness at that time :  alta and difficulty with cpap. Referred by kaitlin tatum. Gets recurrent sinus  infections and respiratory infections when she would wear cpap. It also made her feel like her eyeballs were going to pop out.      Has vivid dreams. Sleep study (PSG/MSLT) 5-2021 showed overall ahi of 15.1  oxygen rafy of 86.0%.  The supine AHI was 23.8 and the REM AHI was 23.6. no central apneas.  there was also evidence of narcolepsy. Had HST 3-21-23 with AHI of 14.      Gets keloids.Has a lorena susan for scoliosis  Has been dealing with some dental issues recently as well         Had sinus surgery with Dr Alcantara in 2022. Has a lot of allergy issues. Has dust , mold allergy. Saw dr schneider in the past as well. Gets congestion in the nose. Does get sinus infections but not sure when the last one was. She thinks  it was in the beginning of October.     Past Medical History  Past Medical History:   Diagnosis Date    Arthritis     Chronic back pain     Chronic neck pain     Fibrocystic breast     Fibroid, uterus     GERD (gastroesophageal reflux disease)     Herpes     Migraine headache     Narcolepsy     Nephrolithiasis 2018    Sciatica of right side 2018    Scoliosis     Urge incontinence 2018        Past Surgical History  Past Surgical History:   Procedure Laterality Date    ARTHROSCOPIC CHONDROPLASTY OF KNEE JOINT Left 2022    Procedure: ARTHROSCOPY, KNEE, WITH CHONDROPLASTY;  Surgeon: Janette Odonnell MD;  Location: [facility] OR;  Service: Orthopedics;  Laterality: Left;     SECTION      EMBOLIZATION N/A 2020    Procedure: EMBOLIZATION, BLOOD VESSEL;  Surgeon: Dean Wheeler MD;  Location: [facility] CATH LAB;  Service: Radiology;  Laterality: N/A;    NASAL TURBINATE REDUCTION Bilateral 2022    Procedure: REDUCTION, NASAL TURBINATE john bullosa;  Surgeon: Waldo Giles MD;  Location: [address];  Service: ENT;  Laterality: Bilateral;    Scoliosis surgery      SINUS SURGERY      SYNOVECTOMY OF KNEE Left 2022    Procedure: SYNOVECTOMY, KNEE;  Surgeon: Janette Odonnell MD;  Location: [facility] OR;  Service: Orthopedics;  Laterality: Left;    UMBILICAL HERNIA REPAIR          Medications  Current Outpatient Medications on File Prior to Visit   Medication Sig Dispense Refill    azelastine (ASTELIN) 137 mcg (0.1 %) nasal spray SPRAY 1 SPRAY IN EACH NOSTRIL TWICE DAILY 90 mL 1    azelastine (ASTELIN) 137 mcg (0.1 %) nasal spray 1 spray (137 mcg total) by Nasal route 2 (two) times daily. 30 mL 3    calcium carbonate (OS-TIFFANIE) 500 mg calcium (1,250 mg) tablet Take 1 tablet by mouth once daily.      dextroamphetamine-amphetamine (ADDERALL XR) 30 MG 24 hr capsule Take 1 capsule (30 mg total) by mouth every morning. 30 capsule 0    dextroamphetamine-amphetamine (ADDERALL) 10 mg Tab take 1  tablet by mouth 3 times daily 90 tablet 0    eszopiclone (LUNESTA) 3 mg Tab 1 tablet by mouth nightly as needed at bedtime for insomnia (Patient not taking: Reported on 11/16/2023) 30 tablet 0    fluticasone propionate (FLONASE) 50 mcg/actuation nasal spray 2 sprays (100 mcg total) by Each Nostril route 2 (two) times daily. 18.2 mL 11    ibuprofen (ADVIL,MOTRIN) 200 MG tablet Take 200 mg by mouth every 6 (six) hours as needed for Pain.      Lactobac no.41/Bifidobact no.7 (PROBIOTIC-10 ORAL) Take by mouth.      magnesium oxide (MAG-OX) 400 mg (241.3 mg magnesium) tablet Take 400 mg by mouth once daily.      modafiniL (PROVIGIL) 200 MG Tab take 1 tablet by mouth twice daily 60 tablet 5    multivitamin with minerals tablet Take 1 tablet by mouth once daily.      omega-3/dha/epa/dpa/fish oil (OMEGA-3 2100 ORAL) Take by mouth.      pitolisant 17.8 mg Tab Take 17.8 mg by mouth once daily. 30 tablet 5    solriamfetoL (SUNOSI) 150 mg Tab Take1 tablet by mouth every morning 30 tablet 5    tranexamic acid (LYSTEDA) 650 mg tablet Take 2 tablets (1,300 mg total) by mouth 3 (three) times daily. Do not take for longer than 5 days 30 tablet 1    [DISCONTINUED] QUEtiapine (SEROQUEL) 25 MG Tab TK 1 T PO QHS  2     Current Facility-Administered Medications on File Prior to Visit   Medication Dose Route Frequency Provider Last Rate Last Admin    LIDOcaine (PF) 10 mg/ml (1%) injection 10 mg  1 mL Intradermal Once Checo Escobar MD           Review of Systems  Review of Systems   Constitutional:  Positive for malaise/fatigue.   Eyes:  Positive for photophobia.   Gastrointestinal:  Positive for abdominal pain and constipation.   Musculoskeletal:  Positive for back pain and neck pain.   Skin: Negative.    Neurological:  Positive for dizziness and headaches.   Psychiatric/Behavioral:  The patient is nervous/anxious.     Answers submitted by the patient for this visit:  Review of Symptoms Questionnaire  (Submitted on  3/21/2024)  appetite change : Yes  eye itching: Yes  Snoring?: Yes  Sleep Apnea?: Yes  Foot swelling?: Yes  Urinating too frequently?: Yes  Muscle aches / pain?: Yes  Food Allergies?: Yes  Seasonal Allergies?: Yes  Cold all of the time? : Yes  Hot all of the time? : Yes  Light-headedness: Yes  None of these: Yes  decreased concentration: Yes  sleep disturbance: Yes    Social History   reports that she has never smoked. She has never used smokeless tobacco. She reports that she does not currently use alcohol. She reports that she does not use drugs.     Family History  Family History   Problem Relation Age of Onset    Breast cancer Paternal Aunt     Allergies Other     Asthma Son         exercise induced    Eczema Daughter     Colon cancer Neg Hx     Ovarian cancer Neg Hx         Physical Exam   There were no vitals filed for this visit. There is no height or weight on file to calculate BMI.            GENERAL: no acute distress.  HEAD: normocephalic.   EYES: No scleral icterus  EARS: external ear without lesion, normal pinna shape and position.    NOSE: external nose without significant bony abnormality  ORAL CAVITY/OROPHARYNX: tongue mobile.   NECK: trachea midline.   RESPIRATORY: no stridor, no stertor. Voice normal. Respirations nonlabored.  NEURO: alert, responds to questions appropriately.    PSYCH:mood appropriate      Imaging:  The patient does not have any new imaging of the head and neck since last visit.     Labs:  CBC  Recent Labs   Lab 02/14/23  1440   WBC 6.58   Hemoglobin 13.5   Hematocrit 41.1   MCV 94   Platelets 393     BMP      COAGS        Assessment  1. HAIR (obstructive sleep apnea)       Plan:  Discussed plan of care with patient in detail and all questions answered. Patient reported understanding of plan of care. I gave the patient the opportunity to ask questions and patient confirmed all questions answered to satisfaction.     Reviewd and had discussion about findings in recent sleep study  and hst   No candidate for inspire ahi does not meet criteria  Discussed with her that ahi likely lower on psg because slept on sides and not just supine as was reported on hst. Gave info about ambrocio positional therapy but stressed that needs to talk to sleep medicine md about if positional therapy enough and would recommend using cpap or getting oral appliance until clear by sleep mediicne for positional therapy    Also rec reaching out regarding medication for narcolepsy as it does not appear that she has f/u scheduled    F/u with ent prn     I spent a total of 25 minutes on the day of the visit.  This includes face to face time and non-face to face time preparing to see the patient (eg, review of tests), obtaining and/or reviewing separately obtained history, documenting clinical information in the electronic or other health record, independently interpreting results and communicating results to the patient/family/caregiver, or care coordinator.   Please be aware that this note has been generated with the assistance of MModal voice-to-text.  Please excuse any spelling or grammatical errors.

## 2024-05-16 DIAGNOSIS — N92.0 MENORRHAGIA WITH REGULAR CYCLE: ICD-10-CM

## 2024-05-16 DIAGNOSIS — N93.8 DUB (DYSFUNCTIONAL UTERINE BLEEDING): ICD-10-CM

## 2024-05-16 RX ORDER — TRANEXAMIC ACID 650 MG/1
TABLET ORAL
Qty: 30 TABLET | Refills: 1 | OUTPATIENT
Start: 2024-05-16

## 2024-05-16 RX ORDER — TRANEXAMIC ACID 650 MG/1
1300 TABLET ORAL 3 TIMES DAILY
Qty: 30 TABLET | Refills: 1 | Status: SHIPPED | OUTPATIENT
Start: 2024-05-16

## 2024-05-16 RX ORDER — TRANEXAMIC ACID 650 MG/1
1300 TABLET ORAL 3 TIMES DAILY
Qty: 30 TABLET | Refills: 1 | Status: CANCELLED | OUTPATIENT
Start: 2024-05-16

## 2024-05-17 DIAGNOSIS — N93.8 DUB (DYSFUNCTIONAL UTERINE BLEEDING): ICD-10-CM

## 2024-05-17 DIAGNOSIS — N92.0 MENORRHAGIA WITH REGULAR CYCLE: ICD-10-CM

## 2024-05-17 RX ORDER — TRANEXAMIC ACID 650 MG/1
1300 TABLET ORAL 3 TIMES DAILY
Qty: 30 TABLET | Refills: 1 | Status: CANCELLED | OUTPATIENT
Start: 2024-05-16

## 2024-05-24 ENCOUNTER — OFFICE VISIT (OUTPATIENT)
Dept: DERMATOLOGY | Facility: CLINIC | Age: 54
End: 2024-05-24
Payer: COMMERCIAL

## 2024-05-24 VITALS — BODY MASS INDEX: 30.38 KG/M2 | WEIGHT: 177.94 LBS | HEIGHT: 64 IN

## 2024-05-24 DIAGNOSIS — L30.9 DERMATITIS: ICD-10-CM

## 2024-05-24 DIAGNOSIS — D22.9 BENIGN NEVUS: ICD-10-CM

## 2024-05-24 DIAGNOSIS — B35.3 TINEA PEDIS, UNSPECIFIED LATERALITY: Primary | ICD-10-CM

## 2024-05-24 DIAGNOSIS — L60.8 MELANONYCHIA: ICD-10-CM

## 2024-05-24 PROCEDURE — 3008F BODY MASS INDEX DOCD: CPT | Mod: CPTII,S$GLB,, | Performed by: STUDENT IN AN ORGANIZED HEALTH CARE EDUCATION/TRAINING PROGRAM

## 2024-05-24 PROCEDURE — 1160F RVW MEDS BY RX/DR IN RCRD: CPT | Mod: CPTII,S$GLB,, | Performed by: STUDENT IN AN ORGANIZED HEALTH CARE EDUCATION/TRAINING PROGRAM

## 2024-05-24 PROCEDURE — 1159F MED LIST DOCD IN RCRD: CPT | Mod: CPTII,S$GLB,, | Performed by: STUDENT IN AN ORGANIZED HEALTH CARE EDUCATION/TRAINING PROGRAM

## 2024-05-24 PROCEDURE — 99999 PR PBB SHADOW E&M-EST. PATIENT-LVL IV: CPT | Mod: PBBFAC,,, | Performed by: STUDENT IN AN ORGANIZED HEALTH CARE EDUCATION/TRAINING PROGRAM

## 2024-05-24 PROCEDURE — 99204 OFFICE O/P NEW MOD 45 MIN: CPT | Mod: S$GLB,,, | Performed by: STUDENT IN AN ORGANIZED HEALTH CARE EDUCATION/TRAINING PROGRAM

## 2024-05-24 PROCEDURE — G2211 COMPLEX E/M VISIT ADD ON: HCPCS | Mod: S$GLB,,, | Performed by: STUDENT IN AN ORGANIZED HEALTH CARE EDUCATION/TRAINING PROGRAM

## 2024-05-24 RX ORDER — MOMETASONE FUROATE 1 MG/G
OINTMENT TOPICAL DAILY
Qty: 45 G | Refills: 2 | Status: SHIPPED | OUTPATIENT
Start: 2024-05-24

## 2024-05-24 RX ORDER — TERBINAFINE HYDROCHLORIDE 250 MG/1
250 TABLET ORAL DAILY
Qty: 30 TABLET | Refills: 0 | Status: SHIPPED | OUTPATIENT
Start: 2024-05-24 | End: 2024-06-30

## 2024-05-24 NOTE — PROGRESS NOTES
Subjective:       Patient ID:  Jess Mcleod is a 54 y.o. female who presents for   Chief Complaint   Patient presents with    Mole     History of Present Illness: The patient presents with chief complaint of moles on the feet.  Location: bottom of the feet  Duration: unsure how long they have been present. Was notified recently by podiatrist that she has moles on the feet  Signs/Symptoms: denies any symptoms with the lesions    Prior treatments: none  No history of skin cancer or melanoma.     Patient also with a dark line in the nails, no symptoms. Noticed the streaks months ago. Also has a chronic history of athlete's feet, and has tried several OTC creams including lamisil with no improvement. Also with very dry and itchy skin on the arms and legs, with reddish bumps on the skin. Has tried several moisturizers all with minimal improvement.           Review of Systems   Constitutional:  Negative for fever and chills.   Skin:  Positive for itching, rash and dry skin.        Objective:    Physical Exam   Constitutional: She appears well-developed and well-nourished. No distress.   Neurological: She is alert and oriented to person, place, and time. She is not disoriented.   Psychiatric: She has a normal mood and affect.   Skin:   Areas Examined (abnormalities noted in diagram):   Head / Face Inspection Performed  Neck Inspection Performed  Chest / Axilla Inspection Performed  Back Inspection Performed  RUE Inspected  LUE Inspection Performed  RLE Inspected  LLE Inspection Performed  Nails and Digits Inspection Performed                      Diagram Legend     Erythematous scaling macule/papule c/w actinic keratosis       Vascular papule c/w angioma      Pigmented verrucoid papule/plaque c/w seborrheic keratosis      Yellow umbilicated papule c/w sebaceous hyperplasia      Irregularly shaped tan macule c/w lentigo     1-2 mm smooth white papules consistent with Milia      Movable subcutaneous cyst with  punctum c/w epidermal inclusion cyst      Subcutaneous movable cyst c/w pilar cyst      Firm pink to brown papule c/w dermatofibroma      Pedunculated fleshy papule(s) c/w skin tag(s)      Evenly pigmented macule c/w junctional nevus     Mildly variegated pigmented, slightly irregular-bordered macule c/w mildly atypical nevus      Flesh colored to evenly pigmented papule c/w intradermal nevus       Pink pearly papule/plaque c/w basal cell carcinoma      Erythematous hyperkeratotic cursted plaque c/w SCC      Surgical scar with no sign of skin cancer recurrence      Open and closed comedones      Inflammatory papules and pustules      Verrucoid papule consistent consistent with wart     Erythematous eczematous patches and plaques     Dystrophic onycholytic nail with subungual debris c/w onychomycosis     Umbilicated papule    Erythematous-base heme-crusted tan verrucoid plaque consistent with inflamed seborrheic keratosis     Erythematous Silvery Scaling Plaque c/w Psoriasis     See annotation      Assessment / Plan:        Tinea pedis - discussed treatment options, patient failed topicals. Discussed oral medications; will proceed with lamisil for 4 weeks.  -     terbinafine HCL (LAMISIL) 250 mg tablet; Take 1 tablet (250 mg total) by mouth once daily.  Dispense: 30 tablet; Refill: 0    Discussed treatment options with patient including benefits and risks of p.o. Lamisil (75% of patients clear but 50% recur within 2 years, hepatotoxicity) vs topical treatments.    Dermatitis  -     mometasone (ELOCON) 0.1 % ointment; Apply topically once daily.  Dispense: 45 g; Refill: 2  -     Counseled patient on gentle skin care regimen, including need for sensitive soaps/detergents, as well as need for frequent use of sensitize moisturizers.       Benign nevus  Melanonychia -   No lesions suspicious for malignancy noted.  Reassurance provided.    Instructed patient to observe lesion(s) for changes and follow up in clinic if  changes are noted. Patient to monitor skin at home for new or changing lesions and follow up in clinic if noted.    Discussed JOSE RAFAEL's of moles             Follow up in about 4 months (around 9/24/2024).

## 2024-06-12 ENCOUNTER — OFFICE VISIT (OUTPATIENT)
Dept: OBSTETRICS AND GYNECOLOGY | Facility: CLINIC | Age: 54
End: 2024-06-12
Payer: COMMERCIAL

## 2024-06-12 VITALS
WEIGHT: 180.88 LBS | SYSTOLIC BLOOD PRESSURE: 128 MMHG | HEIGHT: 64 IN | BODY MASS INDEX: 30.88 KG/M2 | DIASTOLIC BLOOD PRESSURE: 82 MMHG

## 2024-06-12 DIAGNOSIS — G47.33 OSA (OBSTRUCTIVE SLEEP APNEA): ICD-10-CM

## 2024-06-12 DIAGNOSIS — Z78.9 INTOLERANCE OF CONTINUOUS POSITIVE AIRWAY PRESSURE (CPAP) VENTILATION: ICD-10-CM

## 2024-06-12 DIAGNOSIS — Z01.419 ENCOUNTER FOR GYNECOLOGICAL EXAMINATION WITHOUT ABNORMAL FINDING: ICD-10-CM

## 2024-06-12 DIAGNOSIS — N94.6 DYSMENORRHEA: ICD-10-CM

## 2024-06-12 DIAGNOSIS — D25.0 INTRAMURAL AND SUBMUCOUS LEIOMYOMA OF UTERUS: ICD-10-CM

## 2024-06-12 DIAGNOSIS — N84.1 CERVICAL POLYP: ICD-10-CM

## 2024-06-12 DIAGNOSIS — D25.1 INTRAMURAL AND SUBMUCOUS LEIOMYOMA OF UTERUS: ICD-10-CM

## 2024-06-12 DIAGNOSIS — E78.2 MIXED HYPERLIPIDEMIA: ICD-10-CM

## 2024-06-12 DIAGNOSIS — Z12.31 VISIT FOR SCREENING MAMMOGRAM: Primary | ICD-10-CM

## 2024-06-12 DIAGNOSIS — K21.9 GASTROESOPHAGEAL REFLUX DISEASE, UNSPECIFIED WHETHER ESOPHAGITIS PRESENT: ICD-10-CM

## 2024-06-12 DIAGNOSIS — N95.9 PERIMENOPAUSAL DISORDER: ICD-10-CM

## 2024-06-12 PROCEDURE — 99999 PR PBB SHADOW E&M-EST. PATIENT-LVL III: CPT | Mod: PBBFAC,,, | Performed by: OBSTETRICS & GYNECOLOGY

## 2024-06-12 PROCEDURE — 99396 PREV VISIT EST AGE 40-64: CPT | Mod: S$GLB,,, | Performed by: OBSTETRICS & GYNECOLOGY

## 2024-06-12 PROCEDURE — 3008F BODY MASS INDEX DOCD: CPT | Mod: CPTII,S$GLB,, | Performed by: OBSTETRICS & GYNECOLOGY

## 2024-06-12 PROCEDURE — 3074F SYST BP LT 130 MM HG: CPT | Mod: CPTII,S$GLB,, | Performed by: OBSTETRICS & GYNECOLOGY

## 2024-06-12 PROCEDURE — 1159F MED LIST DOCD IN RCRD: CPT | Mod: CPTII,S$GLB,, | Performed by: OBSTETRICS & GYNECOLOGY

## 2024-06-12 PROCEDURE — 3079F DIAST BP 80-89 MM HG: CPT | Mod: CPTII,S$GLB,, | Performed by: OBSTETRICS & GYNECOLOGY

## 2024-06-12 PROCEDURE — 99213 OFFICE O/P EST LOW 20 MIN: CPT | Mod: 25,S$GLB,, | Performed by: OBSTETRICS & GYNECOLOGY

## 2024-06-12 NOTE — PROGRESS NOTES
"CC: Well woman exam & problem    Jess Mcleod is a 54 y.o. female  presents for both well woman exam and a problem  LMP: Patient's last menstrual period was 05/15/2024 (exact date)..      She does not desire STD screening.    The patient participates in regular exercise: yes.    The patient does not smoke.    The patient wears seatbelts.     Pt denies any domestic violence.    PMH, PSH, SOC, FH, OB Hx:  see below    /82   Ht 5' 4" (1.626 m)   Wt 82.1 kg (180 lb 14.2 oz)   LMP 05/15/2024 (Exact Date)   BMI 31.05 kg/m²       ROS: see below      PHYSICAL EXAM: see below      ASSESSMENT & PLAN  See below          PATIENT ALSO PRESENTS FOR A PROBLEM    CC: Irregular bleeding     HPI    Patient reports cycles occur every 1-4 months weeks lasting 2-6 days using 2-3 pads per day with debilitating pain with menses. Last cycle lasted from May 15th-   She denies any BTB.   s/p [place]     2023  EMBX:  FRAGMENTS OF EARLY SECRETORY ENDOMETRIUM       Narrative & Impression  EXAMINATION:  US PELVIS COMP WITH TRANSVAG NON-OB (XPD)     CLINICAL HISTORY:  Pelvic and perineal pain     TECHNIQUE:  Transabdominal sonography of the pelvis was performed, followed by transvaginal sonography to better evaluate the uterus and ovaries.     COMPARISON:  Pelvic ultrasound 2019 and 2018     FINDINGS:  Uterus: Size: 9.7 x 5.8 x 7.3 cm  Masses: Multiple intramural fibroids, largest measuring 3.2 x 4.0 x 4.4 cm within the superior fundus with calcifications.  Additional possibly pedunculated fibroid adjacent to the left ovary measuring 1.4 x 1.0 x 1.1 cm.  Multiple nabothian cysts present.  Endometrium: Normal in this pre menopausal patient, measuring 5 mm.     Right ovary: Size: 2.5 x 2.1 x 1.5 cm  Appearance: Physiologic follicle measuring 0.9 x 0.6 x 0.8 cm  Vascular flow: Normal.     Left ovary: Size: 4.5 x 2.6 x 3.0 cm  Appearance: Physiologic follicle measuring 1.9 x 1.4 x 1.0.  Vascular Flow: " Normal.     Free Fluid:     None.     Impression:  Multiple uterine fibroids, one of which appears pedunculated and adjacent to the left adnexa.  Electronically signed by resident: Christal Sheets  Date:                                            2022  Time:                                           10:03     Electronically signed by:Raúl White MD  Date:                                            2022  Time:                                           10:17           Exam Ended: 22 09:40 CST             Past Medical History:   Diagnosis Date    Arthritis     Chronic back pain     Chronic neck pain     Fibrocystic breast     Fibroid, uterus     GERD (gastroesophageal reflux disease)     Herpes     Migraine headache     Narcolepsy     Nephrolithiasis 2018    Sciatica of right side 2018    Scoliosis     Urge incontinence 2018     Past Surgical History:   Procedure Laterality Date    ARTHROSCOPIC CHONDROPLASTY OF KNEE JOINT Left 2022    Procedure: ARTHROSCOPY, KNEE, WITH CHONDROPLASTY;  Surgeon: Janette Odonnell MD;  Location: [facility];  Service: Orthopedics;  Laterality: Left;     SECTION      EMBOLIZATION N/A 2020    Procedure: EMBOLIZATION, BLOOD VESSEL;  Surgeon: Dean Wheeler MD;  Location: [facility] CATH LAB;  Service: Radiology;  Laterality: N/A;    NASAL TURBINATE REDUCTION Bilateral 2022    Procedure: REDUCTION, NASAL TURBINATE john bullosa;  Surgeon: Waldo Giles MD;  Location: [address];  Service: ENT;  Laterality: Bilateral;    Scoliosis surgery      SINUS SURGERY      SYNOVECTOMY OF KNEE Left 2022    Procedure: SYNOVECTOMY, KNEE;  Surgeon: Janette Odonnell MD;  Location: [facility] OR;  Service: Orthopedics;  Laterality: Left;    UMBILICAL HERNIA REPAIR       Social History     Socioeconomic History    Marital status: Legally    Tobacco Use    Smoking status: Never    Smokeless tobacco: Never   Substance and Sexual Activity  "   Alcohol use: Not Currently    Drug use: No    Sexual activity: Not Currently     Partners: Male     Birth control/protection: None     Social Determinants of Health     Financial Resource Strain: High Risk (3/21/2024)    Overall Financial Resource Strain (CARDIA)     Difficulty of Paying Living Expenses: Very hard   Food Insecurity: Food Insecurity Present (3/21/2024)    Hunger Vital Sign     Worried About Running Out of Food in the Last Year: Often true     Ran Out of Food in the Last Year: Often true   Physical Activity: Unknown (3/21/2024)    Exercise Vital Sign     Days of Exercise per Week: 0 days   Stress: Stress Concern Present (3/21/2024)    [employer] [place] [employer] - Occupational Stress Questionnaire     Feeling of Stress : Very much   Housing Stability: High Risk (3/21/2024)    Housing Stability Vital Sign     Unable to Pay for Housing in the Last Year: Yes     Unstable Housing in the Last Year: No     Family History   Problem Relation Name Age of Onset    Breast cancer Paternal Aunt      Allergies Other nephew     Asthma Son          exercise induced    Eczema Daughter      Colon cancer Neg Hx      Ovarian cancer Neg Hx       OB History          2    Para   2    Term   2            AB        Living   2         SAB        IAB        Ectopic        Multiple        Live Births                   Patient's last menstrual period was 05/15/2024 (exact date).    /82   Ht 5' 4" (1.626 m)   Wt 82.1 kg (180 lb 14.2 oz)   LMP 05/15/2024 (Exact Date)   BMI 31.05 kg/m²       ROS:  GENERAL: Denies weight gain or weight loss. Feeling well overall.   SKIN: Denies rash or lesions.   HEAD: Denies head injury or headache.   NODES: Denies enlarged lymph nodes.   CHEST: Denies chest pain or shortness of breath.   CARDIOVASCULAR: Denies palpitations or left sided chest pain.   ABDOMEN: No abdominal pain, constipation, diarrhea, nausea, vomiting or rectal bleeding.   URINARY: No " frequency, dysuria, hematuria, or burning on urination.  REPRODUCTIVE: See HPI  BREASTS: The patient performs breast self-examination and denies pain, lumps, or nipple discharge.   HEMATOLOGIC: No easy bruisability or excessive bleeding.   MUSCULOSKELETAL: Denies joint pain or swelling.   NEUROLOGIC: Denies syncope or weakness.   PSYCHIATRIC: Denies depression, anxiety or mood swings.      PHYSICAL EXAM:  PPEARANCE: Well nourished, well developed, in no acute distress.  AFFECT: WNL, alert and oriented x 3  SKIN: No acne or hirsutism  NECK: Neck symmetric without masses or thyromegaly  NODES: No inguinal, cervical, axillary, or femoral lymph node enlargement  CHEST: Good respiratory effect  ABDOMEN: Soft.  No tenderness or masses.  No hepatosplenomegaly.  No hernias.  BREASTS: Symmetrical, no skin changes or visible lesions.  No palpable masses, nipple discharge bilaterally.  PELVIC: Normal external genitalia without lesions.  Normal hair distribution.  Adequate perineal body, normal urethral meatus.  Vagina moist and well rugated without lesions or discharge.  Cervix 1 cm polyp, pink, without lesions, discharge or tenderness.  No significant cystocele or rectocele.  Bimanual exam shows uterus 10-12 weeks in size, regular, mobile and nontender.  Adnexa without masses or tenderness.    EXTREMITIES: No edema.    ASSESSMENT & PLAN      ICD-10-CM ICD-9-CM    1. Visit for screening mammogram  Z12.31 V76.12 Mammo Digital Screening Bilat w/ Erasto      2. Encounter for gynecological examination without abnormal finding  Z01.419 V72.31       3. Perimenopausal disorder  N95.9 627.9       4. Intolerance of continuous positive airway pressure (CPAP) ventilation  Z78.9 V49.89       5. Mixed hyperlipidemia  E78.2 272.2       6. Intramural and submucous leiomyoma of uterus  D25.1 218.0     D25.0 218.1       7. Gastroesophageal reflux disease, unspecified whether esophagitis present  K21.9 530.81       8. HAIR (obstructive sleep  apnea)  G47.33 327.23         Patient was counseled today on A.C.S. Pap guidelines and recommendations for yearly pelvic exams, mammograms and monthly self breast exams; to see her PCP for other health maintenance.        minutes addressing problems separate from annual exam.  This includes face to face time and non-face to face time preparing to see the patient (eg, review of tests), Obtaining and/or reviewing separately obtained history, Documenting clinical information in the electronic or other health record, Independently interpreting resultsand communicating results to the patient/family/caregiver, or Care coordination.     Cervical polyp removal

## 2024-06-15 ENCOUNTER — HOSPITAL ENCOUNTER (OUTPATIENT)
Dept: RADIOLOGY | Facility: OTHER | Age: 54
Discharge: HOME | End: 2024-06-15
Attending: OBSTETRICS & GYNECOLOGY
Payer: COMMERCIAL

## 2024-06-15 DIAGNOSIS — N94.6 DYSMENORRHEA: ICD-10-CM

## 2024-06-15 PROCEDURE — 76856 US EXAM PELVIC COMPLETE: CPT | Mod: 26,,, | Performed by: RADIOLOGY

## 2024-06-15 PROCEDURE — 76856 US EXAM PELVIC COMPLETE: CPT | Mod: TC

## 2024-06-15 PROCEDURE — 76830 TRANSVAGINAL US NON-OB: CPT | Mod: 26,,, | Performed by: RADIOLOGY

## 2024-06-21 ENCOUNTER — PATIENT MESSAGE (OUTPATIENT)
Dept: OBSTETRICS AND GYNECOLOGY | Facility: CLINIC | Age: 54
End: 2024-06-21
Payer: COMMERCIAL

## 2024-06-25 ENCOUNTER — TELEPHONE (OUTPATIENT)
Dept: OBSTETRICS AND GYNECOLOGY | Facility: CLINIC | Age: 54
End: 2024-06-25
Payer: COMMERCIAL

## 2024-06-25 NOTE — TELEPHONE ENCOUNTER
Dr Thomas spoke to the  pt in detail and informed her that she will go in further detail with her tomorrow at her apt.

## 2024-06-25 NOTE — TELEPHONE ENCOUNTER
Called patient to respond to her concern and message sent through [employer] last week.  Patient concerned that she may have returned products after having a miscarriage in 2010 and that has the reason why she is having menorrhagia at this point.  Explained to patient that the likelihood of retained products was extremely rare.  At the time of the miscarriage her beta HCGs were followed down to 0 and her last beta was negative on December 27, 2010.  In addition to that her endometrial stripe on region ultrasound was normal.    Patient also concerned that since having the miscarriages her cycles have gotten heavier.  Explained to patient that over these years her uterine fibroids have increased in size she has undergone a end of uterine artery embolization for the uterine fibroids but an increase in size of uterine fibroids would be consistent with a increased amount of heavy bleeding and menorrhagia.      Patient also explained that she does not have a uterine polyp with a cervical polyp for which she is scheduled for removal on tomorrow.  Patient voiced appreciation for call.

## 2024-06-27 ENCOUNTER — PROCEDURE VISIT (OUTPATIENT)
Dept: OBSTETRICS AND GYNECOLOGY | Facility: CLINIC | Age: 54
End: 2024-06-27
Payer: COMMERCIAL

## 2024-06-27 DIAGNOSIS — Z01.812 PRE-PROCEDURE LAB EXAM: Primary | ICD-10-CM

## 2024-06-27 DIAGNOSIS — N84.1 CERVICAL POLYP: ICD-10-CM

## 2024-06-27 DIAGNOSIS — N95.9 PERIMENOPAUSAL DISORDER: ICD-10-CM

## 2024-06-27 LAB
B-HCG UR QL: NEGATIVE
CTP QC/QA: YES

## 2024-06-27 PROCEDURE — 81025 URINE PREGNANCY TEST: CPT | Mod: S$GLB,,, | Performed by: OBSTETRICS & GYNECOLOGY

## 2024-06-27 PROCEDURE — 88305 TISSUE EXAM BY PATHOLOGIST: CPT | Mod: 59 | Performed by: PATHOLOGY

## 2024-06-27 PROCEDURE — 88342 IMHCHEM/IMCYTCHM 1ST ANTB: CPT | Performed by: PATHOLOGY

## 2024-06-27 PROCEDURE — 57500 BIOPSY OF CERVIX: CPT | Mod: S$GLB,,, | Performed by: OBSTETRICS & GYNECOLOGY

## 2024-07-01 NOTE — PROCEDURES
Biopsy (Gynecological)    Date/Time: 6/27/2024 10:30 AM    Performed by: Kayla Thomas MD  Authorized by: Kayla Thomas MD    Consent obtained:  Prior to procedure the appropriate consent was completed and verified  Local anesthesia used?: No      Biopsy Location:  Cervix  Cervix:     : Polyp.   Patient tolerated the procedure well with no immediate complications.

## 2024-07-03 LAB
COMMENT: NORMAL
FINAL PATHOLOGIC DIAGNOSIS: NORMAL
GROSS: NORMAL
Lab: NORMAL

## 2024-08-22 ENCOUNTER — HOSPITAL ENCOUNTER (OUTPATIENT)
Dept: RADIOLOGY | Facility: OTHER | Age: 54
Discharge: HOME | End: 2024-08-22
Attending: OBSTETRICS & GYNECOLOGY
Payer: COMMERCIAL

## 2024-08-22 DIAGNOSIS — Z12.31 VISIT FOR SCREENING MAMMOGRAM: ICD-10-CM

## 2024-08-27 ENCOUNTER — PATIENT MESSAGE (OUTPATIENT)
Dept: SLEEP MEDICINE | Facility: CLINIC | Age: 54
End: 2024-08-27
Payer: COMMERCIAL

## 2024-10-08 ENCOUNTER — PATIENT MESSAGE (OUTPATIENT)
Dept: OBSTETRICS AND GYNECOLOGY | Facility: CLINIC | Age: 54
End: 2024-10-08
Payer: COMMERCIAL

## 2024-10-08 ENCOUNTER — TELEPHONE (OUTPATIENT)
Dept: OBSTETRICS AND GYNECOLOGY | Facility: CLINIC | Age: 54
End: 2024-10-08
Payer: COMMERCIAL

## 2024-10-08 ENCOUNTER — PATIENT MESSAGE (OUTPATIENT)
Dept: SPORTS MEDICINE | Facility: CLINIC | Age: 54
End: 2024-10-08
Payer: COMMERCIAL

## 2024-10-08 NOTE — TELEPHONE ENCOUNTER
Dr Thomas spoke to the pt in full detail about her ultrasound results and we will get the patient scheduled to be seen in the office with us on oct 28 at 1 pm.

## 2024-10-08 NOTE — TELEPHONE ENCOUNTER
----- Message from Denilson sent at 10/8/2024  3:17 PM CDT -----  Regarding: Self [phone]  Type:  Patient Returning Call    Who Called:  Self     Who Left Message for Patient:  Kayla     Does the patient know what this is regarding?: no     Would the patient rather a call back or a response via [facility]?  Call back     Best Call Back Number: [phone]     Additional Information:     Thank you.

## 2024-10-22 NOTE — PROGRESS NOTES
ESTABLISHED  PATIENT    HISTORY OF PRESENT ILLNESS   54 y.o. Female with a history of left knee pain who was referred to us by Ganesh Musa PA-C to discuss a possible cartilage procedure. I performed a chondroplasty and synovectomy on her left knee on 2022. She has been treated in there interim by Ganesh Musa PA-C for chronic left knee pain and lumbar radiculopathy. 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  She continues to have pain and wants to discuss a chondral procedure for her medial femoral condyle defect.    Is affecting ADLs.  Pain is 1/10 at it's worst.        PAST MEDICAL HISTORY    Past Medical History:   Diagnosis Date    Arthritis     Chronic back pain     Chronic neck pain     Fibrocystic breast     Fibroid, uterus     GERD (gastroesophageal reflux disease)     Herpes     Migraine headache     Narcolepsy     Nephrolithiasis 2018    Sciatica of right side 2018    Scoliosis     Urge incontinence 2018       PAST SURGICAL HISTORY     Past Surgical History:   Procedure Laterality Date    ARTHROSCOPIC CHONDROPLASTY OF KNEE JOINT Left 2022    Procedure: ARTHROSCOPY, KNEE, WITH CHONDROPLASTY;  Surgeon: Janette Odonnell MD;  Location: [facility];  Service: Orthopedics;  Laterality: Left;     SECTION      EMBOLIZATION N/A 2020    Procedure: EMBOLIZATION, BLOOD VESSEL;  Surgeon: Dean Wheeler MD;  Location: [facility] CATH LAB;  Service: Radiology;  Laterality: N/A;    NASAL TURBINATE REDUCTION Bilateral 2022    Procedure: REDUCTION, NASAL TURBINATE john bullosa;  Surgeon: Waldo Giles MD;  Location: [address];  Service: ENT;  Laterality: Bilateral;    Scoliosis surgery      SINUS SURGERY      SYNOVECTOMY OF KNEE Left 2022    Procedure: SYNOVECTOMY, KNEE;  Surgeon: Janette Odonnell MD;  Location: [facility] OR;  Service: Orthopedics;  Laterality: Left;    UMBILICAL HERNIA REPAIR         FAMILY HISTORY    Family  History   Problem Relation Name Age of Onset    Breast cancer Paternal Aunt      Allergies Other nephew     Asthma Son          exercise induced    Eczema Daughter      Colon cancer Neg Hx      Ovarian cancer Neg Hx         SOCIAL HISTORY    Social History     Socioeconomic History    Marital status: Legally    Tobacco Use    Smoking status: Never    Smokeless tobacco: Never   Substance and Sexual Activity    Alcohol use: Not Currently    Drug use: No    Sexual activity: Not Currently     Partners: Male     Birth control/protection: None     Social Drivers of Health     Financial Resource Strain: High Risk (3/21/2024)    Overall Financial Resource Strain (CARDIA)     Difficulty of Paying Living Expenses: Very hard   Food Insecurity: Food Insecurity Present (3/21/2024)    Hunger Vital Sign     Worried About Running Out of Food in the Last Year: Often true     Ran Out of Food in the Last Year: Often true   Physical Activity: Unknown (3/21/2024)    Exercise Vital Sign     Days of Exercise per Week: 0 days   Stress: Stress Concern Present (3/21/2024)    [employer] [place] [employer] - Occupational Stress Questionnaire     Feeling of Stress : Very much   Housing Stability: High Risk (3/21/2024)    Housing Stability Vital Sign     Unable to Pay for Housing in the Last Year: Yes     Unstable Housing in the Last Year: No       MEDICATIONS      Current Outpatient Medications:     ibuprofen (ADVIL,MOTRIN) 200 MG tablet, Take 200 mg by mouth every 6 (six) hours as needed for Pain., Disp: , Rfl:     Lactobac no.41/Bifidobact no.7 (PROBIOTIC-10 ORAL), Take by mouth., Disp: , Rfl:     magnesium oxide (MAG-OX) 400 mg (241.3 mg magnesium) tablet, Take 400 mg by mouth once daily., Disp: , Rfl:     mometasone (ELOCON) 0.1 % ointment, Apply topically once daily., Disp: 45 g, Rfl: 2    multivitamin with minerals tablet, Take 1 tablet by mouth once daily., Disp: , Rfl:     omega-3/dha/epa/dpa/fish oil (OMEGA-3 2100  "ORAL), Take by mouth., Disp: , Rfl:     solriamfetoL (SUNOSI) 150 mg Tab, Take1 tablet by mouth every morning, Disp: 30 tablet, Rfl: 5    tranexamic acid (LYSTEDA) 650 mg tablet, Take 2 tablets (1,300 mg total) by mouth 3 (three) times daily. Do not take for longer than 5 days, Disp: 30 tablet, Rfl: 1    azelastine (ASTELIN) 137 mcg (0.1 %) nasal spray, SPRAY 1 SPRAY IN EACH NOSTRIL TWICE DAILY (Patient not taking: Reported on 10/23/2024), Disp: 90 mL, Rfl: 1    azelastine (ASTELIN) 137 mcg (0.1 %) nasal spray, 1 spray (137 mcg total) by Nasal route 2 (two) times daily. (Patient not taking: Reported on 10/23/2024), Disp: 30 mL, Rfl: 3    calcium carbonate (OS-TIFFANIE) 500 mg calcium (1,250 mg) tablet, Take 1 tablet by mouth once daily. (Patient not taking: Reported on 10/23/2024), Disp: , Rfl:   No current facility-administered medications for this visit.    Facility-Administered Medications Ordered in Other Visits:     LIDOcaine (PF) 10 mg/ml (1%) injection 10 mg, 1 mL, Intradermal, Once, Checo Escobar MD    ALLERGIES     Review of patient's allergies indicates:   Allergen Reactions    Ibuprofen Other (See Comments)     It makes my "ears hurt" when I take large doses.    Opioids - morphine analogues Other (See Comments)     Morphine causes headaches         REVIEW OF SYSTEMS   Constitution: Negative. Negative for chills, fever and night sweats.   HENT: Negative for congestion and headaches.    Eyes: Negative for blurred vision, left vision loss and right vision loss.   Cardiovascular: Negative for chest pain and syncope.   Respiratory: Negative for cough and shortness of breath.    Endocrine: Negative for polydipsia, polyphagia and polyuria.   Hematologic/Lymphatic: Negative for bleeding problem. Does not bruise/bleed easily.   Skin: Negative for dry skin, itching and rash.   Musculoskeletal: Negative for falls. Positive for left knee pain   Gastrointestinal: Negative for abdominal pain and bowel incontinence. " "  Genitourinary: Negative for bladder incontinence and nocturia.   Neurological: Negative for disturbances in coordination, loss of balance and seizures.   Psychiatric/Behavioral: Negative for depression. The patient does not have insomnia.    Allergic/Immunologic: Negative for hives and persistent infections.     PHYSICAL EXAMINATION    Vitals: /78   Pulse 102   Ht 5' 4" (1.626 m)   Wt 84.4 kg (186 lb 1.1 oz)   BMI 31.94 kg/m²     General: The patient appears active and healthy with no apparent physical problems.  No disturbance of mood or affect is demonstrated. Alert and Oriented.    HEENT: Eyes normal, pupils equally round, nose normal.    Resp: Equal and symmetrical chest rises. No wheezing    CV: Regular rate    Neck: Supple; nonpainful range of motion.    Extremities: no cyanosis, clubbing, edema, or diffuse swelling.  Palpable pulses, good capillary refill of the digits.  No coolness, discoloration, edema or obvious varicosities.    Skin: no lesions noted.    Lymphatic: no detected adenopathy in the upper or lower extremities.    Neurologic: normal mental status, normal reflexes, normal gait and balance.  Patient is alert and oriented to person, place and time.  No flaccidity or spasticity is noted.  No motor or sensory deficits are noted.  Light touch is intact    Orthopaedic: KNEE EXAM - LEFT    Inspection:   Normal skin color and appearance with no scars, no ecchymosis and no effusion.      ROM:   0° - 145°.      Palpation:   There is no tenderness along medial plica, medial collateral ligament, pes bursa, lateral joint line, iliotibial band, lateral collateral ligament, popliteal fossa, patellar tendon, or quadriceps tendon. + tenderness medial femoral condyle, and medial joint line     Stability: - anterior drawer, - Lachman, - pivot shift and - posterior drawer.      No instability with varus or valgus stress at 0° or 30°. Negative dial  test at 30° and 90°.    Tests:   + pain with Krystens " test.  - patellar compression - grind test, - patellofemoral crepitus.  There is no patellar apprehension.     - J Sign. - Henry's. - patellar tilt. - Andreia. Lateral patella translation  2 quadrants. Medial patella translation 2 quadrants    Motor:   Quadriceps strength is 5/5 and hamstrings strength is 5/5. Hamstrings show no tightness.      Neuro:   Distal neurovascular status is normal    Vascular: Negative Homans and no palpable popliteal cords. 2+ pedal pulse with brisk cap refill    Gait antalgic     THORACIC/LUMBAR SPINE     Inspection   Normal skin color and appearance, no ecchymosis, no swelling, and no scars.  No increased lumbar lordosis. Pelvis is level without tilt.  No scoliosis.      ROM   Full forward flexion, extension, side bending and rotation.  There is no increased pain with ROM testing.      Palpation     There is no tenderness of the spinous processes, iliac crests, posterior superior iliac spines, sacroiliac joints, sacrum, coccyx, and greater trochanters.      Neuro   Straight leg raise is negative bilaterally.      L4 neurologic testing reveals 5/5 strength in TA , 2/2 knee reflex, and normal sensation in L4 dermatome.      L5 neurologic testing reveals 5/5 strength in EHL and normal sensation in L5 dermatome.      S1 neurologic testing reveals 5/5 strength in peroneals, 2/2 achilles tendon reflex, and normal sensation in S1 dermatome.      IMAGING    Narrative & Impression  EXAMINATION:  XR KNEE ORTHO LEFT WITH FLEXION     CLINICAL HISTORY:  Chronic pain left knee     TECHNIQUE:  AP standing view of both knees, PA flexion standing views of both knees, and Merchant views of both knees were performed. A lateral view of the left knee was also performed.     COMPARISON:  March 7, 2022 standard views and after review of MRI of left knee March 8, 2022     FINDINGS:  Left knee:     No fracture.  Question mild narrowing medial compartment, preserved lateral and patellofemoral compartments and no  periarticular spurring.  Tibiofemoral chondrocalcinosis changes as before.  No suprapatellar bursal effusion or prepatellar soft tissue swelling.  Left knee findings similar to earlier exam.     Right knee:     No fracture.  Question mild narrowing medial compartment, preserved lateral and patellofemoral compartments and no periarticular spurring.  Tibiofemoral chondrocalcinosis changes as before.  No prepatellar soft tissue swelling.  Right knee findings similar to earlier exam.     Impression:     As above        Electronically signed by:Koko Ibarra  Date:                                            11/24/2023  Time:                                           08:29    IMPRESSION       ICD-10-CM ICD-9-CM   1. Chronic pain of left knee  M25.562 719.46    G89.29 338.29   2. Lumbar radiculopathy  M54.16 724.4   3. Degenerative lesion of articular cartilage of knee  M23.90 717.3   4. Great toe pain, left  M79.675 729.5         MEDICATIONS PRESCRIBED      None today     RECOMMENDATIONS     MRI left knee ordered today to review cartilage for surgical planning  RTC with MRI results and hip to ankle x-rays.   We discussed possible cartilage procedures, and will have a further discussion once I review the updated MRI   She is also a [employer].  We discussed different graft options as well as potentially a matrix autologous chondrocyte implantation.  She also complained of some left great toe pain.  We will refer her to our foot and ankle colleague for further management.      All questions were answered, pt will contact us for questions or concerns in the interim.

## 2024-10-23 ENCOUNTER — OFFICE VISIT (OUTPATIENT)
Dept: SPORTS MEDICINE | Facility: CLINIC | Age: 54
End: 2024-10-23
Payer: COMMERCIAL

## 2024-10-23 ENCOUNTER — HOSPITAL ENCOUNTER (OUTPATIENT)
Dept: RADIOLOGY | Facility: HOSPITAL | Age: 54
Discharge: HOME | End: 2024-10-23
Attending: ORTHOPAEDIC SURGERY
Payer: COMMERCIAL

## 2024-10-23 VITALS
SYSTOLIC BLOOD PRESSURE: 117 MMHG | HEART RATE: 102 BPM | WEIGHT: 186.06 LBS | HEIGHT: 64 IN | BODY MASS INDEX: 31.77 KG/M2 | DIASTOLIC BLOOD PRESSURE: 78 MMHG

## 2024-10-23 DIAGNOSIS — M25.562 LEFT KNEE PAIN, UNSPECIFIED CHRONICITY: ICD-10-CM

## 2024-10-23 DIAGNOSIS — M25.562 CHRONIC PAIN OF LEFT KNEE: ICD-10-CM

## 2024-10-23 DIAGNOSIS — M54.16 LUMBAR RADICULOPATHY: ICD-10-CM

## 2024-10-23 DIAGNOSIS — M23.90 DEGENERATIVE LESION OF ARTICULAR CARTILAGE OF KNEE: Primary | ICD-10-CM

## 2024-10-23 DIAGNOSIS — M79.675 GREAT TOE PAIN, LEFT: ICD-10-CM

## 2024-10-23 DIAGNOSIS — G89.29 CHRONIC PAIN OF LEFT KNEE: ICD-10-CM

## 2024-10-23 PROCEDURE — 99999 PR PBB SHADOW E&M-EST. PATIENT-LVL IV: CPT | Mod: PBBFAC,,, | Performed by: ORTHOPAEDIC SURGERY

## 2024-10-23 PROCEDURE — 3008F BODY MASS INDEX DOCD: CPT | Mod: CPTII,S$GLB,, | Performed by: ORTHOPAEDIC SURGERY

## 2024-10-23 PROCEDURE — 1159F MED LIST DOCD IN RCRD: CPT | Mod: CPTII,S$GLB,, | Performed by: ORTHOPAEDIC SURGERY

## 2024-10-23 PROCEDURE — 3078F DIAST BP <80 MM HG: CPT | Mod: CPTII,S$GLB,, | Performed by: ORTHOPAEDIC SURGERY

## 2024-10-23 PROCEDURE — 73562 X-RAY EXAM OF KNEE 3: CPT | Mod: 26,59,RT, | Performed by: RADIOLOGY

## 2024-10-23 PROCEDURE — 73564 X-RAY EXAM KNEE 4 OR MORE: CPT | Mod: 26,LT,, | Performed by: RADIOLOGY

## 2024-10-23 PROCEDURE — 99215 OFFICE O/P EST HI 40 MIN: CPT | Mod: S$GLB,,, | Performed by: ORTHOPAEDIC SURGERY

## 2024-10-23 PROCEDURE — 73562 X-RAY EXAM OF KNEE 3: CPT | Mod: TC,RT

## 2024-10-23 PROCEDURE — 3074F SYST BP LT 130 MM HG: CPT | Mod: CPTII,S$GLB,, | Performed by: ORTHOPAEDIC SURGERY

## 2024-10-26 ENCOUNTER — HOSPITAL ENCOUNTER (OUTPATIENT)
Dept: RADIOLOGY | Facility: HOSPITAL | Age: 54
Discharge: HOME | End: 2024-10-26
Attending: ORTHOPAEDIC SURGERY
Payer: COMMERCIAL

## 2024-10-26 DIAGNOSIS — G89.29 CHRONIC PAIN OF LEFT KNEE: ICD-10-CM

## 2024-10-26 DIAGNOSIS — M25.562 CHRONIC PAIN OF LEFT KNEE: ICD-10-CM

## 2024-10-26 DIAGNOSIS — M23.90 DEGENERATIVE LESION OF ARTICULAR CARTILAGE OF KNEE: ICD-10-CM

## 2024-10-26 PROCEDURE — 73721 MRI JNT OF LWR EXTRE W/O DYE: CPT | Mod: 26,LT,, | Performed by: RADIOLOGY

## 2024-10-26 PROCEDURE — 73721 MRI JNT OF LWR EXTRE W/O DYE: CPT | Mod: TC,LT

## 2024-10-28 ENCOUNTER — LAB VISIT (OUTPATIENT)
Dept: LAB | Facility: HOSPITAL | Age: 54
End: 2024-10-28
Attending: OBSTETRICS & GYNECOLOGY
Payer: COMMERCIAL

## 2024-10-28 ENCOUNTER — OFFICE VISIT (OUTPATIENT)
Dept: OBSTETRICS AND GYNECOLOGY | Facility: CLINIC | Age: 54
End: 2024-10-28
Payer: COMMERCIAL

## 2024-10-28 VITALS
HEIGHT: 64 IN | SYSTOLIC BLOOD PRESSURE: 103 MMHG | DIASTOLIC BLOOD PRESSURE: 72 MMHG | WEIGHT: 184.75 LBS | BODY MASS INDEX: 31.54 KG/M2

## 2024-10-28 DIAGNOSIS — N92.6 IRREGULAR MENSTRUAL CYCLE: ICD-10-CM

## 2024-10-28 DIAGNOSIS — N39.41 URGE INCONTINENCE: ICD-10-CM

## 2024-10-28 DIAGNOSIS — E78.2 MIXED HYPERLIPIDEMIA: ICD-10-CM

## 2024-10-28 DIAGNOSIS — N92.0 MENORRHAGIA WITH REGULAR CYCLE: ICD-10-CM

## 2024-10-28 DIAGNOSIS — R41.3 MEMORY DEFICIT: ICD-10-CM

## 2024-10-28 DIAGNOSIS — G47.33 OSA (OBSTRUCTIVE SLEEP APNEA): ICD-10-CM

## 2024-10-28 DIAGNOSIS — D25.1 INTRAMURAL AND SUBMUCOUS LEIOMYOMA OF UTERUS: ICD-10-CM

## 2024-10-28 DIAGNOSIS — D25.0 INTRAMURAL AND SUBMUCOUS LEIOMYOMA OF UTERUS: ICD-10-CM

## 2024-10-28 DIAGNOSIS — K21.9 GASTROESOPHAGEAL REFLUX DISEASE, UNSPECIFIED WHETHER ESOPHAGITIS PRESENT: ICD-10-CM

## 2024-10-28 DIAGNOSIS — D25.9 UTERINE LEIOMYOMA, UNSPECIFIED LOCATION: Primary | ICD-10-CM

## 2024-10-28 LAB — HCG INTACT+B SERPL-ACNC: <2.4 MIU/ML

## 2024-10-28 PROCEDURE — 3074F SYST BP LT 130 MM HG: CPT | Mod: CPTII,S$GLB,, | Performed by: OBSTETRICS & GYNECOLOGY

## 2024-10-28 PROCEDURE — 3078F DIAST BP <80 MM HG: CPT | Mod: CPTII,S$GLB,, | Performed by: OBSTETRICS & GYNECOLOGY

## 2024-10-28 PROCEDURE — 1160F RVW MEDS BY RX/DR IN RCRD: CPT | Mod: CPTII,S$GLB,, | Performed by: OBSTETRICS & GYNECOLOGY

## 2024-10-28 PROCEDURE — 99999 PR PBB SHADOW E&M-EST. PATIENT-LVL III: CPT | Mod: PBBFAC,,, | Performed by: OBSTETRICS & GYNECOLOGY

## 2024-10-28 PROCEDURE — 84702 CHORIONIC GONADOTROPIN TEST: CPT | Performed by: OBSTETRICS & GYNECOLOGY

## 2024-10-28 PROCEDURE — 36415 COLL VENOUS BLD VENIPUNCTURE: CPT | Performed by: OBSTETRICS & GYNECOLOGY

## 2024-10-28 PROCEDURE — 3008F BODY MASS INDEX DOCD: CPT | Mod: CPTII,S$GLB,, | Performed by: OBSTETRICS & GYNECOLOGY

## 2024-10-28 PROCEDURE — 99212 OFFICE O/P EST SF 10 MIN: CPT | Mod: S$GLB,,, | Performed by: OBSTETRICS & GYNECOLOGY

## 2024-10-28 PROCEDURE — 1159F MED LIST DOCD IN RCRD: CPT | Mod: CPTII,S$GLB,, | Performed by: OBSTETRICS & GYNECOLOGY

## 2024-10-30 ENCOUNTER — OFFICE VISIT (OUTPATIENT)
Dept: SPORTS MEDICINE | Facility: CLINIC | Age: 54
End: 2024-10-30
Payer: COMMERCIAL

## 2024-10-30 ENCOUNTER — HOSPITAL ENCOUNTER (OUTPATIENT)
Dept: RADIOLOGY | Facility: HOSPITAL | Age: 54
Discharge: HOME | End: 2024-10-30
Attending: ORTHOPAEDIC SURGERY
Payer: COMMERCIAL

## 2024-10-30 VITALS
SYSTOLIC BLOOD PRESSURE: 135 MMHG | DIASTOLIC BLOOD PRESSURE: 80 MMHG | HEIGHT: 64 IN | HEART RATE: 90 BPM | WEIGHT: 187.25 LBS | BODY MASS INDEX: 31.97 KG/M2

## 2024-10-30 DIAGNOSIS — M17.12 PRIMARY OSTEOARTHRITIS OF LEFT KNEE: ICD-10-CM

## 2024-10-30 DIAGNOSIS — M25.562 CHRONIC PAIN OF LEFT KNEE: ICD-10-CM

## 2024-10-30 DIAGNOSIS — M23.90 DEGENERATIVE LESION OF ARTICULAR CARTILAGE OF KNEE: ICD-10-CM

## 2024-10-30 DIAGNOSIS — M23.312 INTERNAL DERANGEMENT OF LEFT KNEE INVOLVING ANTERIOR HORN OF MEDIAL MENISCUS: Primary | ICD-10-CM

## 2024-10-30 DIAGNOSIS — G89.29 CHRONIC PAIN OF LEFT KNEE: ICD-10-CM

## 2024-10-30 DIAGNOSIS — M23.352 INTERNAL DERANGEMENT OF LEFT KNEE INVOLVING POSTERIOR HORN OF LATERAL MENISCUS: ICD-10-CM

## 2024-10-30 PROCEDURE — 77073 BONE LENGTH STUDIES: CPT | Mod: 26,,, | Performed by: RADIOLOGY

## 2024-10-30 PROCEDURE — 3008F BODY MASS INDEX DOCD: CPT | Mod: CPTII,S$GLB,, | Performed by: ORTHOPAEDIC SURGERY

## 2024-10-30 PROCEDURE — 1159F MED LIST DOCD IN RCRD: CPT | Mod: CPTII,S$GLB,, | Performed by: ORTHOPAEDIC SURGERY

## 2024-10-30 PROCEDURE — 99214 OFFICE O/P EST MOD 30 MIN: CPT | Mod: S$GLB,,, | Performed by: ORTHOPAEDIC SURGERY

## 2024-10-30 PROCEDURE — 77073 BONE LENGTH STUDIES: CPT | Mod: TC

## 2024-10-30 PROCEDURE — 3079F DIAST BP 80-89 MM HG: CPT | Mod: CPTII,S$GLB,, | Performed by: ORTHOPAEDIC SURGERY

## 2024-10-30 PROCEDURE — 3075F SYST BP GE 130 - 139MM HG: CPT | Mod: CPTII,S$GLB,, | Performed by: ORTHOPAEDIC SURGERY

## 2024-10-30 PROCEDURE — 99999 PR PBB SHADOW E&M-EST. PATIENT-LVL III: CPT | Mod: PBBFAC,,, | Performed by: ORTHOPAEDIC SURGERY

## 2024-10-31 DIAGNOSIS — M23.312 INTERNAL DERANGEMENT OF LEFT KNEE INVOLVING ANTERIOR HORN OF MEDIAL MENISCUS: ICD-10-CM

## 2024-10-31 DIAGNOSIS — M23.352 INTERNAL DERANGEMENT OF LEFT KNEE INVOLVING POSTERIOR HORN OF LATERAL MENISCUS: Primary | ICD-10-CM

## 2024-10-31 DIAGNOSIS — M23.90 DEGENERATIVE LESION OF ARTICULAR CARTILAGE OF KNEE: ICD-10-CM

## 2024-11-07 ENCOUNTER — TELEPHONE (OUTPATIENT)
Dept: ORTHOPEDICS | Facility: CLINIC | Age: 54
End: 2024-11-07
Payer: COMMERCIAL

## 2024-11-07 DIAGNOSIS — M79.672 LEFT FOOT PAIN: Primary | ICD-10-CM

## 2024-11-08 ENCOUNTER — HOSPITAL ENCOUNTER (OUTPATIENT)
Dept: RADIOLOGY | Facility: HOSPITAL | Age: 54
Discharge: HOME | End: 2024-11-08
Attending: SURGERY
Payer: COMMERCIAL

## 2024-11-08 ENCOUNTER — OFFICE VISIT (OUTPATIENT)
Dept: ORTHOPEDICS | Facility: CLINIC | Age: 54
End: 2024-11-08
Payer: COMMERCIAL

## 2024-11-08 DIAGNOSIS — M79.672 LEFT FOOT PAIN: ICD-10-CM

## 2024-11-08 DIAGNOSIS — M79.675 GREAT TOE PAIN, LEFT: ICD-10-CM

## 2024-11-08 PROCEDURE — 99999 PR PBB SHADOW E&M-EST. PATIENT-LVL III: CPT | Mod: PBBFAC,,, | Performed by: SURGERY

## 2024-11-08 PROCEDURE — 73630 X-RAY EXAM OF FOOT: CPT | Mod: TC,LT

## 2024-11-08 PROCEDURE — 73630 X-RAY EXAM OF FOOT: CPT | Mod: 26,LT,, | Performed by: RADIOLOGY

## 2024-11-08 NOTE — PROGRESS NOTES
History of Present Illness    HPI:  Jess presents with pain in the left great toe, which started after an accident and surgery on her knee. The pain is localized to the distal phalanx at the tip of the great toe. After walking a block, she experiences a sensation of pressure on the toe. She describes the toe as feeling weak and mentions pain when moving it up and down.    She has seen a podiatrist and a dermatologist for the issue but feels the problem is more with the bone than the nail. She describes a sensation of intense pressure on the toe and believes it may be a nerve problem. She also notes the development of ridges on the toenail after the knee surgery.    Jess reports sensitivity and pain through the hallux valgus (bunion) area. Her foot has low arches, which have collapsed a bit over time. She emphasizes that the toe looked normal prior to the knee surgery and the problem started immediately after.    She denies any fractures or severe deformity of the toe.          OBJECTIVE:      There were no vitals filed for this visit.    Lower Extremity Exam    Physical Exam    Musculoskeletal: Left deltoid strength 5/5. Right deltoid strength 5/5. Left bicep strength 5/5. Right bicep strength 5/5. Left tricep strength 5/5. Right tricep strength 5/5. Left quadriceps strength 5/5. Right quadriceps strength 5/5. Left hamstring strength 5/5. Right hamstring strength 5/5. Left tibialis anterior strength 5/5. Right tibialis anterior strength 5/5. Left extensor hallucis longus strength 5/5. Right extensor hallucis longus strength 5/5. Strength 5/5 in all extremities.  MSK: Foot/Ankle - Right: Pain in distal phalanx of great toe. Hallux valgus (bunion) present. Low arched foot.  IMAGING:  - X-rays foot: No fractures or severe deformity             PLAN:  Jess was seen today for pain and consult.    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  - Will order MRI if EMG results are inconclusive.  - Follow up after EMG results are available to discuss findings and determine next steps in treatment.          Neo Crisostomo MD  [facility]  Orthopedic Surgery      This note was done with voice recognition software. Please excuse any errors missed in proof reading.   This note was generated with the assistance of ambient listening technology. Verbal consent was obtained by the patient and accompanying visitor(s) for the recording of patient appointment to facilitate this note. I attest to having reviewed and edited the generated note for accuracy, though some syntax or spelling errors may persist. Please contact the author of this note for any clarification.

## 2024-11-13 ENCOUNTER — TELEPHONE (OUTPATIENT)
Dept: SPORTS MEDICINE | Facility: CLINIC | Age: 54
End: 2024-11-13
Payer: COMMERCIAL

## 2024-11-13 NOTE — TELEPHONE ENCOUNTER
Spoke with patient about her upcoming surgery. I asked her if she has been having any mechanical symptoms in the knee. She states that she is have catching in her knee that causes pain, as well as feelings of instability due to pain.     I let her know that Dr. Odonnell will write an addendum for her note that includes these symptoms and we will resubmit to her insurance.     She understood and was grateful.   ----- Message from Antoinette sent at 11/13/2024  3:13 PM CST -----  Regarding: Missed Call  Contact: RADHA WILKINS [[id number]]  Returning a Missed Call    Caller:RADHA WILKINS [[id number]]    Returning call to :  Moises    Caller can be reached @: [phone] (home)       Nature of the call: Missed call

## 2024-11-13 NOTE — TELEPHONE ENCOUNTER
Called pt and LVM. Called to discuss her surgery not being authorized and what steps we would need to take in order to get it authorized. I asked her to call back or send us a portal message----- Message from Med Assistant Rosado sent at 11/12/2024 10:23 AM CST -----    ----- Message -----  From: Checo Isabel MD  Sent: 11/12/2024  10:15 AM CST  To: Janette Odonnell MD; Christi Sapp RN; #    P2P completed.  The procedure is NOT AUTHORIZED.  The unsuccessful non-operative treatment guideline has not been met.  However, the guidelines allow for authorization if there are mechanical symptoms.      If the provider performs an addendum to the medical record stating that they checked with the patient and they are having catching/popping/giving way, and this is submitted through the portal, the procedure can be authorized.

## 2024-11-15 ENCOUNTER — PATIENT MESSAGE (OUTPATIENT)
Dept: PREADMISSION TESTING | Facility: HOSPITAL | Age: 54
End: 2024-11-15
Payer: COMMERCIAL

## 2024-11-15 ENCOUNTER — PATIENT MESSAGE (OUTPATIENT)
Dept: SLEEP MEDICINE | Facility: CLINIC | Age: 54
End: 2024-11-15
Payer: COMMERCIAL

## 2024-11-15 DIAGNOSIS — G47.411 NARCOLEPSY WITH CATAPLEXY: ICD-10-CM

## 2024-11-15 RX ORDER — SOLRIAMFETOL 150 MG/1
TABLET, FILM COATED ORAL
Qty: 30 TABLET | Refills: 5 | Status: SHIPPED | OUTPATIENT
Start: 2024-11-15

## 2024-11-22 ENCOUNTER — OFFICE VISIT (OUTPATIENT)
Dept: SPORTS MEDICINE | Facility: CLINIC | Age: 54
End: 2024-11-22
Payer: COMMERCIAL

## 2024-11-22 VITALS
WEIGHT: 183.63 LBS | SYSTOLIC BLOOD PRESSURE: 104 MMHG | HEIGHT: 64 IN | HEART RATE: 92 BPM | DIASTOLIC BLOOD PRESSURE: 70 MMHG | BODY MASS INDEX: 31.35 KG/M2

## 2024-11-22 DIAGNOSIS — M17.12 PRIMARY OSTEOARTHRITIS OF LEFT KNEE: ICD-10-CM

## 2024-11-22 DIAGNOSIS — M23.90 DEGENERATIVE LESION OF ARTICULAR CARTILAGE OF KNEE: ICD-10-CM

## 2024-11-22 DIAGNOSIS — M23.312 INTERNAL DERANGEMENT OF LEFT KNEE INVOLVING ANTERIOR HORN OF MEDIAL MENISCUS: Primary | ICD-10-CM

## 2024-11-22 DIAGNOSIS — M23.352 INTERNAL DERANGEMENT OF LEFT KNEE INVOLVING POSTERIOR HORN OF LATERAL MENISCUS: ICD-10-CM

## 2024-11-22 PROCEDURE — 99999 PR PBB SHADOW E&M-EST. PATIENT-LVL IV: CPT | Mod: PBBFAC,,, | Performed by: PHYSICIAN ASSISTANT

## 2024-11-22 RX ORDER — HYDROCODONE BITARTRATE AND ACETAMINOPHEN 7.5; 325 MG/1; MG/1
1 TABLET ORAL EVERY 6 HOURS PRN
Qty: 20 TABLET | Refills: 0 | Status: SHIPPED | OUTPATIENT
Start: 2024-11-22

## 2024-11-22 RX ORDER — MUPIROCIN 20 MG/G
OINTMENT TOPICAL
OUTPATIENT
Start: 2024-11-22

## 2024-11-22 RX ORDER — ASPIRIN 81 MG/1
81 TABLET ORAL DAILY
Qty: 28 TABLET | Refills: 0 | Status: SHIPPED | OUTPATIENT
Start: 2024-11-22

## 2024-11-22 NOTE — PROGRESS NOTES
PREOPERATIVE APPOINTMENT    History 11/22/2024:  Jess returns here today for follow-up evaluation of her left knee and to complete her preop.  She continues to have pain and functional limitations despite conservative treatment.  Surgical intervention has been recommended and she is scheduled to undergo a left knee arthroscopy with medial and lateral meniscus repair versus menisectomy and chondroplasty on November 26, 2024.    History 10/30/2024:  Jess returns to clinic today to discuss results of her MRI for her left knee pain and to discuss surgical planning. She denies any event that would cause a new injury to her left knee.  She is still having mechanical symptoms in her knee.  She feels subjective feelings of instability.  She states her knee will give out at times.    History 10/23/2024  54 y.o. Female with a history of left knee pain who was referred to us by Ganesh Musa PA-C to discuss a possible cartilage procedure. I performed a chondroplasty and synovectomy on her left knee on 4/28/2022. She has been treated in there interim by Ganesh Musa PA-C for chronic left knee pain and lumbar radiculopathy. 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  She continues to have pain and wants to discuss a chondral procedure for her medial femoral condyle defect.    Is affecting ADLs.  Pain is 1/10 at it's worst.        PAST MEDICAL HISTORY    Past Medical History:   Diagnosis Date    Arthritis     Chronic back pain     Chronic neck pain     Fibrocystic breast     Fibroid, uterus     GERD (gastroesophageal reflux disease)     Herpes     Migraine headache     Narcolepsy     Nephrolithiasis 5/20/2018    Sciatica of right side 5/20/2018    Scoliosis     Urge incontinence 5/20/2018       PAST SURGICAL HISTORY     Past Surgical History:   Procedure Laterality Date    ARTHROSCOPIC CHONDROPLASTY OF KNEE JOINT Left 04/28/2022    Procedure: ARTHROSCOPY, KNEE, WITH CHONDROPLASTY;   Surgeon: Janette Odonnell MD;  Location: [facility] OR;  Service: Orthopedics;  Laterality: Left;     SECTION      EMBOLIZATION N/A 2020    Procedure: EMBOLIZATION, BLOOD VESSEL;  Surgeon: Dean Wheeler MD;  Location: [facility] CATH LAB;  Service: Radiology;  Laterality: N/A;    NASAL TURBINATE REDUCTION Bilateral 2022    Procedure: REDUCTION, NASAL TURBINATE john bullosa;  Surgeon: Waldo Giles MD;  [address];  Service: ENT;  Laterality: Bilateral;    Scoliosis surgery      SINUS SURGERY      SYNOVECTOMY OF KNEE Left 2022    Procedure: SYNOVECTOMY, KNEE;  Surgeon: Janette Odonnell MD;  Location: [facility] OR;  Service: Orthopedics;  Laterality: Left;    UMBILICAL HERNIA REPAIR         FAMILY HISTORY    Family History   Problem Relation Name Age of Onset    Breast cancer Paternal Aunt      Allergies Other nephew     Asthma Son          exercise induced    Eczema Daughter      Colon cancer Neg Hx      Ovarian cancer Neg Hx         SOCIAL HISTORY    Social History     Socioeconomic History    Marital status: Legally    Tobacco Use    Smoking status: Never    Smokeless tobacco: Never   Substance and Sexual Activity    Alcohol use: Not Currently    Drug use: No    Sexual activity: Not Currently     Partners: Male     Birth control/protection: None     Social Drivers of Health     Financial Resource Strain: High Risk (3/21/2024)    Overall Financial Resource Strain (CARDIA)     Difficulty of Paying Living Expenses: Very hard   Food Insecurity: Food Insecurity Present (3/21/2024)    Hunger Vital Sign     Worried About Running Out of Food in the Last Year: Often true     Ran Out of Food in the Last Year: Often true   Physical Activity: Unknown (3/21/2024)    Exercise Vital Sign     Days of Exercise per Week: 0 days   Stress: Stress Concern Present (3/21/2024)    [employer] [place] [employer] - Occupational Stress Questionnaire     Feeling of Stress : Very much    Housing Stability: High Risk (3/21/2024)    Housing Stability Vital Sign     Unable to Pay for Housing in the Last Year: Yes     Unstable Housing in the Last Year: No       MEDICATIONS      Current Outpatient Medications:     atorvastatin (LIPITOR) 40 MG tablet, Take 1 tablet by mouth every evening., Disp: , Rfl:     azelastine (ASTELIN) 137 mcg (0.1 %) nasal spray, SPRAY 1 SPRAY IN EACH NOSTRIL TWICE DAILY, Disp: 90 mL, Rfl: 1    azelastine (ASTELIN) 137 mcg (0.1 %) nasal spray, 1 spray (137 mcg total) by Nasal route 2 (two) times daily., Disp: 30 mL, Rfl: 3    calcium carbonate (OS-TIFFANIE) 500 mg calcium (1,250 mg) tablet, Take 1 tablet by mouth once daily., Disp: , Rfl:     ibuprofen (ADVIL,MOTRIN) 200 MG tablet, Take 200 mg by mouth every 6 (six) hours as needed for Pain., Disp: , Rfl:     Lactobac no.41/Bifidobact no.7 (PROBIOTIC-10 ORAL), Take by mouth., Disp: , Rfl:     magnesium oxide (MAG-OX) 400 mg (241.3 mg magnesium) tablet, Take 400 mg by mouth once daily., Disp: , Rfl:     methylPREDNISolone (MEDROL DOSEPACK) 4 mg tablet, Take 4 mg by mouth., Disp: , Rfl:     mometasone (ELOCON) 0.1 % ointment, Apply topically once daily., Disp: 45 g, Rfl: 2    multivitamin with minerals tablet, Take 1 tablet by mouth once daily., Disp: , Rfl:     omega-3/dha/epa/dpa/fish oil (OMEGA-3 2100 ORAL), Take by mouth., Disp: , Rfl:     solriamfetoL (SUNOSI) 150 mg Tab, Take1 tablet by mouth every morning, Disp: 30 tablet, Rfl: 5    tranexamic acid (LYSTEDA) 650 mg tablet, Take 2 tablets (1,300 mg total) by mouth 3 (three) times daily. Do not take for longer than 5 days, Disp: 30 tablet, Rfl: 1  No current facility-administered medications for this visit.    Facility-Administered Medications Ordered in Other Visits:     LIDOcaine (PF) 10 mg/ml (1%) injection 10 mg, 1 mL, Intradermal, Once, Checo Escobar MD    ALLERGIES     Review of patient's allergies indicates:   Allergen Reactions    Ibuprofen Other (See Comments)     " It makes my "ears hurt" when I take large doses.    Opioids - morphine analogues Other (See Comments)     Morphine causes headaches         REVIEW OF SYSTEMS   Constitution: Negative. Negative for chills, fever and night sweats.   HENT: Negative for congestion and headaches.    Eyes: Negative for blurred vision, left vision loss and right vision loss.   Cardiovascular: Negative for chest pain and syncope.   Respiratory: Negative for cough and shortness of breath.    Endocrine: Negative for polydipsia, polyphagia and polyuria.   Hematologic/Lymphatic: Negative for bleeding problem. Does not bruise/bleed easily.   Skin: Negative for dry skin, itching and rash.   Musculoskeletal: Negative for falls. Positive for left knee pain   Gastrointestinal: Negative for abdominal pain and bowel incontinence.   Genitourinary: Negative for bladder incontinence and nocturia.   Neurological: Negative for disturbances in coordination, loss of balance and seizures.   Psychiatric/Behavioral: Negative for depression. The patient does not have insomnia.    Allergic/Immunologic: Negative for hives and persistent infections.     PHYSICAL EXAMINATION    Vitals: /70   Pulse 92   Ht 5' 4" (1.626 m)   Wt 83.3 kg (183 lb 10.3 oz)   BMI 31.52 kg/m²     General: The patient appears active and healthy with no apparent physical problems.  No disturbance of mood or affect is demonstrated. Alert and Oriented.    HEENT: Eyes normal, pupils equally round, nose normal.    Resp: Equal and symmetrical chest rises. No wheezing    CV: Regular rate    Neck: Supple; nonpainful range of motion.    Extremities: no cyanosis, clubbing, edema, or diffuse swelling.  Palpable pulses, good capillary refill of the digits.  No coolness, discoloration, edema or obvious varicosities.    Skin: no lesions noted.    Lymphatic: no detected adenopathy in the upper or lower extremities.    Neurologic: normal mental status, normal reflexes, normal gait and balance.  " Patient is alert and oriented to person, place and time.  No flaccidity or spasticity is noted.  No motor or sensory deficits are noted.  Light touch is intact    Orthopaedic: KNEE EXAM - LEFT    Inspection:   Normal skin color and appearance with no scars, no ecchymosis and no effusion.      ROM:   0° - 145°.      Palpation:   There is no tenderness along medial plica, medial collateral ligament, pes bursa, lateral joint line, iliotibial band, lateral collateral ligament, popliteal fossa, patellar tendon, or quadriceps tendon. + tenderness medial femoral condyle, and medial joint line     Stability: - anterior drawer, - Lachman, - pivot shift and - posterior drawer.      No instability with varus or valgus stress at 0° or 30°. Negative dial  test at 30° and 90°.    Tests:   + pain with Krystens test.  - patellar compression - grind test, - patellofemoral crepitus.  There is no patellar apprehension.     - J Sign. - Henry's. - patellar tilt. - Andreia. Lateral patella translation  2 quadrants. Medial patella translation 2 quadrants    Motor:   Quadriceps strength is 5/5 and hamstrings strength is 5/5. Hamstrings show no tightness.      Neuro:   Distal neurovascular status is normal    Vascular: Negative Homans and no palpable popliteal cords. 2+ pedal pulse with brisk cap refill    Gait antalgic     THORACIC/LUMBAR SPINE     Inspection   Normal skin color and appearance, no ecchymosis, no swelling, and no scars.  No increased lumbar lordosis. Pelvis is level without tilt.  No scoliosis.      ROM   Full forward flexion, extension, side bending and rotation.  There is no increased pain with ROM testing.      Palpation     There is no tenderness of the spinous processes, iliac crests, posterior superior iliac spines, sacroiliac joints, sacrum, coccyx, and greater trochanters.      Neuro   Straight leg raise is negative bilaterally.      L4 neurologic testing reveals 5/5 strength in TA , 2/2 knee reflex, and normal  sensation in L4 dermatome.      L5 neurologic testing reveals 5/5 strength in EHL and normal sensation in L5 dermatome.      S1 neurologic testing reveals 5/5 strength in peroneals, 2/2 achilles tendon reflex, and normal sensation in S1 dermatome.      IMAGING    Narrative & Impression  EXAMINATION:  XR KNEE ORTHO LEFT WITH FLEXION     CLINICAL HISTORY:  Chronic pain left knee     TECHNIQUE:  AP standing view of both knees, PA flexion standing views of both knees, and Merchant views of both knees were performed. A lateral view of the left knee was also performed.     COMPARISON:  March 7, 2022 standard views and after review of MRI of left knee March 8, 2022     FINDINGS:  Left knee:     No fracture.  Question mild narrowing medial compartment, preserved lateral and patellofemoral compartments and no periarticular spurring.  Tibiofemoral chondrocalcinosis changes as before.  No suprapatellar bursal effusion or prepatellar soft tissue swelling.  Left knee findings similar to earlier exam.     Right knee:     No fracture.  Question mild narrowing medial compartment, preserved lateral and patellofemoral compartments and no periarticular spurring.  Tibiofemoral chondrocalcinosis changes as before.  No prepatellar soft tissue swelling.  Right knee findings similar to earlier exam.     Impression:     As above       Electronically signed by:Koko Ibarra  Date:                                            11/24/2023  Time:                                           08:29    Kellgren Clay Grade 3    X-Ray Foot Complete 3 view Left  Narrative: EXAMINATION:  XR FOOT COMPLETE 3 VIEW LEFT    CLINICAL HISTORY:  .  Pain in left foot    TECHNIQUE:  AP, lateral and oblique views of the left foot were performed.    COMPARISON:  None    FINDINGS:  No displaced fracture, dislocation or osseous destructive process.  Mild hallux valgus deformity with flattening of the plantar arch.  Otherwise, alignment is maintained.  No abnormal  radiopaque retained foreign body.  Impression: No acute osseous abnormalities.    Electronically signed by: Donavon Goldstein MD  Date:    11/08/2024  Time:    15:20        IMPRESSION       ICD-10-CM ICD-9-CM   1. Internal derangement of left knee involving posterior horn of lateral meniscus  M23.352 717.2   2. Internal derangement of left knee involving anterior horn of medial meniscus  M23.312 717.1   3. Degenerative lesion of articular cartilage of knee  M23.90 717.3             MEDICATIONS PRESCRIBED      Aspirin 81 mg  Hydrocodone 7.5/325 mg      RECOMMENDATIONS     Surgical versus non-surgical options discussed today; left knee arthroscopy with medial and lateral meniscus repair versus menisectomy and chondroplasty   We had an in depth discussion about steroid injections and HA injections prior to proceeding with a knee scope as well as activity modifications.  She elected to proceed with surgery.  I also discussed with her that any type of cartilage surgery at this point would be unreasonable based on her meniscal pathology.    The patient elected to proceed with operative intervention after all risks, benefits, and alternatives were discussed with the patient.  The risks of surgery include bleeding, scar formation, injuries to nerves, arteries and veins, need for additional surgeries, blood clots, infections, inability to return to the same level of the performance and the risk of anesthesia.   Informed consent was obtained  Physical therapy was ordered - [facility]        All questions were answered, pt will contact us for questions or concerns in the interim.        Ganesh Musa PA-C, [facility]

## 2024-11-22 NOTE — H&P
H&P    History 11/22/2024:  Jess returns here today for follow-up evaluation of her left knee and to complete her preop.  She continues to have pain and functional limitations despite conservative treatment.  Surgical intervention has been recommended and she is scheduled to undergo a left knee arthroscopy with medial and lateral meniscus repair versus menisectomy and chondroplasty on November 26, 2024.    History 10/30/2024:  Jess returns to clinic today to discuss results of her MRI for her left knee pain and to discuss surgical planning. She denies any event that would cause a new injury to her left knee.  She is still having mechanical symptoms in her knee.  She feels subjective feelings of instability.  She states her knee will give out at times.    History 10/23/2024  54 y.o. Female with a history of left knee pain who was referred to us by Ganesh Musa PA-C to discuss a possible cartilage procedure. I performed a chondroplasty and synovectomy on her left knee on 4/28/2022. She has been treated in there interim by Ganesh Musa PA-C for chronic left knee pain and lumbar radiculopathy. 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  She continues to have pain and wants to discuss a chondral procedure for her medial femoral condyle defect.    Is affecting ADLs.  Pain is 1/10 at it's worst.        PAST MEDICAL HISTORY    Past Medical History:   Diagnosis Date    Arthritis     Chronic back pain     Chronic neck pain     Fibrocystic breast     Fibroid, uterus     GERD (gastroesophageal reflux disease)     Herpes     Migraine headache     Narcolepsy     Nephrolithiasis 5/20/2018    Sciatica of right side 5/20/2018    Scoliosis     Urge incontinence 5/20/2018       PAST SURGICAL HISTORY     Past Surgical History:   Procedure Laterality Date    ARTHROSCOPIC CHONDROPLASTY OF KNEE JOINT Left 04/28/2022    Procedure: ARTHROSCOPY, KNEE, WITH CHONDROPLASTY;  Surgeon: Janette Odonnell,  MD;  Location: [facility] OR;  Service: Orthopedics;  Laterality: Left;     SECTION      EMBOLIZATION N/A 2020    Procedure: EMBOLIZATION, BLOOD VESSEL;  Surgeon: Dean Wheeler MD;  Location: [facility] CATH LAB;  Service: Radiology;  Laterality: N/A;    NASAL TURBINATE REDUCTION Bilateral 2022    Procedure: REDUCTION, NASAL TURBINATE john bullosa;  Surgeon: Waldo Giles MD;  [address];  Service: ENT;  Laterality: Bilateral;    Scoliosis surgery      SINUS SURGERY      SYNOVECTOMY OF KNEE Left 2022    Procedure: SYNOVECTOMY, KNEE;  Surgeon: Janette Odonnell MD;  Location: [facility] OR;  Service: Orthopedics;  Laterality: Left;    UMBILICAL HERNIA REPAIR         FAMILY HISTORY    Family History   Problem Relation Name Age of Onset    Breast cancer Paternal Aunt      Allergies Other nephew     Asthma Son          exercise induced    Eczema Daughter      Colon cancer Neg Hx      Ovarian cancer Neg Hx         SOCIAL HISTORY    Social History     Socioeconomic History    Marital status: Legally    Tobacco Use    Smoking status: Never    Smokeless tobacco: Never   Substance and Sexual Activity    Alcohol use: Not Currently    Drug use: No    Sexual activity: Not Currently     Partners: Male     Birth control/protection: None     Social Drivers of Health     Financial Resource Strain: High Risk (3/21/2024)    Overall Financial Resource Strain (CARDIA)     Difficulty of Paying Living Expenses: Very hard   Food Insecurity: Food Insecurity Present (3/21/2024)    Hunger Vital Sign     Worried About Running Out of Food in the Last Year: Often true     Ran Out of Food in the Last Year: Often true   Physical Activity: Unknown (3/21/2024)    Exercise Vital Sign     Days of Exercise per Week: 0 days   Stress: Stress Concern Present (3/21/2024)    Citizen of [place] [employer] - Occupational Stress Questionnaire     Feeling of Stress : Very much   Housing Stability: High Risk  "(3/21/2024)    Housing Stability Vital Sign     Unable to Pay for Housing in the Last Year: Yes     Unstable Housing in the Last Year: No       MEDICATIONS      Current Outpatient Medications:     atorvastatin (LIPITOR) 40 MG tablet, Take 1 tablet by mouth every evening., Disp: , Rfl:     azelastine (ASTELIN) 137 mcg (0.1 %) nasal spray, SPRAY 1 SPRAY IN EACH NOSTRIL TWICE DAILY, Disp: 90 mL, Rfl: 1    azelastine (ASTELIN) 137 mcg (0.1 %) nasal spray, 1 spray (137 mcg total) by Nasal route 2 (two) times daily., Disp: 30 mL, Rfl: 3    calcium carbonate (OS-TIFFANIE) 500 mg calcium (1,250 mg) tablet, Take 1 tablet by mouth once daily., Disp: , Rfl:     ibuprofen (ADVIL,MOTRIN) 200 MG tablet, Take 200 mg by mouth every 6 (six) hours as needed for Pain., Disp: , Rfl:     Lactobac no.41/Bifidobact no.7 (PROBIOTIC-10 ORAL), Take by mouth., Disp: , Rfl:     magnesium oxide (MAG-OX) 400 mg (241.3 mg magnesium) tablet, Take 400 mg by mouth once daily., Disp: , Rfl:     methylPREDNISolone (MEDROL DOSEPACK) 4 mg tablet, Take 4 mg by mouth., Disp: , Rfl:     mometasone (ELOCON) 0.1 % ointment, Apply topically once daily., Disp: 45 g, Rfl: 2    multivitamin with minerals tablet, Take 1 tablet by mouth once daily., Disp: , Rfl:     omega-3/dha/epa/dpa/fish oil (OMEGA-3 2100 ORAL), Take by mouth., Disp: , Rfl:     solriamfetoL (SUNOSI) 150 mg Tab, Take1 tablet by mouth every morning, Disp: 30 tablet, Rfl: 5    tranexamic acid (LYSTEDA) 650 mg tablet, Take 2 tablets (1,300 mg total) by mouth 3 (three) times daily. Do not take for longer than 5 days, Disp: 30 tablet, Rfl: 1  No current facility-administered medications for this visit.    Facility-Administered Medications Ordered in Other Visits:     LIDOcaine (PF) 10 mg/ml (1%) injection 10 mg, 1 mL, Intradermal, Once, Checo Escobar MD    ALLERGIES     Review of patient's allergies indicates:   Allergen Reactions    Ibuprofen Other (See Comments)     It makes my "ears hurt" when " "I take large doses.    Opioids - morphine analogues Other (See Comments)     Morphine causes headaches         REVIEW OF SYSTEMS   Constitution: Negative. Negative for chills, fever and night sweats.   HENT: Negative for congestion and headaches.    Eyes: Negative for blurred vision, left vision loss and right vision loss.   Cardiovascular: Negative for chest pain and syncope.   Respiratory: Negative for cough and shortness of breath.    Endocrine: Negative for polydipsia, polyphagia and polyuria.   Hematologic/Lymphatic: Negative for bleeding problem. Does not bruise/bleed easily.   Skin: Negative for dry skin, itching and rash.   Musculoskeletal: Negative for falls. Positive for left knee pain   Gastrointestinal: Negative for abdominal pain and bowel incontinence.   Genitourinary: Negative for bladder incontinence and nocturia.   Neurological: Negative for disturbances in coordination, loss of balance and seizures.   Psychiatric/Behavioral: Negative for depression. The patient does not have insomnia.    Allergic/Immunologic: Negative for hives and persistent infections.     PHYSICAL EXAMINATION    Vitals: /70   Pulse 92   Ht 5' 4" (1.626 m)   Wt 83.3 kg (183 lb 10.3 oz)   BMI 31.52 kg/m²     General: The patient appears active and healthy with no apparent physical problems.  No disturbance of mood or affect is demonstrated. Alert and Oriented.    HEENT: Eyes normal, pupils equally round, nose normal.    Resp: Equal and symmetrical chest rises. No wheezing    CV: Regular rate    Neck: Supple; nonpainful range of motion.    Extremities: no cyanosis, clubbing, edema, or diffuse swelling.  Palpable pulses, good capillary refill of the digits.  No coolness, discoloration, edema or obvious varicosities.    Skin: no lesions noted.    Lymphatic: no detected adenopathy in the upper or lower extremities.    Neurologic: normal mental status, normal reflexes, normal gait and balance.  Patient is alert and oriented " to person, place and time.  No flaccidity or spasticity is noted.  No motor or sensory deficits are noted.  Light touch is intact    Orthopaedic: KNEE EXAM - LEFT    Inspection:   Normal skin color and appearance with no scars, no ecchymosis and no effusion.      ROM:   0° - 145°.      Palpation:   There is no tenderness along medial plica, medial collateral ligament, pes bursa, lateral joint line, iliotibial band, lateral collateral ligament, popliteal fossa, patellar tendon, or quadriceps tendon. + tenderness medial femoral condyle, and medial joint line     Stability: - anterior drawer, - Lachman, - pivot shift and - posterior drawer.      No instability with varus or valgus stress at 0° or 30°. Negative dial  test at 30° and 90°.    Tests:   + pain with Krystens test.  - patellar compression - grind test, - patellofemoral crepitus.  There is no patellar apprehension.     - J Sign. - Henry's. - patellar tilt. - Andreia. Lateral patella translation  2 quadrants. Medial patella translation 2 quadrants    Motor:   Quadriceps strength is 5/5 and hamstrings strength is 5/5. Hamstrings show no tightness.      Neuro:   Distal neurovascular status is normal    Vascular: Negative Homans and no palpable popliteal cords. 2+ pedal pulse with brisk cap refill    Gait antalgic     THORACIC/LUMBAR SPINE     Inspection   Normal skin color and appearance, no ecchymosis, no swelling, and no scars.  No increased lumbar lordosis. Pelvis is level without tilt.  No scoliosis.      ROM   Full forward flexion, extension, side bending and rotation.  There is no increased pain with ROM testing.      Palpation     There is no tenderness of the spinous processes, iliac crests, posterior superior iliac spines, sacroiliac joints, sacrum, coccyx, and greater trochanters.      Neuro   Straight leg raise is negative bilaterally.      L4 neurologic testing reveals 5/5 strength in TA , 2/2 knee reflex, and normal sensation in L4 dermatome.      L5  neurologic testing reveals 5/5 strength in EHL and normal sensation in L5 dermatome.      S1 neurologic testing reveals 5/5 strength in peroneals, 2/2 achilles tendon reflex, and normal sensation in S1 dermatome.      IMAGING    Narrative & Impression  EXAMINATION:  XR KNEE ORTHO LEFT WITH FLEXION     CLINICAL HISTORY:  Chronic pain left knee     TECHNIQUE:  AP standing view of both knees, PA flexion standing views of both knees, and Merchant views of both knees were performed. A lateral view of the left knee was also performed.     COMPARISON:  March 7, 2022 standard views and after review of MRI of left knee March 8, 2022     FINDINGS:  Left knee:     No fracture.  Question mild narrowing medial compartment, preserved lateral and patellofemoral compartments and no periarticular spurring.  Tibiofemoral chondrocalcinosis changes as before.  No suprapatellar bursal effusion or prepatellar soft tissue swelling.  Left knee findings similar to earlier exam.     Right knee:     No fracture.  Question mild narrowing medial compartment, preserved lateral and patellofemoral compartments and no periarticular spurring.  Tibiofemoral chondrocalcinosis changes as before.  No prepatellar soft tissue swelling.  Right knee findings similar to earlier exam.     Impression:     As above       Electronically signed by:Koko Ibarra  Date:                                            11/24/2023  Time:                                           08:29    Kellgren Clay Grade 3    X-Ray Foot Complete 3 view Left  Narrative: EXAMINATION:  XR FOOT COMPLETE 3 VIEW LEFT    CLINICAL HISTORY:  .  Pain in left foot    TECHNIQUE:  AP, lateral and oblique views of the left foot were performed.    COMPARISON:  None    FINDINGS:  No displaced fracture, dislocation or osseous destructive process.  Mild hallux valgus deformity with flattening of the plantar arch.  Otherwise, alignment is maintained.  No abnormal radiopaque retained foreign  body.  Impression: No acute osseous abnormalities.    Electronically signed by: Donavon Goldstein MD  Date:    11/08/2024  Time:    15:20        IMPRESSION       ICD-10-CM ICD-9-CM   1. Internal derangement of left knee involving posterior horn of lateral meniscus  M23.352 717.2   2. Internal derangement of left knee involving anterior horn of medial meniscus  M23.312 717.1   3. Degenerative lesion of articular cartilage of knee  M23.90 717.3             MEDICATIONS PRESCRIBED      Aspirin 81 mg  Hydrocodone 7.5/325 mg      RECOMMENDATIONS     Surgical versus non-surgical options discussed today; left knee arthroscopy with medial and lateral meniscus repair versus menisectomy and chondroplasty   We had an in depth discussion about steroid injections and HA injections prior to proceeding with a knee scope as well as activity modifications.  She elected to proceed with surgery.  I also discussed with her that any type of cartilage surgery at this point would be unreasonable based on her meniscal pathology.    The patient elected to proceed with operative intervention after all risks, benefits, and alternatives were discussed with the patient.  The risks of surgery include bleeding, scar formation, injuries to nerves, arteries and veins, need for additional surgeries, blood clots, infections, inability to return to the same level of the performance and the risk of anesthesia.   Informed consent was obtained  Physical therapy was ordered - [facility]        All questions were answered, pt will contact us for questions or concerns in the interim.        Ganesh Musa PA-C, [facility]

## 2024-11-25 ENCOUNTER — TELEPHONE (OUTPATIENT)
Dept: SPORTS MEDICINE | Facility: CLINIC | Age: 54
End: 2024-11-25
Payer: COMMERCIAL

## 2024-11-25 NOTE — TELEPHONE ENCOUNTER
Tried to call patient to see if she still wants to do her surgery tomorrow as we were just informed that her surgery has been approved. No answer, left message for her to call me back as soon as possible.

## 2024-12-04 ENCOUNTER — PATIENT MESSAGE (OUTPATIENT)
Dept: OBSTETRICS AND GYNECOLOGY | Facility: CLINIC | Age: 54
End: 2024-12-04
Payer: COMMERCIAL

## 2024-12-05 ENCOUNTER — TELEPHONE (OUTPATIENT)
Dept: SPORTS MEDICINE | Facility: CLINIC | Age: 54
End: 2024-12-05
Payer: COMMERCIAL

## 2024-12-05 NOTE — TELEPHONE ENCOUNTER
Patient called stating that she may be pregnant as she got a positive test results. She states that she has been going back and forth with her doctors about this because she is perimenopausal but now she has a positive test. She is scheduled for knee surgery with Dr. Odonnell on 12/12 and is not able to get in with ob/gyn until 12/10. I told her we can keep her scheduled as is for now (she did not want to cancel) but if she is pregnant, we will have to postpone her knee surgery. She will let us know after her ob/gyn visit on Tuesday.

## 2024-12-09 ENCOUNTER — TELEPHONE (OUTPATIENT)
Dept: OBSTETRICS AND GYNECOLOGY | Facility: CLINIC | Age: 54
End: 2024-12-09
Payer: COMMERCIAL

## 2024-12-10 ENCOUNTER — LAB VISIT (OUTPATIENT)
Dept: LAB | Facility: OTHER | Age: 54
End: 2024-12-10
Payer: COMMERCIAL

## 2024-12-10 ENCOUNTER — OFFICE VISIT (OUTPATIENT)
Dept: OBSTETRICS AND GYNECOLOGY | Facility: CLINIC | Age: 54
End: 2024-12-10
Payer: COMMERCIAL

## 2024-12-10 ENCOUNTER — CLINICAL SUPPORT (OUTPATIENT)
Dept: OBSTETRICS AND GYNECOLOGY | Facility: CLINIC | Age: 54
End: 2024-12-10
Payer: COMMERCIAL

## 2024-12-10 ENCOUNTER — TELEPHONE (OUTPATIENT)
Dept: OBSTETRICS AND GYNECOLOGY | Facility: CLINIC | Age: 54
End: 2024-12-10
Payer: COMMERCIAL

## 2024-12-10 VITALS
BODY MASS INDEX: 31.12 KG/M2 | HEIGHT: 64 IN | WEIGHT: 182.31 LBS | DIASTOLIC BLOOD PRESSURE: 82 MMHG | SYSTOLIC BLOOD PRESSURE: 138 MMHG

## 2024-12-10 DIAGNOSIS — N91.2 AMENORRHEA: Primary | ICD-10-CM

## 2024-12-10 DIAGNOSIS — Z32.01 POSITIVE PREGNANCY TEST: ICD-10-CM

## 2024-12-10 DIAGNOSIS — Z32.00 POSSIBLE PREGNANCY, NOT CONFIRMED: Primary | ICD-10-CM

## 2024-12-10 LAB
B-HCG UR QL: NEGATIVE
CTP QC/QA: YES
HCG INTACT+B SERPL-ACNC: 4.4 MIU/ML

## 2024-12-10 PROCEDURE — 3079F DIAST BP 80-89 MM HG: CPT | Mod: CPTII,S$GLB,,

## 2024-12-10 PROCEDURE — 99999 PR PBB SHADOW E&M-EST. PATIENT-LVL IV: CPT | Mod: PBBFAC,,,

## 2024-12-10 PROCEDURE — 84702 CHORIONIC GONADOTROPIN TEST: CPT

## 2024-12-10 PROCEDURE — 3075F SYST BP GE 130 - 139MM HG: CPT | Mod: CPTII,S$GLB,,

## 2024-12-10 PROCEDURE — 99214 OFFICE O/P EST MOD 30 MIN: CPT | Mod: S$GLB,,,

## 2024-12-10 PROCEDURE — 36415 COLL VENOUS BLD VENIPUNCTURE: CPT

## 2024-12-10 PROCEDURE — 81025 URINE PREGNANCY TEST: CPT | Mod: S$GLB,,,

## 2024-12-10 PROCEDURE — 3008F BODY MASS INDEX DOCD: CPT | Mod: CPTII,S$GLB,,

## 2024-12-10 PROCEDURE — 1159F MED LIST DOCD IN RCRD: CPT | Mod: CPTII,S$GLB,,

## 2024-12-10 NOTE — TELEPHONE ENCOUNTER
----- Message from Marina sent at 12/10/2024 11:00 AM CST -----  Contact: [phone] .[email or web address]     Patient is calling for Medical Advice regarding:pt states she has an virtual appt on today for 1 , pt states she needs a ultrasound and believes a virtual visit wont do anything for her, pt states she needs to been by 12/12, pt believes she is pregnant and confirmed with 3 digital test , pt states no one is taking her concerns seriously     How long has patient had these symptoms:    Pharmacy name and phone#:    Patient wants a call back or thru [facility]:Call back     Please call pt and advise

## 2024-12-10 NOTE — PROGRESS NOTES
Last cycle may  Unprotected intercourse in July  Nausea started in August  Implantation bleeding in September, stomach burning  Taking tests in August, September, October, November,   December 4, 8, 10 +upt at home. Negative in office.  No vaginal bleeding. Feeling abdominal distention. Feels like she has been feeling subtle movement. Flutters started last month.

## 2024-12-12 ENCOUNTER — HOSPITAL ENCOUNTER (OUTPATIENT)
Dept: RADIOLOGY | Facility: OTHER | Age: 54
Discharge: HOME | End: 2024-12-12
Payer: COMMERCIAL

## 2024-12-12 DIAGNOSIS — Z32.00 POSSIBLE PREGNANCY, NOT CONFIRMED: ICD-10-CM

## 2024-12-12 PROCEDURE — 76856 US EXAM PELVIC COMPLETE: CPT | Mod: 26,,, | Performed by: RADIOLOGY

## 2024-12-12 PROCEDURE — 76830 TRANSVAGINAL US NON-OB: CPT | Mod: 26,,, | Performed by: RADIOLOGY

## 2024-12-12 PROCEDURE — 76830 TRANSVAGINAL US NON-OB: CPT | Mod: TC

## 2024-12-12 NOTE — PROGRESS NOTES
Subjective     Patient ID: Jess Mcleod is a 54 y.o. female.    Chief Complaint: Possible Pregnancy    Pt presents because she has had 3 positive pregnancy tests at home. Her last cycle was in May. She has unprotected intercourse in July. She reports that nausea started in August. She had an episode of implantation bleeding in September and her stomach has been burning ever since. She is adamant that she is pregnant because this is how she always knows that she is pregnant. She has taken a pregnancy test every month since August and they have all been negative until December. She had a positive pregnancy test at home on December 4, 8, and 10th. Her upt in clinic today is negative. She was evaluated for similar concerns in October with Dr. Thomas. Her upt and beta HCG were negative at that time. Denies vaginal bleeding. Reports that her abdomen is growing and becoming more distended. She reports that she feels fetal movement and subtle flutters that started last month. Denies GI complaints. She is insistent that she is pregnant and desires an ultrasound. She is supposed to have knee surgery on Thursday but plans to cancel her surgery until she knows what is going on with her body.      Review of Systems   Gastrointestinal:  Positive for abdominal distention and nausea. Negative for change in bowel habit and constipation.   Genitourinary:  Positive for menstrual irregularity. Negative for dysuria, frequency, pelvic pain, urgency and vaginal bleeding.   Psychiatric/Behavioral:  Positive for agitation.           Objective     Physical Exam  Constitutional:       General: She is not in acute distress.     Appearance: She is not ill-appearing.   Pulmonary:      Effort: Pulmonary effort is normal.   Abdominal:      General: There is distension.   Neurological:      General: No focal deficit present.      Mental Status: She is alert and oriented to person, place, and time.   Psychiatric:         Attention and  Perception: Attention normal.         Mood and Affect: Affect is angry.         Speech: Speech normal.         Behavior: Behavior is agitated. Behavior is not aggressive. Behavior is cooperative.     Declined pelvic exam       Assessment and Plan     1. Possible pregnancy, not confirmed  -     POCT urine pregnancy  -     HCG, Quantitative; Future; Expected date: 12/10/2024  -     US Pelvis Comp with Transvag NON-OB (xpd; Future; Expected date: 12/10/2024    2. Positive pregnancy test  -     POCT urine pregnancy  -     HCG, Quantitative; Future; Expected date: 12/10/2024        -suspect amenorrhea is r/t perimenopause & abdominal distention r/t fibroids  -upt negative  -beta HCG ordered  -pelvic US scheduled for Thursday         Follow up if symptoms worsen or fail to improve, for pending test results.

## 2024-12-13 ENCOUNTER — PATIENT MESSAGE (OUTPATIENT)
Dept: OBSTETRICS AND GYNECOLOGY | Facility: CLINIC | Age: 54
End: 2024-12-13
Payer: COMMERCIAL

## 2024-12-20 ENCOUNTER — TELEPHONE (OUTPATIENT)
Dept: OBSTETRICS AND GYNECOLOGY | Facility: CLINIC | Age: 54
End: 2024-12-20

## 2024-12-20 NOTE — TELEPHONE ENCOUNTER
Called to speak with patient regarding recent message sent to the office regarding her concerns.  Patient reports increased abdominal girth and weight gain consistent with pregnancy.  Pregnancy has been ruled out with negative urine and serum test along with normal abdominal pelvic ultrasound.  Patient is scheduled for knee surgery however postponed secondary to these concerns.  We will have patient follow up with her primary care for evaluation of symptoms listed above.  Plan follow up with office within the next 4-5 weeks for reassessment.  Patient voices appreciation for call.  Strict precautions given and patient voices understanding

## 2025-02-03 ENCOUNTER — OFFICE VISIT (OUTPATIENT)
Dept: OBSTETRICS AND GYNECOLOGY | Facility: CLINIC | Age: 55
End: 2025-02-03
Payer: COMMERCIAL

## 2025-02-03 VITALS
WEIGHT: 185.19 LBS | HEIGHT: 64 IN | DIASTOLIC BLOOD PRESSURE: 80 MMHG | BODY MASS INDEX: 31.62 KG/M2 | SYSTOLIC BLOOD PRESSURE: 122 MMHG

## 2025-02-03 DIAGNOSIS — N92.6 IRREGULAR MENSTRUAL CYCLE: ICD-10-CM

## 2025-02-03 DIAGNOSIS — Z12.31 VISIT FOR SCREENING MAMMOGRAM: ICD-10-CM

## 2025-02-03 DIAGNOSIS — Z11.3 SCREEN FOR STD (SEXUALLY TRANSMITTED DISEASE): Primary | ICD-10-CM

## 2025-02-03 PROCEDURE — 1160F RVW MEDS BY RX/DR IN RCRD: CPT | Mod: CPTII,S$GLB,, | Performed by: OBSTETRICS & GYNECOLOGY

## 2025-02-03 PROCEDURE — 99213 OFFICE O/P EST LOW 20 MIN: CPT | Mod: S$GLB,,, | Performed by: OBSTETRICS & GYNECOLOGY

## 2025-02-03 PROCEDURE — 3079F DIAST BP 80-89 MM HG: CPT | Mod: CPTII,S$GLB,, | Performed by: OBSTETRICS & GYNECOLOGY

## 2025-02-03 PROCEDURE — 3074F SYST BP LT 130 MM HG: CPT | Mod: CPTII,S$GLB,, | Performed by: OBSTETRICS & GYNECOLOGY

## 2025-02-03 PROCEDURE — 3008F BODY MASS INDEX DOCD: CPT | Mod: CPTII,S$GLB,, | Performed by: OBSTETRICS & GYNECOLOGY

## 2025-02-03 PROCEDURE — 1159F MED LIST DOCD IN RCRD: CPT | Mod: CPTII,S$GLB,, | Performed by: OBSTETRICS & GYNECOLOGY

## 2025-02-03 PROCEDURE — 99999 PR PBB SHADOW E&M-EST. PATIENT-LVL IV: CPT | Mod: PBBFAC,,, | Performed by: OBSTETRICS & GYNECOLOGY

## 2025-02-10 NOTE — PROGRESS NOTES
HISTORY OF PRESENT ILLNESS:    Jess Mcleod is a 55 y.o. female  Patient's last menstrual period was 2025 (exact date). presents today complaining of pregnancy symptoms. Breast tenderness, abdominal fullness and fluttering. Stopped all meds because of possible pregnancy  LMP - May 2024 - 2024 (30 days)   Prior to May - Patient reports cycles occur every 1-4 months weeks lasting 2-6 days using 2-3 pads per day with debilitating pain with menses. Denies pain throughout the month.   Seen by NP- thought she was pregnant. Labs & US we    Past Medical History:   Diagnosis Date    Arthritis     Chronic back pain     Chronic neck pain     Fibrocystic breast     Fibroid, uterus     GERD (gastroesophageal reflux disease)     Herpes     Migraine headache     Narcolepsy     Nephrolithiasis 2018    Sciatica of right side 2018    Scoliosis     Urge incontinence 2018       Past Surgical History:   Procedure Laterality Date    ARTHROSCOPIC CHONDROPLASTY OF KNEE JOINT Left 2022    Procedure: ARTHROSCOPY, KNEE, WITH CHONDROPLASTY;  Surgeon: Janette Odonnell MD;  Location: [facility] OR;  Service: Orthopedics;  Laterality: Left;     SECTION      EMBOLIZATION N/A 2020    Procedure: EMBOLIZATION, BLOOD VESSEL;  Surgeon: Dean Wheeler MD;  Location: [facility] CATH LAB;  Service: Radiology;  Laterality: N/A;    NASAL TURBINATE REDUCTION Bilateral 2022    Procedure: REDUCTION, NASAL TURBINATE john bullosa;  Surgeon: Waldo Giles MD;  Location: [address];  Service: ENT;  Laterality: Bilateral;    Scoliosis surgery      SINUS SURGERY      SYNOVECTOMY OF KNEE Left 2022    Procedure: SYNOVECTOMY, KNEE;  Surgeon: Janette Odonnell MD;  Location: [facility] OR;  Service: Orthopedics;  Laterality: Left;    UMBILICAL HERNIA REPAIR         MEDICATIONS AND ALLERGIES:      Current Outpatient Medications:     aspirin (ECOTRIN) 81 MG EC tablet, Take 1 tablet (81 mg total) by  mouth once daily., Disp: 28 tablet, Rfl: 0    atorvastatin (LIPITOR) 40 MG tablet, Take 1 tablet by mouth every evening., Disp: , Rfl:     azelastine (ASTELIN) 137 mcg (0.1 %) nasal spray, SPRAY 1 SPRAY IN EACH NOSTRIL TWICE DAILY, Disp: 90 mL, Rfl: 1    azelastine (ASTELIN) 137 mcg (0.1 %) nasal spray, 1 spray (137 mcg total) by Nasal route 2 (two) times daily., Disp: 30 mL, Rfl: 3    calcium carbonate (OS-TIFFANIE) 500 mg calcium (1,250 mg) tablet, Take 1 tablet by mouth once daily., Disp: , Rfl:     HYDROcodone-acetaminophen (NORCO) 7.5-325 mg per tablet, Take 1 tablet by mouth every 6 (six) hours as needed for Pain., Disp: 20 tablet, Rfl: 0    ibuprofen (ADVIL,MOTRIN) 200 MG tablet, Take 200 mg by mouth every 6 (six) hours as needed for Pain., Disp: , Rfl:     mometasone (ELOCON) 0.1 % ointment, Apply topically once daily., Disp: 45 g, Rfl: 2    multivitamin with minerals tablet, Take 1 tablet by mouth once daily., Disp: , Rfl:     solriamfetoL (SUNOSI) 150 mg Tab, Take1 tablet by mouth every morning, Disp: 30 tablet, Rfl: 5    Lactobac no.41/Bifidobact no.7 (PROBIOTIC-10 ORAL), Take by mouth. (Patient not taking: Reported on 2/3/2025), Disp: , Rfl:     magnesium oxide (MAG-OX) 400 mg (241.3 mg magnesium) tablet, Take 400 mg by mouth once daily. (Patient not taking: Reported on 2/3/2025), Disp: , Rfl:     methylPREDNISolone (MEDROL DOSEPACK) 4 mg tablet, Take 4 mg by mouth. (Patient not taking: Reported on 2/3/2025), Disp: , Rfl:     omega-3/dha/epa/dpa/fish oil (OMEGA-3 2100 ORAL), Take by mouth. (Patient not taking: Reported on 2/3/2025), Disp: , Rfl:     tranexamic acid (LYSTEDA) 650 mg tablet, Take 2 tablets (1,300 mg total) by mouth 3 (three) times daily. Do not take for longer than 5 days (Patient not taking: Reported on 2/3/2025), Disp: 30 tablet, Rfl: 1  No current facility-administered medications for this visit.    Facility-Administered Medications Ordered in Other Visits:     LIDOcaine (PF) 10 mg/ml (1%)  "injection 10 mg, 1 mL, Intradermal, Once, Checo Escobar MD    Review of patient's allergies indicates:   Allergen Reactions    Ibuprofen Other (See Comments)     It makes my "ears hurt" when I take large doses.    Opioids - morphine analogues Other (See Comments)     Morphine causes headaches       COMPREHENSIVE GYN HISTORY:  PAP History: Denies abnormal Paps.  Infection History: Denies STDs. Denies PID.  Benign History: Denies uterine fibroids. Denies ovarian cysts. Denies endometriosis. Denies other conditions.  Cancer History: Denies cervical cancer. Denies uterine cancer or hyperplasia. Denies ovarian cancer. Denies vulvar cancer or pre-cancer. Denies vaginal cancer or pre-cancer. Denies breast cancer. Denies colon cancer.  Sexual Activity History: Reports currently being sexually active  Menstrual History: Every 28 days, flows for 4 days. Light flow.  Dysmenorrhea History: Denies dysmenorrhea.    ROS:  GENERAL: No fever or chills.  BREASTS: No pain. No lumps. No discharge.  ABDOMEN: No pain. No nausea. No vomiting. No diarrhea. No constipation.  REPRODUCTIVE: No abnormal bleeding.   VULVA: No pain. No lesions. No itching.  VAGINA: No relaxation. No itching. No odor. No discharge. No lesions.  URINARY: No incontinence. No nocturia. No frequency. No dysuria.    /80 (BP Location: Left arm)   Ht 5' 4" (1.626 m)   Wt 84 kg (185 lb 3 oz)   LMP 01/13/2025 (Exact Date) Comment: spotting 13 14 and 15  BMI 31.79 kg/m²     PE:  APPEARANCE: Well nourished, well developed, in no acute distress.  AFFECT: WNL, alert and oriented x 3.  Deferred      1. Screen for STD (sexually transmitted disease)    2. Visit for screening mammogram    3. Irregular menstrual cycle        PLAN:    Orders Placed This Encounter    Mammo Digital Screening Bilat w/ [facility]    Hepatitis Panel, Acute    Treponema Pallidium Antibodies IgG, IgM    C. trachomatis/N. gonorrhoeae by AMP DNA [facility]; Urine    HIV 1/2 Ag/Ab (4th Gen) "       COUNSELING:  The patient was counseled today on:    I spent a total of 25 minutes on the day of the visit. addressing problems separate from annual exam.  This includes face to face time and non-face to face time preparing to see the patient (eg, review of tests), Obtaining and/or reviewing separately obtained history, Documenting clinical information in the electronic or other health record, Independently interpreting resultsand communicating results to the patient/family/caregiver, or Care coordination.     FOLLOW-UP with me after above

## (undated) DEVICE — GOWN SMARTGOWN LVL4 X-LONG XL

## (undated) DEVICE — SEE MEDLINE ITEM 157150

## (undated) DEVICE — SEE MEDLINE ITEM 146292

## (undated) DEVICE — APPLICATOR CHLORAPREP ORN 26ML

## (undated) DEVICE — KIT CUSTOM

## (undated) DEVICE — SEE MEDLINE ITEM 156918

## (undated) DEVICE — TUBE SET INFLOW/OUTFLOW

## (undated) DEVICE — SEE L#120831

## (undated) DEVICE — DRESSING SURGICAL 1X3

## (undated) DEVICE — UNDERGLOVES BIOGEL PI SZ 7 LF

## (undated) DEVICE — BANDAGE MATRIX HK LOOP 6IN 5YD

## (undated) DEVICE — GLOVE PROTEXIS LIGHT BROWN 8.5

## (undated) DEVICE — PROTECTION STATION PLUS

## (undated) DEVICE — BLADE SCALP OPHTL RND TIP

## (undated) DEVICE — SEE MEDLINE ITEM 157117

## (undated) DEVICE — GOWN SMART IMP BREATHABLE XXLG

## (undated) DEVICE — PAD COLD THERAPY KNEE WRAP ON

## (undated) DEVICE — GLOVE SURGEON SYN PF SZ 9

## (undated) DEVICE — CATH GLIDECATH ANG 4FR 150CM

## (undated) DEVICE — GLOVE BIOGEL SKINSENSE PI 7.5

## (undated) DEVICE — SEE MEDLINE ITEM 157166

## (undated) DEVICE — GLOVE PROTEXIS LTX MICRO 8

## (undated) DEVICE — DRESSING N ADH OIL EMUL 3X3

## (undated) DEVICE — MICROSPHR HYDROPEARL 600UM 2ML

## (undated) DEVICE — NDL 18GA X1 1/2 REG BEVEL

## (undated) DEVICE — Device

## (undated) DEVICE — GLOVE BIOGEL SKINSENSE PI 8.5

## (undated) DEVICE — HEMOSTAT VASC BAND REG 24CM

## (undated) DEVICE — BLADE INFERIOR TURBINATE 5/PK

## (undated) DEVICE — ELECTRODE REM PLYHSV RETURN 9

## (undated) DEVICE — COVER LIGHT HANDLE 80/CA

## (undated) DEVICE — STOPCOCK 3 WAY MED PRESSURE

## (undated) DEVICE — TOURNIQUET SB QC DP 34X4IN

## (undated) DEVICE — DRESSING TRANS 4X4 TEGADERM

## (undated) DEVICE — GLOVE BIOGEL SKINSENSE PI 6.5

## (undated) DEVICE — GLIDESHEATH SLENDER SS 5FR10CM

## (undated) DEVICE — SUT ETHILON 3-0 PS2 18 BLK

## (undated) DEVICE — NDL SAFETY 22G X 1.5 ECLIPSE

## (undated) DEVICE — TOWEL OR DISP STRL BLUE 4/PK

## (undated) DEVICE — SOL IRR NACL .9% 3000ML

## (undated) DEVICE — UNDERGLOVES BIOGEL PI SIZE 8.5

## (undated) DEVICE — FLOWSWITCH HP 1-W W/O LL

## (undated) DEVICE — SEE MEDLINE ITEM 157131

## (undated) DEVICE — GAUZE SPONGE 4X4 12PLY

## (undated) DEVICE — PADDING WYTEX UNDRCST 6INX4YD

## (undated) DEVICE — GUIDEWIRE LAUREATE .035X260CM

## (undated) DEVICE — DRAPE STERI U-SHAPED 47X51IN

## (undated) DEVICE — SEE MEDLINE ITEM 156913

## (undated) DEVICE — BAG SNAP KOVER BAND 36 X 28 IN

## (undated) DEVICE — CHLORAPREP 10.5 ML APPLICATOR

## (undated) DEVICE — SUCTION COAGULATOR 10FR 6IN

## (undated) DEVICE — COVER CAMERA OPERATING ROOM

## (undated) DEVICE — KIT PROBE COVER WITH GEL